# Patient Record
Sex: MALE | Race: WHITE | NOT HISPANIC OR LATINO | Employment: OTHER | URBAN - METROPOLITAN AREA
[De-identification: names, ages, dates, MRNs, and addresses within clinical notes are randomized per-mention and may not be internally consistent; named-entity substitution may affect disease eponyms.]

---

## 2017-01-07 LAB
CHOLEST SERPL-MCNC: 151 MG/DL (ref 100–199)
COMMENT 011824: ABNORMAL
HDLC SERPL-MCNC: 54 MG/DL
LDLC SERPL CALC-MCNC: 63 MG/DL (ref 0–99)
TRIGL SERPL-MCNC: 172 MG/DL (ref 0–149)
VLDLC SERPL CALC-MCNC: 34 MG/DL (ref 5–40)

## 2017-01-12 ENCOUNTER — OFFICE VISIT (OUTPATIENT)
Dept: CARDIOLOGY | Facility: MEDICAL CENTER | Age: 72
End: 2017-01-12
Payer: MEDICARE

## 2017-01-12 VITALS
HEIGHT: 67 IN | SYSTOLIC BLOOD PRESSURE: 112 MMHG | BODY MASS INDEX: 28.72 KG/M2 | WEIGHT: 183 LBS | OXYGEN SATURATION: 96 % | HEART RATE: 88 BPM | DIASTOLIC BLOOD PRESSURE: 68 MMHG

## 2017-01-12 DIAGNOSIS — I10 ESSENTIAL HYPERTENSION: ICD-10-CM

## 2017-01-12 DIAGNOSIS — I25.119 CORONARY ARTERY DISEASE INVOLVING NATIVE CORONARY ARTERY OF NATIVE HEART WITH ANGINA PECTORIS (HCC): ICD-10-CM

## 2017-01-12 DIAGNOSIS — E78.5 HYPERLIPIDEMIA, UNSPECIFIED HYPERLIPIDEMIA TYPE: ICD-10-CM

## 2017-01-12 PROCEDURE — 99214 OFFICE O/P EST MOD 30 MIN: CPT | Performed by: NURSE PRACTITIONER

## 2017-01-12 ASSESSMENT — ENCOUNTER SYMPTOMS
ABDOMINAL PAIN: 0
FEVER: 0
PALPITATIONS: 0
PND: 0
DIZZINESS: 0
SENSORY CHANGE: 1
BACK PAIN: 1
MYALGIAS: 0
ORTHOPNEA: 0
CLAUDICATION: 0
COUGH: 0
SHORTNESS OF BREATH: 0

## 2017-01-12 NOTE — Clinical Note
[facility]   [address] [place] [address]  [place] [address]  Phone: [phone]  Fax: [phone]              Ronny Gallegos  1945    Encounter Date: 1/12/2017    FLAVIA Kirk          PROGRESS NOTE:  Subjective:   Ronny Gallegos is a 71 y.o. male who presents today for 6 month follow up.    He is a patient of Dr. Ellsworth in our office. Hx of HLD, HTN, and CAD (+ CT scoring).    He is overall doing well from a cardiac standpoint. He does have severe back pain and neuropathy, pending another back surgery this year sometime with Dr. Garsia.    He is not able to exercise because of this. He knows he can do better with his diet, his sugars and triglycerides have been a little elevated.    He has had no episodes of chest pain, palpitations, dizziness/lightheadedness, shortness of breath, orthopnea, or peripheral edema.    Past Medical History   Diagnosis Date   • High cholesterol    • GERD (gastroesophageal reflux disease)    • Hypertension    • Alcohol abuse, unspecified    • Arthropathy, unspecified, site unspecified      Surgical repair , cervical arthritis   • Other chest pain    • Other and unspecified hyperlipidemia    • Unspecified essential hypertension    • Hypopotassemia    • Hyposmolality and/or hyponatremia    • Personal history of peptic ulcer disease    • Heart burn    • Indigestion    • Glaucoma    • Unspecified cataract    • Jaundice 4/1/04   • Pneumonia 1979   • Asthma      uses inhalers   • Depressive disorder, not elsewhere classified      Past Surgical History   Procedure Laterality Date   • Other       ANTERIOR NECK FUSION C5-7   • Other       BLEEDING ULCERS   • Lumbar fusion posterior  9/10/2015     Procedure: LUMBAR FUSION POSTERIOR L5-S1 ;  Surgeon: Clint Garsai M.D.;  Location: SURGERY [facility];  Service:    • Lumbar laminectomy diskectomy  9/10/2015     Procedure: LUMBAR LAMINECTOMY ;  Surgeon: Clint FAITH  JULISA Garsia;  Location: SURGERY [facility];  Service:    • Laminotomy  9/10/2015     Procedure: LAMINOTOMY;  Surgeon: Clint Garsia M.D.;  Location: SURGERY [facility];  Service:    • Laparoscopy robotic xi  10/26/2016     Procedure: LAPAROSCOPY FOR LIVER CYST MARSUPIALIZATION AND RESECTION LIVER WALL CYST;  Surgeon: Frank Corona M.D.;  Location: SURGERY [facility];  Service:      Family History   Problem Relation Age of Onset   • Stroke Mother    • Heart Disease Mother    • Stroke Father    • Heart Disease Father      History   Smoking status   • Former Smoker -- 13 years   • Quit date: 12/20/1978   Smokeless tobacco   • Never Used     Allergies   Allergen Reactions   • Nkda [No Known Drug Allergy]      Outpatient Encounter Prescriptions as of 1/12/2017   Medication Sig Dispense Refill   • carvedilol (COREG) 6.25 MG Tab Take 6.25 mg by mouth 2 times a day, with meals.     • escitalopram (LEXAPRO) 20 MG tablet Take 20 mg by mouth every day.     • lisinopril (PRINIVIL) 20 MG Tab Take 20 mg by mouth every day.     • tramadol (ULTRAM) 50 MG Tab Take 1 Tab by mouth every four hours as needed for Mild Pain. 30 Tab 0   • ketorolac (TORADOL) 10 MG Tab Take 1 Tab by mouth every 6 hours as needed for Mild Pain. 20 Tab 1   • ondansetron (ZOFRAN) 4 MG Tab tablet Take 1 Tab by mouth every four hours as needed for Nausea/Vomiting. 20 Tab 1   • atorvastatin (LIPITOR) 20 MG Tab Take 1 Tab by mouth every day. 90 Tab 3   • amlodipine (NORVASC) 10 MG Tab Take 1 Tab by mouth every day. 90 Tab 3   • gabapentin (NEURONTIN) 300 MG Cap Take 300 mg by mouth 3 times a day.  0   • tamsulosin (FLOMAX) 0.4 MG capsule Take 0.4 mg by mouth ONE-HALF HOUR AFTER BREAKFAST.     • dutasteride (AVODART) 0.5 MG capsule Take 0.5 mg by mouth every day.     • Brinzolamide-Brimonidine (SIMBRINZA) 1-0.2 % SUSP 1 Drop by Ophthalmic route 3 times a day.     • bimatoprost (LUMIGAN) 0.03 % SOLN Place 1 Drop in both eyes every  "evening.       No facility-administered encounter medications on file as of 1/12/2017.     Review of Systems   Constitutional: Negative for fever and malaise/fatigue.   Respiratory: Negative for cough and shortness of breath.    Cardiovascular: Negative for chest pain, palpitations, orthopnea, claudication, leg swelling and PND.   Gastrointestinal: Negative for abdominal pain.   Musculoskeletal: Positive for back pain. Negative for myalgias.   Neurological: Positive for sensory change. Negative for dizziness.        Neuropathy     All other systems reviewed and are negative.       Objective:   /68 mmHg  Pulse 88  Ht 1.702 m (5' 7.01\")  Wt 83.008 kg (183 lb)  BMI 28.66 kg/m2  SpO2 96%    Physical Exam   Constitutional: He is oriented to person, place, and time. He appears well-developed and well-nourished. No distress.   HENT:   Head: Normocephalic and atraumatic.   Eyes: EOM are normal.   Neck: Normal range of motion. No JVD present.   Cardiovascular: Normal rate, regular rhythm, normal heart sounds and intact distal pulses.    No murmur heard.  Pulmonary/Chest: Effort normal and breath sounds normal. No respiratory distress. He has no wheezes. He has no rales.   Abdominal: Soft. Bowel sounds are normal.   Musculoskeletal: Normal range of motion. He exhibits no edema.   Neurological: He is alert and oriented to person, place, and time.   Skin: Skin is warm and dry.   Psychiatric: He has a normal mood and affect.   Nursing note and vitals reviewed.    Lab Results   Component Value Date/Time    CHOLESTEROL, 01/06/2017 10:35 AM    LDL 63 01/06/2017 10:35 AM    HDL 54 01/06/2017 10:35 AM    TRIGLYCERIDES 172* 01/06/2017 10:35 AM       Lab Results   Component Value Date/Time    SODIUM 135 10/20/2016 08:28 AM    POTASSIUM 4.0 10/20/2016 08:28 AM    CHLORIDE 101 10/20/2016 08:28 AM    CO2 25 10/20/2016 08:28 AM    GLUCOSE 134* 10/20/2016 08:28 AM    BUN 13 10/20/2016 08:28 AM    CREATININE 0.78 10/20/2016 " 08:28 AM    BUN-CREATININE RATIO 18 06/29/2016 09:59 AM     Lab Results   Component Value Date/Time    ALKALINE PHOSPHATASE 57 10/20/2016 08:28 AM    AST(SGOT) 23 10/20/2016 08:28 AM    ALT(SGPT) 20 10/20/2016 08:28 AM    TOTAL BILIRUBIN 1.1 10/20/2016 08:28 AM      2015 CT SCORING   FINDINGS:  Coronary calcification:  LMA - 0.0  LCX - 35.4  LAD - 167.9  RCA - 616.6  PDA - 0.0  Calcium score:  819.9  The visualized portions of the lungs and mediastinum are unremarkable. There is some scarring in the lingula.  Limited evaluation of the upper abdomen demonstrates interval enlargement of cyst in the left hepatic lobe currently measuring 11 cm and previously measuring 6 cm. Surgical clips identified about the GE junction.           1.  There is extensive atherosclerotic plaque burden.  2.  There is a high likelihood (greater than 90%) of at least one significant coronary stenosis.  3.  The patient's cardiovascular risk is high.  4.  Aggressive risk factor reduction is strongly suggested.  5.  Exercise or pharmacologic nuclear medicine stress testing is strongly suggested to evaluate for indicible ischemia.  6.  Global cardiac assessment is suggested.  7.  Interval enlargement of left hepatic lobe cyst.  Calcium score is above the 75th percentile for the patient's age.    Assessment:     1. Hyperlipidemia, unspecified hyperlipidemia type  PET SCAN MYOCARDIAL PERFUSION   2. Essential hypertension  PET SCAN MYOCARDIAL PERFUSION   3. Coronary artery disease involving native coronary artery of native heart with angina pectoris (HCC)  PET SCAN MYOCARDIAL PERFUSION     Medical Decision Making:  Today's Assessment / Status / Plan:     1. HLD, good LDL control <70 with CAD but triglycerides a little high, he will work on cutting back sugar/carbs. Continue statin. FU with yearly CMP and lipid.    2. HTN, great control on lisinopril, coreg, and amlodipine. Continue same dosing.    3. CAD with + CT scoring, significant disease,  before back surgery check PET scan for ischemia. We will also start him on a baby ASA 81 mg daily, continue his statin and beta blocker. We will call him with his PET scan results- if + elective angiogram will be done, if - can proceed with back surgery at a moderate risk.    FU in clinic in 6 months with ST with yearly labs and PET scan review; call with results as above    Patient verbalizes understanding and agrees with the plan of care.     Collaborating MD: Rui BRADY    Spent 45 minutes in face-to-face patient contact in which greater than 50% of the visit was spent in counseling/coordination of care of medication management, symptom management, re-assurance, discussion of coronary disease, medications in detail, discussion of labs, order PET.          No Recipients

## 2017-01-12 NOTE — PROGRESS NOTES
Subjective:   Ronny Gallegos is a 71 y.o. male who presents today for 6 month follow up.    He is a patient of Dr. Ellsworth in our office. Hx of HLD, HTN, and CAD (+ CT scoring).    He is overall doing well from a cardiac standpoint. He does have severe back pain and neuropathy, pending another back surgery this year sometime with Dr. Garsia.    He is not able to exercise because of this. He knows he can do better with his diet, his sugars and triglycerides have been a little elevated.    He has had no episodes of chest pain, palpitations, dizziness/lightheadedness, shortness of breath, orthopnea, or peripheral edema.    Past Medical History   Diagnosis Date   • High cholesterol    • GERD (gastroesophageal reflux disease)    • Hypertension    • Alcohol abuse, unspecified    • Arthropathy, unspecified, site unspecified      Surgical repair , cervical arthritis   • Other chest pain    • Other and unspecified hyperlipidemia    • Unspecified essential hypertension    • Hypopotassemia    • Hyposmolality and/or hyponatremia    • Personal history of peptic ulcer disease    • Heart burn    • Indigestion    • Glaucoma    • Unspecified cataract    • Jaundice 4/1/04   • Pneumonia 1979   • Asthma      uses inhalers   • Depressive disorder, not elsewhere classified      Past Surgical History   Procedure Laterality Date   • Other       ANTERIOR NECK FUSION C5-7   • Other       BLEEDING ULCERS   • Lumbar fusion posterior  9/10/2015     Procedure: LUMBAR FUSION POSTERIOR L5-S1 ;  Surgeon: Clint Garsia M.D.;  Location: [facility];  Service:    • Lumbar laminectomy diskectomy  9/10/2015     Procedure: LUMBAR LAMINECTOMY ;  Surgeon: Clint Garsia M.D.;  Location: [facility];  Service:    • Laminotomy  9/10/2015     Procedure: LAMINOTOMY;  Surgeon: Clint Garsia M.D.;  Location: [facility];  Service:    • Laparoscopy robotic xi  10/26/2016     Procedure: LAPAROSCOPY FOR LIVER CYST  MARSUPIALIZATION AND RESECTION LIVER WALL CYST;  Surgeon: Frank Corona M.D.;  Location: SURGERY [facility];  Service:      Family History   Problem Relation Age of Onset   • Stroke Mother    • Heart Disease Mother    • Stroke Father    • Heart Disease Father      History   Smoking status   • Former Smoker -- 13 years   • Quit date: 12/20/1978   Smokeless tobacco   • Never Used     Allergies   Allergen Reactions   • Nkda [No Known Drug Allergy]      Outpatient Encounter Prescriptions as of 1/12/2017   Medication Sig Dispense Refill   • carvedilol (COREG) 6.25 MG Tab Take 6.25 mg by mouth 2 times a day, with meals.     • escitalopram (LEXAPRO) 20 MG tablet Take 20 mg by mouth every day.     • lisinopril (PRINIVIL) 20 MG Tab Take 20 mg by mouth every day.     • tramadol (ULTRAM) 50 MG Tab Take 1 Tab by mouth every four hours as needed for Mild Pain. 30 Tab 0   • ketorolac (TORADOL) 10 MG Tab Take 1 Tab by mouth every 6 hours as needed for Mild Pain. 20 Tab 1   • ondansetron (ZOFRAN) 4 MG Tab tablet Take 1 Tab by mouth every four hours as needed for Nausea/Vomiting. 20 Tab 1   • atorvastatin (LIPITOR) 20 MG Tab Take 1 Tab by mouth every day. 90 Tab 3   • amlodipine (NORVASC) 10 MG Tab Take 1 Tab by mouth every day. 90 Tab 3   • gabapentin (NEURONTIN) 300 MG Cap Take 300 mg by mouth 3 times a day.  0   • tamsulosin (FLOMAX) 0.4 MG capsule Take 0.4 mg by mouth ONE-HALF HOUR AFTER BREAKFAST.     • dutasteride (AVODART) 0.5 MG capsule Take 0.5 mg by mouth every day.     • Brinzolamide-Brimonidine (SIMBRINZA) 1-0.2 % SUSP 1 Drop by Ophthalmic route 3 times a day.     • bimatoprost (LUMIGAN) 0.03 % SOLN Place 1 Drop in both eyes every evening.       No facility-administered encounter medications on file as of 1/12/2017.     Review of Systems   Constitutional: Negative for fever and malaise/fatigue.   Respiratory: Negative for cough and shortness of breath.    Cardiovascular: Negative for chest pain,  "palpitations, orthopnea, claudication, leg swelling and PND.   Gastrointestinal: Negative for abdominal pain.   Musculoskeletal: Positive for back pain. Negative for myalgias.   Neurological: Positive for sensory change. Negative for dizziness.        Neuropathy     All other systems reviewed and are negative.       Objective:   /68 mmHg  Pulse 88  Ht 1.702 m (5' 7.01\")  Wt 83.008 kg (183 lb)  BMI 28.66 kg/m2  SpO2 96%    Physical Exam   Constitutional: He is oriented to person, place, and time. He appears well-developed and well-nourished. No distress.   HENT:   Head: Normocephalic and atraumatic.   Eyes: EOM are normal.   Neck: Normal range of motion. No JVD present.   Cardiovascular: Normal rate, regular rhythm, normal heart sounds and intact distal pulses.    No murmur heard.  Pulmonary/Chest: Effort normal and breath sounds normal. No respiratory distress. He has no wheezes. He has no rales.   Abdominal: Soft. Bowel sounds are normal.   Musculoskeletal: Normal range of motion. He exhibits no edema.   Neurological: He is alert and oriented to person, place, and time.   Skin: Skin is warm and dry.   Psychiatric: He has a normal mood and affect.   Nursing note and vitals reviewed.    Lab Results   Component Value Date/Time    CHOLESTEROL, 01/06/2017 10:35 AM    LDL 63 01/06/2017 10:35 AM    HDL 54 01/06/2017 10:35 AM    TRIGLYCERIDES 172* 01/06/2017 10:35 AM       Lab Results   Component Value Date/Time    SODIUM 135 10/20/2016 08:28 AM    POTASSIUM 4.0 10/20/2016 08:28 AM    CHLORIDE 101 10/20/2016 08:28 AM    CO2 25 10/20/2016 08:28 AM    GLUCOSE 134* 10/20/2016 08:28 AM    BUN 13 10/20/2016 08:28 AM    CREATININE 0.78 10/20/2016 08:28 AM    BUN-CREATININE RATIO 18 06/29/2016 09:59 AM     Lab Results   Component Value Date/Time    ALKALINE PHOSPHATASE 57 10/20/2016 08:28 AM    AST(SGOT) 23 10/20/2016 08:28 AM    ALT(SGPT) 20 10/20/2016 08:28 AM    TOTAL BILIRUBIN 1.1 10/20/2016 08:28 AM    "   2015 CT SCORING   FINDINGS:  Coronary calcification:  LMA - 0.0  LCX - 35.4  LAD - 167.9  RCA - 616.6  PDA - 0.0  Calcium score:  819.9  The visualized portions of the lungs and mediastinum are unremarkable. There is some scarring in the lingula.  Limited evaluation of the upper abdomen demonstrates interval enlargement of cyst in the left hepatic lobe currently measuring 11 cm and previously measuring 6 cm. Surgical clips identified about the GE junction.           1.  There is extensive atherosclerotic plaque burden.  2.  There is a high likelihood (greater than 90%) of at least one significant coronary stenosis.  3.  The patient's cardiovascular risk is high.  4.  Aggressive risk factor reduction is strongly suggested.  5.  Exercise or pharmacologic nuclear medicine stress testing is strongly suggested to evaluate for indicible ischemia.  6.  Global cardiac assessment is suggested.  7.  Interval enlargement of left hepatic lobe cyst.  Calcium score is above the 75th percentile for the patient's age.    Assessment:     1. Hyperlipidemia, unspecified hyperlipidemia type  PET SCAN MYOCARDIAL PERFUSION   2. Essential hypertension  PET SCAN MYOCARDIAL PERFUSION   3. Coronary artery disease involving native coronary artery of native heart with angina pectoris (HCC)  PET SCAN MYOCARDIAL PERFUSION     Medical Decision Making:  Today's Assessment / Status / Plan:     1. HLD, good LDL control <70 with CAD but triglycerides a little high, he will work on cutting back sugar/carbs. Continue statin. FU with yearly CMP and lipid.    2. HTN, great control on lisinopril, coreg, and amlodipine. Continue same dosing.    3. CAD with + CT scoring, significant disease, before back surgery check PET scan for ischemia. We will also start him on a baby ASA 81 mg daily, continue his statin and beta blocker. We will call him with his PET scan results- if + elective angiogram will be done, if - can proceed with back surgery at a moderate  risk.    FU in clinic in 6 months with ST with yearly labs and PET scan review; call with results as above    Patient verbalizes understanding and agrees with the plan of care.     Collaborating MD: Rui BRADY    Spent 45 minutes in face-to-face patient contact in which greater than 50% of the visit was spent in counseling/coordination of care of medication management, symptom management, re-assurance, discussion of coronary disease, medications in detail, discussion of labs, order PET.

## 2017-01-12 NOTE — MR AVS SNAPSHOT
"        Ronny Gallegos   2017 1:20 PM   Office Visit   MRN: [id number]    Department:  [facility]   Dept Phone:  [phone]    Description:  Male : 1945   Provider:  FLAVIA Kirk           Reason for Visit     Follow-Up           Allergies as of 2017     Allergen Noted Reactions    Nkda [No Known Drug Allergy] 10/03/2008         You were diagnosed with     Hyperlipidemia, unspecified hyperlipidemia type   [3785929]       Essential hypertension   [4182106]       Coronary artery disease involving native coronary artery of native heart with angina pectoris (HCC)   [1423456]         Vital Signs     Blood Pressure Pulse Height Weight Body Mass Index Oxygen Saturation    112/68 mmHg 88 1.702 m (5' 7.01\") 83.008 kg (183 lb) 28.66 kg/m2 96%    Smoking Status                   Former Smoker           Basic Information     Date Of Birth Sex Race Ethnicity Preferred Language    1945 Male White Non- English      Your appointments     2017 10:00 AM   PET SCAN with PET TECH   [facility] (--)    [address] [place] [address], [address]  [facility] [address]   [phone]           Cardiac PET Prep:  1. Testing Location is [address] [place] [address] 2. No caffeine products including decaf, chocolate and no medications that contain caffeine for 18 hours.  3. NPO for 4 hours, except water.  4. No Viagra, Levitra, or Cialis for 48 hours prior.  5. Arrive 15 minutes before appointment time for check in.  6. Wear comfortable clothing with no metal over the chest area. 7. The procedure will last approximately 45 minutes to an hour.              Problem List              ICD-10-CM Priority Class Noted - Resolved    Alcohol abuse F10.10 Low  Unknown - Present    Arthropathy M12.9 Low  Unknown - Present    Depression F32.9 Low  Unknown - Present    Hyperlipidemia E78.5 Medium  Unknown - Present    Essential hypertension I10 Medium  Unknown - Present    History of peptic ulcer disease " Z87.11 Low  Unknown - Present    Environmental allergies Z91.09 Low  9/17/2013 - Present    Alcohol withdrawal (HCC) F10.239 Low  5/9/2014 - Present    Osteoarthritis of lumbosacral spine M47.817 Low  8/3/2015 - Present    Degeneration of lumbar or lumbosacral intervertebral disc M51.37 Low  9/10/2015 - Present    Other chronic nonalcoholic liver disease K76.89 Low  10/26/2016 - Present    Coronary artery disease involving native coronary artery of native heart with angina pectoris (HCC) I25.119 Medium  1/12/2017 - Present      Health Maintenance        Date Due Completion Dates    IMM DTaP/Tdap/Td Vaccine (1 - Tdap) 7/19/1966 ---    COLONOSCOPY 7/19/1997 ---    IMM ZOSTER VACCINE 7/19/2007 ---    IMM PNEUMOCOCCAL 65+ (ADULT) LOW/MEDIUM RISK SERIES (1 of 2 - PCV13) 7/19/2012 ---    IMM INFLUENZA (1) 9/1/2016 ---            Current Immunizations     Pneumococcal Vaccine (UF)Historical Data 8/1/2015      Below and/or attached are the medications your provider expects you to take. Review all of your home medications and newly ordered medications with your provider and/or pharmacist. Follow medication instructions as directed by your provider and/or pharmacist. Please keep your medication list with you and share with your provider. Update the information when medications are discontinued, doses are changed, or new medications (including over-the-counter products) are added; and carry medication information at all times in the event of emergency situations     Allergies:  NKDA - (reactions not documented)               Medications  Valid as of: January 12, 2017 -  1:56 PM    Generic Name Brand Name Tablet Size Instructions for use    AmLODIPine Besylate (Tab) NORVASC 10 MG Take 1 Tab by mouth every day.        Atorvastatin Calcium (Tab) LIPITOR 20 MG Take 1 Tab by mouth every day.        Bimatoprost (Solution) LUMIGAN 0.03 % Place 1 Drop in both eyes every evening.        Brinzolamide-Brimonidine (Suspension)  Brinzolamide-Brimonidine 1-0.2 % 1 Drop by Ophthalmic route 3 times a day.        Carvedilol (Tab) COREG 6.25 MG Take 6.25 mg by mouth 2 times a day, with meals.        Dutasteride (Cap) AVODART 0.5 MG Take 0.5 mg by mouth every day.        Escitalopram Oxalate (Tab) LEXAPRO 20 MG Take 20 mg by mouth every day.        Gabapentin (Cap) NEURONTIN 300 MG Take 300 mg by mouth 3 times a day.        Ketorolac Tromethamine (Tab) TORADOL 10 MG Take 1 Tab by mouth every 6 hours as needed for Mild Pain.        Lisinopril (Tab) PRINIVIL 20 MG Take 20 mg by mouth every day.        Ondansetron HCl (Tab) ZOFRAN 4 MG Take 1 Tab by mouth every four hours as needed for Nausea/Vomiting.        Tamsulosin HCl (Cap) FLOMAX 0.4 MG Take 0.4 mg by mouth ONE-HALF HOUR AFTER BREAKFAST.        TraMADol HCl (Tab) ULTRAM 50 MG Take 1 Tab by mouth every four hours as needed for Mild Pain.        .                 Medicines prescribed today were sent to:     [facility] - [place], [place] - [address] [facility]    [address] [facility] [address]    Phone: [phone] Fax: [phone]    Open 24 Hours?: No    [facility] - [place], [place] - [address]    [address] [facility] [address]    Phone: [phone] Fax: [phone]    Open 24 Hours?: No    [facility] - [place], [place] - [address] [facility] [facility] [address]    [address] [facility] [place] [address]    Phone: [phone] Fax: [phone]    Open 24 Hours?: No      Medication refill instructions:       If your prescription bottle indicates you have medication refills left, it is not necessary to call your provider’s office. Please contact your pharmacy and they will refill your medication.    If your prescription bottle indicates you do not have any refills left, you may request refills at any time through one of the following ways: The online [employer] system (except Urgent Care), by calling  your provider’s office, or by asking your pharmacy to contact your provider’s office with a refill request. Medication refills are processed only during regular business hours and may not be available until the next business day. Your provider may request additional information or to have a follow-up visit with you prior to refilling your medication.   *Please Note: Medication refills are assigned a new Rx number when refilled electronically. Your pharmacy may indicate that no refills were authorized even though a new prescription for the same medication is available at the pharmacy. Please request the medicine by name with the pharmacy before contacting your provider for a refill.           [employer] Access Code: Activation code not generated  Current [employer] Status: Active

## 2017-01-20 ENCOUNTER — TELEPHONE (OUTPATIENT)
Dept: CARDIOLOGY | Facility: MEDICAL CENTER | Age: 72
End: 2017-01-20

## 2017-01-20 NOTE — TELEPHONE ENCOUNTER
S/w pt, he is requesting order for PET scan to be sent to [facility] in [place], [place] (F: [phone].) Faxed order and included pts cell phone number on the order to call him to schedule as requested.

## 2017-01-20 NOTE — TELEPHONE ENCOUNTER
----- Message from Alyssa Ellis sent at 1/19/2017 11:03 AM PST -----  Regarding: patient wants to do his Pet scan in [place]  SC/Shawn        Patient said Isabela had put in an order for him to have a Pet scan. He is in [place] and wants to know if he can do the scan in [place] so he doesn't have to come back to [place]. He spoke to a lab in [place] and they said they would do it if they have an order. He can be reached at [phone]

## 2017-01-25 NOTE — TELEPHONE ENCOUNTER
Mojgan from [facility] in [place], [place] received request from pt is schedule his PET scan as [facility] in [place] does not perform PET scans.  [facility] is requesting records on the pt to provide better care and facilitate testing- Demographics, H&P, pertinent testing.    Faxed last ov note from SC which includes results of CT-scoring, and facesheet to F: [phone].

## 2017-02-03 ENCOUNTER — FOLLOW UP ESTABLISHED (OUTPATIENT)
Dept: URBAN - METROPOLITAN AREA CLINIC 44 | Facility: CLINIC | Age: 72
End: 2017-02-03
Payer: MEDICARE

## 2017-02-03 PROCEDURE — 92012 INTRM OPH EXAM EST PATIENT: CPT | Performed by: OPHTHALMOLOGY

## 2017-02-03 ASSESSMENT — INTRAOCULAR PRESSURE
OS: 16
OD: 24

## 2017-02-23 DIAGNOSIS — I10 UNSPECIFIED ESSENTIAL HYPERTENSION: ICD-10-CM

## 2017-02-23 DIAGNOSIS — I10 ESSENTIAL HYPERTENSION: ICD-10-CM

## 2017-02-23 DIAGNOSIS — E78.49 OTHER HYPERLIPIDEMIA: ICD-10-CM

## 2017-02-23 RX ORDER — LISINOPRIL 20 MG/1
20 TABLET ORAL DAILY
Qty: 90 TAB | Refills: 3 | Status: SHIPPED | OUTPATIENT
Start: 2017-02-23 | End: 2018-04-25 | Stop reason: SDUPTHER

## 2017-02-23 RX ORDER — ATORVASTATIN CALCIUM 20 MG/1
20 TABLET, FILM COATED ORAL DAILY
Qty: 90 TAB | Refills: 3 | Status: SHIPPED | OUTPATIENT
Start: 2017-02-23 | End: 2018-04-20 | Stop reason: SDUPTHER

## 2017-02-23 RX ORDER — CARVEDILOL 6.25 MG/1
6.25 TABLET ORAL 2 TIMES DAILY WITH MEALS
Qty: 180 TAB | Refills: 3 | Status: SHIPPED | OUTPATIENT
Start: 2017-02-23 | End: 2018-03-19 | Stop reason: SDUPTHER

## 2017-02-23 RX ORDER — AMLODIPINE BESYLATE 10 MG/1
10 TABLET ORAL DAILY
Qty: 90 TAB | Refills: 3 | Status: SHIPPED | OUTPATIENT
Start: 2017-02-23 | End: 2018-03-19 | Stop reason: SDUPTHER

## 2017-04-13 ENCOUNTER — FOLLOW UP ESTABLISHED (OUTPATIENT)
Dept: URBAN - METROPOLITAN AREA CLINIC 10 | Facility: CLINIC | Age: 72
End: 2017-04-13
Payer: MEDICARE

## 2017-04-13 PROCEDURE — 92012 INTRM OPH EXAM EST PATIENT: CPT | Performed by: OPHTHALMOLOGY

## 2017-05-03 ENCOUNTER — HOSPITAL ENCOUNTER (OUTPATIENT)
Dept: RADIOLOGY | Facility: MEDICAL CENTER | Age: 72
End: 2017-05-03
Attending: NURSE PRACTITIONER
Payer: MEDICARE

## 2017-05-03 DIAGNOSIS — E78.5 HYPERLIPIDEMIA, UNSPECIFIED HYPERLIPIDEMIA TYPE: ICD-10-CM

## 2017-05-03 DIAGNOSIS — I10 ESSENTIAL HYPERTENSION: ICD-10-CM

## 2017-05-03 DIAGNOSIS — I25.119 CORONARY ARTERY DISEASE INVOLVING NATIVE CORONARY ARTERY OF NATIVE HEART WITH ANGINA PECTORIS (HCC): ICD-10-CM

## 2017-05-03 PROCEDURE — A9555 RB82 RUBIDIUM: HCPCS

## 2017-05-03 PROCEDURE — 700111 HCHG RX REV CODE 636 W/ 250 OVERRIDE (IP)

## 2017-05-03 NOTE — PROCEDURES
DATE OF PROCEDURE:  5/3/2017    REFERRING PHYSICIAN:  LESA Ny    AGE:  71    GENDER:  Male   HEIGHT:  5 feet 7 inches   WEIGHT:  183 pounds     BMI:      INDICATIONS:  CAD and angina.    MEDICATIONS:      PROCEDURE:  The patient reviewed and signed the acknowledgement for testing   form.  The patient was in a fasting state and was properly prepared for   testing.  An intravenous line was inserted and a flush of normal saline   followed to insure line patency.    A transmission scan was acquired for attenuation correction using the internal   Germanium sources.  The patient was then administered 20.2 mCi of   Rubidium-82.  Approximately 90 seconds after the infusion, resting imagine   were obtained with ECG-gating.  Following the resting series, the patient   administered 47 mg of dipyridamole over four minutes.  The blood pressure,   heart rate and ECG were monitored and recorded.  After the dipyridamole   infusion was completed, another transmission scan for attenuation correction   was obtained.  The patient was then administered 20.1 mCi of Rubidium-82.    Approximately 90 seconds after the infusion, Peak stress images were obtained   with ECG-gating.    CLINICAL RESPONSE:  Resting blood pressure was 117/61 with a heart rate of 58.    Immediately post-dipyridamole infusion the blood pressure was 97/49 with a   heart rate of 71.  After a recovery period the blood pressure was 105/48 with   a heart rate of 69.    The patient experienced headache.    Aminophylline 100 mg was administered following the scan.    ELECTROCARDIOGRAPHIC FINDINGS:  Show normal sinus rhythm with a normal ST   segment and no inducible ST segment changes with peak stress.    SCINTOGRAPHIC FINDINGS:  There is normal homogeneous tracer uptake at rest and   peak stress.    GATED WALL MOTION FINDINGS:  Show a rest EF of 65% with a stress EF of 74%   with normal wall motion and no inducible wall motion  abnormalities.    CONCLUSIONS AND IMPRESSIONS:  Normal stress test.       ____________________________________     MD DREW RILEY / [place]    DD:  05/03/2017 12:32:53  DT:  05/03/2017 13:36:21    D#:  [id number]  Job#:  [id number]

## 2017-05-11 ENCOUNTER — TELEPHONE (OUTPATIENT)
Dept: CARDIOLOGY | Facility: MEDICAL CENTER | Age: 72
End: 2017-05-11

## 2017-05-11 NOTE — TELEPHONE ENCOUNTER
SC/Zeny,     Pt is calling to discuss results of his recent Pet Scan. States he was not able to view them on My Chart. He can be reached at [phone] for a call back.            Pt notified of Pet scan results as reviewed by Dr. Matthews. Pt wanted to get back sx and stated SC wanted to get Pet scan done prior to clearance. Educated pt that he needs to make 6 month Fu appointment and they can also do the clearance then. Pt OK with scheduling appointment. Made for 6/8 with Sheryl ROB.

## 2017-06-05 ENCOUNTER — HOSPITAL ENCOUNTER (OUTPATIENT)
Dept: RADIOLOGY | Facility: MEDICAL CENTER | Age: 72
End: 2017-06-05
Attending: NEUROLOGICAL SURGERY | Admitting: NEUROLOGICAL SURGERY
Payer: MEDICARE

## 2017-06-05 DIAGNOSIS — Z01.812 PRE-OPERATIVE LABORATORY EXAMINATION: ICD-10-CM

## 2017-06-05 DIAGNOSIS — M51.36 DEGENERATION OF LUMBAR INTERVERTEBRAL DISC: ICD-10-CM

## 2017-06-05 DIAGNOSIS — Z01.810 PRE-OPERATIVE CARDIOVASCULAR EXAMINATION: ICD-10-CM

## 2017-06-05 LAB
ANION GAP SERPL CALC-SCNC: 7 MMOL/L (ref 0–11.9)
APPEARANCE UR: CLEAR
APTT PPP: 29.9 SEC (ref 24.7–36)
BASOPHILS # BLD AUTO: 0.9 % (ref 0–1.8)
BASOPHILS # BLD: 0.05 K/UL (ref 0–0.12)
BILIRUB UR QL STRIP.AUTO: NEGATIVE
BUN SERPL-MCNC: 10 MG/DL (ref 8–22)
CALCIUM SERPL-MCNC: 8.8 MG/DL (ref 8.5–10.5)
CHLORIDE SERPL-SCNC: 97 MMOL/L (ref 96–112)
CO2 SERPL-SCNC: 27 MMOL/L (ref 20–33)
COLOR UR: NORMAL
CREAT SERPL-MCNC: 0.63 MG/DL (ref 0.5–1.4)
CULTURE IF INDICATED INDCX: NO UA CULTURE
EKG IMPRESSION: NORMAL
EOSINOPHIL # BLD AUTO: 0.11 K/UL (ref 0–0.51)
EOSINOPHIL NFR BLD: 1.9 % (ref 0–6.9)
ERYTHROCYTE [DISTWIDTH] IN BLOOD BY AUTOMATED COUNT: 47.8 FL (ref 35.9–50)
GFR SERPL CREATININE-BSD FRML MDRD: >60 ML/MIN/1.73 M 2
GLUCOSE SERPL-MCNC: 119 MG/DL (ref 65–99)
GLUCOSE UR STRIP.AUTO-MCNC: NEGATIVE MG/DL
HCT VFR BLD AUTO: 44.2 % (ref 42–52)
HGB BLD-MCNC: 14.5 G/DL (ref 14–18)
IMM GRANULOCYTES # BLD AUTO: 0.09 K/UL (ref 0–0.11)
IMM GRANULOCYTES NFR BLD AUTO: 1.6 % (ref 0–0.9)
INR PPP: 0.93 (ref 0.87–1.13)
KETONES UR STRIP.AUTO-MCNC: NEGATIVE MG/DL
LEUKOCYTE ESTERASE UR QL STRIP.AUTO: NEGATIVE
LYMPHOCYTES # BLD AUTO: 1.66 K/UL (ref 1–4.8)
LYMPHOCYTES NFR BLD: 29.3 % (ref 22–41)
MCH RBC QN AUTO: 29.7 PG (ref 27–33)
MCHC RBC AUTO-ENTMCNC: 32.8 G/DL (ref 33.7–35.3)
MCV RBC AUTO: 90.6 FL (ref 81.4–97.8)
MICRO URNS: NORMAL
MONOCYTES # BLD AUTO: 0.7 K/UL (ref 0–0.85)
MONOCYTES NFR BLD AUTO: 12.4 % (ref 0–13.4)
NEUTROPHILS # BLD AUTO: 3.05 K/UL (ref 1.82–7.42)
NEUTROPHILS NFR BLD: 53.9 % (ref 44–72)
NITRITE UR QL STRIP.AUTO: NEGATIVE
NRBC # BLD AUTO: 0 K/UL
NRBC BLD AUTO-RTO: 0 /100 WBC
PH UR STRIP.AUTO: 6.5 [PH]
PLATELET # BLD AUTO: 357 K/UL (ref 164–446)
PMV BLD AUTO: 9.3 FL (ref 9–12.9)
POTASSIUM SERPL-SCNC: 4.1 MMOL/L (ref 3.6–5.5)
PROT UR QL STRIP: NEGATIVE MG/DL
PROTHROMBIN TIME: 12.7 SEC (ref 12–14.6)
RBC # BLD AUTO: 4.88 M/UL (ref 4.7–6.1)
RBC UR QL AUTO: NEGATIVE
SODIUM SERPL-SCNC: 131 MMOL/L (ref 135–145)
SP GR UR STRIP.AUTO: 1.01
WBC # BLD AUTO: 5.7 K/UL (ref 4.8–10.8)

## 2017-06-05 PROCEDURE — 72110 X-RAY EXAM L-2 SPINE 4/>VWS: CPT

## 2017-06-05 PROCEDURE — 85610 PROTHROMBIN TIME: CPT

## 2017-06-05 PROCEDURE — 93005 ELECTROCARDIOGRAM TRACING: CPT

## 2017-06-05 PROCEDURE — 85730 THROMBOPLASTIN TIME PARTIAL: CPT

## 2017-06-05 PROCEDURE — 36415 COLL VENOUS BLD VENIPUNCTURE: CPT

## 2017-06-05 PROCEDURE — 80048 BASIC METABOLIC PNL TOTAL CA: CPT

## 2017-06-05 PROCEDURE — 93010 ELECTROCARDIOGRAM REPORT: CPT | Performed by: INTERNAL MEDICINE

## 2017-06-05 PROCEDURE — 81003 URINALYSIS AUTO W/O SCOPE: CPT

## 2017-06-05 PROCEDURE — 85025 COMPLETE CBC W/AUTO DIFF WBC: CPT

## 2017-06-05 PROCEDURE — 71020 DX-CHEST-2 VIEWS: CPT

## 2017-06-08 ENCOUNTER — OFFICE VISIT (OUTPATIENT)
Dept: CARDIOLOGY | Facility: MEDICAL CENTER | Age: 72
End: 2017-06-08
Payer: MEDICARE

## 2017-06-08 VITALS
HEART RATE: 78 BPM | SYSTOLIC BLOOD PRESSURE: 132 MMHG | BODY MASS INDEX: 27.94 KG/M2 | HEIGHT: 67 IN | DIASTOLIC BLOOD PRESSURE: 80 MMHG | WEIGHT: 178 LBS | OXYGEN SATURATION: 95 %

## 2017-06-08 DIAGNOSIS — K76.89 OTHER CHRONIC NONALCOHOLIC LIVER DISEASE: ICD-10-CM

## 2017-06-08 DIAGNOSIS — I25.119 CORONARY ARTERY DISEASE INVOLVING NATIVE CORONARY ARTERY OF NATIVE HEART WITH ANGINA PECTORIS (HCC): ICD-10-CM

## 2017-06-08 DIAGNOSIS — E78.2 MIXED HYPERLIPIDEMIA: ICD-10-CM

## 2017-06-08 DIAGNOSIS — I10 ESSENTIAL HYPERTENSION: ICD-10-CM

## 2017-06-08 PROCEDURE — 99214 OFFICE O/P EST MOD 30 MIN: CPT | Performed by: NURSE PRACTITIONER

## 2017-06-08 ASSESSMENT — ENCOUNTER SYMPTOMS
CLAUDICATION: 0
ORTHOPNEA: 0
PND: 0
PALPITATIONS: 0
DIZZINESS: 0
BACK PAIN: 1
ABDOMINAL PAIN: 0
SHORTNESS OF BREATH: 0
COUGH: 0
WEAKNESS: 0
MYALGIAS: 0

## 2017-06-08 NOTE — Clinical Note
"     [facility]   [address] [place] [address]  [place] [address]  Phone: [phone]  Fax: [phone]              Ronny Gallegos  1945    Encounter Date: 2017    KENDELL Hayward          PROGRESS NOTE:  Subjective:   Ronny \"Mikhail\" Royer Gallegos is a 71 y.o. male who presents today to follow-up on hypertension and hyperlipidemia. He is felt to have coronary artery disease as he underwent coronary calcium scoring in  and had a value of 819. He has never had any angina symptoms. He was last seen by Isabela Kay in 2017 at which time she ordered a PET scan as he was anticipating back surgery.    He is scheduled for a lumbar laminectomy on 2017 by Dr. Clint Garsia. Patient tells me that he had an EKG done by Dr. Garsia's office and no other testing was suggested or ordered.    Patient states he feels generally well with the exception of back pain and pain down his right leg. He is hoping that the surgery will relieve these discomforts.    He denies any chest tightness, heaviness or pressure. No shortness of breath, ankle edema or palpitations.    Past Medical History   Diagnosis Date   • High cholesterol    • Hypertension    • Hypopotassemia    • Hyposmolality and/or hyponatremia    • Personal history of peptic ulcer disease    • Glaucoma    • Unspecified cataract      removed bilat   • Jaundice      \"from drinking after wife \"   • Pneumonia    • Asthma    • Cold      2017   • Depression    • Pain 2017     back and bilat legs, 6/10   • Neuropathy (CMS-[facility])      bilat legs/feet   • Arthritis      right ankle/left knee     Past Surgical History   Procedure Laterality Date   • Other       ANTERIOR NECK FUSION C5-7   • Other       BLEEDING ULCERS   • Lumbar fusion posterior  9/10/2015     Procedure: LUMBAR FUSION POSTERIOR L5-S1 ;  Surgeon: Clint Garsia M.D.;  Location: SURGERY [facility];  " Service:    • Lumbar laminectomy diskectomy  9/10/2015     Procedure: LUMBAR LAMINECTOMY ;  Surgeon: Clint Garsia M.D.;  Location: SURGERY [facility];  Service:    • Laminotomy  9/10/2015     Procedure: LAMINOTOMY;  Surgeon: Clint Garsia M.D.;  Location: SURGERY [facility];  Service:    • Laparoscopy robotic xi  10/26/2016     Procedure: LAPAROSCOPY FOR LIVER CYST MARSUPIALIZATION AND RESECTION LIVER WALL CYST;  Surgeon: Frank Corona M.D.;  Location: SURGERY [facility];  Service:      Family History   Problem Relation Age of Onset   • Stroke Mother    • Heart Disease Mother    • Stroke Father    • Heart Disease Father      History   Smoking status   • Former Smoker -- 1.00 packs/day for 13 years   • Types: Cigarettes   • Quit date: 12/25/1978   Smokeless tobacco   • Never Used     Allergies   Allergen Reactions   • Nkda [No Known Drug Allergy]      Outpatient Encounter Prescriptions as of 6/8/2017   Medication Sig Dispense Refill   • amlodipine (NORVASC) 10 MG Tab Take 1 Tab by mouth every day. 90 Tab 3   • atorvastatin (LIPITOR) 20 MG Tab Take 1 Tab by mouth every day. 90 Tab 3   • lisinopril (PRINIVIL) 20 MG Tab Take 1 Tab by mouth every day. 90 Tab 3   • carvedilol (COREG) 6.25 MG Tab Take 1 Tab by mouth 2 times a day, with meals. 180 Tab 3   • escitalopram (LEXAPRO) 20 MG tablet Take 20 mg by mouth every morning. Indications: Major Depressive Disorder     • gabapentin (NEURONTIN) 300 MG Cap Take 300 mg by mouth 2 Times a Day. Indications: Neuropathic Pain  0   • bimatoprost (LUMIGAN) 0.03 % SOLN Place 1 Drop in both eyes every evening.       No facility-administered encounter medications on file as of 6/8/2017.     Review of Systems   Constitutional: Negative for malaise/fatigue.   Respiratory: Negative for cough and shortness of breath.    Cardiovascular: Negative for chest pain, palpitations, orthopnea, claudication, leg swelling and PND.   Gastrointestinal: Negative for  "abdominal pain.   Musculoskeletal: Positive for back pain (chronic with pain down his right leg.). Negative for myalgias.   Neurological: Negative for dizziness and weakness.        Objective:   /80 mmHg  Pulse 78  Ht 1.702 m (5' 7.01\")  Wt 80.74 kg (178 lb)  BMI 27.87 kg/m2  SpO2 95%    Physical Exam   Constitutional: He is oriented to person, place, and time. He appears well-developed and well-nourished.   HENT:   Head: Normocephalic.   Eyes: Conjunctivae are normal.   Neck: No JVD present. No thyromegaly present.   Cardiovascular: Normal rate, regular rhythm and normal heart sounds.    Pulmonary/Chest: Effort normal and breath sounds normal. He has no wheezes. He has no rales.   Abdominal: Soft. Bowel sounds are normal. He exhibits no distension. There is no tenderness.   Musculoskeletal: He exhibits no edema.   Neurological: He is alert and oriented to person, place, and time.   Skin: Skin is warm and dry.   Psychiatric: He has a normal mood and affect.     Results for JAMEL GUADARRAMA (MRN [id number]) as of 6/8/2017 14:42   Ref. Range 6/5/2017 08:38   Sodium Latest Ref Range: 135-145 mmol/L 131 (L)   Potassium Latest Ref Range: 3.6-5.5 mmol/L 4.1   Chloride Latest Ref Range:  mmol/L 97   Co2 Latest Ref Range: 20-33 mmol/L 27   Anion Gap Latest Ref Range: 0.0-11.9  7.0   Glucose Latest Ref Range: 65-99 mg/dL 119 (H)   Bun Latest Ref Range: 8-22 mg/dL 10   Creatinine Latest Ref Range: 0.50-1.40 mg/dL 0.63   GFR If  Latest Ref Range: >60 mL/min/1.73 m 2 >60   GFR If Non  Latest Ref Range: >60 mL/min/1.73 m 2 >60   Calcium Latest Ref Range: 8.5-10.5 mg/dL 8.8   Results for JAMEL GUADARRAMA (MRN [id number]) as of 6/8/2017 15:50   Ref. Range 1/6/2017 10:35   Cholesterol,Tot Latest Ref Range: 100-199 mg/dL 151   Triglycerides Latest Ref Range: 0-149 mg/dL 172 (H)   HDL Latest Ref Range: >39 mg/dL 54   LDL Latest Ref Range: 0-99 mg/dL 63   VLDL Cholesterol Calc " Latest Ref Range: 5-40 mg/dL 34       May 4, 2017: Cardiac PET scan was negative for ischemia.    Assessment:     1. Essential hypertension     2. Mixed hyperlipidemia     3. Other chronic nonalcoholic liver disease     4. Coronary artery disease involving native coronary artery of native heart with angina pectoris (CMS-HCC)         Medical Decision Making:  Today's Assessment / Status / Plan:     Hypertension: His blood pressure is excellent in the office today. He will continue on his current regimen of medications.    Hyperlipidemia: He had stopped the atorvastatin for. Of time but has gone back on it. His last lipids done in January were to goal with exception of slightly elevated triglycerides. He will limit sweets and carbs in his diet.    Coronary artery disease: Based on coronary calcium scoring done in 2015. Patient has no symptoms of angina and his PET scan was negative for ischemia.    Back pain: He has chronic back pain with radiation into his right leg. He is planning on undergoing lambert laminectomy with Dr. Clint Garsia on June 13, 2017. He may undergo this surgery without further testing as he has had a recent PET scan which is negative for ischemia.    He will follow-up in one year with our office. He will need to establish with a new cardiologist as it appears that Dr. Ellsworth will not return for medical leave. [facility]    Collaborating Provider: Dr Ayala.        No Recipients

## 2017-06-08 NOTE — MR AVS SNAPSHOT
"        Ronny Gallegos   2017 2:20 PM   Office Visit   MRN: [id number]    Department:  [facility]   Dept Phone:  [phone]    Description:  Male : 1945   Provider:  KENDELL Hayward           Reason for Visit     New Patient           Allergies as of 2017     Allergen Noted Reactions    Nkda [No Known Drug Allergy] 10/03/2008         You were diagnosed with     Essential hypertension   [6422756]       Mixed hyperlipidemia   [272.2.ICD-9-CM]       Other chronic nonalcoholic liver disease   [571.8.ICD-9-CM]         Vital Signs     Blood Pressure Pulse Height Weight Body Mass Index Oxygen Saturation    132/80 mmHg 78 1.702 m (5' 7.01\") 80.74 kg (178 lb) 27.87 kg/m2 95%    Smoking Status                   Former Smoker           Basic Information     Date Of Birth Sex Race Ethnicity Preferred Language    1945 Male White Non- English      Problem List              ICD-10-CM Priority Class Noted - Resolved    Alcohol abuse F10.10 Low  Unknown - Present    Arthropathy M12.9 Low  Unknown - Present    Depression F32.9 Low  Unknown - Present    Mixed hyperlipidemia E78.2 Medium  Unknown - Present    Essential hypertension I10 Medium  Unknown - Present    History of peptic ulcer disease Z87.11 Low  Unknown - Present    Environmental allergies Z91.09 Low  2013 - Present    Alcohol withdrawal (HCC) F10.239 Low  2014 - Present    Osteoarthritis of lumbosacral spine M47.817 Low  8/3/2015 - Present    Degeneration of lumbar or lumbosacral intervertebral disc M51.37 Low  9/10/2015 - Present    Other chronic nonalcoholic liver disease K76.89 Low  10/26/2016 - Present    Coronary artery disease involving native coronary artery of native heart with angina pectoris (CMS-HCC) I25.119 Medium  2017 - Present      Health Maintenance        Date Due Completion Dates    IMM DTaP/Tdap/Td Vaccine (1 - Tdap) 1966 ---    COLONOSCOPY 1997 ---    IMM ZOSTER VACCINE " 7/19/2007 ---    IMM PNEUMOCOCCAL 65+ (ADULT) LOW/MEDIUM RISK SERIES (1 of 2 - PCV13) 7/19/2012 ---            Current Immunizations     Pneumococcal Vaccine (UF)Historical Data 8/1/2015      Below and/or attached are the medications your provider expects you to take. Review all of your home medications and newly ordered medications with your provider and/or pharmacist. Follow medication instructions as directed by your provider and/or pharmacist. Please keep your medication list with you and share with your provider. Update the information when medications are discontinued, doses are changed, or new medications (including over-the-counter products) are added; and carry medication information at all times in the event of emergency situations     Allergies:  NKDA - (reactions not documented)               Medications  Valid as of: June 08, 2017 -  2:55 PM    Generic Name Brand Name Tablet Size Instructions for use    AmLODIPine Besylate (Tab) NORVASC 10 MG Take 1 Tab by mouth every day.        Atorvastatin Calcium (Tab) LIPITOR 20 MG Take 1 Tab by mouth every day.        Bimatoprost (Solution) LUMIGAN 0.03 % Place 1 Drop in both eyes every evening.        Carvedilol (Tab) COREG 6.25 MG Take 1 Tab by mouth 2 times a day, with meals.        Escitalopram Oxalate (Tab) LEXAPRO 20 MG Take 20 mg by mouth every morning. Indications: Major Depressive Disorder        Gabapentin (Cap) NEURONTIN 300 MG Take 300 mg by mouth 2 Times a Day. Indications: Neuropathic Pain        Lisinopril (Tab) PRINIVIL 20 MG Take 1 Tab by mouth every day.        .                 Medicines prescribed today were sent to:     [id number] - [place], [place] - [address] AT [address] [place] [address] & [place]    [address] [place] [address] [address] [place] [address]    Phone: [phone] Fax: [phone]    Open 24 Hours?: Yes    [id number] - [place] [facility] - [address] AT [facility] [address]    [address] [place] [address]     Phone: [phone] Fax: [phone]    Open 24 Hours?: No      Medication refill instructions:       If your prescription bottle indicates you have medication refills left, it is not necessary to call your provider’s office. Please contact your pharmacy and they will refill your medication.    If your prescription bottle indicates you do not have any refills left, you may request refills at any time through one of the following ways: The online [employer] system (except Urgent Care), by calling your provider’s office, or by asking your pharmacy to contact your provider’s office with a refill request. Medication refills are processed only during regular business hours and may not be available until the next business day. Your provider may request additional information or to have a follow-up visit with you prior to refilling your medication.   *Please Note: Medication refills are assigned a new Rx number when refilled electronically. Your pharmacy may indicate that no refills were authorized even though a new prescription for the same medication is available at the pharmacy. Please request the medicine by name with the pharmacy before contacting your provider for a refill.           [employer] Access Code: Activation code not generated  Current [employer] Status: Active

## 2017-06-08 NOTE — PROGRESS NOTES
"Subjective:   Ronny \"Mikhail\" Royer Gallegos is a 71 y.o. male who presents today to follow-up on hypertension and hyperlipidemia. He is felt to have coronary artery disease as he underwent coronary calcium scoring in  and had a value of 819. He has never had any angina symptoms. He was last seen by Isabela Kay in 2017 at which time she ordered a PET scan as he was anticipating back surgery.    He is scheduled for a lumbar laminectomy on 2017 by Dr. Clint Garsia. Patient tells me that he had an EKG done by Dr. Garsia's office and no other testing was suggested or ordered.    Patient states he feels generally well with the exception of back pain and pain down his right leg. He is hoping that the surgery will relieve these discomforts.    He denies any chest tightness, heaviness or pressure. No shortness of breath, ankle edema or palpitations.    Past Medical History   Diagnosis Date   • High cholesterol    • Hypertension    • Hypopotassemia    • Hyposmolality and/or hyponatremia    • Personal history of peptic ulcer disease    • Glaucoma    • Unspecified cataract      removed bilat   • Jaundice      \"from drinking after wife \"   • Pneumonia    • Asthma    • Cold      2017   • Depression    • Pain 2017     back and bilat legs, 6/10   • Neuropathy (CMS-[facility])      bilat legs/feet   • Arthritis      right ankle/left knee     Past Surgical History   Procedure Laterality Date   • Other       ANTERIOR NECK FUSION C5-7   • Other       BLEEDING ULCERS   • Lumbar fusion posterior  9/10/2015     Procedure: LUMBAR FUSION POSTERIOR L5-S1 ;  Surgeon: Clint Garsia M.D.;  Location: SURGERY [facility];  Service:    • Lumbar laminectomy diskectomy  9/10/2015     Procedure: LUMBAR LAMINECTOMY ;  Surgeon: Clint Garsia M.D.;  Location: [facility];  Service:    • Laminotomy  9/10/2015     Procedure: LAMINOTOMY;  Surgeon: Clint Garsia M.D.;  Location: SURGERY " "[facility];  Service:    • Laparoscopy robotic xi  10/26/2016     Procedure: LAPAROSCOPY FOR LIVER CYST MARSUPIALIZATION AND RESECTION LIVER WALL CYST;  Surgeon: Frank Corona M.D.;  Location: SURGERY [facility];  Service:      Family History   Problem Relation Age of Onset   • Stroke Mother    • Heart Disease Mother    • Stroke Father    • Heart Disease Father      History   Smoking status   • Former Smoker -- 1.00 packs/day for 13 years   • Types: Cigarettes   • Quit date: 12/25/1978   Smokeless tobacco   • Never Used     Allergies   Allergen Reactions   • Nkda [No Known Drug Allergy]      Outpatient Encounter Prescriptions as of 6/8/2017   Medication Sig Dispense Refill   • amlodipine (NORVASC) 10 MG Tab Take 1 Tab by mouth every day. 90 Tab 3   • atorvastatin (LIPITOR) 20 MG Tab Take 1 Tab by mouth every day. 90 Tab 3   • lisinopril (PRINIVIL) 20 MG Tab Take 1 Tab by mouth every day. 90 Tab 3   • carvedilol (COREG) 6.25 MG Tab Take 1 Tab by mouth 2 times a day, with meals. 180 Tab 3   • escitalopram (LEXAPRO) 20 MG tablet Take 20 mg by mouth every morning. Indications: Major Depressive Disorder     • gabapentin (NEURONTIN) 300 MG Cap Take 300 mg by mouth 2 Times a Day. Indications: Neuropathic Pain  0   • bimatoprost (LUMIGAN) 0.03 % SOLN Place 1 Drop in both eyes every evening.       No facility-administered encounter medications on file as of 6/8/2017.     Review of Systems   Constitutional: Negative for malaise/fatigue.   Respiratory: Negative for cough and shortness of breath.    Cardiovascular: Negative for chest pain, palpitations, orthopnea, claudication, leg swelling and PND.   Gastrointestinal: Negative for abdominal pain.   Musculoskeletal: Positive for back pain (chronic with pain down his right leg.). Negative for myalgias.   Neurological: Negative for dizziness and weakness.        Objective:   /80 mmHg  Pulse 78  Ht 1.702 m (5' 7.01\")  Wt 80.74 kg (178 lb)  BMI 27.87 " kg/m2  SpO2 95%    Physical Exam   Constitutional: He is oriented to person, place, and time. He appears well-developed and well-nourished.   HENT:   Head: Normocephalic.   Eyes: Conjunctivae are normal.   Neck: No JVD present. No thyromegaly present.   Cardiovascular: Normal rate, regular rhythm and normal heart sounds.    Pulmonary/Chest: Effort normal and breath sounds normal. He has no wheezes. He has no rales.   Abdominal: Soft. Bowel sounds are normal. He exhibits no distension. There is no tenderness.   Musculoskeletal: He exhibits no edema.   Neurological: He is alert and oriented to person, place, and time.   Skin: Skin is warm and dry.   Psychiatric: He has a normal mood and affect.     Results for JAMEL GUADARRAMA (MRN [id number]) as of 6/8/2017 14:42   Ref. Range 6/5/2017 08:38   Sodium Latest Ref Range: 135-145 mmol/L 131 (L)   Potassium Latest Ref Range: 3.6-5.5 mmol/L 4.1   Chloride Latest Ref Range:  mmol/L 97   Co2 Latest Ref Range: 20-33 mmol/L 27   Anion Gap Latest Ref Range: 0.0-11.9  7.0   Glucose Latest Ref Range: 65-99 mg/dL 119 (H)   Bun Latest Ref Range: 8-22 mg/dL 10   Creatinine Latest Ref Range: 0.50-1.40 mg/dL 0.63   GFR If  Latest Ref Range: >60 mL/min/1.73 m 2 >60   GFR If Non  Latest Ref Range: >60 mL/min/1.73 m 2 >60   Calcium Latest Ref Range: 8.5-10.5 mg/dL 8.8   Results for JAMEL GUADARRAMA (MRN [id number]) as of 6/8/2017 15:50   Ref. Range 1/6/2017 10:35   Cholesterol,Tot Latest Ref Range: 100-199 mg/dL 151   Triglycerides Latest Ref Range: 0-149 mg/dL 172 (H)   HDL Latest Ref Range: >39 mg/dL 54   LDL Latest Ref Range: 0-99 mg/dL 63   VLDL Cholesterol Calc Latest Ref Range: 5-40 mg/dL 34       May 4, 2017: Cardiac PET scan was negative for ischemia.    Assessment:     1. Essential hypertension     2. Mixed hyperlipidemia     3. Other chronic nonalcoholic liver disease     4. Coronary artery disease involving native coronary artery  of native heart with angina pectoris (CMS-HCC)         Medical Decision Making:  Today's Assessment / Status / Plan:     Hypertension: His blood pressure is excellent in the office today. He will continue on his current regimen of medications.    Hyperlipidemia: He had stopped the atorvastatin for. Of time but has gone back on it. His last lipids done in January were to goal with exception of slightly elevated triglycerides. He will limit sweets and carbs in his diet.    Coronary artery disease: Based on coronary calcium scoring done in 2015. Patient has no symptoms of angina and his PET scan was negative for ischemia.    Back pain: He has chronic back pain with radiation into his right leg. He is planning on undergoing lambert laminectomy with Dr. Clint Garsia on June 13, 2017. He may undergo this surgery without further testing as he has had a recent PET scan which is negative for ischemia.    He will follow-up in one year with our office. He will need to establish with a new cardiologist as it appears that Dr. Ellsworth will not return for medical leave. [facility] will follow[facility].    Collaborating Provider: Dr Ayala.

## 2017-06-13 ENCOUNTER — HOSPITAL ENCOUNTER (OUTPATIENT)
Facility: MEDICAL CENTER | Age: 72
End: 2017-06-14
Attending: NEUROLOGICAL SURGERY | Admitting: NEUROLOGICAL SURGERY
Payer: MEDICARE

## 2017-06-13 ENCOUNTER — APPOINTMENT (OUTPATIENT)
Dept: RADIOLOGY | Facility: MEDICAL CENTER | Age: 72
End: 2017-06-13
Attending: NEUROLOGICAL SURGERY
Payer: MEDICARE

## 2017-06-13 PROBLEM — M51.369 DDD (DEGENERATIVE DISC DISEASE), LUMBAR: Status: ACTIVE | Noted: 2017-06-13

## 2017-06-13 PROBLEM — M51.36 DDD (DEGENERATIVE DISC DISEASE), LUMBAR: Status: ACTIVE | Noted: 2017-06-13

## 2017-06-13 PROCEDURE — 700102 HCHG RX REV CODE 250 W/ 637 OVERRIDE(OP)

## 2017-06-13 PROCEDURE — A9270 NON-COVERED ITEM OR SERVICE: HCPCS | Performed by: PHYSICIAN ASSISTANT

## 2017-06-13 PROCEDURE — 500885 HCHG PACK, JACKSON TABLE: Performed by: NEUROLOGICAL SURGERY

## 2017-06-13 PROCEDURE — 160035 HCHG PACU - 1ST 60 MINS PHASE I: Performed by: NEUROLOGICAL SURGERY

## 2017-06-13 PROCEDURE — 160009 HCHG ANES TIME/MIN: Performed by: NEUROLOGICAL SURGERY

## 2017-06-13 PROCEDURE — 501838 HCHG SUTURE GENERAL: Performed by: NEUROLOGICAL SURGERY

## 2017-06-13 PROCEDURE — 96376 TX/PRO/DX INJ SAME DRUG ADON: CPT

## 2017-06-13 PROCEDURE — 700102 HCHG RX REV CODE 250 W/ 637 OVERRIDE(OP): Performed by: NEUROLOGICAL SURGERY

## 2017-06-13 PROCEDURE — 160029 HCHG SURGERY MINUTES - 1ST 30 MINS LEVEL 4: Performed by: NEUROLOGICAL SURGERY

## 2017-06-13 PROCEDURE — 700111 HCHG RX REV CODE 636 W/ 250 OVERRIDE (IP)

## 2017-06-13 PROCEDURE — 160041 HCHG SURGERY MINUTES - EA ADDL 1 MIN LEVEL 4: Performed by: NEUROLOGICAL SURGERY

## 2017-06-13 PROCEDURE — 160036 HCHG PACU - EA ADDL 30 MINS PHASE I: Performed by: NEUROLOGICAL SURGERY

## 2017-06-13 PROCEDURE — 72020 X-RAY EXAM OF SPINE 1 VIEW: CPT

## 2017-06-13 PROCEDURE — 160002 HCHG RECOVERY MINUTES (STAT): Performed by: NEUROLOGICAL SURGERY

## 2017-06-13 PROCEDURE — 96374 THER/PROPH/DIAG INJ IV PUSH: CPT

## 2017-06-13 PROCEDURE — A9270 NON-COVERED ITEM OR SERVICE: HCPCS

## 2017-06-13 PROCEDURE — 700112 HCHG RX REV CODE 229: Performed by: PHYSICIAN ASSISTANT

## 2017-06-13 PROCEDURE — 700111 HCHG RX REV CODE 636 W/ 250 OVERRIDE (IP): Performed by: PHYSICIAN ASSISTANT

## 2017-06-13 PROCEDURE — 502240 HCHG MISC OR SUPPLY RC 0272: Performed by: NEUROLOGICAL SURGERY

## 2017-06-13 PROCEDURE — 700102 HCHG RX REV CODE 250 W/ 637 OVERRIDE(OP): Performed by: PHYSICIAN ASSISTANT

## 2017-06-13 PROCEDURE — 700101 HCHG RX REV CODE 250

## 2017-06-13 PROCEDURE — 502626 HCHG SURGIFLO HEMOSTATIC MATRIX 6ML: Performed by: NEUROLOGICAL SURGERY

## 2017-06-13 PROCEDURE — A9270 NON-COVERED ITEM OR SERVICE: HCPCS | Performed by: NEUROLOGICAL SURGERY

## 2017-06-13 PROCEDURE — 502627 HCHG HEMOSTAT, SURGICEL 4X4: Performed by: NEUROLOGICAL SURGERY

## 2017-06-13 PROCEDURE — 160048 HCHG OR STATISTICAL LEVEL 1-5: Performed by: NEUROLOGICAL SURGERY

## 2017-06-13 PROCEDURE — G0378 HOSPITAL OBSERVATION PER HR: HCPCS

## 2017-06-13 RX ORDER — LIDOCAINE AND PRILOCAINE 25; 25 MG/G; MG/G
1 CREAM TOPICAL
Status: COMPLETED | OUTPATIENT
Start: 2017-06-13 | End: 2017-06-13

## 2017-06-13 RX ORDER — CALCIUM CARBONATE 500 MG/1
500 TABLET, CHEWABLE ORAL 2 TIMES DAILY
Status: DISCONTINUED | OUTPATIENT
Start: 2017-06-13 | End: 2017-06-14 | Stop reason: HOSPADM

## 2017-06-13 RX ORDER — DEXTROSE MONOHYDRATE, SODIUM CHLORIDE, AND POTASSIUM CHLORIDE 50; 1.49; 4.5 G/1000ML; G/1000ML; G/1000ML
INJECTION, SOLUTION INTRAVENOUS CONTINUOUS
Status: DISCONTINUED | OUTPATIENT
Start: 2017-06-13 | End: 2017-06-14 | Stop reason: HOSPADM

## 2017-06-13 RX ORDER — DOCUSATE SODIUM 100 MG/1
100 CAPSULE, LIQUID FILLED ORAL 2 TIMES DAILY
Status: DISCONTINUED | OUTPATIENT
Start: 2017-06-13 | End: 2017-06-14 | Stop reason: HOSPADM

## 2017-06-13 RX ORDER — AMOXICILLIN 250 MG
1 CAPSULE ORAL NIGHTLY
Status: DISCONTINUED | OUTPATIENT
Start: 2017-06-13 | End: 2017-06-14 | Stop reason: HOSPADM

## 2017-06-13 RX ORDER — DIPHENHYDRAMINE HYDROCHLORIDE 50 MG/ML
25 INJECTION INTRAMUSCULAR; INTRAVENOUS EVERY 6 HOURS PRN
Status: DISCONTINUED | OUTPATIENT
Start: 2017-06-13 | End: 2017-06-14 | Stop reason: HOSPADM

## 2017-06-13 RX ORDER — BISACODYL 10 MG
10 SUPPOSITORY, RECTAL RECTAL
Status: DISCONTINUED | OUTPATIENT
Start: 2017-06-13 | End: 2017-06-14 | Stop reason: HOSPADM

## 2017-06-13 RX ORDER — CYCLOBENZAPRINE HCL 10 MG
10 TABLET ORAL EVERY 8 HOURS PRN
Status: DISCONTINUED | OUTPATIENT
Start: 2017-06-13 | End: 2017-06-14 | Stop reason: HOSPADM

## 2017-06-13 RX ORDER — ATORVASTATIN CALCIUM 20 MG/1
20 TABLET, FILM COATED ORAL DAILY
Status: DISCONTINUED | OUTPATIENT
Start: 2017-06-13 | End: 2017-06-14 | Stop reason: HOSPADM

## 2017-06-13 RX ORDER — BIMATOPROST 0.3 MG/ML
1 SOLUTION/ DROPS OPHTHALMIC EVERY EVENING
Status: DISCONTINUED | OUTPATIENT
Start: 2017-06-13 | End: 2017-06-13

## 2017-06-13 RX ORDER — ONDANSETRON 4 MG/1
4 TABLET, ORALLY DISINTEGRATING ORAL EVERY 4 HOURS PRN
Status: DISCONTINUED | OUTPATIENT
Start: 2017-06-13 | End: 2017-06-14 | Stop reason: HOSPADM

## 2017-06-13 RX ORDER — DOXYCYCLINE 100 MG/1
100 TABLET ORAL EVERY 12 HOURS
Status: DISCONTINUED | OUTPATIENT
Start: 2017-06-14 | End: 2017-06-14 | Stop reason: HOSPADM

## 2017-06-13 RX ORDER — OXYCODONE HYDROCHLORIDE 5 MG/1
5 TABLET ORAL EVERY 4 HOURS PRN
Status: DISCONTINUED | OUTPATIENT
Start: 2017-06-13 | End: 2017-06-14 | Stop reason: HOSPADM

## 2017-06-13 RX ORDER — MORPHINE SULFATE 4 MG/ML
2-4 INJECTION, SOLUTION INTRAMUSCULAR; INTRAVENOUS
Status: DISCONTINUED | OUTPATIENT
Start: 2017-06-13 | End: 2017-06-14 | Stop reason: HOSPADM

## 2017-06-13 RX ORDER — SODIUM CHLORIDE, SODIUM LACTATE, POTASSIUM CHLORIDE, CALCIUM CHLORIDE 600; 310; 30; 20 MG/100ML; MG/100ML; MG/100ML; MG/100ML
INJECTION, SOLUTION INTRAVENOUS CONTINUOUS
Status: DISCONTINUED | OUTPATIENT
Start: 2017-06-13 | End: 2017-06-14 | Stop reason: HOSPADM

## 2017-06-13 RX ORDER — LATANOPROST 50 UG/ML
1 SOLUTION/ DROPS OPHTHALMIC EVERY EVENING
Status: DISCONTINUED | OUTPATIENT
Start: 2017-06-13 | End: 2017-06-14 | Stop reason: HOSPADM

## 2017-06-13 RX ORDER — POLYETHYLENE GLYCOL 3350 17 G/17G
1 POWDER, FOR SOLUTION ORAL 2 TIMES DAILY PRN
Status: DISCONTINUED | OUTPATIENT
Start: 2017-06-13 | End: 2017-06-14 | Stop reason: HOSPADM

## 2017-06-13 RX ORDER — SODIUM CHLORIDE, SODIUM LACTATE, POTASSIUM CHLORIDE, AND CALCIUM CHLORIDE .6; .31; .03; .02 G/100ML; G/100ML; G/100ML; G/100ML
IRRIGANT IRRIGATION
Status: DISCONTINUED | OUTPATIENT
Start: 2017-06-13 | End: 2017-06-13 | Stop reason: HOSPADM

## 2017-06-13 RX ORDER — AMOXICILLIN 250 MG
1 CAPSULE ORAL
Status: DISCONTINUED | OUTPATIENT
Start: 2017-06-13 | End: 2017-06-14 | Stop reason: HOSPADM

## 2017-06-13 RX ORDER — OXYCODONE HYDROCHLORIDE 5 MG/1
2.5 TABLET ORAL
Status: DISCONTINUED | OUTPATIENT
Start: 2017-06-13 | End: 2017-06-14 | Stop reason: HOSPADM

## 2017-06-13 RX ORDER — CEFAZOLIN SODIUM 2 G/100ML
2 INJECTION, SOLUTION INTRAVENOUS EVERY 8 HOURS
Status: COMPLETED | OUTPATIENT
Start: 2017-06-13 | End: 2017-06-13

## 2017-06-13 RX ORDER — DIPHENHYDRAMINE HCL 25 MG
25 TABLET ORAL EVERY 6 HOURS PRN
Status: DISCONTINUED | OUTPATIENT
Start: 2017-06-13 | End: 2017-06-14 | Stop reason: HOSPADM

## 2017-06-13 RX ORDER — HYDRALAZINE HYDROCHLORIDE 20 MG/ML
10 INJECTION INTRAMUSCULAR; INTRAVENOUS
Status: DISCONTINUED | OUTPATIENT
Start: 2017-06-13 | End: 2017-06-14 | Stop reason: HOSPADM

## 2017-06-13 RX ORDER — DEXTROSE MONOHYDRATE, SODIUM CHLORIDE, AND POTASSIUM CHLORIDE 50; 1.49; 4.5 G/1000ML; G/1000ML; G/1000ML
INJECTION, SOLUTION INTRAVENOUS
Status: COMPLETED
Start: 2017-06-13 | End: 2017-06-13

## 2017-06-13 RX ORDER — AMLODIPINE BESYLATE 5 MG/1
10 TABLET ORAL DAILY
Status: DISCONTINUED | OUTPATIENT
Start: 2017-06-14 | End: 2017-06-14 | Stop reason: HOSPADM

## 2017-06-13 RX ORDER — OXYCODONE HCL 5 MG/5 ML
SOLUTION, ORAL ORAL
Status: COMPLETED
Start: 2017-06-13 | End: 2017-06-13

## 2017-06-13 RX ORDER — GABAPENTIN 300 MG/1
300 CAPSULE ORAL 2 TIMES DAILY
Status: DISCONTINUED | OUTPATIENT
Start: 2017-06-13 | End: 2017-06-14 | Stop reason: HOSPADM

## 2017-06-13 RX ORDER — ACETAMINOPHEN 500 MG
500 TABLET ORAL EVERY 6 HOURS
Status: DISCONTINUED | OUTPATIENT
Start: 2017-06-13 | End: 2017-06-14 | Stop reason: HOSPADM

## 2017-06-13 RX ORDER — ESCITALOPRAM OXALATE 10 MG/1
20 TABLET ORAL EVERY MORNING
Status: DISCONTINUED | OUTPATIENT
Start: 2017-06-13 | End: 2017-06-14 | Stop reason: HOSPADM

## 2017-06-13 RX ORDER — LIDOCAINE HYDROCHLORIDE 10 MG/ML
0.5 INJECTION, SOLUTION INFILTRATION; PERINEURAL
Status: COMPLETED | OUTPATIENT
Start: 2017-06-13 | End: 2017-06-13

## 2017-06-13 RX ORDER — CLONIDINE HYDROCHLORIDE 0.1 MG/1
0.1 TABLET ORAL EVERY 4 HOURS PRN
Status: DISCONTINUED | OUTPATIENT
Start: 2017-06-13 | End: 2017-06-14 | Stop reason: HOSPADM

## 2017-06-13 RX ORDER — OXYCODONE HYDROCHLORIDE 10 MG/1
10 TABLET ORAL EVERY 4 HOURS PRN
Status: DISCONTINUED | OUTPATIENT
Start: 2017-06-13 | End: 2017-06-14 | Stop reason: HOSPADM

## 2017-06-13 RX ORDER — ONDANSETRON 2 MG/ML
4 INJECTION INTRAMUSCULAR; INTRAVENOUS EVERY 4 HOURS PRN
Status: DISCONTINUED | OUTPATIENT
Start: 2017-06-13 | End: 2017-06-14 | Stop reason: HOSPADM

## 2017-06-13 RX ORDER — ENEMA 19; 7 G/133ML; G/133ML
1 ENEMA RECTAL
Status: DISCONTINUED | OUTPATIENT
Start: 2017-06-13 | End: 2017-06-14 | Stop reason: HOSPADM

## 2017-06-13 RX ORDER — LIDOCAINE HYDROCHLORIDE 10 MG/ML
INJECTION, SOLUTION INFILTRATION; PERINEURAL
Status: COMPLETED
Start: 2017-06-13 | End: 2017-06-13

## 2017-06-13 RX ORDER — BUPIVACAINE HYDROCHLORIDE AND EPINEPHRINE 5; 5 MG/ML; UG/ML
INJECTION, SOLUTION EPIDURAL; INTRACAUDAL; PERINEURAL
Status: DISCONTINUED | OUTPATIENT
Start: 2017-06-13 | End: 2017-06-13 | Stop reason: HOSPADM

## 2017-06-13 RX ORDER — OXYCODONE HYDROCHLORIDE 5 MG/1
5-10 TABLET ORAL EVERY 4 HOURS PRN
Status: DISCONTINUED | OUTPATIENT
Start: 2017-06-13 | End: 2017-06-13 | Stop reason: CLARIF

## 2017-06-13 RX ORDER — LISINOPRIL 20 MG/1
20 TABLET ORAL DAILY
Status: DISCONTINUED | OUTPATIENT
Start: 2017-06-13 | End: 2017-06-14 | Stop reason: HOSPADM

## 2017-06-13 RX ORDER — CARVEDILOL 6.25 MG/1
6.25 TABLET ORAL 2 TIMES DAILY WITH MEALS
Status: DISCONTINUED | OUTPATIENT
Start: 2017-06-13 | End: 2017-06-14 | Stop reason: HOSPADM

## 2017-06-13 RX ADMIN — ACETAMINOPHEN 500 MG: 500 TABLET ORAL at 15:17

## 2017-06-13 RX ADMIN — ANTACID TABLETS 500 MG: 500 TABLET, CHEWABLE ORAL at 15:17

## 2017-06-13 RX ADMIN — CARVEDILOL 6.25 MG: 6.25 TABLET, FILM COATED ORAL at 17:30

## 2017-06-13 RX ADMIN — CEFAZOLIN SODIUM 2 G: 2 INJECTION, SOLUTION INTRAVENOUS at 15:16

## 2017-06-13 RX ADMIN — STANDARDIZED SENNA CONCENTRATE AND DOCUSATE SODIUM 1 TABLET: 8.6; 5 TABLET, FILM COATED ORAL at 20:00

## 2017-06-13 RX ADMIN — LISINOPRIL 20 MG: 20 TABLET ORAL at 15:26

## 2017-06-13 RX ADMIN — GABAPENTIN 300 MG: 300 CAPSULE ORAL at 19:59

## 2017-06-13 RX ADMIN — OXYCODONE HYDROCHLORIDE 5 MG: 5 TABLET ORAL at 19:59

## 2017-06-13 RX ADMIN — ATORVASTATIN CALCIUM 20 MG: 20 TABLET, FILM COATED ORAL at 19:59

## 2017-06-13 RX ADMIN — CYCLOBENZAPRINE 10 MG: 10 TABLET, FILM COATED ORAL at 19:59

## 2017-06-13 RX ADMIN — CEFAZOLIN SODIUM 2 G: 2 INJECTION, SOLUTION INTRAVENOUS at 22:43

## 2017-06-13 RX ADMIN — GABAPENTIN 300 MG: 300 CAPSULE ORAL at 15:16

## 2017-06-13 RX ADMIN — DOCUSATE SODIUM 100 MG: 100 CAPSULE ORAL at 20:00

## 2017-06-13 RX ADMIN — ANTACID TABLETS 500 MG: 500 TABLET, CHEWABLE ORAL at 19:59

## 2017-06-13 RX ADMIN — SODIUM CHLORIDE, SODIUM LACTATE, POTASSIUM CHLORIDE, CALCIUM CHLORIDE: 600; 310; 30; 20 INJECTION, SOLUTION INTRAVENOUS at 07:35

## 2017-06-13 RX ADMIN — ESCITALOPRAM OXALATE 20 MG: 10 TABLET ORAL at 15:16

## 2017-06-13 RX ADMIN — OXYCODONE HYDROCHLORIDE 10 MG: 5 SOLUTION ORAL at 11:45

## 2017-06-13 RX ADMIN — FENTANYL CITRATE 25 MCG: 50 INJECTION, SOLUTION INTRAMUSCULAR; INTRAVENOUS at 11:35

## 2017-06-13 RX ADMIN — ACETAMINOPHEN 500 MG: 500 TABLET ORAL at 22:43

## 2017-06-13 RX ADMIN — POTASSIUM CHLORIDE, DEXTROSE MONOHYDRATE AND SODIUM CHLORIDE 1000 ML: 150; 5; 450 INJECTION, SOLUTION INTRAVENOUS at 11:45

## 2017-06-13 RX ADMIN — LIDOCAINE HYDROCHLORIDE 0.5 ML: 10 INJECTION, SOLUTION INFILTRATION; PERINEURAL at 07:35

## 2017-06-13 RX ADMIN — DEXTROSE MONOHYDRATE, SODIUM CHLORIDE, AND POTASSIUM CHLORIDE 1000 ML: 50; 1.49; 4.5 INJECTION, SOLUTION INTRAVENOUS at 11:45

## 2017-06-13 RX ADMIN — FENTANYL CITRATE 25 MCG: 50 INJECTION, SOLUTION INTRAMUSCULAR; INTRAVENOUS at 11:40

## 2017-06-13 ASSESSMENT — LIFESTYLE VARIABLES
EVER_SMOKED: YES
EVER FELT BAD OR GUILTY ABOUT YOUR DRINKING: NO
DO YOU DRINK ALCOHOL: YES
HOW MANY TIMES IN THE PAST YEAR HAVE YOU HAD 5 OR MORE DRINKS IN A DAY: 10
ON A TYPICAL DAY WHEN YOU DRINK ALCOHOL HOW MANY DRINKS DO YOU HAVE: 4
TOTAL SCORE: 1
HAVE PEOPLE ANNOYED YOU BY CRITICIZING YOUR DRINKING: NO
HAVE YOU EVER FELT YOU SHOULD CUT DOWN ON YOUR DRINKING: YES
EVER HAD A DRINK FIRST THING IN THE MORNING TO STEADY YOUR NERVES TO GET RID OF A HANGOVER: NO
AVERAGE NUMBER OF DAYS PER WEEK YOU HAVE A DRINK CONTAINING ALCOHOL: 4
CONSUMPTION TOTAL: POSITIVE

## 2017-06-13 ASSESSMENT — PAIN SCALES - GENERAL
PAINLEVEL_OUTOF10: 6
PAINLEVEL_OUTOF10: 4
PAINLEVEL_OUTOF10: 3
PAINLEVEL_OUTOF10: 2
PAINLEVEL_OUTOF10: 0
PAINLEVEL_OUTOF10: 2
PAINLEVEL_OUTOF10: 3

## 2017-06-13 NOTE — IP AVS SNAPSHOT
" Home Care Instructions                                                                                                                  Name:Ronny Gallegos  Medical Record Number:[id number]  CSN: [id number]    YOB: 1945   Age: 71 y.o.  Sex: male  HT:1.702 m (5' 7\") WT: 81.1 kg (178 lb 12.7 oz)          Admit Date: 6/13/2017     Discharge Date:   Today's Date: 6/14/2017  Attending Doctor:  Clint Garsia M.D.                  Allergies:  Nkda            Discharge Instructions       May shower in 72 hours   No bending/lifting/twisting   No driving on pain medication   Return to ER with Chest pain, Shortness of Breath , Leg swelling      Discharge Instructions    Discharged to home by car with relative. Discharged via wheelchair, hospital escort: Yes.  Special equipment needed: Not Applicable    Be sure to schedule a follow-up appointment with your primary care doctor or any specialists as instructed.     Discharge Plan:   Diet Plan: Discussed  Activity Level: Discussed  Confirmed Follow up Appointment: Patient to Call and Schedule Appointment  Confirmed Symptoms Management: Discussed  Medication Reconciliation Updated: Yes  Influenza Vaccine Indication: Indicated: Not available from distributor/    I understand that a diet low in cholesterol, fat, and sodium is recommended for good health. Unless I have been given specific instructions below for another diet, I accept this instruction as my diet prescription.   Other diet: Regular as tolerated      Special Instructions: None    · Is patient discharged on Warfarin / Coumadin?   No     · Is patient Post Blood Transfusion?  No    Depression / Suicide Risk    As you are discharged from this [facility] facility, it is important to learn how to keep safe from harming yourself.    Recognize the warning signs:  · Abrupt changes in personality, positive or negative- including increase in energy   · Giving away possessions  · Change in eating " patterns- significant weight changes-  positive or negative  · Change in sleeping patterns- unable to sleep or sleeping all the time   · Unwillingness or inability to communicate  · Depression  · Unusual sadness, discouragement and loneliness  · Talk of wanting to die  · Neglect of personal appearance   · Rebelliousness- reckless behavior  · Withdrawal from people/activities they love  · Confusion- inability to concentrate     If you or a loved one observes any of these behaviors or has concerns about self-harm, here's what you can do:  · Talk about it- your feelings and reasons for harming yourself  · Remove any means that you might use to hurt yourself (examples: pills, rope, extension cords, firearm)  · Get professional help from the community (Mental Health, Substance Abuse, psychological counseling)  · Do not be alone:Call your Safe Contact- someone whom you trust who will be there for you.  · Call your local CRISIS HOTLINE 799-9012 or 648-946-6834  · Call your local Children's Mobile Crisis Response Team [place] (711) 222-6230 or wwwMaxymiser  · Call the toll free National Suicide Prevention Hotlines   · National Suicide Prevention Lifeline 419-532-DNIH (7739)  · National Hope Line Network 800-SUICIDE (820-6848)           Discharge Medication Instructions:    Below are the medications your physician expects you to take upon discharge:    Review all your home medications and newly ordered medications with your doctor and/or pharmacist. Follow medication instructions as directed by your doctor and/or pharmacist.    Please keep your medication list with you and share with your physician.               Medication List      START taking these medications        Instructions    Morning Afternoon Evening Bedtime    cyclobenzaprine 10 MG Tabs   Last time this was given:  10 mg on 6/14/2017  8:04 AM   Commonly known as:  FLEXERIL        Take 1 Tab by mouth every 8 hours as needed for Muscle Spasms.      Dose:  10 mg                        doxycycline monohydrate 100 MG tablet   Last time this was given:  100 mg on 6/14/2017  8:03 AM   Commonly known as:  ADOXA        Take 1 Tab by mouth every 12 hours.   Dose:  100 mg                        oxycodone immediate-release 5 MG Tabs   Last time this was given:  5 mg on 6/14/2017  8:06 AM   Commonly known as:  ROXICODONE        Take 1-2 Tabs by mouth every 6 hours as needed for Severe Pain.   Dose:  5-10 mg                          CONTINUE taking these medications        Instructions    Morning Afternoon Evening Bedtime    amlodipine 10 MG Tabs   Last time this was given:  10 mg on 6/14/2017  8:05 AM   Commonly known as:  NORVASC        Take 1 Tab by mouth every day.   Dose:  10 mg                        atorvastatin 20 MG Tabs   Last time this was given:  20 mg on 6/13/2017  7:59 PM   Commonly known as:  LIPITOR        Take 1 Tab by mouth every day.   Dose:  20 mg                        bimatoprost 0.03 % Soln   Commonly known as:  LUMIGAN        Place 1 Drop in both eyes every evening.   Dose:  1 Drop                        carvedilol 6.25 MG Tabs   Last time this was given:  6.25 mg on 6/14/2017  8:05 AM   Commonly known as:  COREG        Take 1 Tab by mouth 2 times a day, with meals.   Dose:  6.25 mg                        gabapentin 300 MG Caps   Last time this was given:  300 mg on 6/14/2017  8:05 AM   Commonly known as:  NEURONTIN        Take 300 mg by mouth 2 Times a Day. Indications: Neuropathic Pain   Dose:  300 mg                        LEXAPRO 20 MG tablet   Last time this was given:  20 mg on 6/14/2017  8:06 AM   Generic drug:  escitalopram        Take 20 mg by mouth every morning. Indications: Major Depressive Disorder   Dose:  20 mg                        lisinopril 20 MG Tabs   Last time this was given:  20 mg on 6/14/2017  8:06 AM   Commonly known as:  PRINIVIL        Take 1 Tab by mouth every day.   Dose:  20 mg                             Where to  Get Your Medications      Information about where to get these medications is not yet available     ! Ask your nurse or doctor about these medications    - cyclobenzaprine 10 MG Tabs  - doxycycline monohydrate 100 MG tablet  - oxycodone immediate-release 5 MG Tabs            Instructions           Diet / Nutrition:    Follow any diet instructions given to you by your doctor or the dietician, including how much salt (sodium) you are allowed each day.    If you are overweight, talk to your doctor about a weight reduction plan.    Activity:    Remain physically active following your doctor's instructions about exercise and activity.    Rest often.     Any time you become even a little tired or short of breath, SIT DOWN and rest.    Worsening Symptoms:    Report any of the following signs and symptoms to the doctor's office immediately:    *Pain of jaw, arm, or neck  *Chest pain not relieved by medication                               *Dizziness or loss of consciousness  *Difficulty breathing even when at rest   *More tired than usual                                       *Bleeding drainage or swelling of surgical site  *Swelling of feet, ankles, legs or stomach                 *Fever (>100ºF)  *Pink or blood tinged sputum  *Weight gain (3lbs/day or 5lbs /week)           *Shock from internal defibrillator (if applicable)  *Palpitations or irregular heartbeats                *Cool and/or numb extremities    Stroke Awareness    Common Risk Factors for Stroke include:    Age  Atrial Fibrillation  Carotid Artery Stenosis  Diabetes Mellitus  Excessive alcohol consumption  High blood pressure  Overweight   Physical inactivity  Smoking    Warning signs and symptoms of a stroke include:    *Sudden numbness or weakness of the face, arm or leg (especially on one side of the body).  *Sudden confusion, trouble speaking or understanding.  *Sudden trouble seeing in one or both eyes.  *Sudden trouble walking, dizziness, loss of  balance or coordination.Sudden severe headache with no known cause.    It is very important to get treatment quickly when a stroke occurs. If you experience any of the above warning signs, call 911 immediately.                   Disclaimer         Quit Smoking / Tobacco Use:    I understand the use of any tobacco products increases my chance of suffering from future heart disease or stroke and could cause other illnesses which may shorten my life. Quitting the use of tobacco products is the single most important thing I can do to improve my health. For further information on smoking / tobacco cessation call a Toll Free Quit Line at 1-463.501.1955 (*National Cancer [place]) or 1-154.375.3894 (American Lung Association) or you can access the web based program at www.lungTilck.org.    [place] Tobacco Users Help Line:  (932) 562-9990       Toll Free: 1-248.852.2392  Quit Tobacco Program [facility] (749)[phone]    Crisis Hotline:    [place] Crisis Hotline:  2-274-ZVPROZN or 1-553.929.7973    [place] Crisis Hotline:    1-685.638.4313 or 641-201-0291    Discharge Survey:   Thank you for choosing [facility]. We hope we did everything we could to make your hospital stay a pleasant one. You may be receiving a phone survey and we would appreciate your time and participation in answering the questions. Your input is very valuable to us in our efforts to improve our service to our patients and their families.        My signature on this form indicates that:    1. I have reviewed and understand the above information.  2. My questions regarding this information have been answered to my satisfaction.  3. I have formulated a plan with my discharge nurse to obtain my prescribed medications for home.                  Disclaimer         __________________________________                     __________       ________                       Patient Signature                                                 Date                     Time

## 2017-06-13 NOTE — IP AVS SNAPSHOT
" <p align=\"LEFT\"><IMG SRC=\"//[email or web address]$/[email or web address]\" alt=\"Image\" WIDTH=\"50%\" HEIGHT=\"200\" BORDER=\"\"></p>                   Name:Ronny Gallegos  Medical Record Number:[id number]  CSN: [id number]    YOB: 1945   Age: 71 y.o.  Sex: male  HT:1.702 m (5' 7\") WT: 81.1 kg (178 lb 12.7 oz)          Admit Date: 6/13/2017     Discharge Date:   Today's Date: 6/14/2017  Attending Doctor:  Clint Garsia M.D.                  Allergies:  Nkda             Medication List      Take these Medications        Instructions    amlodipine 10 MG Tabs   Commonly known as:  NORVASC    Take 1 Tab by mouth every day.   Dose:  10 mg       atorvastatin 20 MG Tabs   Commonly known as:  LIPITOR    Take 1 Tab by mouth every day.   Dose:  20 mg       bimatoprost 0.03 % Soln   Commonly known as:  LUMIGAN    Place 1 Drop in both eyes every evening.   Dose:  1 Drop       carvedilol 6.25 MG Tabs   Commonly known as:  COREG    Take 1 Tab by mouth 2 times a day, with meals.   Dose:  6.25 mg       cyclobenzaprine 10 MG Tabs   Commonly known as:  FLEXERIL    Take 1 Tab by mouth every 8 hours as needed for Muscle Spasms.   Dose:  10 mg       doxycycline monohydrate 100 MG tablet   Commonly known as:  ADOXA    Take 1 Tab by mouth every 12 hours.   Dose:  100 mg       gabapentin 300 MG Caps   Commonly known as:  NEURONTIN    Take 300 mg by mouth 2 Times a Day. Indications: Neuropathic Pain   Dose:  300 mg       LEXAPRO 20 MG tablet   Generic drug:  escitalopram    Take 20 mg by mouth every morning. Indications: Major Depressive Disorder   Dose:  20 mg       lisinopril 20 MG Tabs   Commonly known as:  PRINIVIL    Take 1 Tab by mouth every day.   Dose:  20 mg       oxycodone immediate-release 5 MG Tabs   Commonly known as:  ROXICODONE    Take 1-2 Tabs by mouth every 6 hours as needed for Severe Pain.   Dose:  5-10 mg         "

## 2017-06-13 NOTE — IP AVS SNAPSHOT
6/14/2017    Ronny Gallegos  [address] [place] [address]    Dear Ronny:    [facility] wants to ensure your discharge home is safe and you or your loved ones have had all of your questions answered regarding your care after you leave the hospital.    Below is a list of resources and contact information should you have any questions regarding your hospital stay, follow-up instructions, or active medical symptoms.    Questions or Concerns Regarding… Contact   Medical Questions Related to Your Discharge  (7 days a week, 8am-5pm) Contact a Nurse Care Coordinator   [phone]   Medical Questions Not Related to Your Discharge  (24 hours a day / 7 days a week)  Contact the Nurse Health Line   [phone]    Medications or Discharge Instructions Refer to your discharge packet   or contact your [facility] Primary Care Provider   [phone]   Follow-up Appointment(s) Schedule your appointment via Mine   or contact Scheduling [phone]   Billing Review your statement via Mine  or contact Billing [phone]   Medical Records Review your records via Mine   or contact Medical Records [phone]     You may receive a telephone call within two days of discharge. This call is to make certain you understand your discharge instructions and have the opportunity to have any questions answered. You can also easily access your medical information, test results and upcoming appointments via the Mine free online health management tool. You can learn more and sign up at [employer]/Mine. For assistance setting up your Mine account, please call [phone].    Once again, we want to ensure your discharge home is safe and that you have a clear understanding of any next steps in your care. If you have any questions or concerns, please do not hesitate to contact us, we are here for you. Thank you for choosing [facility] for your healthcare needs.    Sincerely,    Your [facility] Healthcare Team

## 2017-06-13 NOTE — IP AVS SNAPSHOT
3PointData Access Code: Activation code not generated  Current 3PointData Status: Active    [employer] systemhart  A secure, online tool to manage your health information     [employer]’s 3PointData® is a secure, online tool that connects you to your personalized health information from the privacy of your home -- day or night - making it very easy for you to manage your healthcare. Once the activation process is completed, you can even access your medical information using the 3PointData bruce, which is available for free in the Apple Bruce store or Google Play store.     3PointData provides the following levels of access (as shown below):   My Chart Features   [facility] Primary Care Doctor [facility]  Specialists [facility]  Urgent  Care Non-[facility]  Primary Care  Doctor   Email your healthcare team securely and privately 24/7 X X X X   Manage appointments: schedule your next appointment; view details of past/upcoming appointments X      Request prescription refills. X      View recent personal medical records, including lab and immunizations X X X X   View health record, including health history, allergies, medications X X X X   Read reports about your outpatient visits, procedures, consult and ER notes X X X X   See your discharge summary, which is a recap of your hospital and/or ER visit that includes your diagnosis, lab results, and care plan. X X       How to register for 3PointData:  1. Go to  [email or web address].  2. Click on the Sign Up Now box, which takes you to the New Member Sign Up page. You will need to provide the following information:  a. Enter your 3PointData Access Code exactly as it appears at the top of this page. (You will not need to use this code after you’ve completed the sign-up process. If you do not sign up before the expiration date, you must request a new code.)   b. Enter your date of birth.   c. Enter your home email address.   d. Click Submit, and follow the next screen’s instructions.  3. Create a 3PointData ID. This will  be your Image Metrics login ID and cannot be changed, so think of one that is secure and easy to remember.  4. Create a Image Metrics password. You can change your password at any time.  5. Enter your Password Reset Question and Answer. This can be used at a later time if you forget your password.   6. Enter your e-mail address. This allows you to receive e-mail notifications when new information is available in Image Metrics.  7. Click Sign Up. You can now view your health information.    For assistance activating your Image Metrics account, call [phone]

## 2017-06-14 VITALS
SYSTOLIC BLOOD PRESSURE: 104 MMHG | HEART RATE: 65 BPM | WEIGHT: 178.79 LBS | TEMPERATURE: 98.9 F | OXYGEN SATURATION: 95 % | RESPIRATION RATE: 18 BRPM | DIASTOLIC BLOOD PRESSURE: 58 MMHG | BODY MASS INDEX: 28.06 KG/M2 | HEIGHT: 67 IN

## 2017-06-14 LAB
ANION GAP SERPL CALC-SCNC: 4 MMOL/L (ref 0–11.9)
BUN SERPL-MCNC: 11 MG/DL (ref 8–22)
CALCIUM SERPL-MCNC: 8.9 MG/DL (ref 8.5–10.5)
CHLORIDE SERPL-SCNC: 100 MMOL/L (ref 96–112)
CO2 SERPL-SCNC: 28 MMOL/L (ref 20–33)
CREAT SERPL-MCNC: 0.84 MG/DL (ref 0.5–1.4)
ERYTHROCYTE [DISTWIDTH] IN BLOOD BY AUTOMATED COUNT: 49.1 FL (ref 35.9–50)
GFR SERPL CREATININE-BSD FRML MDRD: >60 ML/MIN/1.73 M 2
GLUCOSE SERPL-MCNC: 176 MG/DL (ref 65–99)
HCT VFR BLD AUTO: 40.6 % (ref 42–52)
HGB BLD-MCNC: 13.7 G/DL (ref 14–18)
MCH RBC QN AUTO: 31 PG (ref 27–33)
MCHC RBC AUTO-ENTMCNC: 33.7 G/DL (ref 33.7–35.3)
MCV RBC AUTO: 91.9 FL (ref 81.4–97.8)
PLATELET # BLD AUTO: 305 K/UL (ref 164–446)
PMV BLD AUTO: 9.8 FL (ref 9–12.9)
POTASSIUM SERPL-SCNC: 4.5 MMOL/L (ref 3.6–5.5)
RBC # BLD AUTO: 4.42 M/UL (ref 4.7–6.1)
SODIUM SERPL-SCNC: 132 MMOL/L (ref 135–145)
WBC # BLD AUTO: 10.2 K/UL (ref 4.8–10.8)

## 2017-06-14 PROCEDURE — 85027 COMPLETE CBC AUTOMATED: CPT

## 2017-06-14 PROCEDURE — 700102 HCHG RX REV CODE 250 W/ 637 OVERRIDE(OP): Performed by: NEUROLOGICAL SURGERY

## 2017-06-14 PROCEDURE — A9270 NON-COVERED ITEM OR SERVICE: HCPCS

## 2017-06-14 PROCEDURE — A9270 NON-COVERED ITEM OR SERVICE: HCPCS | Performed by: PHYSICIAN ASSISTANT

## 2017-06-14 PROCEDURE — 700102 HCHG RX REV CODE 250 W/ 637 OVERRIDE(OP): Performed by: PHYSICIAN ASSISTANT

## 2017-06-14 PROCEDURE — G8988 SELF CARE GOAL STATUS: HCPCS | Mod: CI

## 2017-06-14 PROCEDURE — 80048 BASIC METABOLIC PNL TOTAL CA: CPT

## 2017-06-14 PROCEDURE — G8979 MOBILITY GOAL STATUS: HCPCS | Mod: CI

## 2017-06-14 PROCEDURE — 700112 HCHG RX REV CODE 229: Performed by: PHYSICIAN ASSISTANT

## 2017-06-14 PROCEDURE — A9270 NON-COVERED ITEM OR SERVICE: HCPCS | Performed by: NEUROLOGICAL SURGERY

## 2017-06-14 PROCEDURE — G0378 HOSPITAL OBSERVATION PER HR: HCPCS

## 2017-06-14 PROCEDURE — 97166 OT EVAL MOD COMPLEX 45 MIN: CPT | Mod: XE

## 2017-06-14 PROCEDURE — 97162 PT EVAL MOD COMPLEX 30 MIN: CPT

## 2017-06-14 PROCEDURE — G8987 SELF CARE CURRENT STATUS: HCPCS | Mod: CI

## 2017-06-14 PROCEDURE — 700102 HCHG RX REV CODE 250 W/ 637 OVERRIDE(OP)

## 2017-06-14 PROCEDURE — G8978 MOBILITY CURRENT STATUS: HCPCS | Mod: CI

## 2017-06-14 PROCEDURE — 36415 COLL VENOUS BLD VENIPUNCTURE: CPT

## 2017-06-14 RX ORDER — ESCITALOPRAM OXALATE 10 MG/1
TABLET ORAL
Status: DISCONTINUED
Start: 2017-06-14 | End: 2017-06-14 | Stop reason: HOSPADM

## 2017-06-14 RX ORDER — CYCLOBENZAPRINE HCL 10 MG
10 TABLET ORAL EVERY 8 HOURS PRN
Qty: 30 TAB | Refills: 0 | Status: SHIPPED | OUTPATIENT
Start: 2017-06-14 | End: 2019-08-05

## 2017-06-14 RX ORDER — LISINOPRIL 20 MG/1
TABLET ORAL
Status: DISCONTINUED
Start: 2017-06-14 | End: 2017-06-14 | Stop reason: HOSPADM

## 2017-06-14 RX ORDER — AMLODIPINE BESYLATE 5 MG/1
TABLET ORAL
Status: DISCONTINUED
Start: 2017-06-14 | End: 2017-06-14 | Stop reason: HOSPADM

## 2017-06-14 RX ORDER — OXYCODONE HYDROCHLORIDE 5 MG/1
5-10 TABLET ORAL EVERY 6 HOURS PRN
Qty: 60 TAB | Refills: 0 | Status: SHIPPED | OUTPATIENT
Start: 2017-06-14 | End: 2018-06-05

## 2017-06-14 RX ORDER — ACETAMINOPHEN 500 MG
TABLET ORAL
Status: COMPLETED
Start: 2017-06-14 | End: 2017-06-14

## 2017-06-14 RX ORDER — DOXYCYCLINE 100 MG/1
TABLET ORAL
Status: DISCONTINUED
Start: 2017-06-14 | End: 2017-06-14 | Stop reason: HOSPADM

## 2017-06-14 RX ORDER — CYCLOBENZAPRINE HCL 10 MG
TABLET ORAL
Status: DISCONTINUED
Start: 2017-06-14 | End: 2017-06-14 | Stop reason: HOSPADM

## 2017-06-14 RX ORDER — DOCUSATE SODIUM 100 MG/1
CAPSULE, LIQUID FILLED ORAL
Status: DISCONTINUED
Start: 2017-06-14 | End: 2017-06-14 | Stop reason: HOSPADM

## 2017-06-14 RX ORDER — GABAPENTIN 300 MG/1
CAPSULE ORAL
Status: DISCONTINUED
Start: 2017-06-14 | End: 2017-06-14 | Stop reason: HOSPADM

## 2017-06-14 RX ORDER — OXYCODONE HYDROCHLORIDE 5 MG/1
TABLET ORAL
Status: DISCONTINUED
Start: 2017-06-14 | End: 2017-06-14 | Stop reason: HOSPADM

## 2017-06-14 RX ORDER — CARVEDILOL 6.25 MG/1
TABLET ORAL
Status: DISCONTINUED
Start: 2017-06-14 | End: 2017-06-14 | Stop reason: HOSPADM

## 2017-06-14 RX ORDER — DOXYCYCLINE 100 MG/1
100 TABLET ORAL EVERY 12 HOURS
Qty: 10 TAB | Refills: 0 | Status: SHIPPED | OUTPATIENT
Start: 2017-06-14 | End: 2018-06-05

## 2017-06-14 RX ORDER — OXYCODONE HYDROCHLORIDE 5 MG/1
TABLET ORAL
Status: COMPLETED
Start: 2017-06-14 | End: 2017-06-14

## 2017-06-14 RX ADMIN — DOCUSATE SODIUM 100 MG: 100 CAPSULE ORAL at 08:04

## 2017-06-14 RX ADMIN — DOXYCYCLINE 100 MG: 100 TABLET ORAL at 08:03

## 2017-06-14 RX ADMIN — OXYCODONE HYDROCHLORIDE 5 MG: 5 TABLET ORAL at 06:06

## 2017-06-14 RX ADMIN — ANTACID TABLETS 500 MG: 500 TABLET, CHEWABLE ORAL at 09:00

## 2017-06-14 RX ADMIN — CARVEDILOL 6.25 MG: 6.25 TABLET, FILM COATED ORAL at 08:05

## 2017-06-14 RX ADMIN — ESCITALOPRAM OXALATE 20 MG: 10 TABLET ORAL at 08:06

## 2017-06-14 RX ADMIN — CYCLOBENZAPRINE 10 MG: 10 TABLET, FILM COATED ORAL at 08:04

## 2017-06-14 RX ADMIN — ACETAMINOPHEN 500 MG: 500 TABLET ORAL at 12:14

## 2017-06-14 RX ADMIN — GABAPENTIN 300 MG: 300 CAPSULE ORAL at 08:05

## 2017-06-14 RX ADMIN — LISINOPRIL 20 MG: 20 TABLET ORAL at 08:06

## 2017-06-14 RX ADMIN — OXYCODONE HYDROCHLORIDE 5 MG: 5 TABLET ORAL at 08:06

## 2017-06-14 RX ADMIN — CALCIUM CARBONATE-CHOLECALCIFEROL TAB 250 MG-125 UNIT 1 TABLET: 250-125 TAB at 08:06

## 2017-06-14 RX ADMIN — ACETAMINOPHEN 500 MG: 500 TABLET ORAL at 06:01

## 2017-06-14 RX ADMIN — AMLODIPINE BESYLATE 10 MG: 5 TABLET ORAL at 08:05

## 2017-06-14 ASSESSMENT — COGNITIVE AND FUNCTIONAL STATUS - GENERAL
PERSONAL GROOMING: A LITTLE
WALKING IN HOSPITAL ROOM: A LITTLE
CLIMB 3 TO 5 STEPS WITH RAILING: A LITTLE
SUGGESTED CMS G CODE MODIFIER MOBILITY: CJ
SUGGESTED CMS G CODE MODIFIER DAILY ACTIVITY: CK
TOILETING: A LITTLE
DAILY ACTIVITIY SCORE: 19
MOBILITY SCORE: 21
HELP NEEDED FOR BATHING: A LITTLE
MOVING TO AND FROM BED TO CHAIR: A LITTLE
DRESSING REGULAR UPPER BODY CLOTHING: A LITTLE
DRESSING REGULAR LOWER BODY CLOTHING: A LITTLE

## 2017-06-14 ASSESSMENT — PAIN SCALES - GENERAL: PAINLEVEL_OUTOF10: 4

## 2017-06-14 ASSESSMENT — ACTIVITIES OF DAILY LIVING (ADL): TOILETING: INDEPENDENT

## 2017-06-14 ASSESSMENT — PATIENT HEALTH QUESTIONNAIRE - PHQ9
1. LITTLE INTEREST OR PLEASURE IN DOING THINGS: NOT AT ALL
SUM OF ALL RESPONSES TO PHQ9 QUESTIONS 1 AND 2: 0
SUM OF ALL RESPONSES TO PHQ QUESTIONS 1-9: 0
2. FEELING DOWN, DEPRESSED, IRRITABLE, OR HOPELESS: NOT AT ALL

## 2017-06-14 ASSESSMENT — ENCOUNTER SYMPTOMS: BACK PAIN: 1

## 2017-06-14 ASSESSMENT — GAIT ASSESSMENTS
DISTANCE (FEET): 250
DEVIATION: STEP TO;DECREASED BASE OF SUPPORT
ASSISTIVE DEVICE: FRONT WHEEL WALKER
GAIT LEVEL OF ASSIST: STAND BY ASSIST

## 2017-06-14 NOTE — THERAPY
"Occupational Therapy Evaluation completed.   Functional Status:  CG with standing ADLs and CG with mobility with FWW  Plan of Care: Will benefit from Occupational Therapy 2 times per week  Discharge Recommendations:  Equipment: FWW, Shower Chair, Grab Bars and Will Continue to Assess for Equipment Needs. Post-acute therapy Discharge to home with outpatient or home health for additional skilled therapy services.    Patient presents s/p lumbar sx with no brace ordered at this time. Patient near functional baseline of I ADLs and mobility necessitating CG with standing ADLs and CG with mobility. Patient incurred one significant LOB during initial ambulation with HHA necessitating therapist assist to recover. No further LOB with FWW however patient requested therapist physical contact for steady assist. Patient report RN dtr in law will assist upon DC home for about one week. Patient would benefit from skilled OT in this setting prior to DC home with services. 1-2 more sessions.     Reports owns FWW from  wife that is fairly new and sturdy.     See \"Rehab Therapy-Acute\" Patient Summary Report for complete documentation.    "

## 2017-06-14 NOTE — PROGRESS NOTES
Progress Note               Author: Glenna Dela Cruz Date & Time created: 2017  8:01 AM     Interval History:  POD1 s/p redo decompression, L4-S1  Leg pain improved  Has not mobilized  Drain     Review of Systems:  Review of Systems   Musculoskeletal: Positive for back pain.       Physical Exam:  Physical Exam   Musculoskeletal:   Motor 5/5 b/l LEs  No calf tenderness  Drain intact       Labs:        Invalid input(s): [id number]      Recent Labs      17   SODIUM  132*   POTASSIUM  4.5   CHLORIDE  100   CO2  28   BUN  11   CREATININE  0.84   CALCIUM  8.9     Recent Labs      17   GLUCOSE  176*     Recent Labs      17   RBC  4.42*   HEMOGLOBIN  13.7*   HEMATOCRIT  40.6*   PLATELETCT  305     Recent Labs      17   WBC  10.2     Hemodynamics:  Temp (24hrs), Av.6 °C (97.9 °F), Min:36.1 °C (97 °F), Max:37.3 °C (99.2 °F)  Temperature: 36.1 °C (97 °F)  Pulse  Av.8  Min: 57  Max: 86Heart Rate (Monitored): 62  Blood Pressure : 117/72 mmHg, NIBP: 121/72 mmHg     Respiratory:    Respiration: 18, Pulse Oximetry: 94 %           Fluids:    Intake/Output Summary (Last 24 hours) at 17 0801  Last data filed at 17 0600   Gross per 24 hour   Intake   2120 ml   Output   2470 ml   Net   -350 ml        GI/Nutrition:  Orders Placed This Encounter   Procedures   • DIET ORDER     Standing Status: Standing      Number of Occurrences: 1      Standing Expiration Date:      Order Specific Question:  Diet:     Answer:  Regular [1]     Medical Decision Making, by Problem:  Active Hospital Problems    Diagnosis   • DDD (degenerative disc disease), lumbar [M51.36]       Plan:  Mobilize PT/OT  D/c home if tolerates PT  Rx on chart  D/c drain prior to d/c    Core Measures

## 2017-06-14 NOTE — CARE PLAN
Problem: Pain Management  Goal: Pain level will decrease to patient’s comfort goal  Outcome: PROGRESSING AS EXPECTED  Patient's pain relieved with current PO medication regime. Patient able to rest comfortably and preform appropriate ADL's.

## 2017-06-14 NOTE — CARE PLAN
Problem: Safety  Goal: Will remain free from injury  Outcome: PROGRESSING AS EXPECTED  Goal: Will remain free from falls  Outcome: PROGRESSING AS EXPECTED    Problem: Infection  Goal: Will remain free from infection  Outcome: PROGRESSING AS EXPECTED    Problem: Venous Thromboembolism (VTW)/Deep Vein Thrombosis (DVT) Prevention:  Goal: Patient will participate in Venous Thrombosis (VTE)/Deep Vein Thrombosis (DVT)Prevention Measures  Outcome: PROGRESSING AS EXPECTED

## 2017-06-14 NOTE — CARE PLAN
Problem: Safety  Goal: Will remain free from injury  Outcome: PROGRESSING AS EXPECTED  Patient using call bell appropriately. Bed is locked and in lowest position. Bed alarm on and functional. Room cleared of mobility hazards.

## 2017-06-14 NOTE — RESPIRATORY CARE
COPD EDUCATION by COPD CLINICAL EDUCATOR  6/14/2017 at 6:02 AM by Toma Roy     Patient reviewed by COPD education team. Patient does not qualify for COPD program at this time

## 2017-06-14 NOTE — PROGRESS NOTES
Pt tolerated ambulation with staff and believes that he will be ok to go home. Pt was instructed that he needs to use a walker and he states that he has one at home. Dc instructions, prescriptions, and follow up appointments provided. Pt verbalized understanding. All lines and monitors removed. Pt to be escorted by wheel chair to private vehicle.

## 2017-06-14 NOTE — PROGRESS NOTES
Two rn skin check done. Skin intact with no signs of breakdown other than surgical incision. See flow sheet for incision.

## 2017-06-14 NOTE — DISCHARGE SUMMARY
DATE OF ADMISSION:  06/13/2017    DATE OF DISCHARGE:  06/14/2017    ADMISSION DIAGNOSES:  1.  Right lumbar radiculopathy.  2.  Low back pain.  3.  Remote history of L5-S1 laminectomy and fusion in 2015 with Dr. Garsia.  4.  Recurrent stenosis, L5-S1.  5.  Lateral recess stenosis, L4-L5.    HOSPITAL COURSE:  The patient is a very pleasant 71-year-old male with a   remote history of L5-S1 laminectomy and fusion with Dr. Garsia in 2015.    Patient did well immediately postoperatively.  Unfortunately, he developed   significant right lower extremity pain and some fraying in his left foot,   which prompted workup and evaluation.  Ultimately, he failed nonoperative   measures.  Imaging demonstrated recurrent stenosis as well as foraminal   stenosis.  He underwent revision decompression at L5-S1 with laminectomy and   decompression at L4-L5.  For details of the surgery, please see Dr. Garsia's   operative report.  Postoperatively, he will be mobilized with physical therapy   and occupational therapy.  His drain output is 35 mL over 8-hour period.  He   reports he did have improved right lower extremity symptoms and is quite   happy.  His vital signs are stable and he is afebrile.    DISCHARGE INSTRUCTIONS:  Follow up with [facility] as previously arranged.    No bending, lifting, twisting.  Take pain medication as prescribed.  Return to   ER with chest pain, shortness of breath, fever, chills, leg or arm swelling.    Take stool softeners as needed.  Ice the incision frequently and mobilize as   tolerated.    The above represents a brief summary of this patient's hospital stay.  For   details, please see the medical chart.       ____________________________________     KRISTAN Holley / [place]    DD:  06/14/2017 08:11:27  DT:  06/14/2017 10:32:36    D#:  [id number]  Job#:  [id number]    cc: Aletha Guzman MD

## 2017-06-14 NOTE — DISCHARGE INSTRUCTIONS
May shower in 72 hours   No bending/lifting/twisting   No driving on pain medication   Return to ER with Chest pain, Shortness of Breath , Leg swelling      Discharge Instructions    Discharged to home by car with relative. Discharged via wheelchair, hospital escort: Yes.  Special equipment needed: Not Applicable    Be sure to schedule a follow-up appointment with your primary care doctor or any specialists as instructed.     Discharge Plan:   Diet Plan: Discussed  Activity Level: Discussed  Confirmed Follow up Appointment: Patient to Call and Schedule Appointment  Confirmed Symptoms Management: Discussed  Medication Reconciliation Updated: Yes  Influenza Vaccine Indication: Indicated: Not available from distributor/    I understand that a diet low in cholesterol, fat, and sodium is recommended for good health. Unless I have been given specific instructions below for another diet, I accept this instruction as my diet prescription.   Other diet: Regular as tolerated      Special Instructions: None    · Is patient discharged on Warfarin / Coumadin?   No     · Is patient Post Blood Transfusion?  No    Depression / Suicide Risk    As you are discharged from this [facility] facility, it is important to learn how to keep safe from harming yourself.    Recognize the warning signs:  · Abrupt changes in personality, positive or negative- including increase in energy   · Giving away possessions  · Change in eating patterns- significant weight changes-  positive or negative  · Change in sleeping patterns- unable to sleep or sleeping all the time   · Unwillingness or inability to communicate  · Depression  · Unusual sadness, discouragement and loneliness  · Talk of wanting to die  · Neglect of personal appearance   · Rebelliousness- reckless behavior  · Withdrawal from people/activities they love  · Confusion- inability to concentrate     If you or a loved one observes any of these behaviors or has concerns about  self-harm, here's what you can do:  · Talk about it- your feelings and reasons for harming yourself  · Remove any means that you might use to hurt yourself (examples: pills, rope, extension cords, firearm)  · Get professional help from the community (Mental Health, Substance Abuse, psychological counseling)  · Do not be alone:Call your Safe Contact- someone whom you trust who will be there for you.  · Call your local CRISIS HOTLINE 120-9453 or 122-331-3739  · Call your local Children's Mobile Crisis Response Team [place] (974) 386-2744 or www.Yamisee  · Call the toll free National Suicide Prevention Hotlines   · National Suicide Prevention Lifeline 713-366-PQYA (3149)  · National Hope Line Network 800-SUICIDE (087-8657)

## 2017-06-14 NOTE — CARE PLAN
Problem: Safety  Goal: Will remain free from injury  Outcome: PROGRESSING AS EXPECTED  Uses call ligth appropriately. Steady with ambulation    Problem: Infection  Goal: Will remain free from infection  Outcome: PROGRESSING AS EXPECTED  Monitor for s/s of infection

## 2017-06-15 NOTE — THERAPY
"Physical Therapy Evaluation completed.   Bed Mobility:  Supine to Sit:  (NT, in chair pre/post; demo'd and discussed)  Transfers: Sit to Stand: Stand by Assist (with FWW)  Gait: Level Of Assist: Stand by Assist with Front-Wheel Walker       Plan of Care: Will benefit from Physical Therapy 4 times per week  Discharge Recommendations: Equipment: No Equipment Needed has a FWW at home; unless identified by OT   Pt presents with impaired activity tolerance, motor contorl, right LE ROM and gait mechanics s/p L4-S1 'redo'. Pt is most limited by tremulous gait, denies excess EtOH intake or this kind of anesthetic reaction (though gin does report jaudice from drinking in 2014). Either way, improves with continued ambulation, sensation intact but tingling in a sock distribution on right foot. Reporting negative fall history and daughter in law's assist for the next week, anticipate can return home but would ambulate at least one more time with RN staff to ensure progression/steadiness. Will benefit from outpt PT when appropriate in stage of recovery. Will continue to follow if remains in house.   See \"Rehab Therapy-Acute\" Patient Summary Report for complete documentation.     "

## 2017-06-18 ENCOUNTER — PATIENT OUTREACH (OUTPATIENT)
Dept: HEALTH INFORMATION MANAGEMENT | Facility: OTHER | Age: 72
End: 2017-06-18

## 2017-06-18 NOTE — PROGRESS NOTES
· 6/18/17 at 9:13 AM--Received incoming VM from pt 6/17/17 at 4:04 PM.  Placed phone call to patient s/p hospital discharge 6/14/17.  Pt inquires when he can remove dressing to shower.  Reviewed discharge instructions with patient and informed pt that his d/c instructions state he can remove outer dressing and shower 72 hours after discharge.  Pt verbalizes understanding and states that he has no further needs at this time.

## 2017-06-19 NOTE — OP REPORT
DATE OF SERVICE:  06/13/2017    PREOPERATIVE DIAGNOSES.  1.  Bilateral L5-S1 radiculopathies.  2.  Bilateral L4-L5 and L5-S1 lateral recess and foraminal stenosis.  3.  Status post prior fusion with hardware.    POSTOPERATIVE DIAGNOSES:  1.  Bilateral L5-S1 radiculopathies.  2.  Bilateral L4-L5 and L5-S1 lateral recess and foraminal stenosis.  3.  Status post prior fusion with hardware.    PROCEDURES:  1.  Redo bilateral L4 laminotomies and foraminotomies, decompression bilateral   L4 nerve roots.  2.  Redo bilateral L5 laminotomies and foraminotomies, decompression bilateral   L5 nerve roots.  3.  Redo bilateral S1 laminotomies and foraminotomies, decompression bilateral   S1 nerve roots.  4.  Right L5 transpedicular approach far lateral decompression of right L5   nerve root.  5.  Right S1 transpedicular approach far lateral decompression right S1 nerve   root.  6.  Removal of prior hardware with replacement.  7.  L5-S1 posterior lateral arthrodesis.  8.  Exploration of prior fusion.  9.  Microscope for microdissection of spinal canal.  10.  SSEP, EMG monitoring performed by neuro monitoring associates, which   remained stable throughout.    SURGEON:  Clint Garsia MD, neurosurgery-spine surgery.    ASSISTANT:  Glenna Dela Cruz PA-C.    ANESTHESIA:  General endotracheal anesthesia.    ANESTHESIOLOGIST:  Rojelio Iniguez MD    COMPLICATIONS:  None.    ESTIMATED BLOOD LOSS:  Less than 100 mL    PREOPERATIVE NOTE:  This is an extremely pleasant 71-year-old male who   presents with right greater than left radicular leg pain, weakness, and   paresthesia.  When we first worked up, the patient was complaining of mainly   right leg pain.  Now he is presenting with bilateral leg symptoms with pain in   the right L5-S1 distribution.  Given his lumbar stenosis, particularly at   L4-L5 above his prior fusion and his L5-S1, lateral recess stenosis, I offered   the patient redo bilateral L4-S1 laminotomies and foraminotomies,  exploration   of prior fusion.  I discussed surgical procedure, alternatives, goals, risks   and benefits, complications in detail with the patient, used a bone model, MRI   and educational handouts to assist the patient with decision making, answered   all questions to the best of my ability.  The patient understood and   consented to surgery.  Details are well contained in the office visit notes.    NARRATIVE DICTATION:  The patient was brought to the operating room and placed   under general endotracheal anesthesia.  He was placed prone on the operating   OSI table with care taken about the bony prominences, peripheral nerves.    Lumbar region was prepped and draped in usual sterile fashion.  Following   localization with cross table fluoroscopy, a midline incision was made from   L4-S1.  The dorsal elements of L4-S1 were exposed in a subperiosteal fashion   bilaterally.  Self-retaining retraction applied.  Intraoperative x-ray   confirmed appropriate levels.  I dissected over the prior hardware.  I removed   the prior hardware on the right side and replaced it at the completion of the   case to get better exposure, overall.  I explored the prior fusion, which   certainly could have better fusion material laid down.    Using [place] AM-8 drillbit, Kerrison rongeurs and curettes, bilateral redo   L4 laminotomies and foraminotomies were completed, bilateral redo L5   laminotomies and foraminotomies completed, bilateral redo S1 laminotomies and   foraminotomies were completed.  Marked facet and ligamentous hypertrophy was   undercut and removed.  I then drilled down the right L5 pedicle for   transpedicular decompression of right L5 nerve root.  I drilled down the right   S1 pedicle for transpedicular decompression right S1 nerve root.  I then   explored the fusion.  I decorticated the fusion and then I mixed up bone with   DBM and laid down more posterior lateral arthrodesis at L5-S1 level to further   augment  the fusion.  Replaced the hardware that I removed.  Microscopes used   for microdissection of spinal canal to assist throughout.  An EMG monitoring   and SSEP monitoring were stable throughout.  Wound was irrigated with   bacitracin irrigation.  I placed an epidural catheter with Marcaine and   fentanyl for postoperative analgesia.  I infiltrated the muscle with Marcaine   analgesia and closed the wound over a drain brought through a separate   incision with 0 Vicryl deep fascia, 2-0 Vicryl deep dermal layer, Steri-Strips   to skin.  Small sterile dressing was applied.  Swabs, needles, instruments   correct x2 count.  No complications were encountered.  The patient tolerated   procedure, was stable and transferred to recovery room.  The patient will be   observed at [facility] overnight until he meets discharge   criteria.    INTRAOPERATIVE FINDINGS:  Severe stenosis bilaterally L4-L5 and L5-S1 levels   which was freed up as described.  I added the bilateral exposure after   discussing with the patient that he had new left-sided symptoms and this was   after reviewing the MRI, this was the most appropriate thing to do and had   medical necessity.    The patient will follow up at [facility]   in 2 weeks and 6 weeks' time as arranged.       ____________________________________     MD RUFINA MARTINEZ / [place]    DD:  06/18/2017 19:32:34  DT:  06/18/2017 19:54:24    D#:  [id number]  Job#:  [id number]    cc: Aletha Guzman MD, Ronny Gallegos

## 2017-06-28 NOTE — ADDENDUM NOTE
Encounter addended by: Laura Ulloa R.N. on: 6/28/2017  4:50 PM<BR>     Documentation filed: Inpatient Document Flowsheet

## 2017-07-18 ENCOUNTER — HOSPITAL ENCOUNTER (OUTPATIENT)
Dept: HOSPITAL 8 - CFH | Age: 72
Discharge: HOME | End: 2017-07-18
Attending: FAMILY MEDICINE
Payer: MEDICARE

## 2017-07-18 DIAGNOSIS — J98.11: Primary | ICD-10-CM

## 2017-07-18 DIAGNOSIS — K76.89: ICD-10-CM

## 2017-07-18 DIAGNOSIS — J84.10: ICD-10-CM

## 2017-07-18 PROCEDURE — 82565 ASSAY OF CREATININE: CPT

## 2017-07-18 PROCEDURE — 71260 CT THORAX DX C+: CPT

## 2017-08-10 ENCOUNTER — FOLLOW UP ESTABLISHED (OUTPATIENT)
Dept: URBAN - METROPOLITAN AREA CLINIC 10 | Facility: CLINIC | Age: 72
End: 2017-08-10
Payer: MEDICARE

## 2017-08-10 PROCEDURE — 92012 INTRM OPH EXAM EST PATIENT: CPT | Performed by: OPHTHALMOLOGY

## 2017-08-10 PROCEDURE — 92020 GONIOSCOPY: CPT | Performed by: OPHTHALMOLOGY

## 2017-08-10 PROCEDURE — 92083 EXTENDED VISUAL FIELD XM: CPT | Performed by: OPHTHALMOLOGY

## 2017-08-10 ASSESSMENT — INTRAOCULAR PRESSURE
OS: 15
OD: 14

## 2018-02-28 ENCOUNTER — HOSPITAL ENCOUNTER (OUTPATIENT)
Dept: RADIOLOGY | Facility: MEDICAL CENTER | Age: 73
End: 2018-02-28
Attending: NURSE PRACTITIONER
Payer: MEDICARE

## 2018-02-28 DIAGNOSIS — M12.871 ARTHROPATHY, TRANSIENT, ANKLE OR FOOT, RIGHT: ICD-10-CM

## 2018-02-28 DIAGNOSIS — M25.571 RIGHT ANKLE PAIN, UNSPECIFIED CHRONICITY: ICD-10-CM

## 2018-02-28 PROCEDURE — 73700 CT LOWER EXTREMITY W/O DYE: CPT | Mod: RT

## 2018-03-06 ENCOUNTER — FOLLOW UP ESTABLISHED (OUTPATIENT)
Dept: URBAN - METROPOLITAN AREA CLINIC 24 | Facility: CLINIC | Age: 73
End: 2018-03-06
Payer: MEDICARE

## 2018-03-06 PROCEDURE — 92012 INTRM OPH EXAM EST PATIENT: CPT | Performed by: OPHTHALMOLOGY

## 2018-03-06 ASSESSMENT — INTRAOCULAR PRESSURE
OD: 18
OS: 18

## 2018-03-19 DIAGNOSIS — I10 ESSENTIAL HYPERTENSION: ICD-10-CM

## 2018-03-19 RX ORDER — AMLODIPINE BESYLATE 10 MG/1
10 TABLET ORAL DAILY
Qty: 90 TAB | Refills: 3 | Status: SHIPPED | OUTPATIENT
Start: 2018-03-19 | End: 2018-04-20 | Stop reason: SDUPTHER

## 2018-03-19 RX ORDER — CARVEDILOL 6.25 MG/1
6.25 TABLET ORAL 2 TIMES DAILY WITH MEALS
Qty: 180 TAB | Refills: 3 | Status: SHIPPED | OUTPATIENT
Start: 2018-03-19 | End: 2018-04-20 | Stop reason: SDUPTHER

## 2018-03-28 ENCOUNTER — HOSPITAL ENCOUNTER (OUTPATIENT)
Dept: RADIOLOGY | Facility: MEDICAL CENTER | Age: 73
End: 2018-03-28
Attending: ORTHOPAEDIC SURGERY
Payer: MEDICARE

## 2018-03-28 DIAGNOSIS — M12.871 ARTHROPATHY, TRANSIENT, ANKLE OR FOOT, RIGHT: ICD-10-CM

## 2018-03-28 DIAGNOSIS — M25.571 RIGHT ANKLE PAIN, UNSPECIFIED CHRONICITY: ICD-10-CM

## 2018-03-28 PROCEDURE — 73721 MRI JNT OF LWR EXTRE W/O DYE: CPT | Mod: RT

## 2018-04-20 DIAGNOSIS — E78.49 OTHER HYPERLIPIDEMIA: ICD-10-CM

## 2018-04-20 DIAGNOSIS — I10 ESSENTIAL HYPERTENSION: ICD-10-CM

## 2018-04-20 RX ORDER — CARVEDILOL 6.25 MG/1
6.25 TABLET ORAL 2 TIMES DAILY WITH MEALS
Qty: 180 TAB | Refills: 3 | Status: SHIPPED | OUTPATIENT
Start: 2018-04-20 | End: 2019-05-23 | Stop reason: SDUPTHER

## 2018-04-20 RX ORDER — AMLODIPINE BESYLATE 10 MG/1
10 TABLET ORAL DAILY
Qty: 90 TAB | Refills: 1 | Status: SHIPPED | OUTPATIENT
Start: 2018-04-20 | End: 2018-08-03 | Stop reason: SDUPTHER

## 2018-04-20 RX ORDER — CARVEDILOL 6.25 MG/1
6.25 TABLET ORAL 2 TIMES DAILY WITH MEALS
Qty: 180 TAB | Refills: 1 | Status: CANCELLED | OUTPATIENT
Start: 2018-04-20

## 2018-04-20 RX ORDER — ATORVASTATIN CALCIUM 20 MG/1
20 TABLET, FILM COATED ORAL DAILY
Qty: 90 TAB | Refills: 3 | Status: SHIPPED | OUTPATIENT
Start: 2018-04-20 | End: 2019-05-22 | Stop reason: SDUPTHER

## 2018-04-20 RX ORDER — ATORVASTATIN CALCIUM 20 MG/1
20 TABLET, FILM COATED ORAL DAILY
Qty: 90 TAB | Refills: 1 | Status: CANCELLED | OUTPATIENT
Start: 2018-04-20

## 2018-04-25 DIAGNOSIS — I10 ESSENTIAL HYPERTENSION: ICD-10-CM

## 2018-04-25 RX ORDER — LISINOPRIL 20 MG/1
20 TABLET ORAL DAILY
Qty: 90 TAB | Refills: 0 | Status: SHIPPED | OUTPATIENT
Start: 2018-04-25 | End: 2018-08-03 | Stop reason: SDUPTHER

## 2018-05-29 ENCOUNTER — TELEPHONE (OUTPATIENT)
Dept: CARDIOLOGY | Facility: MEDICAL CENTER | Age: 73
End: 2018-05-29

## 2018-05-29 NOTE — TELEPHONE ENCOUNTER
Spoke with patient to confirm appointment with [facility], [facility] and has all the results will bring them with him.

## 2018-06-05 ENCOUNTER — OFFICE VISIT (OUTPATIENT)
Dept: CARDIOLOGY | Facility: MEDICAL CENTER | Age: 73
End: 2018-06-05
Payer: MEDICARE

## 2018-06-05 VITALS
WEIGHT: 175 LBS | HEART RATE: 78 BPM | BODY MASS INDEX: 27.47 KG/M2 | DIASTOLIC BLOOD PRESSURE: 72 MMHG | SYSTOLIC BLOOD PRESSURE: 120 MMHG | OXYGEN SATURATION: 96 % | HEIGHT: 67 IN

## 2018-06-05 DIAGNOSIS — Z01.812 PRE-OPERATIVE LABORATORY EXAMINATION: ICD-10-CM

## 2018-06-05 DIAGNOSIS — Z01.810 PRE-OPERATIVE CARDIOVASCULAR EXAMINATION: ICD-10-CM

## 2018-06-05 DIAGNOSIS — I25.119 CORONARY ARTERY DISEASE INVOLVING NATIVE CORONARY ARTERY OF NATIVE HEART WITH ANGINA PECTORIS (HCC): ICD-10-CM

## 2018-06-05 DIAGNOSIS — E78.2 MIXED HYPERLIPIDEMIA: ICD-10-CM

## 2018-06-05 DIAGNOSIS — I10 ESSENTIAL HYPERTENSION: ICD-10-CM

## 2018-06-05 PROBLEM — M51.369 DDD (DEGENERATIVE DISC DISEASE), LUMBAR: Status: RESOLVED | Noted: 2017-06-13 | Resolved: 2018-06-05

## 2018-06-05 PROBLEM — M51.36 DDD (DEGENERATIVE DISC DISEASE), LUMBAR: Status: RESOLVED | Noted: 2017-06-13 | Resolved: 2018-06-05

## 2018-06-05 LAB
ANION GAP SERPL CALC-SCNC: 9 MMOL/L (ref 0–11.9)
BASOPHILS # BLD AUTO: 0.9 % (ref 0–1.8)
BASOPHILS # BLD: 0.06 K/UL (ref 0–0.12)
BUN SERPL-MCNC: 13 MG/DL (ref 8–22)
CALCIUM SERPL-MCNC: 9.2 MG/DL (ref 8.5–10.5)
CHLORIDE SERPL-SCNC: 98 MMOL/L (ref 96–112)
CO2 SERPL-SCNC: 26 MMOL/L (ref 20–33)
COMMENT 1642: NORMAL
CREAT SERPL-MCNC: 0.85 MG/DL (ref 0.5–1.4)
EKG IMPRESSION: NORMAL
EOSINOPHIL # BLD AUTO: 0.13 K/UL (ref 0–0.51)
EOSINOPHIL NFR BLD: 2 % (ref 0–6.9)
ERYTHROCYTE [DISTWIDTH] IN BLOOD BY AUTOMATED COUNT: 46 FL (ref 35.9–50)
GLUCOSE SERPL-MCNC: 168 MG/DL (ref 65–99)
HCT VFR BLD AUTO: 44.9 % (ref 42–52)
HGB BLD-MCNC: 14.8 G/DL (ref 14–18)
IMM GRANULOCYTES # BLD AUTO: 0.03 K/UL (ref 0–0.11)
IMM GRANULOCYTES NFR BLD AUTO: 0.5 % (ref 0–0.9)
LYMPHOCYTES # BLD AUTO: 1.88 K/UL (ref 1–4.8)
LYMPHOCYTES NFR BLD: 28.4 % (ref 22–41)
MCH RBC QN AUTO: 30 PG (ref 27–33)
MCHC RBC AUTO-ENTMCNC: 33 G/DL (ref 33.7–35.3)
MCV RBC AUTO: 91.1 FL (ref 81.4–97.8)
MONOCYTES # BLD AUTO: 0.52 K/UL (ref 0–0.85)
MONOCYTES NFR BLD AUTO: 7.9 % (ref 0–13.4)
MORPHOLOGY BLD-IMP: NORMAL
NEUTROPHILS # BLD AUTO: 4 K/UL (ref 1.82–7.42)
NEUTROPHILS NFR BLD: 60.3 % (ref 44–72)
NRBC # BLD AUTO: 0 K/UL
NRBC BLD-RTO: 0 /100 WBC
PLATELET # BLD AUTO: 242 K/UL (ref 164–446)
PMV BLD AUTO: 12.1 FL (ref 9–12.9)
POTASSIUM SERPL-SCNC: 4.1 MMOL/L (ref 3.6–5.5)
RBC # BLD AUTO: 4.93 M/UL (ref 4.7–6.1)
SODIUM SERPL-SCNC: 133 MMOL/L (ref 135–145)
WBC # BLD AUTO: 6.6 K/UL (ref 4.8–10.8)

## 2018-06-05 PROCEDURE — 93010 ELECTROCARDIOGRAM REPORT: CPT | Performed by: INTERNAL MEDICINE

## 2018-06-05 PROCEDURE — 36415 COLL VENOUS BLD VENIPUNCTURE: CPT

## 2018-06-05 PROCEDURE — 85025 COMPLETE CBC W/AUTO DIFF WBC: CPT

## 2018-06-05 PROCEDURE — 99214 OFFICE O/P EST MOD 30 MIN: CPT | Performed by: NURSE PRACTITIONER

## 2018-06-05 PROCEDURE — 93010 ELECTROCARDIOGRAM REPORT: CPT | Mod: 77 | Performed by: INTERNAL MEDICINE

## 2018-06-05 PROCEDURE — 80048 BASIC METABOLIC PNL TOTAL CA: CPT

## 2018-06-05 PROCEDURE — 93005 ELECTROCARDIOGRAM TRACING: CPT

## 2018-06-05 RX ORDER — GABAPENTIN 600 MG/1
TABLET ORAL
Refills: 1 | COMMUNITY
Start: 2018-04-04 | End: 2018-06-05

## 2018-06-05 RX ORDER — BIMATOPROST 0.1 MG/ML
1 SOLUTION/ DROPS OPHTHALMIC
Refills: 3 | COMMUNITY
Start: 2018-04-05

## 2018-06-05 RX ORDER — FLUTICASONE PROPIONATE 50 MCG
1 SPRAY, SUSPENSION (ML) NASAL
COMMUNITY
End: 2019-10-30

## 2018-06-05 RX ORDER — NAPROXEN 500 MG/1
TABLET ORAL
Refills: 2 | COMMUNITY
Start: 2018-05-30 | End: 2019-08-05

## 2018-06-05 RX ORDER — ASPIRIN 81 MG/1
162 TABLET, CHEWABLE ORAL DAILY
Qty: 100 TAB | Refills: 3 | Status: ON HOLD | COMMUNITY
End: 2019-11-07

## 2018-06-05 RX ORDER — DUTASTERIDE 0.5 MG/1
CAPSULE, LIQUID FILLED ORAL
COMMUNITY
End: 2018-06-05

## 2018-06-05 ASSESSMENT — ENCOUNTER SYMPTOMS
PND: 0
SENSORY CHANGE: 1
CLAUDICATION: 0
FEVER: 0
MYALGIAS: 0
BACK PAIN: 0
ABDOMINAL PAIN: 0
COUGH: 0
SHORTNESS OF BREATH: 0
ORTHOPNEA: 0
DIZZINESS: 0
PALPITATIONS: 0

## 2018-06-05 NOTE — LETTER
"     [facility]   [address]  [place] [address]  Phone: [phone]  Fax: [phone]              Ronny Gallegos  1945    Encounter Date: 2018    FLAVIA Kirk          PROGRESS NOTE:  Subjective:   Ronny Gallegos is a 71 y.o. male who presents today for 6 month follow up for CAD, HTN, and HLD.    He is a prior patient of Dr. Ellsworth in our office we will set him up with another cardiologist.     Hx of HLD, HTN, and CAD (+ CT scoring in '15).    He is overall doing well from a cardiac standpoint.    He has no complaints except for right ankle pain and swelling, for which he is getting surgery for in a few weeks.    He is able to exercise and walk with no chest pain or dyspnea. He has lost weight with dietary changes. He is now on medication for his diabetes.    Past Medical History:   Diagnosis Date   • Arthritis     right ankle/left knee   • Asthma     inhaler PRN    • CAD (coronary artery disease)     + CT scoring, negative PET cardiac in    • Depression    • Diabetes (HCC)     oral medication    • Glaucoma    • High cholesterol    • Hypertension    • Hypopotassemia    • Hyposmolality and/or hyponatremia    • Jaundice     \"from drinking after wife \"   • Neuropathy (HCC)     bilat legs/feet   • Pain     right ankle    • Personal history of peptic ulcer disease    • Pneumonia    • Unspecified cataract     removed bilat     Past Surgical History:   Procedure Laterality Date   • LUMBAR LAMINECTOMY DISKECTOMY Right 2017    Procedure: LUMBAR LAMINECTOMY DISKECTOMY - RE-DO OPEN L4-S1 LAMINOTOMIES;  Surgeon: Clint Garsia M.D.;  Location: SURGERY [facility];  Service:    • FORAMINOTOMY  2017    Procedure: FORAMINOTOMY;  Surgeon: Clint Garsia M.D.;  Location: SURGERY [facility];  Service:    • HARDWARE REMOVAL NEURO  2017    Procedure: HARDWARE REMOVAL NEURO ;  Surgeon: Clint FAITH" JULISA Garsia;  Location: SURGERY [facility];  Service:    • LAPAROSCOPY ROBOTIC XI  10/26/2016    Procedure: LAPAROSCOPY FOR LIVER CYST MARSUPIALIZATION AND RESECTION LIVER WALL CYST;  Surgeon: Frank Corona M.D.;  Location: SURGERY [facility];  Service:    • LUMBAR FUSION POSTERIOR  9/10/2015    Procedure: LUMBAR FUSION POSTERIOR L5-S1 ;  Surgeon: Clint Garsia M.D.;  Location: SURGERY [facility];  Service:    • LUMBAR LAMINECTOMY DISKECTOMY  9/10/2015    Procedure: LUMBAR LAMINECTOMY ;  Surgeon: Clint Garsia M.D.;  Location: SURGERY [facility];  Service:    • LAMINOTOMY  9/10/2015    Procedure: LAMINOTOMY;  Surgeon: Clint Garsia M.D.;  Location: SURGERY [facility];  Service:    • OTHER  2005    ANTERIOR NECK FUSION C5-7   • OTHER  1978    BLEEDING ULCERS   • CATARACT EXTRACTION WITH IOL       Family History   Problem Relation Age of Onset   • Stroke Mother    • Heart Disease Mother    • Stroke Father    • Heart Disease Father      History   Smoking Status   • Former Smoker   • Packs/day: 1.50   • Years: 13.00   • Types: Cigarettes   • Quit date: 12/25/1978   Smokeless Tobacco   • Never Used     Allergies   Allergen Reactions   • Nkda [No Known Drug Allergy]      Outpatient Encounter Prescriptions as of 6/5/2018   Medication Sig Dispense Refill   • naproxen (NAPROSYN) 500 MG Tab TK 1 T PO BID WF PRN  2   • LUMIGAN 0.01 % Solution INT ONE GTT INTO OU QHS  3   • metFORMIN (GLUCOPHAGE) 500 MG Tab Take 500 mg by mouth 2 times a day, with meals.     • aspirin (ASA) 81 MG Chew Tab chewable tablet Take 1 Tab by mouth every day. 100 Tab 3   • lisinopril (PRINIVIL) 20 MG Tab Take 1 Tab by mouth every day. 90 Tab 0   • amLODIPine (NORVASC) 10 MG Tab Take 1 Tab by mouth every day. 90 Tab 1   • carvedilol (COREG) 6.25 MG Tab Take 1 Tab by mouth 2 times a day, with meals. 180 Tab 3   • atorvastatin (LIPITOR) 20 MG Tab Take 1 Tab by mouth every day. 90 Tab 3   • escitalopram (LEXAPRO) 20  "MG tablet Take 20 mg by mouth every morning. Indications: Major Depressive Disorder     • gabapentin (NEURONTIN) 300 MG Cap Take 300 mg by mouth every day.  0   • PROAIR  (90 Base) MCG/ACT Aero Soln inhalation aerosol      • [DISCONTINUED] dutasteride (AVODART) 0.5 MG capsule Avodart 0.5 mg capsule   Take 1 capsule every day by oral route.     • [DISCONTINUED] gabapentin (NEURONTIN) 600 MG tablet TK 1 T PO QHS  1   • cyclobenzaprine (FLEXERIL) 10 MG Tab Take 1 Tab by mouth every 8 hours as needed for Muscle Spasms. 30 Tab 0   • [DISCONTINUED] oxycodone immediate-release (ROXICODONE) 5 MG Tab Take 1-2 Tabs by mouth every 6 hours as needed for Severe Pain. 60 Tab 0   • [DISCONTINUED] doxycycline monohydrate (ADOXA) 100 MG tablet Take 1 Tab by mouth every 12 hours. 10 Tab 0   • [DISCONTINUED] bimatoprost (LUMIGAN) 0.03 % SOLN Place 1 Drop in both eyes every evening.       No facility-administered encounter medications on file as of 6/5/2018.      Review of Systems   Constitutional: Negative for fever and malaise/fatigue.   Respiratory: Negative for cough and shortness of breath.    Cardiovascular: Negative for chest pain, palpitations, orthopnea, claudication, leg swelling and PND.   Gastrointestinal: Negative for abdominal pain.   Musculoskeletal: Positive for joint pain. Negative for back pain and myalgias.        R ankle pain (pending surgery soon)   Neurological: Positive for sensory change. Negative for dizziness.        Neuropathy     All other systems reviewed and are negative.       Objective:   /72   Pulse 78   Ht 1.702 m (5' 7\")   Wt 79.4 kg (175 lb)   SpO2 96%   BMI 27.41 kg/m²      Physical Exam   Constitutional: He is oriented to person, place, and time. He appears well-developed and well-nourished. No distress.   HENT:   Head: Normocephalic and atraumatic.   Eyes: EOM are normal.   Neck: Normal range of motion. No JVD present.   Cardiovascular: Normal rate, regular rhythm, normal heart " sounds and intact distal pulses.    No murmur heard.  Pulmonary/Chest: Effort normal and breath sounds normal. No respiratory distress. He has no wheezes. He has no rales.   Abdominal: Soft. Bowel sounds are normal.   Musculoskeletal: Normal range of motion. He exhibits no edema.   Neurological: He is alert and oriented to person, place, and time.   Skin: Skin is warm and dry.   Psychiatric: He has a normal mood and affect.   Nursing note and vitals reviewed.    Lab Results   Component Value Date/Time    CHOLSTRLTOT 151 01/06/2017 10:35 AM    CHOLSTRLTOT 105 09/12/2015 02:28 AM    LDL 63 01/06/2017 10:35 AM    LDL 44 09/12/2015 02:28 AM    HDL 54 01/06/2017 10:35 AM    HDL 45 09/12/2015 02:28 AM    TRIGLYCERIDE 172 (H) 01/06/2017 10:35 AM    TRIGLYCERIDE 80 09/12/2015 02:28 AM       Lab Results   Component Value Date/Time    SODIUM 132 (L) 06/14/2017 02:12 AM    POTASSIUM 4.5 06/14/2017 02:12 AM    CHLORIDE 100 06/14/2017 02:12 AM    CO2 28 06/14/2017 02:12 AM    GLUCOSE 176 (H) 06/14/2017 02:12 AM    BUN 11 06/14/2017 02:12 AM    CREATININE 0.84 06/14/2017 02:12 AM    CREATININE 0.93 06/18/2012 09:13 AM    BUNCREATRAT 18 06/29/2016 09:59 AM    BUNCREATRAT 14 06/18/2012 09:13 AM     Lab Results   Component Value Date/Time    ALKPHOSPHAT 57 10/20/2016 08:28 AM    ASTSGOT 23 10/20/2016 08:28 AM    ALTSGPT 20 10/20/2016 08:28 AM    TBILIRUBIN 1.1 10/20/2016 08:28 AM      2015 CT SCORING   FINDINGS:  Coronary calcification:  LMA - 0.0  LCX - 35.4  LAD - 167.9  RCA - 616.6  PDA - 0.0  Calcium score:  819.9  The visualized portions of the lungs and mediastinum are unremarkable. There is some scarring in the lingula.  Limited evaluation of the upper abdomen demonstrates interval enlargement of cyst in the left hepatic lobe currently measuring 11 cm and previously measuring 6 cm. Surgical clips identified about the GE junction.           1.  There is extensive atherosclerotic plaque burden.  2.  There is a high likelihood  (greater than 90%) of at least one significant coronary stenosis.  3.  The patient's cardiovascular risk is high.  4.  Aggressive risk factor reduction is strongly suggested.  5.  Exercise or pharmacologic nuclear medicine stress testing is strongly suggested to evaluate for indicible ischemia.  6.  Global cardiac assessment is suggested.  7.  Interval enlargement of left hepatic lobe cyst.  Calcium score is above the 75th percentile for the patient's age.    Assessment:     1. Coronary artery disease involving native coronary artery of native heart with angina pectoris (HCC)     2. Essential hypertension     3. Mixed hyperlipidemia       Medical Decision Making:  Today's Assessment / Status / Plan:     1. HLD  -good LDL control <70 with CAD  -statin, cont   -follow yearly CMP and lipid    2. HTN  -great control on lisinopril, coreg, and amlodipine    3. CAD with + CT scoring in '15  -stress imaging in '17 with no ischemia  -follow clinically, currently no angina or DAWSON  -start baby ASA 81 mg and cont statin (he wasn't taking ASA previously as prescribed?)    FU in clinic in 6 months with LS; no labs or testing; call for cardiac concerns/symptoms    Patient verbalizes understanding and agrees with the plan of care.     Collaborating MD: Og BRADY    Physical Exam   Constitutional: He is oriented to person, place, and time. He appears well-developed and well-nourished. No distress.   HENT:   Head: Normocephalic and atraumatic.   Eyes: EOM are normal.   Neck: Normal range of motion. No JVD present.   Cardiovascular: Normal rate, regular rhythm, normal heart sounds and intact distal pulses.    No murmur heard.  Pulmonary/Chest: Effort normal and breath sounds normal. No respiratory distress. He has no wheezes. He has no rales.   Abdominal: Soft. Bowel sounds are normal.   Musculoskeletal: Normal range of motion. He exhibits no edema.   Neurological: He is alert and oriented to person, place, and time.   Skin: Skin is  warm and dry.   Psychiatric: He has a normal mood and affect.   Nursing note and vitals reviewed.          No Recipients

## 2018-06-05 NOTE — PROGRESS NOTES
"Subjective:   Ronny Gallegos is a 71 y.o. male who presents today for 6 month follow up for CAD, HTN, and HLD.    He is a prior patient of Dr. Ellsworth in our office we will set him up with another cardiologist.     Hx of HLD, HTN, and CAD (+ CT scoring in '15).    He is overall doing well from a cardiac standpoint.    He has no complaints except for right ankle pain and swelling, for which he is getting surgery for in a few weeks.    He is able to exercise and walk with no chest pain or dyspnea. He has lost weight with dietary changes. He is now on medication for his diabetes.    Past Medical History:   Diagnosis Date   • Arthritis     right ankle/left knee   • Asthma     inhaler PRN    • CAD (coronary artery disease)     + CT scoring, negative PET cardiac in    • Depression    • Diabetes (HCC)     oral medication    • Glaucoma    • High cholesterol    • Hypertension    • Hypopotassemia    • Hyposmolality and/or hyponatremia    • Jaundice     \"from drinking after wife \"   • Neuropathy (HCC)     bilat legs/feet   • Pain     right ankle    • Personal history of peptic ulcer disease    • Pneumonia    • Unspecified cataract     removed bilat     Past Surgical History:   Procedure Laterality Date   • LUMBAR LAMINECTOMY DISKECTOMY Right 2017    Procedure: LUMBAR LAMINECTOMY DISKECTOMY - RE-DO OPEN L4-S1 LAMINOTOMIES;  Surgeon: Clint Garsia M.D.;  Location: [facility];  Service:    • FORAMINOTOMY  2017    Procedure: FORAMINOTOMY;  Surgeon: Clint Garsia M.D.;  Location: [facility];  Service:    • HARDWARE REMOVAL NEURO  2017    Procedure: HARDWARE REMOVAL NEURO ;  Surgeon: Clint Garsia M.D.;  Location: [facility];  Service:    • LAPAROSCOPY ROBOTIC XI  10/26/2016    Procedure: LAPAROSCOPY FOR LIVER CYST MARSUPIALIZATION AND RESECTION LIVER WALL CYST;  Surgeon: Frank Corona M.D.;  Location: [facility];  Service:    • " LUMBAR FUSION POSTERIOR  9/10/2015    Procedure: LUMBAR FUSION POSTERIOR L5-S1 ;  Surgeon: Clint Garsia M.D.;  Location: SUR[facility]ERY [facility];  Service:    • LUMBAR LAMINECTOMY DISKECTOMY  9/10/2015    Procedure: LUMBAR LAMINECTOMY ;  Surgeon: Clint Garsia M.D.;  Location: SURGERY [facility];  Service:    • LAMINOTOMY  9/10/2015    Procedure: LAMINOTOMY;  Surgeon: Clint Garsia M.D.;  Location: SURGERY [facility];  Service:    • OTHER  2005    ANTERIOR NECK FUSION C5-7   • OTHER  1978    BLEEDING ULCERS   • CATARACT EXTRACTION WITH IOL       Family History   Problem Relation Age of Onset   • Stroke Mother    • Heart Disease Mother    • Stroke Father    • Heart Disease Father      History   Smoking Status   • Former Smoker   • Packs/day: 1.50   • Years: 13.00   • Types: Cigarettes   • Quit date: 12/25/1978   Smokeless Tobacco   • Never Used     Allergies   Allergen Reactions   • Nkda [No Known Drug Allergy]      Outpatient Encounter Prescriptions as of 6/5/2018   Medication Sig Dispense Refill   • naproxen (NAPROSYN) 500 MG Tab TK 1 T PO BID WF PRN  2   • LUMIGAN 0.01 % Solution INT ONE GTT INTO OU QHS  3   • metFORMIN (GLUCOPHAGE) 500 MG Tab Take 500 mg by mouth 2 times a day, with meals.     • aspirin (ASA) 81 MG Chew Tab chewable tablet Take 1 Tab by mouth every day. 100 Tab 3   • lisinopril (PRINIVIL) 20 MG Tab Take 1 Tab by mouth every day. 90 Tab 0   • amLODIPine (NORVASC) 10 MG Tab Take 1 Tab by mouth every day. 90 Tab 1   • carvedilol (COREG) 6.25 MG Tab Take 1 Tab by mouth 2 times a day, with meals. 180 Tab 3   • atorvastatin (LIPITOR) 20 MG Tab Take 1 Tab by mouth every day. 90 Tab 3   • escitalopram (LEXAPRO) 20 MG tablet Take 20 mg by mouth every morning. Indications: Major Depressive Disorder     • gabapentin (NEURONTIN) 300 MG Cap Take 300 mg by mouth every day.  0   • PROAIR  (90 Base) MCG/ACT Aero Soln inhalation aerosol      • [DISCONTINUED] dutasteride (AVODART) 0.5 MG  "capsule Avodart 0.5 mg capsule   Take 1 capsule every day by oral route.     • [DISCONTINUED] gabapentin (NEURONTIN) 600 MG tablet TK 1 T PO QHS  1   • cyclobenzaprine (FLEXERIL) 10 MG Tab Take 1 Tab by mouth every 8 hours as needed for Muscle Spasms. 30 Tab 0   • [DISCONTINUED] oxycodone immediate-release (ROXICODONE) 5 MG Tab Take 1-2 Tabs by mouth every 6 hours as needed for Severe Pain. 60 Tab 0   • [DISCONTINUED] doxycycline monohydrate (ADOXA) 100 MG tablet Take 1 Tab by mouth every 12 hours. 10 Tab 0   • [DISCONTINUED] bimatoprost (LUMIGAN) 0.03 % SOLN Place 1 Drop in both eyes every evening.       No facility-administered encounter medications on file as of 6/5/2018.      Review of Systems   Constitutional: Negative for fever and malaise/fatigue.   Respiratory: Negative for cough and shortness of breath.    Cardiovascular: Negative for chest pain, palpitations, orthopnea, claudication, leg swelling and PND.   Gastrointestinal: Negative for abdominal pain.   Musculoskeletal: Positive for joint pain. Negative for back pain and myalgias.        R ankle pain (pending surgery soon)   Neurological: Positive for sensory change. Negative for dizziness.        Neuropathy     All other systems reviewed and are negative.       Objective:   /72   Pulse 78   Ht 1.702 m (5' 7\")   Wt 79.4 kg (175 lb)   SpO2 96%   BMI 27.41 kg/m²     Physical Exam   Constitutional: He is oriented to person, place, and time. He appears well-developed and well-nourished. No distress.   HENT:   Head: Normocephalic and atraumatic.   Eyes: EOM are normal.   Neck: Normal range of motion. No JVD present.   Cardiovascular: Normal rate, regular rhythm, normal heart sounds and intact distal pulses.    No murmur heard.  Pulmonary/Chest: Effort normal and breath sounds normal. No respiratory distress. He has no wheezes. He has no rales.   Abdominal: Soft. Bowel sounds are normal.   Musculoskeletal: Normal range of motion. He exhibits no " edema.   Neurological: He is alert and oriented to person, place, and time.   Skin: Skin is warm and dry.   Psychiatric: He has a normal mood and affect.   Nursing note and vitals reviewed.    Lab Results   Component Value Date/Time    CHOLSTRLTOT 151 01/06/2017 10:35 AM    CHOLSTRLTOT 105 09/12/2015 02:28 AM    LDL 63 01/06/2017 10:35 AM    LDL 44 09/12/2015 02:28 AM    HDL 54 01/06/2017 10:35 AM    HDL 45 09/12/2015 02:28 AM    TRIGLYCERIDE 172 (H) 01/06/2017 10:35 AM    TRIGLYCERIDE 80 09/12/2015 02:28 AM       Lab Results   Component Value Date/Time    SODIUM 132 (L) 06/14/2017 02:12 AM    POTASSIUM 4.5 06/14/2017 02:12 AM    CHLORIDE 100 06/14/2017 02:12 AM    CO2 28 06/14/2017 02:12 AM    GLUCOSE 176 (H) 06/14/2017 02:12 AM    BUN 11 06/14/2017 02:12 AM    CREATININE 0.84 06/14/2017 02:12 AM    CREATININE 0.93 06/18/2012 09:13 AM    BUNCREATRAT 18 06/29/2016 09:59 AM    BUNCREATRAT 14 06/18/2012 09:13 AM     Lab Results   Component Value Date/Time    ALKPHOSPHAT 57 10/20/2016 08:28 AM    ASTSGOT 23 10/20/2016 08:28 AM    ALTSGPT 20 10/20/2016 08:28 AM    TBILIRUBIN 1.1 10/20/2016 08:28 AM      2015 CT SCORING   FINDINGS:  Coronary calcification:  LMA - 0.0  LCX - 35.4  LAD - 167.9  RCA - 616.6  PDA - 0.0  Calcium score:  819.9  The visualized portions of the lungs and mediastinum are unremarkable. There is some scarring in the lingula.  Limited evaluation of the upper abdomen demonstrates interval enlargement of cyst in the left hepatic lobe currently measuring 11 cm and previously measuring 6 cm. Surgical clips identified about the GE junction.           1.  There is extensive atherosclerotic plaque burden.  2.  There is a high likelihood (greater than 90%) of at least one significant coronary stenosis.  3.  The patient's cardiovascular risk is high.  4.  Aggressive risk factor reduction is strongly suggested.  5.  Exercise or pharmacologic nuclear medicine stress testing is strongly suggested to evaluate for  indicible ischemia.  6.  Global cardiac assessment is suggested.  7.  Interval enlargement of left hepatic lobe cyst.  Calcium score is above the 75th percentile for the patient's age.    Assessment:     1. Coronary artery disease involving native coronary artery of native heart with angina pectoris (HCC)     2. Essential hypertension     3. Mixed hyperlipidemia       Medical Decision Making:  Today's Assessment / Status / Plan:     1. HLD  -good LDL control <70 with CAD  -statin, cont   -follow yearly CMP and lipid    2. HTN  -great control on lisinopril, coreg, and amlodipine    3. CAD with + CT scoring in '15  -stress imaging in '17 with no ischemia  -follow clinically, currently no angina or DAWSON  -start baby ASA 81 mg and cont statin (he wasn't taking ASA previously as prescribed?)    FU in clinic in 6 months with LS; no labs or testing; call for cardiac concerns/symptoms    Patient verbalizes understanding and agrees with the plan of care.     Collaborating MD: Og BRADY    Physical Exam   Constitutional: He is oriented to person, place, and time. He appears well-developed and well-nourished. No distress.   HENT:   Head: Normocephalic and atraumatic.   Eyes: EOM are normal.   Neck: Normal range of motion. No JVD present.   Cardiovascular: Normal rate, regular rhythm, normal heart sounds and intact distal pulses.    No murmur heard.  Pulmonary/Chest: Effort normal and breath sounds normal. No respiratory distress. He has no wheezes. He has no rales.   Abdominal: Soft. Bowel sounds are normal.   Musculoskeletal: Normal range of motion. He exhibits no edema.   Neurological: He is alert and oriented to person, place, and time.   Skin: Skin is warm and dry.   Psychiatric: He has a normal mood and affect.   Nursing note and vitals reviewed.

## 2018-06-05 NOTE — DISCHARGE PLANNING
DISCHARGE PLANNING NOTE - TOTAL JOINT     Procedure: Procedure(s):  ANKLE TOTAL ARTHROPLASTY  LIGAMENT REPAIR- LATERAL, THOMAS  Procedure Date: 6/18/2018  Insurance:  Payor: MEDICARE / Plan: MEDICARE PART A & B   Equipment currently available at home? cane and kneeling scooter  Steps into the home? 2  Steps within the home? 0  Toilet height? Standard  Type of shower? tub-shower  Who will be with you during your recovery? Friend,son  Is Outpatient Physical Therapy set up after surgery? No , not yet  Did you take the Total Joint Class and where? N/A     Plan: Reviewed Equipment Resource list and provided a copy to the patient. Recommended a tub transfer bench, hand held shower head and raised toilet seat. He may have crutches at home for the stairs. He will let the staff know if he needs crutches. Provided Home Safety Checklist. Anticipate discharge home.

## 2018-06-18 ENCOUNTER — APPOINTMENT (OUTPATIENT)
Dept: RADIOLOGY | Facility: MEDICAL CENTER | Age: 73
DRG: 469 | End: 2018-06-18
Attending: ORTHOPAEDIC SURGERY
Payer: MEDICARE

## 2018-06-18 ENCOUNTER — HOSPITAL ENCOUNTER (INPATIENT)
Facility: MEDICAL CENTER | Age: 73
LOS: 1 days | DRG: 469 | End: 2018-06-18
Attending: ORTHOPAEDIC SURGERY | Admitting: ORTHOPAEDIC SURGERY
Payer: MEDICARE

## 2018-06-18 VITALS
BODY MASS INDEX: 26.89 KG/M2 | DIASTOLIC BLOOD PRESSURE: 74 MMHG | WEIGHT: 171.3 LBS | OXYGEN SATURATION: 95 % | HEIGHT: 67 IN | SYSTOLIC BLOOD PRESSURE: 126 MMHG | RESPIRATION RATE: 14 BRPM | HEART RATE: 69 BPM | TEMPERATURE: 97.8 F

## 2018-06-18 LAB — GLUCOSE BLD-MCNC: 98 MG/DL (ref 65–99)

## 2018-06-18 PROCEDURE — 501838 HCHG SUTURE GENERAL: Performed by: ORTHOPAEDIC SURGERY

## 2018-06-18 PROCEDURE — 160002 HCHG RECOVERY MINUTES (STAT): Performed by: ORTHOPAEDIC SURGERY

## 2018-06-18 PROCEDURE — 160046 HCHG PACU - 1ST 60 MINS PHASE II: Performed by: ORTHOPAEDIC SURGERY

## 2018-06-18 PROCEDURE — 160022 HCHG BLOCK: Performed by: ORTHOPAEDIC SURGERY

## 2018-06-18 PROCEDURE — 700111 HCHG RX REV CODE 636 W/ 250 OVERRIDE (IP)

## 2018-06-18 PROCEDURE — A4306 DRUG DELIVERY SYSTEM <=50 ML: HCPCS | Performed by: ORTHOPAEDIC SURGERY

## 2018-06-18 PROCEDURE — 700101 HCHG RX REV CODE 250: Performed by: ORTHOPAEDIC SURGERY

## 2018-06-18 PROCEDURE — C1713 ANCHOR/SCREW BN/BN,TIS/BN: HCPCS | Performed by: ORTHOPAEDIC SURGERY

## 2018-06-18 PROCEDURE — 73600 X-RAY EXAM OF ANKLE: CPT | Mod: RT

## 2018-06-18 PROCEDURE — 160028 HCHG SURGERY MINUTES - 1ST 30 MINS LEVEL 3: Performed by: ORTHOPAEDIC SURGERY

## 2018-06-18 PROCEDURE — 500881 HCHG PACK, EXTREMITY: Performed by: ORTHOPAEDIC SURGERY

## 2018-06-18 PROCEDURE — 306637 HCHG MISC ORTHO ITEM RC 0274

## 2018-06-18 PROCEDURE — A6454 SELF-ADHER BAND W>=3" <5"/YD: HCPCS | Performed by: ORTHOPAEDIC SURGERY

## 2018-06-18 PROCEDURE — 160036 HCHG PACU - EA ADDL 30 MINS PHASE I: Performed by: ORTHOPAEDIC SURGERY

## 2018-06-18 PROCEDURE — 82962 GLUCOSE BLOOD TEST: CPT

## 2018-06-18 PROCEDURE — 160035 HCHG PACU - 1ST 60 MINS PHASE I: Performed by: ORTHOPAEDIC SURGERY

## 2018-06-18 PROCEDURE — 160039 HCHG SURGERY MINUTES - EA ADDL 1 MIN LEVEL 3: Performed by: ORTHOPAEDIC SURGERY

## 2018-06-18 PROCEDURE — 502240 HCHG MISC OR SUPPLY RC 0272: Performed by: ORTHOPAEDIC SURGERY

## 2018-06-18 PROCEDURE — 502000 HCHG MISC OR IMPLANTS RC 0278: Performed by: ORTHOPAEDIC SURGERY

## 2018-06-18 PROCEDURE — 160025 RECOVERY II MINUTES (STATS): Performed by: ORTHOPAEDIC SURGERY

## 2018-06-18 PROCEDURE — A6223 GAUZE >16<=48 NO W/SAL W/O B: HCPCS | Performed by: ORTHOPAEDIC SURGERY

## 2018-06-18 PROCEDURE — 500367 HCHG DRAIN KIT, HEMOVAC: Performed by: ORTHOPAEDIC SURGERY

## 2018-06-18 PROCEDURE — 700111 HCHG RX REV CODE 636 W/ 250 OVERRIDE (IP): Performed by: ORTHOPAEDIC SURGERY

## 2018-06-18 PROCEDURE — 0SRF0JZ REPLACEMENT OF RIGHT ANKLE JOINT WITH SYNTHETIC SUBSTITUTE, OPEN APPROACH: ICD-10-PCS | Performed by: ORTHOPAEDIC SURGERY

## 2018-06-18 PROCEDURE — E0114 CRUTCH UNDERARM PAIR NO WOOD: HCPCS | Performed by: ORTHOPAEDIC SURGERY

## 2018-06-18 PROCEDURE — 160048 HCHG OR STATISTICAL LEVEL 1-5: Performed by: ORTHOPAEDIC SURGERY

## 2018-06-18 PROCEDURE — 700101 HCHG RX REV CODE 250

## 2018-06-18 PROCEDURE — 501480 HCHG STOCKINETTE: Performed by: ORTHOPAEDIC SURGERY

## 2018-06-18 PROCEDURE — 160009 HCHG ANES TIME/MIN: Performed by: ORTHOPAEDIC SURGERY

## 2018-06-18 PROCEDURE — 503339 HCHG DRESSSING PICO: Performed by: ORTHOPAEDIC SURGERY

## 2018-06-18 DEVICE — SUTURE ANCHOR TWINFIX 3.5 WITH NEEDLES SMALL JOINT: Type: IMPLANTABLE DEVICE | Site: ANKLE | Status: FUNCTIONAL

## 2018-06-18 DEVICE — IMPLANTABLE DEVICE: Type: IMPLANTABLE DEVICE | Site: ANKLE | Status: FUNCTIONAL

## 2018-06-18 RX ORDER — GABAPENTIN 600 MG/1
600 TABLET ORAL DAILY
COMMUNITY
End: 2019-08-05

## 2018-06-18 RX ORDER — MAGNESIUM HYDROXIDE 1200 MG/15ML
LIQUID ORAL
Status: COMPLETED | OUTPATIENT
Start: 2018-06-18 | End: 2018-06-18

## 2018-06-18 RX ORDER — LIDOCAINE HYDROCHLORIDE 10 MG/ML
0.5 INJECTION, SOLUTION INFILTRATION; PERINEURAL
Status: DISCONTINUED | OUTPATIENT
Start: 2018-06-18 | End: 2018-06-18 | Stop reason: HOSPADM

## 2018-06-18 RX ORDER — SODIUM CHLORIDE 9 MG/ML
1000 INJECTION, SOLUTION INTRAVENOUS
Status: COMPLETED | OUTPATIENT
Start: 2018-06-18 | End: 2018-06-18

## 2018-06-18 RX ADMIN — SODIUM CHLORIDE 1000 ML: 9 INJECTION, SOLUTION INTRAVENOUS at 12:54

## 2018-06-18 ASSESSMENT — PAIN SCALES - GENERAL
PAINLEVEL_OUTOF10: 0

## 2018-06-18 NOTE — OP REPORT
DATE OF SERVICE:  6/18/2018     PREOPERATIVE DIAGNOSIS:  Right ankle arthritis.     POSTOPERATIVE DIAGNOSIS:  Right ankle arthritis.     PROCEDURE PERFORMED:  1.  Right total ankle arthroplasty.  2.  Right placement of CECE dressing.    3. Right repair lateral ligaments     SURGEON:  Jose De Jesus Medrano MD     FIRST ASSISTANT:  MD Paras     SECOND ASSISTANT:  Dr. Brenda Moreau.     ANESTHESIA:  General endotracheal with popliteal block catheter per my request   for pain management.     ESTIMATED BLOOD LOSS:  None.     COMPLICATIONS:  None.     DRAINS:  Hemovac x1.     IMPLANTS:  [place] STAR total ankle arthroplasty, tibia LG, polyethylene 11,   talus SM.     POSTOPERATIVE PLAN:  1.  Perioperative antibiotics.  2.  Follow up in 2 weeks.  3.  Preoperative antibiotics.     INDICATIONS:  The patient is a pleasant 72 year old male.  The patient had problems    with their right ankle for some time.  The above diagnoses made and options were   discussed including operative and nonoperative.  The patient elected to undergo    operative intervention.  The procedure discussed and all questions were    answered.  Risks of surgery explained to include but not limited to wound    problems, infection, nerve injury, vascular injury, need for further surgery.     The patient understands the potential for persistent risk for pain, malunion, nonunion,    need for further surgery.  The patient understands and accepts these risks and agrees    to proceed.  The site was marked by myself prior to receiving psychotropic    medicines.     PROCEDURE IN DETAIL:  The patient was given a popliteal block and catheter per   my request for pain management.  They were then brought to the operating room and    underwent general endotracheal anesthesia without complications. The right  lower extremity was prepped and draped in standard fashion in supine position    with all appropriate padding.  Positive site verification confirmed the  patient's right   lower extremity as well as procedure and confirmation that the patient received    preoperative antibiotics.  Esmarch was used to exsanguinate the foot and ankle   and leg tourniquet was inflated to 250 mmHg.  An anterior incision was made    centered over the anterior ankle.  It was dissected down.  The superficial    peroneal nerve was identified and protected distally.  The interval between    the tibialis anterior and extensor hallus longus was developed.  Neurovascular   bundle was identified and was retracted laterally.  This exposed the ankle    joint.  A guide pin was placed perpendicular to the long axis of the tibia.  Tibial   alignment jig was then placed over the top of this, was pinned in place    parallel to the long axis of the tibia.  This was confirmed on C-arm imaging    on AP view.  On lateral view, the janiya wing was placed and this was    identified to be perpendicular to the long axis of the tibia.  Tibial cut    height was adjusted approximately 5 mm at the subchondral bone margin and    medial and lateral cut was based off the shoulder of the tibia at the medial    malleolar aspect.  This was then pinned in place and the distal tibial    alignment jig was pinned in place.  Malleolar guide pins were placed for later   protection.  Distal tibia cut was made with a combination of oscillating saw    and reciprocating saw.  The tibial cutting jig was removed and all bone was    removed from the distal tibia.  Next, the superior talar cutting guide was    placed onto the tibial alignment jig.  This was then held in neutral position    with slight valgus.  It was pinned in place with talus directly against the    paddle.  The superior cut was made with the oscillating saw.  This was    removed.  The talus was measured to be a small.  The guide pin for the     talus was placed through the datum alignment jig was confirmed on    C-arm imaging on AP view to be centered on the talus on  lateral view with the    nipple over the lateral process of the talus.  This was then removed and the    anterior, posterior cutting guide was placed over the datum alignment jig pin    and was pinned in place.  The posterior cut was made with the oscillating saw    and the anterior cut was made with the milling drill bit.  An anterior,    posterior cutting guide was then removed and/or guide pin the medial and    lateral cutting guide was then pinned in place into the tibia went to the    talus.  The medial and lateral cuts were made with the reciprocating saw and    bone of the medial and lateral gutters were removed as well as posterior    aspect of the tibia.  Window trial was then placed after the cutting guide was   removed.  It sat very nicely on the bone.  The keel was then drilled.  Window   trial was removed and was punched.  The window trial was also confirmed on    C-arm imaging to be directly against bone as well as direct visualization.     The tibia was then measured and turned to be a large.  The gutters were   cleared of all soft tissue.  The talar component was then impacted in place    with the long side lateral.  The barrel guide was then set of the tibia on AP    and lateral views with the blue bar protecting the underlying talar component    was determined to be directly against bone on the lateral.  It was pinned in    place and both the medial and lateral barrels were drilled.  This was removed.    All bone was removed.  A thorough irrigation was performed.  Tibial    component was impacted in place with the blue bar protecting the underlying    talar component.  C-arm imaging confirmed tibia component to remain directly    against the distal tibial bone. A deltoid release was perfomed with a sweeney elevator.  There was still lateral instabiliyt.   A 3.5mm anchor was placed in the lateral malleolus.  One suture was brought throught the CFL and the other through the ATFL.  Both were tightened  with good stability lateral  .A series of polyethylene trials were placed    and a 11 mm demonstrated good range of motion with very minimal lift off medial   and lateral. The barrels were then bone    grafted.  Final C-arm imaging demonstrates satisfactory position for internal    fixation alignment.  The patient had excellent range of motion.  No gross instability.    Wounds were irrigated with copious irrigation.  Hemovac drain was placed.     The anterior wound was closed with multiple layers closing the capsular layer    followed by an extensor retinacular layer followed by subcutaneous layer, all    with 3-0 Vicryl and 3-0 nylon for the skin.  Sterile dressings were applied    including a negative pressure incisional dressing, which demonstrated a    positive seal.  The patient was placed into a bulky Charles splint and woke from    general anesthesia without complications and was transferred to the recovery    room in good condition.  Next, utilization of Dr. Crain was necessary for   patient positioning, holding, retracting, provisional and definitive    fixation, wound closure, dressing and splint placement.  He was present    throughout the entire procedure.

## 2018-06-19 NOTE — PROGRESS NOTES
Patient AAOx4, UMAÑA equally, bilaterally, taking PO without N/V, denies pain, meets pacu discharge criteria

## 2018-06-19 NOTE — DISCHARGE INSTRUCTIONS
ACTIVITY: Rest and take it easy for the first 24 hours.  A responsible adult is recommended to remain with you during that time.  It is normal to feel sleepy.  We encourage you to not do anything that requires balance, judgment or coordination.    MILD FLU-LIKE SYMPTOMS ARE NORMAL. YOU MAY EXPERIENCE GENERALIZED MUSCLE ACHES, THROAT IRRITATION, HEADACHE AND/OR SOME NAUSEA.    FOR 24 HOURS DO NOT:  Drive, operate machinery or run household appliances.  Drink beer or alcoholic beverages.   Make important decisions or sign legal documents.    SPECIAL INSTRUCTIONS:   You must remain non-weight bearing to the operative extremity.  You may be touch down for transfers only  Discharge drain on 6/19/18.  Turn off CECE wound vac on post operative day 7.  If the CECE would vac begins to alarm, you may call clinic and come in early for a dressing change.  Keep splint/dressing on at all times, do not remove.    DIET: To avoid nausea, slowly advance diet as tolerated, avoiding spicy or greasy foods for the first day.  Add more substantial food to your diet according to your physician's instructions.  Babies can be fed formula or breast milk as soon as they are hungry.  INCREASE FLUIDS AND FIBER TO AVOID CONSTIPATION.    SURGICAL DRESSING/BATHING: Keep splint/dressing on at all times, do not remove.    FOLLOW-UP APPOINTMENT:  A follow-up appointment should be arranged with your doctor in 1-2 weeks; call to schedule.    You should CALL YOUR PHYSICIAN if you develop:  Fever greater than 101 degrees F.  Pain not relieved by medication, or persistent nausea or vomiting.  Excessive bleeding (blood soaking through dressing) or unexpected drainage from the wound.  Extreme redness or swelling around the incision site, drainage of pus or foul smelling drainage.  Inability to urinate or empty your bladder within 8 hours.  Problems with breathing or chest pain.    You should call 911 if you develop problems with breathing or chest  pain.  If you are unable to contact your doctor or surgical center, you should go to the nearest emergency room or urgent care center.  Physician's telephone #: [phone]    If any questions arise, call your doctor.  If your doctor is not available, please feel free to call the Surgical Center at [phone].  The Center is open Monday through Friday from 7AM to 7PM.  You can also call the HEALTH HOTLINE open 24 hours/day, 7 days/week and speak to a nurse at [phone], or toll free at [phone].    A registered nurse may call you a few days after your surgery to see how you are doing after your procedure.    MEDICATIONS: Resume taking daily medication.  Take prescribed pain medication with food.  If no medication is prescribed, you may take non-aspirin pain medication if needed.  PAIN MEDICATION CAN BE VERY CONSTIPATING.  Take a stool softener or laxative such as senokot, pericolace, or milk of magnesia if needed.    Prescription at home. Pain medication may be taken any time.    If your physician has prescribed pain medication that includes Acetaminophen (Tylenol), do not take additional Acetaminophen (Tylenol) while taking the prescribed medication.    Depression / Suicide Risk    As you are discharged from this [facility] facility, it is important to learn how to keep safe from harming yourself.    Recognize the warning signs:  · Abrupt changes in personality, positive or negative- including increase in energy   · Giving away possessions  · Change in eating patterns- significant weight changes-  positive or negative  · Change in sleeping patterns- unable to sleep or sleeping all the time   · Unwillingness or inability to communicate  · Depression  · Unusual sadness, discouragement and loneliness  · Talk of wanting to die  · Neglect of personal appearance   · Rebelliousness- reckless behavior  · Withdrawal from people/activities they love  · Confusion- inability to concentrate     If you or a  loved one observes any of these behaviors or has concerns about self-harm, here's what you can do:  · Talk about it- your feelings and reasons for harming yourself  · Remove any means that you might use to hurt yourself (examples: pills, rope, extension cords, firearm)  · Get professional help from the community (Mental Health, Substance Abuse, psychological counseling)  · Do not be alone:Call your Safe Contact- someone whom you trust who will be there for you.  · Call your local CRISIS HOTLINE 931-6005 or 159-442-0102  · Call your local Children's Mobile Crisis Response Team [place] (476) 022-9457 or www.Played  · Call the toll free National Suicide Prevention Hotlines   · National Suicide Prevention Lifeline 260-404-BWFZ (5389)  · National Hope Line Network 800-SUICIDE (960-6749)

## 2018-06-19 NOTE — OR NURSING
Assumed care of patient at 1725.  Patient alert and oriented x4. See flowsheets for VS.  Pain is rated 0/10.     Call light and personal belongings within reach. Gurney in lowest position. Monitor alarms set appropriately.    Dressing is CDI . See flowsheets for detailed wound documentation.

## 2018-06-19 NOTE — OR NURSING
Discharge information reviewed with patient and responsible adult. Educated pt and family on NORMA, CECE and On Q. Pt and family verbalized understanding. Provided documentation sheet for NORMA output. No questions or concerns at this time.     IV discontinued.     See vital sign flowsheets  for discharge details.

## 2018-09-04 ENCOUNTER — FOLLOW UP ESTABLISHED (OUTPATIENT)
Dept: URBAN - METROPOLITAN AREA CLINIC 24 | Facility: CLINIC | Age: 73
End: 2018-09-04
Payer: MEDICARE

## 2018-09-04 PROCEDURE — 92012 INTRM OPH EXAM EST PATIENT: CPT | Performed by: OPHTHALMOLOGY

## 2018-09-04 PROCEDURE — 92083 EXTENDED VISUAL FIELD XM: CPT | Performed by: OPHTHALMOLOGY

## 2018-09-04 PROCEDURE — 92250 FUNDUS PHOTOGRAPHY W/I&R: CPT | Performed by: OPHTHALMOLOGY

## 2018-09-04 ASSESSMENT — INTRAOCULAR PRESSURE
OS: 15
OD: 18

## 2018-11-19 ENCOUNTER — TELEPHONE (OUTPATIENT)
Dept: CARDIOLOGY | Facility: MEDICAL CENTER | Age: 73
End: 2018-11-19

## 2018-11-19 DIAGNOSIS — E78.2 MIXED HYPERLIPIDEMIA: ICD-10-CM

## 2018-11-19 DIAGNOSIS — I25.119 CORONARY ARTERY DISEASE INVOLVING NATIVE CORONARY ARTERY OF NATIVE HEART WITH ANGINA PECTORIS (HCC): ICD-10-CM

## 2018-11-19 DIAGNOSIS — I10 ESSENTIAL HYPERTENSION: ICD-10-CM

## 2018-11-19 NOTE — TELEPHONE ENCOUNTER
Yara Jeffries R.N.             STACIE/Emy     Patient has an appt on 12/6 with Dr Foley and wants to find out if he needs to have lab work done for the appt. He can be reached at [phone].      Labs ordered; pt notified. Lab slip sent to pt.

## 2018-12-01 ENCOUNTER — HOSPITAL ENCOUNTER (OUTPATIENT)
Dept: LAB | Facility: MEDICAL CENTER | Age: 73
End: 2018-12-01
Attending: INTERNAL MEDICINE
Payer: MEDICARE

## 2018-12-01 DIAGNOSIS — I10 ESSENTIAL HYPERTENSION: ICD-10-CM

## 2018-12-01 DIAGNOSIS — I25.119 CORONARY ARTERY DISEASE INVOLVING NATIVE CORONARY ARTERY OF NATIVE HEART WITH ANGINA PECTORIS (HCC): ICD-10-CM

## 2018-12-01 DIAGNOSIS — E78.2 MIXED HYPERLIPIDEMIA: ICD-10-CM

## 2018-12-01 LAB
ALBUMIN SERPL BCP-MCNC: 4.3 G/DL (ref 3.2–4.9)
ALBUMIN/GLOB SERPL: 1.6 G/DL
ALP SERPL-CCNC: 58 U/L (ref 30–99)
ALT SERPL-CCNC: 12 U/L (ref 2–50)
ANION GAP SERPL CALC-SCNC: 9 MMOL/L (ref 0–11.9)
AST SERPL-CCNC: 18 U/L (ref 12–45)
BILIRUB SERPL-MCNC: 1.6 MG/DL (ref 0.1–1.5)
BUN SERPL-MCNC: 10 MG/DL (ref 8–22)
CALCIUM SERPL-MCNC: 9.4 MG/DL (ref 8.5–10.5)
CHLORIDE SERPL-SCNC: 96 MMOL/L (ref 96–112)
CHOLEST SERPL-MCNC: 126 MG/DL (ref 100–199)
CO2 SERPL-SCNC: 27 MMOL/L (ref 20–33)
CREAT SERPL-MCNC: 0.79 MG/DL (ref 0.5–1.4)
GLOBULIN SER CALC-MCNC: 2.7 G/DL (ref 1.9–3.5)
GLUCOSE SERPL-MCNC: 103 MG/DL (ref 65–99)
HDLC SERPL-MCNC: 53 MG/DL
LDLC SERPL CALC-MCNC: 49 MG/DL
POTASSIUM SERPL-SCNC: 4.4 MMOL/L (ref 3.6–5.5)
PROT SERPL-MCNC: 7 G/DL (ref 6–8.2)
SODIUM SERPL-SCNC: 132 MMOL/L (ref 135–145)
TRIGL SERPL-MCNC: 120 MG/DL (ref 0–149)

## 2018-12-01 PROCEDURE — 80061 LIPID PANEL: CPT

## 2018-12-01 PROCEDURE — 36415 COLL VENOUS BLD VENIPUNCTURE: CPT

## 2018-12-01 PROCEDURE — 80053 COMPREHEN METABOLIC PANEL: CPT

## 2018-12-06 ENCOUNTER — OFFICE VISIT (OUTPATIENT)
Dept: CARDIOLOGY | Facility: MEDICAL CENTER | Age: 73
End: 2018-12-06
Payer: MEDICARE

## 2018-12-06 VITALS
BODY MASS INDEX: 25.61 KG/M2 | WEIGHT: 163.14 LBS | OXYGEN SATURATION: 96 % | DIASTOLIC BLOOD PRESSURE: 70 MMHG | SYSTOLIC BLOOD PRESSURE: 100 MMHG | HEIGHT: 67 IN | HEART RATE: 76 BPM

## 2018-12-06 DIAGNOSIS — E78.49 OTHER HYPERLIPIDEMIA: ICD-10-CM

## 2018-12-06 DIAGNOSIS — Z72.0 TOBACCO USE: ICD-10-CM

## 2018-12-06 DIAGNOSIS — I25.119 CORONARY ARTERY DISEASE INVOLVING NATIVE CORONARY ARTERY OF NATIVE HEART WITH ANGINA PECTORIS (HCC): ICD-10-CM

## 2018-12-06 DIAGNOSIS — I10 ESSENTIAL HYPERTENSION: ICD-10-CM

## 2018-12-06 DIAGNOSIS — E78.2 MIXED HYPERLIPIDEMIA: ICD-10-CM

## 2018-12-06 PROCEDURE — 99214 OFFICE O/P EST MOD 30 MIN: CPT | Performed by: INTERNAL MEDICINE

## 2018-12-06 RX ORDER — AMLODIPINE BESYLATE 5 MG/1
5 TABLET ORAL DAILY
Qty: 90 TAB | Refills: 3 | Status: SHIPPED | OUTPATIENT
Start: 2018-12-06 | End: 2019-10-30

## 2018-12-06 NOTE — PROGRESS NOTES
Subjective:   Chief Complaint:   Chief Complaint   Patient presents with   • Coronary Artery Disease       Ronny Gallegos is a 73 y.o. male who returns for follow-up of coronary artery disease, hypertension hyperlipidemia.  He has a previous patient of Dr. Ellsworth for 35 years.  He first came to cardiology after giving blood and found out he had HTN. This had been the focus.  Was also on statin therapy despite low cholesterol.  This led to a calcium. He had a positive calcium score in  with disease predominantly in the RCA and LAD with total score of 820.  He has been on primary prevention therapy.  He had a nuclear stress test in 2017 which is normal.  His EKG is normal. LDL cholesterol is 49.    His blood pressures been well controlled on multiple medications.  He is on aspirin and statin therapy in addition to beta-blocker.  He has never had an echocardiogram that I could find even in the archived records. No heart cath.    He is not limited by chest pain, pressure or tightness. No significant dyspnea on exertion, orthopnea or lower extremity swelling. No significant palpitations, dizziness, or presyncope/syncope. No symptoms of leg claudication.    Has remotely been checked in the hospital for Cps, no issues.  Remote tobacco use, no abd US.    Wife  about 5 years ago, prior heavy etoh.    DATA REVIEWED by me:  ECG 2018  Sinus, rate 70, first-degree AV delay, left axis deviation    Nuclear PET stress test 5/3/2017  EF 65%, normal perfusion    Calcium score 2015  Coronary calcification: Total score 819.9  LMA - 0.0  LCX - 35.4  LAD - 167.9  RCA - 616.6  PDA - 0.0    Treadmill 3-21-12  10:16 sec, good BP, no arrhtymias      Most recent labs:     2018 sodium 132, potassium 4.4, creatinine 0.79, LFTs normal the exception of total bilirubin of 1.6, total cholesterol 126, triglycerides 120, HDL 53, LDL 49    Past Medical History:   Diagnosis Date   • Arthritis     right ankle/left knee   •  "Asthma     inhaler PRN    • CAD (coronary artery disease)     + CT scoring, negative PET cardiac in    • Depression    • Diabetes (HCC)     oral medication    • Glaucoma    • High cholesterol    • Hypertension    • Hypopotassemia    • Hyposmolality and/or hyponatremia    • Jaundice     \"from drinking after wife \"   • Neuropathy (HCC)     bilat legs/feet   • Pain     right ankle    • Personal history of peptic ulcer disease    • Pneumonia    • Unspecified cataract     removed bilat     Past Surgical History:   Procedure Laterality Date   • ANKLE TOTAL ARTHROPLASTY Right 2018    Procedure: ANKLE TOTAL ARTHROPLASTY;  Surgeon: Jose De Jesus Medrano M.D.;  Location: SURGERY [facility];  Service: Orthopedics   • LIGAMENT REPAIR Right 2018    Procedure: LIGAMENT REPAIR- LATERAL;  Surgeon: Jose De Jesus Medrano M.D.;  Location: SURGERY [facility];  Service: Orthopedics   • LUMBAR LAMINECTOMY DISKECTOMY Right 2017    Procedure: LUMBAR LAMINECTOMY DISKECTOMY - RE-DO OPEN L4-S1 LAMINOTOMIES;  Surgeon: Clint Garsia M.D.;  Location: SURGERY [facility];  Service:    • FORAMINOTOMY  2017    Procedure: FORAMINOTOMY;  Surgeon: Clint Garsia M.D.;  Location: SURGERY [facility];  Service:    • HARDWARE REMOVAL NEURO  2017    Procedure: HARDWARE REMOVAL NEURO ;  Surgeon: Clint Garsia M.D.;  Location: SURGERY [facility];  Service:    • LAPAROSCOPY ROBOTIC XI  10/26/2016    Procedure: LAPAROSCOPY FOR LIVER CYST MARSUPIALIZATION AND RESECTION LIVER WALL CYST;  Surgeon: Frank Corona M.D.;  Location: SURGERY [facility];  Service:    • LUMBAR FUSION POSTERIOR  9/10/2015    Procedure: LUMBAR FUSION POSTERIOR L5-S1 ;  Surgeon: Clint Garsia M.D.;  Location: SURGERY [facility];  Service:    • LUMBAR LAMINECTOMY DISKECTOMY  9/10/2015    Procedure: LUMBAR LAMINECTOMY ;  Surgeon: Clint Garsia M.D.;  Location: SURGERY [facility];  Service:    • LAMINOTOMY  " 9/10/2015    Procedure: LAMINOTOMY;  Surgeon: Clint Garsia M.D.;  Location: SURGERY [facility];  Service:    • OTHER  2005    ANTERIOR NECK FUSION C5-7   • OTHER  1978    BLEEDING ULCERS   • CATARACT EXTRACTION WITH IOL       Family History   Problem Relation Age of Onset   • Stroke Mother    • Heart Disease Mother    • Stroke Father    • Heart Disease Father      Social History     Social History   • Marital status:      Spouse name: N/A   • Number of children: N/A   • Years of education: N/A     Occupational History   • Not on file.     Social History Main Topics   • Smoking status: Former Smoker     Packs/day: 1.50     Years: 13.00     Types: Cigarettes     Quit date: 12/25/1978   • Smokeless tobacco: Never Used   • Alcohol use No   • Drug use: No   • Sexual activity: Not on file     Other Topics Concern   • Not on file     Social History Narrative   • No narrative on file     Allergies   Allergen Reactions   • Nkda [No Known Drug Allergy]        Current Outpatient Prescriptions   Medication Sig Dispense Refill   • amLODIPine (NORVASC) 5 MG Tab Take 1 Tab by mouth every day. 90 Tab 3   • lisinopril (PRINIVIL) 20 MG Tab TAKE 1 TABLET BY MOUTH  EVERY DAY 90 Tab 3   • gabapentin (NEURONTIN) 600 MG tablet Take 600 mg by mouth every day.     • naproxen (NAPROSYN) 500 MG Tab TK 1 T PO BID WF PRN  2   • LUMIGAN 0.01 % Solution INT ONE GTT INTO OU QHS  3   • PROAIR  (90 Base) MCG/ACT Aero Soln inhalation aerosol Inhale 2 Puffs by mouth 1 time daily as needed.     • metFORMIN (GLUCOPHAGE) 500 MG Tab Take 500 mg by mouth 2 times a day, with meals.     • aspirin (ASA) 81 MG Chew Tab chewable tablet Take 1 Tab by mouth every day. 100 Tab 3   • fluticasone (FLONASE) 50 MCG/ACT nasal spray Spray 1 Spray in nose every day.     • carvedilol (COREG) 6.25 MG Tab Take 1 Tab by mouth 2 times a day, with meals. 180 Tab 3   • atorvastatin (LIPITOR) 20 MG Tab Take 1 Tab by mouth every day. 90 Tab 3   •  "cyclobenzaprine (FLEXERIL) 10 MG Tab Take 1 Tab by mouth every 8 hours as needed for Muscle Spasms. 30 Tab 0   • escitalopram (LEXAPRO) 20 MG tablet Take 20 mg by mouth every morning. Indications: Major Depressive Disorder       No current facility-administered medications for this visit.        ROS  All others systems reviewed and negative.     Objective:     Blood pressure 100/70, pulse 76, height 1.702 m (5' 7\"), weight 74 kg (163 lb 2.3 oz), SpO2 96 %. Body mass index is 25.55 kg/m².    Physical Exam   General: No acute distress. Well nourished.  HEENT: EOM grossly intact, no scleral icterus, no pharyngeal erythema.   Neck:  No JVD, no bruits, trachea midline  CVS: RRR. Normal S1, S2. No M/R/G. Trace right LE edema (after prosthesis).  2+ radial pulses, 2+ DP pulses  Resp: CTAB. No wheezing or crackles/rhonchi. Normal respiratory effort.  Abdomen: Soft, NT, no tayler hepatomegaly.  MSK/Ext: No clubbing or cyanosis.  Skin: Warm and dry, no rashes.  Neurological: CN III-XII grossly intact. No focal deficits.   Psych: A&O x 3, appropriate affect, good judgement      Assessment:     1. Coronary artery disease involving native coronary artery of native heart with angina pectoris (HCC)  US-AORTA/ILIACS DUPLEX COMPLETE   2. Mixed hyperlipidemia     3. Essential hypertension     4. Other hyperlipidemia     5. Tobacco use  US-AORTA/ILIACS DUPLEX COMPLETE    History of, quit 1978.       Medical Decision Making:  Today's Assessment / Status / Plan:     -Primary prevention ASA and statin, LDL at goal.  -BP at goal, a little low, reduce amlodipine to 5 mg  -AAA us screen, prior smoker  -[facility]    Return in about 1 year (around 12/6/2019).    It is my pleasure to participate in the care of Mr. Gallegos.  Please do not hesitate to contact me with questions or concerns.    Alberta Foley MD, [facility]  Cardiologist [facility]    Please note that this dictation was " created using voice recognition software. I have made every reasonable attempt to correct obvious errors, but it is possible there are errors of grammar and possibly content that I did not discover before finalizing the note.

## 2018-12-06 NOTE — LETTER
[facility]   [address]  [place] [address]  Phone: [phone]  Fax: [phone]              Ronny Gallegos  1945    Encounter Date: 2018    Alberta Foley M.D.          PROGRESS NOTE:  Subjective:   Chief Complaint:   Chief Complaint   Patient presents with   • Coronary Artery Disease       Ronny Gallegos is a 73 y.o. male who returns for follow-up of coronary artery disease, hypertension hyperlipidemia.  He has a previous patient of Dr. Ellsworth for 35 years.  He first came to cardiology after giving blood and found out he had HTN. This had been the focus.  Was also on statin therapy despite low cholesterol.  This led to a calcium. He had a positive calcium score in  with disease predominantly in the RCA and LAD with total score of 820.  He has been on primary prevention therapy.  He had a nuclear stress test in  which is normal.  His EKG is normal. LDL cholesterol is 49.    His blood pressures been well controlled on multiple medications.  He is on aspirin and statin therapy in addition to beta-blocker.  He has never had an echocardiogram that I could find even in the archived records. No heart cath.    He is not limited by chest pain, pressure or tightness. No significant dyspnea on exertion, orthopnea or lower extremity swelling. No significant palpitations, dizziness, or presyncope/syncope. No symptoms of leg claudication.    Has remotely been checked in the hospital for Cps, no issues.  Remote tobacco use, no abd US.    Wife  about 5 years ago, prior heavy etoh.    DATA REVIEWED by me:  ECG 2018  Sinus, rate 70, first-degree AV delay, left axis deviation    Nuclear PET stress test 5/3/2017  EF 65%, normal perfusion    Calcium score 2015  Coronary calcification: Total score 819.9  LMA - 0.0  LCX - 35.4  LAD - 167.9  RCA - 616.6  PDA - 0.0    Treadmill 3-21-12  10:16 sec, good BP, no  "arrhtymias      Most recent labs:     2018 sodium 132, potassium 4.4, creatinine 0.79, LFTs normal the exception of total bilirubin of 1.6, total cholesterol 126, triglycerides 120, HDL 53, LDL 49    Past Medical History:   Diagnosis Date   • Arthritis     right ankle/left knee   • Asthma     inhaler PRN    • CAD (coronary artery disease)     + CT scoring, negative PET cardiac in    • Depression    • Diabetes (HCC)     oral medication    • Glaucoma    • High cholesterol    • Hypertension    • Hypopotassemia    • Hyposmolality and/or hyponatremia    • Jaundice     \"from drinking after wife \"   • Neuropathy (HCC)     bilat legs/feet   • Pain     right ankle    • Personal history of peptic ulcer disease    • Pneumonia    • Unspecified cataract     removed bilat     Past Surgical History:   Procedure Laterality Date   • ANKLE TOTAL ARTHROPLASTY Right 2018    Procedure: ANKLE TOTAL ARTHROPLASTY;  Surgeon: Jose De Jesus Medrano M.D.;  Location: [facility];  Service: Orthopedics   • LIGAMENT REPAIR Right 2018    Procedure: LIGAMENT REPAIR- LATERAL;  Surgeon: Jose De Jesus Medrano M.D.;  Location: [facility];  Service: Orthopedics   • LUMBAR LAMINECTOMY DISKECTOMY Right 2017    Procedure: LUMBAR LAMINECTOMY DISKECTOMY - RE-DO OPEN L4-S1 LAMINOTOMIES;  Surgeon: Clint Garsia M.D.;  Location: [facility];  Service:    • FORAMINOTOMY  2017    Procedure: FORAMINOTOMY;  Surgeon: Clint Garsia M.D.;  Location: [facility];  Service:    • HARDWARE REMOVAL NEURO  2017    Procedure: HARDWARE REMOVAL NEURO ;  Surgeon: Clint Garsia M.D.;  Location: [facility];  Service:    • LAPAROSCOPY ROBOTIC XI  10/26/2016    Procedure: LAPAROSCOPY FOR LIVER CYST MARSUPIALIZATION AND RESECTION LIVER WALL CYST;  Surgeon: Frank Corona M.D.;  Location: [facility];  Service:    • LUMBAR FUSION POSTERIOR  9/10/2015    Procedure: " LUMBAR FUSION POSTERIOR L5-S1 ;  Surgeon: Clint Garsia M.D.;  Location: SURGERY [facility];  Service:    • LUMBAR LAMINECTOMY DISKECTOMY  9/10/2015    Procedure: LUMBAR LAMINECTOMY ;  Surgeon: Clint Garsia M.D.;  Location: SURGERY [facility];  Service:    • LAMINOTOMY  9/10/2015    Procedure: LAMINOTOMY;  Surgeon: Clint Garsia M.D.;  Location: SURGERY [facility];  Service:    • OTHER  2005    ANTERIOR NECK FUSION C5-7   • OTHER  1978    BLEEDING ULCERS   • CATARACT EXTRACTION WITH IOL       Family History   Problem Relation Age of Onset   • Stroke Mother    • Heart Disease Mother    • Stroke Father    • Heart Disease Father      Social History     Social History   • Marital status:      Spouse name: N/A   • Number of children: N/A   • Years of education: N/A     Occupational History   • Not on file.     Social History Main Topics   • Smoking status: Former Smoker     Packs/day: 1.50     Years: 13.00     Types: Cigarettes     Quit date: 12/25/1978   • Smokeless tobacco: Never Used   • Alcohol use No   • Drug use: No   • Sexual activity: Not on file     Other Topics Concern   • Not on file     Social History Narrative   • No narrative on file     Allergies   Allergen Reactions   • Nkda [No Known Drug Allergy]        Current Outpatient Prescriptions   Medication Sig Dispense Refill   • amLODIPine (NORVASC) 5 MG Tab Take 1 Tab by mouth every day. 90 Tab 3   • lisinopril (PRINIVIL) 20 MG Tab TAKE 1 TABLET BY MOUTH  EVERY DAY 90 Tab 3   • gabapentin (NEURONTIN) 600 MG tablet Take 600 mg by mouth every day.     • naproxen (NAPROSYN) 500 MG Tab TK 1 T PO BID WF PRN  2   • LUMIGAN 0.01 % Solution INT ONE GTT INTO OU QHS  3   • PROAIR  (90 Base) MCG/ACT Aero Soln inhalation aerosol Inhale 2 Puffs by mouth 1 time daily as needed.     • metFORMIN (GLUCOPHAGE) 500 MG Tab Take 500 mg by mouth 2 times a day, with meals.     • aspirin (ASA) 81 MG Chew Tab chewable tablet Take 1 Tab by mouth every  "day. 100 Tab 3   • fluticasone (FLONASE) 50 MCG/ACT nasal spray Spray 1 Spray in nose every day.     • carvedilol (COREG) 6.25 MG Tab Take 1 Tab by mouth 2 times a day, with meals. 180 Tab 3   • atorvastatin (LIPITOR) 20 MG Tab Take 1 Tab by mouth every day. 90 Tab 3   • cyclobenzaprine (FLEXERIL) 10 MG Tab Take 1 Tab by mouth every 8 hours as needed for Muscle Spasms. 30 Tab 0   • escitalopram (LEXAPRO) 20 MG tablet Take 20 mg by mouth every morning. Indications: Major Depressive Disorder       No current facility-administered medications for this visit.        ROS  All others systems reviewed and negative.     Objective:     Blood pressure 100/70, pulse 76, height 1.702 m (5' 7\"), weight 74 kg (163 lb 2.3 oz), SpO2 96 %. Body mass index is 25.55 kg/m².    Physical Exam   General: No acute distress. Well nourished.  HEENT: EOM grossly intact, no scleral icterus, no pharyngeal erythema.   Neck:  No JVD, no bruits, trachea midline  CVS: RRR. Normal S1, S2. No M/R/G. Trace right LE edema (after prosthesis).  2+ radial pulses, 2+ DP pulses  Resp: CTAB. No wheezing or crackles/rhonchi. Normal respiratory effort.  Abdomen: Soft, NT, no tayler hepatomegaly.  MSK/Ext: No clubbing or cyanosis.  Skin: Warm and dry, no rashes.  Neurological: CN III-XII grossly intact. No focal deficits.   Psych: A&O x 3, appropriate affect, good judgement      Assessment:     1. Coronary artery disease involving native coronary artery of native heart with angina pectoris (HCC)  US-AORTA/ILIACS DUPLEX COMPLETE   2. Mixed hyperlipidemia     3. Essential hypertension     4. Other hyperlipidemia     5. Tobacco use  US-AORTA/ILIACS DUPLEX COMPLETE    History of, quit 1978.       Medical Decision Making:  Today's Assessment / Status / Plan:     -Primary prevention ASA and statin, LDL at goal.  -BP at goal, a little low, reduce amlodipine to 5 mg  -AAA us screen, prior smoker  -annual CMP, Lipids[facility]    Return in about 1 year (around " 12/6/2019).    It is my pleasure to participate in the care of Mr. Gallegos.  Please do not hesitate to contact me with questions or concerns.    Alberta Foley MD, [facility]  Cardiologist [facility]    Please note that this dictation was created using voice recognition software. I have made every reasonable attempt to correct obvious errors, but it is possible there are errors of grammar and possibly content that I did not discover before finalizing the note.      China Abad, N.P.  [address]  [address] [address]  VIA Facsimile: [phone]

## 2018-12-13 ENCOUNTER — HOSPITAL ENCOUNTER (OUTPATIENT)
Dept: RADIOLOGY | Facility: MEDICAL CENTER | Age: 73
End: 2018-12-13
Attending: INTERNAL MEDICINE
Payer: MEDICARE

## 2018-12-13 PROCEDURE — 93978 VASCULAR STUDY: CPT

## 2018-12-13 PROCEDURE — 93979 VASCULAR STUDY: CPT

## 2018-12-14 PROCEDURE — 93979 VASCULAR STUDY: CPT | Mod: 26 | Performed by: INTERNAL MEDICINE

## 2018-12-21 ENCOUNTER — TELEPHONE (OUTPATIENT)
Dept: CARDIOLOGY | Facility: MEDICAL CENTER | Age: 73
End: 2018-12-21

## 2018-12-21 NOTE — TELEPHONE ENCOUNTER
Sharon Barnett, Med Ass't  Emy Jeffires R.N.   Phone Number: [phone]             Patient said he had a missed call from  nurse. He believed the call was in regards to US resutls. He asked for a call back [phone]      Spoke to pt. Provided aortic u/s results as per Dr. Foley. Pt verbalized understanding.

## 2019-04-15 ENCOUNTER — FOLLOW UP ESTABLISHED (OUTPATIENT)
Dept: URBAN - METROPOLITAN AREA CLINIC 10 | Facility: CLINIC | Age: 74
End: 2019-04-15
Payer: MEDICARE

## 2019-04-15 PROCEDURE — 92012 INTRM OPH EXAM EST PATIENT: CPT | Performed by: OPHTHALMOLOGY

## 2019-04-15 ASSESSMENT — INTRAOCULAR PRESSURE
OD: 27
OS: 14

## 2019-05-22 DIAGNOSIS — E78.49 OTHER HYPERLIPIDEMIA: ICD-10-CM

## 2019-05-22 DIAGNOSIS — I10 ESSENTIAL HYPERTENSION: ICD-10-CM

## 2019-05-22 RX ORDER — ATORVASTATIN CALCIUM 20 MG/1
20 TABLET, FILM COATED ORAL DAILY
Qty: 90 TAB | Refills: 2 | Status: SHIPPED | OUTPATIENT
Start: 2019-05-22 | End: 2019-10-30

## 2019-05-23 DIAGNOSIS — I10 ESSENTIAL HYPERTENSION: ICD-10-CM

## 2019-05-23 RX ORDER — CARVEDILOL 6.25 MG/1
6.25 TABLET ORAL 2 TIMES DAILY WITH MEALS
Qty: 180 TAB | Refills: 2 | Status: SHIPPED | OUTPATIENT
Start: 2019-05-23 | End: 2019-10-30

## 2019-06-03 ENCOUNTER — Encounter (OUTPATIENT)
Dept: URBAN - METROPOLITAN AREA CLINIC 44 | Facility: CLINIC | Age: 74
End: 2019-06-03
Payer: MEDICARE

## 2019-06-03 PROCEDURE — 65855 TRABECULOPLASTY LASER SURG: CPT | Performed by: OPHTHALMOLOGY

## 2019-06-03 RX ORDER — PREDNISOLONE ACETATE 10 MG/ML
1 % SUSPENSION/ DROPS OPHTHALMIC
Qty: 1 | Refills: 1 | Status: INACTIVE
Start: 2019-06-03 | End: 2019-06-03

## 2019-06-15 ENCOUNTER — HOSPITAL ENCOUNTER (EMERGENCY)
Dept: HOSPITAL 8 - ED | Age: 74
Discharge: HOME | End: 2019-06-15
Payer: MEDICARE

## 2019-06-15 VITALS — WEIGHT: 169.76 LBS | HEIGHT: 67 IN | BODY MASS INDEX: 26.64 KG/M2

## 2019-06-15 VITALS — SYSTOLIC BLOOD PRESSURE: 132 MMHG | DIASTOLIC BLOOD PRESSURE: 74 MMHG

## 2019-06-15 DIAGNOSIS — E11.9: ICD-10-CM

## 2019-06-15 DIAGNOSIS — I10: ICD-10-CM

## 2019-06-15 DIAGNOSIS — S80.12XA: Primary | ICD-10-CM
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
## 2019-06-15 DIAGNOSIS — M71.22: ICD-10-CM
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
## 2019-06-15 PROCEDURE — 99284 EMERGENCY DEPT VISIT MOD MDM: CPT

## 2019-06-15 NOTE — NUR
PT ASSISTED TO UNDRESS AND INTO PT GOWN. PT STATES PAIN TO L CALF MINIMAL AT 
THIS TIME.  MILD EDEMA TO BILATERAL ANKLES-PT STATES NORMAL D/T ARTHRITIS AND 
ANKLE SURGERY. COLOR WNL, PEDAL PULSES PALPABLE AND STRONG.  PT STATES SLIGHT 
NUMBNESS TO BILATERAL FEET.  CALL LIGHT WITHIN REACH, ERP IN TO SEE.

## 2019-07-22 DIAGNOSIS — I10 ESSENTIAL HYPERTENSION: ICD-10-CM

## 2019-07-22 RX ORDER — LISINOPRIL 20 MG/1
20 TABLET ORAL
Qty: 90 TAB | Refills: 2 | Status: SHIPPED | OUTPATIENT
Start: 2019-07-22 | End: 2019-10-30

## 2019-08-05 ENCOUNTER — HOSPITAL ENCOUNTER (INPATIENT)
Facility: MEDICAL CENTER | Age: 74
LOS: 2 days | DRG: 641 | End: 2019-08-07
Attending: EMERGENCY MEDICINE | Admitting: HOSPITALIST
Payer: MEDICARE

## 2019-08-05 ENCOUNTER — APPOINTMENT (OUTPATIENT)
Dept: RADIOLOGY | Facility: MEDICAL CENTER | Age: 74
DRG: 641 | End: 2019-08-05
Attending: EMERGENCY MEDICINE
Payer: MEDICARE

## 2019-08-05 ENCOUNTER — APPOINTMENT (OUTPATIENT)
Dept: RADIOLOGY | Facility: MEDICAL CENTER | Age: 74
DRG: 641 | End: 2019-08-05
Attending: STUDENT IN AN ORGANIZED HEALTH CARE EDUCATION/TRAINING PROGRAM
Payer: MEDICARE

## 2019-08-05 DIAGNOSIS — E87.1 HYPONATREMIA: ICD-10-CM

## 2019-08-05 DIAGNOSIS — R20.0 NUMBNESS: ICD-10-CM

## 2019-08-05 DIAGNOSIS — M54.9 PAIN, UPPER BACK: ICD-10-CM

## 2019-08-05 PROBLEM — R10.13 EPIGASTRIC PAIN: Status: ACTIVE | Noted: 2019-08-05

## 2019-08-05 PROBLEM — E80.6 HYPERBILIRUBINEMIA: Status: ACTIVE | Noted: 2019-08-05

## 2019-08-05 LAB
ALBUMIN SERPL BCP-MCNC: 4.1 G/DL (ref 3.2–4.9)
ALBUMIN/GLOB SERPL: 1.6 G/DL
ALP SERPL-CCNC: 61 U/L (ref 30–99)
ALT SERPL-CCNC: 22 U/L (ref 2–50)
ANION GAP SERPL CALC-SCNC: 11 MMOL/L (ref 0–11.9)
ANION GAP SERPL CALC-SCNC: 12 MMOL/L (ref 0–11.9)
ANION GAP SERPL CALC-SCNC: 8 MMOL/L (ref 0–11.9)
APPEARANCE UR: CLEAR
AST SERPL-CCNC: 52 U/L (ref 12–45)
BASOPHILS # BLD AUTO: 0.3 % (ref 0–1.8)
BASOPHILS # BLD: 0.03 K/UL (ref 0–0.12)
BILIRUB SERPL-MCNC: 3.2 MG/DL (ref 0.1–1.5)
BILIRUB UR QL STRIP.AUTO: NEGATIVE
BUN SERPL-MCNC: 8 MG/DL (ref 8–22)
BUN SERPL-MCNC: 8 MG/DL (ref 8–22)
BUN SERPL-MCNC: 9 MG/DL (ref 8–22)
CALCIUM SERPL-MCNC: 8.1 MG/DL (ref 8.5–10.5)
CALCIUM SERPL-MCNC: 8.2 MG/DL (ref 8.5–10.5)
CALCIUM SERPL-MCNC: 8.5 MG/DL (ref 8.5–10.5)
CHLORIDE SERPL-SCNC: 80 MMOL/L (ref 96–112)
CHLORIDE SERPL-SCNC: 84 MMOL/L (ref 96–112)
CHLORIDE SERPL-SCNC: 90 MMOL/L (ref 96–112)
CHLORIDE UR-SCNC: 50 MMOL/L
CHOLEST SERPL-MCNC: 137 MG/DL (ref 100–199)
CO2 SERPL-SCNC: 20 MMOL/L (ref 20–33)
CO2 SERPL-SCNC: 23 MMOL/L (ref 20–33)
CO2 SERPL-SCNC: 24 MMOL/L (ref 20–33)
COLOR UR: YELLOW
CREAT SERPL-MCNC: 0.57 MG/DL (ref 0.5–1.4)
CREAT SERPL-MCNC: 0.68 MG/DL (ref 0.5–1.4)
CREAT SERPL-MCNC: 0.72 MG/DL (ref 0.5–1.4)
CREAT UR-MCNC: 27.2 MG/DL
EKG IMPRESSION: NORMAL
EOSINOPHIL # BLD AUTO: 0.02 K/UL (ref 0–0.51)
EOSINOPHIL NFR BLD: 0.2 % (ref 0–6.9)
ERYTHROCYTE [DISTWIDTH] IN BLOOD BY AUTOMATED COUNT: 39.6 FL (ref 35.9–50)
GLOBULIN SER CALC-MCNC: 2.6 G/DL (ref 1.9–3.5)
GLUCOSE SERPL-MCNC: 109 MG/DL (ref 65–99)
GLUCOSE SERPL-MCNC: 166 MG/DL (ref 65–99)
GLUCOSE SERPL-MCNC: 175 MG/DL (ref 65–99)
GLUCOSE UR STRIP.AUTO-MCNC: NEGATIVE MG/DL
HCT VFR BLD AUTO: 41 % (ref 42–52)
HDLC SERPL-MCNC: 78 MG/DL
HGB BLD-MCNC: 14.4 G/DL (ref 14–18)
IMM GRANULOCYTES # BLD AUTO: 0.08 K/UL (ref 0–0.11)
IMM GRANULOCYTES NFR BLD AUTO: 0.7 % (ref 0–0.9)
KETONES UR STRIP.AUTO-MCNC: NEGATIVE MG/DL
LDLC SERPL CALC-MCNC: 37 MG/DL
LEUKOCYTE ESTERASE UR QL STRIP.AUTO: NEGATIVE
LIPASE SERPL-CCNC: 21 U/L (ref 11–82)
LYMPHOCYTES # BLD AUTO: 1.23 K/UL (ref 1–4.8)
LYMPHOCYTES NFR BLD: 10.5 % (ref 22–41)
MAGNESIUM SERPL-MCNC: 1.9 MG/DL (ref 1.5–2.5)
MCH RBC QN AUTO: 29.8 PG (ref 27–33)
MCHC RBC AUTO-ENTMCNC: 35.1 G/DL (ref 33.7–35.3)
MCV RBC AUTO: 84.7 FL (ref 81.4–97.8)
MICRO URNS: NORMAL
MONOCYTES # BLD AUTO: 0.82 K/UL (ref 0–0.85)
MONOCYTES NFR BLD AUTO: 7 % (ref 0–13.4)
NEUTROPHILS # BLD AUTO: 9.55 K/UL (ref 1.82–7.42)
NEUTROPHILS NFR BLD: 81.3 % (ref 44–72)
NITRITE UR QL STRIP.AUTO: NEGATIVE
NRBC # BLD AUTO: 0 K/UL
NRBC BLD-RTO: 0 /100 WBC
PH UR STRIP.AUTO: 8 [PH] (ref 5–8)
PLATELET # BLD AUTO: 318 K/UL (ref 164–446)
PMV BLD AUTO: 10.3 FL (ref 9–12.9)
POTASSIUM SERPL-SCNC: 3.6 MMOL/L (ref 3.6–5.5)
POTASSIUM SERPL-SCNC: 3.7 MMOL/L (ref 3.6–5.5)
POTASSIUM SERPL-SCNC: 4.8 MMOL/L (ref 3.6–5.5)
POTASSIUM UR-SCNC: 29.1 MMOL/L
PROT SERPL-MCNC: 6.7 G/DL (ref 6–8.2)
PROT UR QL STRIP: NEGATIVE MG/DL
PROT UR-MCNC: 6.1 MG/DL (ref 0–15)
RBC # BLD AUTO: 4.84 M/UL (ref 4.7–6.1)
RBC UR QL AUTO: NEGATIVE
SODIUM SERPL-SCNC: 111 MMOL/L (ref 135–145)
SODIUM SERPL-SCNC: 120 MMOL/L (ref 135–145)
SODIUM SERPL-SCNC: 121 MMOL/L (ref 135–145)
SODIUM UR-SCNC: 53 MMOL/L
SP GR UR STRIP.AUTO: 1.01
TRIGL SERPL-MCNC: 110 MG/DL (ref 0–149)
TROPONIN T SERPL-MCNC: 16 NG/L (ref 6–19)
UROBILINOGEN UR STRIP.AUTO-MCNC: 1 MG/DL
WBC # BLD AUTO: 11.7 K/UL (ref 4.8–10.8)

## 2019-08-05 PROCEDURE — 81003 URINALYSIS AUTO W/O SCOPE: CPT

## 2019-08-05 PROCEDURE — 36415 COLL VENOUS BLD VENIPUNCTURE: CPT

## 2019-08-05 PROCEDURE — 93005 ELECTROCARDIOGRAM TRACING: CPT | Performed by: STUDENT IN AN ORGANIZED HEALTH CARE EDUCATION/TRAINING PROGRAM

## 2019-08-05 PROCEDURE — 80053 COMPREHEN METABOLIC PANEL: CPT

## 2019-08-05 PROCEDURE — 82436 ASSAY OF URINE CHLORIDE: CPT

## 2019-08-05 PROCEDURE — 84156 ASSAY OF PROTEIN URINE: CPT

## 2019-08-05 PROCEDURE — 99223 1ST HOSP IP/OBS HIGH 75: CPT | Performed by: HOSPITALIST

## 2019-08-05 PROCEDURE — 770020 HCHG ROOM/CARE - TELE (206)

## 2019-08-05 PROCEDURE — A9270 NON-COVERED ITEM OR SERVICE: HCPCS | Performed by: STUDENT IN AN ORGANIZED HEALTH CARE EDUCATION/TRAINING PROGRAM

## 2019-08-05 PROCEDURE — 700105 HCHG RX REV CODE 258: Performed by: EMERGENCY MEDICINE

## 2019-08-05 PROCEDURE — 85025 COMPLETE CBC W/AUTO DIFF WBC: CPT

## 2019-08-05 PROCEDURE — 83690 ASSAY OF LIPASE: CPT

## 2019-08-05 PROCEDURE — 71045 X-RAY EXAM CHEST 1 VIEW: CPT

## 2019-08-05 PROCEDURE — 80048 BASIC METABOLIC PNL TOTAL CA: CPT | Mod: 91

## 2019-08-05 PROCEDURE — 700105 HCHG RX REV CODE 258: Performed by: STUDENT IN AN ORGANIZED HEALTH CARE EDUCATION/TRAINING PROGRAM

## 2019-08-05 PROCEDURE — 84300 ASSAY OF URINE SODIUM: CPT

## 2019-08-05 PROCEDURE — 700102 HCHG RX REV CODE 250 W/ 637 OVERRIDE(OP): Performed by: STUDENT IN AN ORGANIZED HEALTH CARE EDUCATION/TRAINING PROGRAM

## 2019-08-05 PROCEDURE — 84484 ASSAY OF TROPONIN QUANT: CPT

## 2019-08-05 PROCEDURE — 80061 LIPID PANEL: CPT

## 2019-08-05 PROCEDURE — 82570 ASSAY OF URINE CREATININE: CPT

## 2019-08-05 PROCEDURE — 83735 ASSAY OF MAGNESIUM: CPT

## 2019-08-05 PROCEDURE — 99285 EMERGENCY DEPT VISIT HI MDM: CPT

## 2019-08-05 PROCEDURE — 84133 ASSAY OF URINE POTASSIUM: CPT

## 2019-08-05 RX ORDER — M-VIT,TX,IRON,MINS/CALC/FOLIC 27MG-0.4MG
1 TABLET ORAL EVERY MORNING
Status: DISCONTINUED | OUTPATIENT
Start: 2019-08-05 | End: 2019-08-07 | Stop reason: HOSPADM

## 2019-08-05 RX ORDER — AMLODIPINE BESYLATE 5 MG/1
5 TABLET ORAL DAILY
Status: DISCONTINUED | OUTPATIENT
Start: 2019-08-05 | End: 2019-08-07 | Stop reason: HOSPADM

## 2019-08-05 RX ORDER — CARVEDILOL 6.25 MG/1
6.25 TABLET ORAL 2 TIMES DAILY WITH MEALS
Status: DISCONTINUED | OUTPATIENT
Start: 2019-08-05 | End: 2019-08-07 | Stop reason: HOSPADM

## 2019-08-05 RX ORDER — AMOXICILLIN 250 MG
2 CAPSULE ORAL 2 TIMES DAILY
Status: DISCONTINUED | OUTPATIENT
Start: 2019-08-05 | End: 2019-08-07 | Stop reason: HOSPADM

## 2019-08-05 RX ORDER — POLYETHYLENE GLYCOL 3350 17 G/17G
1 POWDER, FOR SOLUTION ORAL
Status: DISCONTINUED | OUTPATIENT
Start: 2019-08-05 | End: 2019-08-07 | Stop reason: HOSPADM

## 2019-08-05 RX ORDER — ALBUTEROL SULFATE 90 UG/1
2 AEROSOL, METERED RESPIRATORY (INHALATION) EVERY 4 HOURS PRN
Status: DISCONTINUED | OUTPATIENT
Start: 2019-08-05 | End: 2019-08-07 | Stop reason: HOSPADM

## 2019-08-05 RX ORDER — SODIUM CHLORIDE 9 MG/ML
INJECTION, SOLUTION INTRAVENOUS CONTINUOUS
Status: DISCONTINUED | OUTPATIENT
Start: 2019-08-05 | End: 2019-08-06

## 2019-08-05 RX ORDER — FLUTICASONE PROPIONATE 50 MCG
1 SPRAY, SUSPENSION (ML) NASAL
Status: DISCONTINUED | OUTPATIENT
Start: 2019-08-05 | End: 2019-08-07 | Stop reason: HOSPADM

## 2019-08-05 RX ORDER — ATORVASTATIN CALCIUM 20 MG/1
20 TABLET, FILM COATED ORAL DAILY
Status: DISCONTINUED | OUTPATIENT
Start: 2019-08-05 | End: 2019-08-07 | Stop reason: HOSPADM

## 2019-08-05 RX ORDER — GABAPENTIN 800 MG/1
800 TABLET ORAL 2 TIMES DAILY
COMMUNITY
Start: 2019-07-10 | End: 2019-10-30

## 2019-08-05 RX ORDER — FAMOTIDINE 20 MG/1
20 TABLET, FILM COATED ORAL 2 TIMES DAILY
Status: DISCONTINUED | OUTPATIENT
Start: 2019-08-05 | End: 2019-08-07 | Stop reason: HOSPADM

## 2019-08-05 RX ORDER — ACETAMINOPHEN 325 MG/1
650 TABLET ORAL EVERY 6 HOURS PRN
Status: DISCONTINUED | OUTPATIENT
Start: 2019-08-05 | End: 2019-08-07 | Stop reason: HOSPADM

## 2019-08-05 RX ORDER — BISACODYL 10 MG
10 SUPPOSITORY, RECTAL RECTAL
Status: DISCONTINUED | OUTPATIENT
Start: 2019-08-05 | End: 2019-08-07 | Stop reason: HOSPADM

## 2019-08-05 RX ORDER — ASPIRIN 81 MG/1
81 TABLET, CHEWABLE ORAL DAILY
Status: DISCONTINUED | OUTPATIENT
Start: 2019-08-06 | End: 2019-08-07 | Stop reason: HOSPADM

## 2019-08-05 RX ORDER — ESCITALOPRAM OXALATE 10 MG/1
10 TABLET ORAL EVERY MORNING
Status: DISCONTINUED | OUTPATIENT
Start: 2019-08-05 | End: 2019-08-07 | Stop reason: HOSPADM

## 2019-08-05 RX ORDER — LATANOPROST 50 UG/ML
1 SOLUTION/ DROPS OPHTHALMIC EVERY EVENING
Status: DISCONTINUED | OUTPATIENT
Start: 2019-08-05 | End: 2019-08-07 | Stop reason: HOSPADM

## 2019-08-05 RX ORDER — SODIUM CHLORIDE 9 MG/ML
1000 INJECTION, SOLUTION INTRAVENOUS ONCE
Status: COMPLETED | OUTPATIENT
Start: 2019-08-05 | End: 2019-08-06

## 2019-08-05 RX ORDER — GABAPENTIN 400 MG/1
800 CAPSULE ORAL 2 TIMES DAILY
Status: DISCONTINUED | OUTPATIENT
Start: 2019-08-05 | End: 2019-08-07 | Stop reason: HOSPADM

## 2019-08-05 RX ADMIN — CARVEDILOL 6.25 MG: 6.25 TABLET, FILM COATED ORAL at 16:47

## 2019-08-05 RX ADMIN — METFORMIN HYDROCHLORIDE 500 MG: 500 TABLET, FILM COATED ORAL at 16:45

## 2019-08-05 RX ADMIN — ATORVASTATIN CALCIUM 20 MG: 20 TABLET, FILM COATED ORAL at 16:46

## 2019-08-05 RX ADMIN — SODIUM CHLORIDE 1000 ML: 9 INJECTION, SOLUTION INTRAVENOUS at 11:09

## 2019-08-05 RX ADMIN — SODIUM CHLORIDE: 9 INJECTION, SOLUTION INTRAVENOUS at 16:48

## 2019-08-05 RX ADMIN — FAMOTIDINE 20 MG: 20 TABLET ORAL at 16:45

## 2019-08-05 RX ADMIN — GABAPENTIN 800 MG: 400 CAPSULE ORAL at 16:47

## 2019-08-05 RX ADMIN — AMLODIPINE BESYLATE 5 MG: 5 TABLET ORAL at 16:47

## 2019-08-05 RX ADMIN — SENNOSIDES, DOCUSATE SODIUM 2 TABLET: 50; 8.6 TABLET, FILM COATED ORAL at 16:46

## 2019-08-05 RX ADMIN — MULTIPLE VITAMINS W/ MINERALS TAB 1 TABLET: TAB at 16:44

## 2019-08-05 ASSESSMENT — ENCOUNTER SYMPTOMS
HEADACHES: 0
SPUTUM PRODUCTION: 0
CONSTIPATION: 0
BRUISES/BLEEDS EASILY: 1
WHEEZING: 1
CHILLS: 0
DIZZINESS: 1
VOMITING: 0
EYE PAIN: 0
BRUISES/BLEEDS EASILY: 0
BACK PAIN: 1
SORE THROAT: 0
FOCAL WEAKNESS: 0
DEPRESSION: 0
NECK PAIN: 0
POLYDIPSIA: 0
PALPITATIONS: 0
PHOTOPHOBIA: 0
DIAPHORESIS: 0
DIARRHEA: 1
ABDOMINAL PAIN: 0
FEVER: 0
LOSS OF CONSCIOUSNESS: 0
COUGH: 0
ORTHOPNEA: 0
HEMOPTYSIS: 0
DOUBLE VISION: 0
BLURRED VISION: 0
DEPRESSION: 1
WEIGHT LOSS: 0
SHORTNESS OF BREATH: 0
WEAKNESS: 0
SPEECH CHANGE: 0
BLOOD IN STOOL: 0
NAUSEA: 0
TREMORS: 0

## 2019-08-05 ASSESSMENT — LIFESTYLE VARIABLES
DOES PATIENT WANT TO STOP DRINKING: YES
ON A TYPICAL DAY WHEN YOU DRINK ALCOHOL HOW MANY DRINKS DO YOU HAVE: 5
AVERAGE NUMBER OF DAYS PER WEEK YOU HAVE A DRINK CONTAINING ALCOHOL: 7
EVER FELT BAD OR GUILTY ABOUT YOUR DRINKING: NO
EVER FELT BAD OR GUILTY ABOUT YOUR DRINKING: YES
EVER FELT BAD OR GUILTY ABOUT YOUR DRINKING: NO
EVER HAD A DRINK FIRST THING IN THE MORNING TO STEADY YOUR NERVES TO GET RID OF A HANGOVER: NO
CONSUMPTION TOTAL: INCOMPLETE
HAVE YOU EVER FELT YOU SHOULD CUT DOWN ON YOUR DRINKING: NO
DO YOU DRINK ALCOHOL: YES
TOTAL SCORE: 0
DOES PATIENT WANT TO TALK TO SOMEONE ABOUT QUITTING: NO
TOTAL SCORE: 0
EVER HAD A DRINK FIRST THING IN THE MORNING TO STEADY YOUR NERVES TO GET RID OF A HANGOVER: NO
TOTAL SCORE: 0
HAVE PEOPLE ANNOYED YOU BY CRITICIZING YOUR DRINKING: NO
TOTAL SCORE: 1
ALCOHOL_USE: YES
EVER_SMOKED: YES
EVER HAD A DRINK FIRST THING IN THE MORNING TO STEADY YOUR NERVES TO GET RID OF A HANGOVER: NO
ALCOHOL_USE: YES
CONSUMPTION TOTAL: POSITIVE
TOTAL SCORE: 3
TOTAL SCORE: 1
TOTAL SCORE: 1
HOW MANY TIMES IN THE PAST YEAR HAVE YOU HAD 5 OR MORE DRINKS IN A DAY: 1
HAVE YOU EVER FELT YOU SHOULD CUT DOWN ON YOUR DRINKING: YES
HAVE PEOPLE ANNOYED YOU BY CRITICIZING YOUR DRINKING: YES
HAVE YOU EVER FELT YOU SHOULD CUT DOWN ON YOUR DRINKING: YES
CONSUMPTION TOTAL: INCOMPLETE
HAVE PEOPLE ANNOYED YOU BY CRITICIZING YOUR DRINKING: NO

## 2019-08-05 ASSESSMENT — COGNITIVE AND FUNCTIONAL STATUS - GENERAL
SUGGESTED CMS G CODE MODIFIER MOBILITY: CH
SUGGESTED CMS G CODE MODIFIER DAILY ACTIVITY: CH
MOBILITY SCORE: 24
DAILY ACTIVITIY SCORE: 24

## 2019-08-05 ASSESSMENT — PATIENT HEALTH QUESTIONNAIRE - PHQ9
1. LITTLE INTEREST OR PLEASURE IN DOING THINGS: NOT AT ALL
SUM OF ALL RESPONSES TO PHQ9 QUESTIONS 1 AND 2: 0
2. FEELING DOWN, DEPRESSED, IRRITABLE, OR HOPELESS: NOT AT ALL
SUM OF ALL RESPONSES TO PHQ9 QUESTIONS 1 AND 2: 0
2. FEELING DOWN, DEPRESSED, IRRITABLE, OR HOPELESS: NOT AT ALL
1. LITTLE INTEREST OR PLEASURE IN DOING THINGS: NOT AT ALL

## 2019-08-05 NOTE — SENIOR ADMIT NOTE
Senior admit note    HPI  Mr. Gallegos, a pleasant 74-year-old male  with history of hypertension, type 2 diabetes mellitus, dyslipidemia, depression, peripheral neuropathy and alcohol use disorder presented to the ER with complaint of back pain described as spasm which occurs intermittently for the past 3 days, constant, nonradiating without any aggravating or relieving factors.  Patient denies any associated symptoms as well as no history of fall or trauma.  This morning, he developed numbness on the left maxillary area.  This is an isolated symptoms no other focal neurological deficits.  Patient denies any headache, dizziness, visual changes, focal weakness.  Patient drinks alcohol heavily.  He drank 3 sixpacks of beer in 3 days.  Patient states that he has been on and off drinking alcohol and can go without drinking alcohol 4 months before relapsing.  He feels guilty of drinking a lot.  [facility] which he has rescheduled due to admission to the hospital.  He understands that he needs to quit drinking and will follow recommendations.  At this point in time, patient states that his numbness on his left cheek has significantly improved and his back spasm have reduced.  Patient denies any history of alcohol withdrawal in the past.    In the ER, vitals were within normal limits, labs showed leukocytosis of 11.7 with ANC 9.55, , K4.8, CL 80, HCO3 20, ABG 11, glucose 166, total bilirubin elevated 3.2, AST elevated 52,.  Urinalysis was normal.  EKG showed sinus rhythm without any arrhythmia.  Chest x-ray did not show any evidence of acute cardiopulmonary disease    Review of system: As per [facility] [place]    Physical examination  Physical Exam   Constitutional: He is oriented to person, place, and time and well-developed, well-nourished, and in no distress.   HENT:   Head: Normocephalic and atraumatic.   Eyes: Pupils are equal, round, and  reactive to light. EOM are normal.   Neck: Normal range of motion. Neck supple.   Cardiovascular: Normal rate, regular rhythm and normal heart sounds.   Pulmonary/Chest: Effort normal and breath sounds normal. No respiratory distress. He has no wheezes.   Abdominal: Soft. Bowel sounds are normal. He exhibits no distension. There is tenderness (Mild epigastric tenderness).   Neurological: He is alert and oriented to person, place, and time. He has normal sensation, normal strength, normal reflexes and intact cranial nerves. No cranial nerve deficit.   Skin: Skin is warm and dry.   Psychiatric: Mood and affect normal.     Assessment and plan  #Electrolyte imbalance -hyponatremia, hypochloremia most likely secondary to beer potomania  #Alcohol use disorder  -Patient presented with spasmic back pain and numbness on the left maxillary area which has improved over time  -NIHSS score of 0, unlikely to be stroke  -Labs showed sodium of 111, chloride of 80, corrected serum sodium for hyperglycemia is 112  -Patient has been drinking excessive alcohol for the past 3 days about 12 packs of beer per day    Plan  -Admit to telemetry  -Start IV normal saline 83 mL/hr  -Restrict fluid to 1.5-to 2 L/day  -Avoid overcorrection of sodium (goal is 4-8meq/l/day)  -Check urine sodium and urine osmolality  -Hold lisinopril  -Recheck BMP  -Alcohol cessation counseling  -Monitor for alcohol withdrawal    #Gastritis  -Start famotidine    #Transaminitis -elevated AST, normal ALT  #Hyperbilirubinemia  -Most likely secondary to alcohol use disorder  -No evidence of alcoholic hepatitis at this point in time  -Continue to monitor  -Alcohol cessation counseling provided    #Hypertension  #Type 2 diabetes mellitus  #Dyslipidemia  #Depression  #Peripheral neuropathy  -Continue home medication    Core measures  CODE STATUS: Full code  DVT prophylaxis: Lovenox  GI prophylaxis: Famotidine  Antibiotics: Not required    For complete H&P, please refer to  intern note by Dr. Arroyo

## 2019-08-05 NOTE — NON-PROVIDER
"      Internal Medicine Medical Student Admitting History and Physical  Note Author: Dominga Beltran, Student    Name Ronny Gallegos     1945   Age/Sex 74 y.o. male   MRN [id number]   Code Status Full Code       Chief Complaint:  Back Pain, Face numbness     HPI:  Ronny Gallegos is a 74 year old male who presented to the ED with new onset back pain and left sided facial numbness.     The patient's back pain suddenly onset 2-3 days ago. Since then, the pain has been intermittent in nature. The patient rates the pain a 5/10 and localizes it to his left thoracic region around the level of T4. He describes the pain as a spasm. He denies injury and recent trauma. Per patient, he has not tried OTC pain medicines or heat/ice for the pain. Patient states that he has had a cervical spine operation and two lumbar back surgeries. Patient states that these procedures were uncomplicated but notes that he developed a peripheral neuropathy in his right foot after his first lumbar surgery in . Patient denies current difficulties ambulating.     The patient's left sided facial numbness onset today at 2:00 am. This was very concerning to the patient and prompted him to come to the ED. Patient notes that he felt like his left eyelid was \"drooping\" when the numbness onset. He denies weakness, speech difficulties, headache and vision changes. Patient states that the numbness has fully resolved at this time.     Patient has a history of alcoholism. Per patient, he has been struggling with this for \"many years.\" He has periodically been able to maintain sobriety but has recently been drinking. This last weekend, the patient states that he drank 12 beers daily. Patient denies blackouts, loss of consciousness, and vomiting.     During his ED workup the patient labs showed leukocytosis 11.7 with absolute neutrophils 9.55, , CL 80, calcium 8.1, glucose 166, total bilirubin elevated 3.2, AST elevated 52.  " Urinalysis and EKG were normal. Chest x-ray did not show any evidence of acute cardiopulmonary process.       Review of Systems   Constitutional: Positive for malaise/fatigue. Negative for fever and weight loss.   HENT: Positive for hearing loss (Chronic). Negative for congestion and sore throat.    Eyes: Negative for blurred vision and double vision.   Respiratory: Positive for wheezing (Chronic 2/2 asthma). Negative for cough and hemoptysis.    Cardiovascular: Negative for chest pain, palpitations, orthopnea and leg swelling.   Gastrointestinal: Positive for diarrhea (One episode two days ago). Negative for abdominal pain, blood in stool, constipation, nausea and vomiting.   Genitourinary: Negative for dysuria.   Musculoskeletal: Positive for back pain.   Skin: Negative for rash.   Neurological: Positive for dizziness. Negative for focal weakness, loss of consciousness, weakness and headaches.   Endo/Heme/Allergies: Bruises/bleeds easily.   Psychiatric/Behavioral: Positive for depression.             Past Medical History (chronic problems, known complications, current management)     Hyperlipidemia   Hypertension  Diabetes- per patient well controlled  Glaucoma   Depression   Peripheral Neuropathy   Asthma   GERD  Seasonal Allergies    Past Surgical History:  Liver Cyst removal- [facility] 2017  Right Ankle total arthroplasty - [facility] 2018  Lumbar Fusion-L5-S1 Ady 2015    -Revised 2017 L4-S1 by Ady     Medication Allergy/Sensitivities:  Patient states none    Family History:  Mother- Alcoholism, COPD  Father- MI at 58  Grandma (paternal)- Stroke   Uncle (paternal)- Stroke     Social History:  Smokin pack years  Alcohol: chronic alcoholic- recently 12 beers a day  Illictis: None  Living situation: Lives with son and daughter-in-law         Physical Exam     Vitals:    19 1201 19 1230 19 1300 19 1400   BP: 138/78 151/85 160/88 156/85   Pulse: 81 82 79 83   Resp:       Temp:      "  TempSrc:       SpO2: 97% 99% 98% 98%   Weight:       Height:         Body mass index is 27.17 kg/m².  /85   Pulse 83   Temp 36.2 °C (97.2 °F) (Temporal)   Resp (!) 34   Ht 1.702 m (5' 7\")   Wt 78.7 kg (173 lb 8 oz)   SpO2 98%   BMI 27.17 kg/m²   O2 therapy: Pulse Oximetry: 98 %, O2 Delivery: None (Room Air)    Physical Exam   Constitutional: He is oriented to person, place, and time and well-developed, well-nourished, and in no distress.   HENT:   Head: Normocephalic and atraumatic.   Mouth/Throat: Oropharynx is clear and moist.   Eyes: Pupils are equal, round, and reactive to light. EOM are normal.   Cardiovascular: Normal rate and regular rhythm.   No murmur heard.  Pulmonary/Chest: Effort normal and breath sounds normal.   Abdominal: Soft. He exhibits no distension. There is tenderness (epigastric). There is no rebound and no guarding.   Musculoskeletal: He exhibits no tenderness (No back tenderness ).   Neurological: He is alert and oriented to person, place, and time. No cranial nerve deficit.   Skin: Skin is warm and dry.   Psychiatric: Affect normal.           Assessment/Plan     1) electrolyte imbalance- hyponatremia and hypochloremia   NS 83 mL/hr   Fluid restriction 1.5-2L per day   Slowly correct sodium  Check urine sodium and osmolality   Hold lisinopril  Recheck BMP    Differential Diagnosis:  hyponatremia due to lisinopril use vs beer potomania vs cirrhosis     2) Hyperbilirubinemia:  2008 US showed evidence of fatty infiltration or fibrosis of liver suggests chronic cirrhosis     2) Epigastric Pain and tenderness   Given recent alcohol binge, may be consistent with gastritis.  Plan famotidine     2) Chronic alcohol us disorder:  High risk for withdrawal. Consider monitoring with CIWA and PRN benzodiazepines   "

## 2019-08-05 NOTE — ED PROVIDER NOTES
ED Provider Note    Scribed for Seth Mayer M.D. by Guido Giles. 8/5/2019  8:25 AM    Primary care provider: China Abad N.P.  Means of arrival: Walk in  History obtained from: Patient, family  History limited by: None    CHIEF COMPLAINT  Chief Complaint   Patient presents with   • Back Pain     began yesterday. Hx of back pain.   • Numbness     Left side of face became numb at 0300.       HPI  Ronny Gallegos is a 74 y.o. male with history of back pain and L4-S1 laminectomy who presents to the Emergency Department complaining of sudden onset upper back pain onset yesterday. States the pain feels like muscle spasms. He rates the pain as a 5/10 in severity currently. He was unable to sleep yesterday due to the back pain. Denies any known recent injuries. He started eperiencing left maxillary numbness onset 2 AM this morning. Family states patient is concerned of having a heart attack or stroke due to having the new numbness with the back pain. He notes consuming 5-6 beers yesterday. States he drank a significant amount of alcohol in the last week. He has history of alcoholism for which he has been intermittently relapsing. Denies any nausea, vomiting, fevers, weakness, facial droop, slurred speech, double vision, or headaches. His right foot is affected by chronic neuropathy from pinched nerves.  He has history of hypertension, hyperlipidemia, and diabetes for which he takes medications. He had a stress test a few years ago which was normal. He reports strong family history of CAD. No history of MI, stroke, or TIA. He is followed by Dr. Foley (cardiology). No history of DVT or PE. He notes having cyst removal on his liver done by Dr. Gutierrez. He smoked tobacco 40 years ago but quit.      REVIEW OF SYSTEMS  Pertinent positives include: upper back pain, left maxillary numbness.  Pertinent negatives include: nausea, vomiting, fevers, weakness, facial droop, slurred speech, double vision, or  "headaches.  10+ systems reviewed and negative.      PAST MEDICAL HISTORY  Past Medical History:   Diagnosis Date   • Arthritis     right ankle/left knee   • Asthma     inhaler PRN    • CAD (coronary artery disease)     + CT scoring, negative PET cardiac in    • Depression    • Diabetes (HCC)     oral medication    • Glaucoma    • High cholesterol    • Hypertension    • Hypopotassemia    • Hyposmolality and/or hyponatremia    • Jaundice     \"from drinking after wife \"   • Neuropathy (HCC)     bilat legs/feet   • Pain     right ankle    • Personal history of peptic ulcer disease    • Pneumonia    • Unspecified cataract     removed bilat       FAMILY HISTORY  Family History   Problem Relation Age of Onset   • Stroke Mother    • Heart Disease Mother    • Stroke Father    • Heart Disease Father        SOCIAL HISTORY  Social History     Tobacco Use   • Smoking status: Former Smoker     Packs/day: 1.50     Years: 13.00     Pack years: 19.50     Types: Cigarettes     Last attempt to quit: 1978     Years since quittin.6   • Smokeless tobacco: Never Used   Substance Use Topics   • Alcohol use: Not Currently     Alcohol/week: 0.0 oz   • Drug use: No     Social History     Substance and Sexual Activity   Drug Use No       SURGICAL HISTORY  Past Surgical History:   Procedure Laterality Date   • ANKLE TOTAL ARTHROPLASTY Right 2018    Procedure: ANKLE TOTAL ARTHROPLASTY;  Surgeon: Jose De Jesus Medrano M.D.;  Location: SURGERY [facility];  Service: Orthopedics   • LIGAMENT REPAIR Right 2018    Procedure: LIGAMENT REPAIR- LATERAL;  Surgeon: Jose De Jesus Medrano M.D.;  Location: [facility];  Service: Orthopedics   • LUMBAR LAMINECTOMY DISKECTOMY Right 2017    Procedure: LUMBAR LAMINECTOMY DISKECTOMY - RE-DO OPEN L4-S1 LAMINOTOMIES;  Surgeon: Clint Garsia M.D.;  Location: [facility];  Service:    • FORAMINOTOMY  2017    Procedure: FORAMINOTOMY;  Surgeon: Clint FAITH" JULISA Garsia;  Location: SURGERY [facility];  Service:    • HARDWARE REMOVAL NEURO  6/13/2017    Procedure: HARDWARE REMOVAL NEURO ;  Surgeon: Clint Garsia M.D.;  Location: SURGERY [facility];  Service:    • LAPAROSCOPY ROBOTIC XI  10/26/2016    Procedure: LAPAROSCOPY FOR LIVER CYST MARSUPIALIZATION AND RESECTION LIVER WALL CYST;  Surgeon: Frank Corona M.D.;  Location: SURGERY [facility];  Service:    • LUMBAR FUSION POSTERIOR  9/10/2015    Procedure: LUMBAR FUSION POSTERIOR L5-S1 ;  Surgeon: Clint Garsia M.D.;  Location: SURGERY [facility];  Service:    • LUMBAR LAMINECTOMY DISKECTOMY  9/10/2015    Procedure: LUMBAR LAMINECTOMY ;  Surgeon: Clint Garsia M.D.;  Location: SURGERY [facility];  Service:    • LAMINOTOMY  9/10/2015    Procedure: LAMINOTOMY;  Surgeon: Clint Garsia M.D.;  Location: SURGERY [facility];  Service:    • OTHER  2005    ANTERIOR NECK FUSION C5-7   • OTHER  1978    BLEEDING ULCERS   • CATARACT EXTRACTION WITH IOL         CURRENT MEDICATIONS  No current facility-administered medications on file prior to encounter.      Current Outpatient Medications on File Prior to Encounter   Medication Sig Dispense Refill   • gabapentin (NEURONTIN) 800 MG tablet Take 800 mg by mouth 3 times a day.     • lisinopril (PRINIVIL) 20 MG Tab Take 1 Tab by mouth every day. 90 Tab 2   • carvedilol (COREG) 6.25 MG Tab Take 1 Tab by mouth 2 times a day, with meals. 180 Tab 2   • atorvastatin (LIPITOR) 20 MG Tab Take 1 Tab by mouth every day. 90 Tab 2   • amLODIPine (NORVASC) 5 MG Tab Take 1 Tab by mouth every day. 90 Tab 3   • LUMIGAN 0.01 % Solution INT ONE GTT INTO OU QHS  3   • PROAIR  (90 Base) MCG/ACT Aero Soln inhalation aerosol Inhale 2 Puffs by mouth 1 time daily as needed.     • metFORMIN (GLUCOPHAGE) 500 MG Tab Take 500 mg by mouth 2 times a day, with meals.     • aspirin (ASA) 81 MG Chew Tab chewable tablet Take 1 Tab by mouth every day. 100 Tab 3   •  "fluticasone (FLONASE) 50 MCG/ACT nasal spray Spray 1 Spray in nose every day.     • escitalopram (LEXAPRO) 20 MG tablet Take 20 mg by mouth every morning. Indications: Major Depressive Disorder          ALLERGIES  Allergies   Allergen Reactions   • Nkda [No Known Drug Allergy]        PHYSICAL EXAM  VITAL SIGNS: BP (!) 169/86   Pulse 70   Temp 36.2 °C (97.2 °F) (Temporal)   Resp (!) 21   Ht 1.702 m (5' 7\")   Wt 78.7 kg (173 lb 8 oz)   SpO2 99%   BMI 27.17 kg/m²   Reviewed and hypertensive, tachypneic, no hypoxia room air  Constitutional: Well developed, Well nourished.  HENT: Normocephalic, atraumatic, bilateral external ears normal, oropharynx moist, No exudates or erythema.   Eyes: PERRLA, conjunctiva pink, no scleral icterus.   Cardiovascular: Regular rate and rhythm. No murmurs, rubs or gallops.   Respiratory: Lungs clear to auscultation bilaterally. No wheezes, rales, or rhonchi.  Abdominal:  Abdomen soft, non-tender, non distended. No rebound, or guarding.   Skin: No erythema, no rash.   Genitourinary: No costovertebral angle tenderness.   Musculoskeletal: No edema.   Neurologic: Alert & oriented x 3.  LOC: Normal.      Motor left leg: No drift.  LOC questions; answers correctly.     Motor right leg: No drift.  Commands: Follows.     Limb ataxia: None.  Best gaze: Normal.      Sensation: Intact to light touch 4 limbs.  Visual fields: Intact by quadrants.     Language: Fluent.  Facial palsy: None.     Dysarthria: Normal.  Motor left arm: No drift.     Extinction: Normal.  Motor right arm: No drift.     Score: 0  Psychiatric: Affect normal, Judgment normal, Mood normal.       DIFFERENTIAL DIAGNOSIS:  Aortic dissection, musculoskeletal back pain, MI, stroke is less likely but still possible.    EKG Interpretation:  Interpreted by me  Rhythm:  Normal sinus rhythm   Rate: 77  Axis: normal  Ectopy: none  Conduction: normal  ST Segments: no acute change  T Waves: no acute change  Q Waves: inferior Q waves which " are old  Clinical Impression: Sinus rhythm, old inferior ischemia.    RADIOLOGY/PROCEDURES  DX-CHEST-LIMITED (1 VIEW)   Final Result      No evidence of acute cardiopulmonary process.        Radiologist interpretation have been reviewed by me.     LABORATORY:  Results for orders placed or performed during the hospital encounter of 08/05/19   CBC WITH DIFFERENTIAL   Result Value Ref Range    WBC 11.7 (H) 4.8 - 10.8 K/uL    RBC 4.84 4.70 - 6.10 M/uL    Hemoglobin 14.4 14.0 - 18.0 g/dL    Hematocrit 41.0 (L) 42.0 - 52.0 %    MCV 84.7 81.4 - 97.8 fL    MCH 29.8 27.0 - 33.0 pg    MCHC 35.1 33.7 - 35.3 g/dL    RDW 39.6 35.9 - 50.0 fL    Platelet Count 318 164 - 446 K/uL    MPV 10.3 9.0 - 12.9 fL    Neutrophils-Polys 81.30 (H) 44.00 - 72.00 %    Lymphocytes 10.50 (L) 22.00 - 41.00 %    Monocytes 7.00 0.00 - 13.40 %    Eosinophils 0.20 0.00 - 6.90 %    Basophils 0.30 0.00 - 1.80 %    Immature Granulocytes 0.70 0.00 - 0.90 %    Nucleated RBC 0.00 /100 WBC    Neutrophils (Absolute) 9.55 (H) 1.82 - 7.42 K/uL    Lymphs (Absolute) 1.23 1.00 - 4.80 K/uL    Monos (Absolute) 0.82 0.00 - 0.85 K/uL    Eos (Absolute) 0.02 0.00 - 0.51 K/uL    Baso (Absolute) 0.03 0.00 - 0.12 K/uL    Immature Granulocytes (abs) 0.08 0.00 - 0.11 K/uL    NRBC (Absolute) 0.00 K/uL   CMP   Result Value Ref Range    Sodium 111 (LL) 135 - 145 mmol/L    Potassium 4.8 3.6 - 5.5 mmol/L    Chloride 80 (L) 96 - 112 mmol/L    Co2 20 20 - 33 mmol/L    Anion Gap 11.0 0.0 - 11.9    Glucose 166 (H) 65 - 99 mg/dL    Bun 9 8 - 22 mg/dL    Creatinine 0.57 0.50 - 1.40 mg/dL    Calcium 8.1 (L) 8.5 - 10.5 mg/dL    AST(SGOT) 52 (H) 12 - 45 U/L    ALT(SGPT) 22 2 - 50 U/L    Alkaline Phosphatase 61 30 - 99 U/L    Total Bilirubin 3.2 (H) 0.1 - 1.5 mg/dL    Albumin 4.1 3.2 - 4.9 g/dL    Total Protein 6.7 6.0 - 8.2 g/dL    Globulin 2.6 1.9 - 3.5 g/dL    A-G Ratio 1.6 g/dL   LIPASE   Result Value Ref Range    Lipase 21 11 - 82 U/L   TROPONIN   Result Value Ref Range    Troponin T  16 6 - 19 ng/L      Lab results reviewed by me.     INTERVENTIONS: Indication IV fluids severe hyponatremia  Medications   NS infusion 1,000 mL (has no administration in time range)     Response: Approved hydration on repeat assessment.    ED COURSE:  Nursing notes, VS, PMSFHx reviewed in chart.     8:25 AM - Patient seen and examined at bedside by resident. Ordered DX elbow right, DX chest, estimated GFR, troponin, CBC with differential, CMP, lipase, EKG to evaluate.     10:52 AM Patient reevaluated at bedside. Discussed lab results as seen above which shows hyponatremia. Discussed the medical risks of hyponatremia and advised admission for further evaluation and care. Patient understands and agrees to plan.     10:52 AM Paged [facility]    I independently evaluated the patient and repeated the important components of the history and physical.  I discussed the management with the resident.  I have reviewed and agree with the pertinent clinical information as above including history, exam, study findings and recommendations.         MEDICAL DECISION MAKING:  This patient presents with upper back pain of unclear etiology.  The pain is mild to moderate and he has had this pain before.  I was worried he may be experiencing a posterior chest pain but mediastinum appears normal on chest x-ray and he has symmetric bilateral blood pressors suggesting that aortic dissection is unlikely.  There is no evidence of pneumonia or pneumothorax.  There is no history of trauma.  He also has a very small patch of numbness on his cheek and severe hyponatremia.  This is possibly related to his serum sodium and I doubt he is having a stroke.    PLAN:  Admission for correction of serum sodium    CONDITION: Patient will be admitted in fair condition.     FINAL IMPRESSION  1. Hyponatremia    2. Pain, upper back    3. Numbness          I, Guido Giles (Scribe), am scribing for, and in the presence of, Seth Mayer M.D..    Electronically  signed by: Guido Giles (Scribe), 8/5/2019    I, Seth Mayer M.D. personally performed the services described in this documentation, as scribed by Guido Giles in my presence, and it is both accurate and complete. C    I independently evaluated the patient and repeated the important components of the history and physical. I discussed the management with the resident. I have reviewed and agree with the pertinent clinical information above including history, exam, study findings, and recommendations.  I saw this patient with resident Dr. Lopez.    Electronically signed by: Seth Mayer, 8/5/2019 3:52 PM       The note accurately reflects work and decisions made by me.  Seth Mayer  8/5/2019  3:52 PM

## 2019-08-05 NOTE — ED TRIAGE NOTES
"Chief Complaint   Patient presents with   • Back Pain     began yesterday. Hx of back pain.   • Numbness     Left side of face became numb at 0300.     /70   Pulse 88   Temp 36.2 °C (97.2 °F) (Temporal)   Resp 16   Ht 1.702 m (5' 7\")   Wt 78.7 kg (173 lb 8 oz)   SpO2 98%   BMI 27.17 kg/m²     PT comes to ER with above complaint.    Charge notified of pt.     A&Ox4.  equal. No motor drift. Possible L upper facial droop. Pt to blue 18 for ERP evaluation.  "

## 2019-08-06 PROBLEM — E11.9 DIABETES (HCC): Status: ACTIVE | Noted: 2019-08-06

## 2019-08-06 LAB
ALBUMIN SERPL BCP-MCNC: 4 G/DL (ref 3.2–4.9)
ALBUMIN/GLOB SERPL: 1.6 G/DL
ALP SERPL-CCNC: 59 U/L (ref 30–99)
ALT SERPL-CCNC: 21 U/L (ref 2–50)
ANION GAP SERPL CALC-SCNC: 10 MMOL/L (ref 0–11.9)
ANION GAP SERPL CALC-SCNC: 9 MMOL/L (ref 0–11.9)
AST SERPL-CCNC: 39 U/L (ref 12–45)
BILIRUB SERPL-MCNC: 2.9 MG/DL (ref 0.1–1.5)
BUN SERPL-MCNC: 13 MG/DL (ref 8–22)
BUN SERPL-MCNC: 8 MG/DL (ref 8–22)
CALCIUM SERPL-MCNC: 8.5 MG/DL (ref 8.5–10.5)
CALCIUM SERPL-MCNC: 8.8 MG/DL (ref 8.5–10.5)
CHLORIDE SERPL-SCNC: 92 MMOL/L (ref 96–112)
CHLORIDE SERPL-SCNC: 94 MMOL/L (ref 96–112)
CO2 SERPL-SCNC: 21 MMOL/L (ref 20–33)
CO2 SERPL-SCNC: 26 MMOL/L (ref 20–33)
CREAT SERPL-MCNC: 0.72 MG/DL (ref 0.5–1.4)
CREAT SERPL-MCNC: 1 MG/DL (ref 0.5–1.4)
ERYTHROCYTE [DISTWIDTH] IN BLOOD BY AUTOMATED COUNT: 41.1 FL (ref 35.9–50)
GLOBULIN SER CALC-MCNC: 2.5 G/DL (ref 1.9–3.5)
GLUCOSE SERPL-MCNC: 112 MG/DL (ref 65–99)
GLUCOSE SERPL-MCNC: 92 MG/DL (ref 65–99)
HCT VFR BLD AUTO: 41.6 % (ref 42–52)
HGB BLD-MCNC: 14.3 G/DL (ref 14–18)
MCH RBC QN AUTO: 29.9 PG (ref 27–33)
MCHC RBC AUTO-ENTMCNC: 34.4 G/DL (ref 33.7–35.3)
MCV RBC AUTO: 87 FL (ref 81.4–97.8)
PLATELET # BLD AUTO: 267 K/UL (ref 164–446)
PMV BLD AUTO: 9.5 FL (ref 9–12.9)
POTASSIUM SERPL-SCNC: 3.9 MMOL/L (ref 3.6–5.5)
POTASSIUM SERPL-SCNC: 4.3 MMOL/L (ref 3.6–5.5)
PROT SERPL-MCNC: 6.5 G/DL (ref 6–8.2)
RBC # BLD AUTO: 4.78 M/UL (ref 4.7–6.1)
SODIUM SERPL-SCNC: 125 MMOL/L (ref 135–145)
SODIUM SERPL-SCNC: 127 MMOL/L (ref 135–145)
WBC # BLD AUTO: 9.1 K/UL (ref 4.8–10.8)

## 2019-08-06 PROCEDURE — 80048 BASIC METABOLIC PNL TOTAL CA: CPT

## 2019-08-06 PROCEDURE — 85027 COMPLETE CBC AUTOMATED: CPT

## 2019-08-06 PROCEDURE — A9270 NON-COVERED ITEM OR SERVICE: HCPCS | Performed by: STUDENT IN AN ORGANIZED HEALTH CARE EDUCATION/TRAINING PROGRAM

## 2019-08-06 PROCEDURE — 700105 HCHG RX REV CODE 258: Performed by: STUDENT IN AN ORGANIZED HEALTH CARE EDUCATION/TRAINING PROGRAM

## 2019-08-06 PROCEDURE — 99232 SBSQ HOSP IP/OBS MODERATE 35: CPT | Performed by: HOSPITALIST

## 2019-08-06 PROCEDURE — 700111 HCHG RX REV CODE 636 W/ 250 OVERRIDE (IP): Performed by: STUDENT IN AN ORGANIZED HEALTH CARE EDUCATION/TRAINING PROGRAM

## 2019-08-06 PROCEDURE — 770020 HCHG ROOM/CARE - TELE (206)

## 2019-08-06 PROCEDURE — 80053 COMPREHEN METABOLIC PANEL: CPT

## 2019-08-06 PROCEDURE — 700102 HCHG RX REV CODE 250 W/ 637 OVERRIDE(OP): Performed by: STUDENT IN AN ORGANIZED HEALTH CARE EDUCATION/TRAINING PROGRAM

## 2019-08-06 PROCEDURE — 99356 PR PROLONGED SVC I/P OR OBS SETTING 1ST HOUR: CPT | Performed by: HOSPITALIST

## 2019-08-06 PROCEDURE — 36415 COLL VENOUS BLD VENIPUNCTURE: CPT

## 2019-08-06 RX ORDER — THIAMINE MONONITRATE (VIT B1) 100 MG
100 TABLET ORAL DAILY
Status: DISCONTINUED | OUTPATIENT
Start: 2019-08-06 | End: 2019-08-07 | Stop reason: HOSPADM

## 2019-08-06 RX ORDER — LISINOPRIL 20 MG/1
20 TABLET ORAL DAILY
Status: DISCONTINUED | OUTPATIENT
Start: 2019-08-06 | End: 2019-08-07 | Stop reason: HOSPADM

## 2019-08-06 RX ORDER — FOLIC ACID 1 MG/1
1 TABLET ORAL DAILY
Status: DISCONTINUED | OUTPATIENT
Start: 2019-08-06 | End: 2019-08-07 | Stop reason: HOSPADM

## 2019-08-06 RX ADMIN — FAMOTIDINE 20 MG: 20 TABLET ORAL at 17:38

## 2019-08-06 RX ADMIN — ENOXAPARIN SODIUM 40 MG: 100 INJECTION SUBCUTANEOUS at 05:15

## 2019-08-06 RX ADMIN — GABAPENTIN 800 MG: 400 CAPSULE ORAL at 17:37

## 2019-08-06 RX ADMIN — FOLIC ACID 1 MG: 1 TABLET ORAL at 09:11

## 2019-08-06 RX ADMIN — FAMOTIDINE 20 MG: 20 TABLET ORAL at 05:14

## 2019-08-06 RX ADMIN — SENNOSIDES, DOCUSATE SODIUM 2 TABLET: 50; 8.6 TABLET, FILM COATED ORAL at 18:22

## 2019-08-06 RX ADMIN — AMLODIPINE BESYLATE 5 MG: 5 TABLET ORAL at 05:14

## 2019-08-06 RX ADMIN — METFORMIN HYDROCHLORIDE 500 MG: 500 TABLET, FILM COATED ORAL at 09:03

## 2019-08-06 RX ADMIN — Medication 100 MG: at 09:11

## 2019-08-06 RX ADMIN — ATORVASTATIN CALCIUM 20 MG: 20 TABLET, FILM COATED ORAL at 17:37

## 2019-08-06 RX ADMIN — GABAPENTIN 800 MG: 400 CAPSULE ORAL at 05:14

## 2019-08-06 RX ADMIN — SENNOSIDES, DOCUSATE SODIUM 2 TABLET: 50; 8.6 TABLET, FILM COATED ORAL at 05:14

## 2019-08-06 RX ADMIN — LISINOPRIL 20 MG: 20 TABLET ORAL at 10:18

## 2019-08-06 RX ADMIN — ESCITALOPRAM OXALATE 10 MG: 10 TABLET ORAL at 05:15

## 2019-08-06 RX ADMIN — METFORMIN HYDROCHLORIDE 500 MG: 500 TABLET, FILM COATED ORAL at 18:22

## 2019-08-06 RX ADMIN — CARVEDILOL 6.25 MG: 6.25 TABLET, FILM COATED ORAL at 17:37

## 2019-08-06 RX ADMIN — SODIUM CHLORIDE: 9 INJECTION, SOLUTION INTRAVENOUS at 00:00

## 2019-08-06 RX ADMIN — LATANOPROST 1 DROP: 50 SOLUTION OPHTHALMIC at 21:17

## 2019-08-06 RX ADMIN — CARVEDILOL 6.25 MG: 6.25 TABLET, FILM COATED ORAL at 09:03

## 2019-08-06 RX ADMIN — ASPIRIN 81 MG 81 MG: 81 TABLET ORAL at 05:14

## 2019-08-06 RX ADMIN — MULTIPLE VITAMINS W/ MINERALS TAB 1 TABLET: TAB at 05:14

## 2019-08-06 ASSESSMENT — ENCOUNTER SYMPTOMS
PALPITATIONS: 0
FEVER: 0
SHORTNESS OF BREATH: 0
PHOTOPHOBIA: 0
TINGLING: 0
NAUSEA: 0
CHILLS: 0
HEADACHES: 0
VOMITING: 0
HEARTBURN: 0
COUGH: 0
DEPRESSION: 0
DOUBLE VISION: 0
BLURRED VISION: 0
WEIGHT LOSS: 0

## 2019-08-06 NOTE — ASSESSMENT & PLAN NOTE
-Patient presented with spasmic back pain and numbness on the left maxillary area which has improved over time  -NIHSS score of 0, unlikely to be stroke  -Labs showed sodium of 111, chloride of 80, corrected serum sodium for hyperglycemia is 112  -Patient has been drinking excessive alcohol for the past 3 days about 12 packs of beer per day.  -Alcohol cessation counseling provided to the patient and [facility]

## 2019-08-06 NOTE — ASSESSMENT & PLAN NOTE
-2008 US abdomen  showed evidence of fatty infiltration or fibrosis of liver  -Counseled patient on consultation and will follow up with PCP

## 2019-08-06 NOTE — H&P
Internal Medicine Admitting History and Physical    Note Author: Fransisco Arroyo M.D.       Name Ronny Gallegos     1945   Age/Sex 74 y.o. male   MRN [id number]   Code Status Full     After 5PM or if no immediate response to page, please call for cross-coverage  Attending/Team: /Jose Ramon See Patient List for primary contact information  Call [phone] to page    1st Call - Day Intern (R1):    2nd Call - Day Sr. Resident (R2/R3):          Chief Complaint:   Back pain and Facial numbness.    HPI:   is a pleasant 74 year-old  Male  who presented to the ED with compaints of Back pain for the past 2-3 days , the pain started suddenly and its off and on, he described this pain as spasm that appeared suddenly in the upper back region , non radiating, 5/10 in intensity with no aggravating or reliving factors.patient denied any trauma to his back.  Patient also started to feel numbness in his left maxillary area around 2 am this morning.he denied any weakness,Headach nausea, vomiting, change in smell or hearing sensation and no change in vision.  Patient stated that he has been drinking heavily for the past few days, he consumed 3 sixpacks  Of beer in 3 days.  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      Review of Systems   Constitutional: Positive for malaise/fatigue. Negative for chills, diaphoresis, fever and weight loss.   Eyes: Negative for double vision, photophobia and pain.   Respiratory: Negative for cough, sputum production and shortness of breath.    Cardiovascular: Negative for chest pain, palpitations and leg swelling.   Gastrointestinal: Negative for abdominal pain and vomiting.   Genitourinary: Negative for dysuria, frequency and urgency.   Musculoskeletal:  Positive for back pain. Negative for neck pain.   Skin: Negative for rash.   Neurological: Negative for tremors, speech change, focal weakness, loss of consciousness and headaches.   Endo/Heme/Allergies: Negative for polydipsia. Does not bruise/bleed easily.   Psychiatric/Behavioral: Negative for depression and suicidal ideas.             Past Medical History (Chronic medical problem, known complications and current treatment)    *Hyperlipidemia.  *Hypertension    *Type II DM  *Glaucoma        *Depression     *Peripheral Neuropathy   *Asthma    *GERD    *Seasonal Allergies    Past Surgical History:  Past Surgical History:   Procedure Laterality Date   • ANKLE TOTAL ARTHROPLASTY Right 6/18/2018    Procedure: ANKLE TOTAL ARTHROPLASTY;  Surgeon: Jose De Jesus Medrano M.D.;  Location: [facility];  Service: Orthopedics   • LIGAMENT REPAIR Right 6/18/2018    Procedure: LIGAMENT REPAIR- LATERAL;  Surgeon: Jose De Jesus Medrano M.D.;  Location: [facility];  Service: Orthopedics   • LUMBAR LAMINECTOMY DISKECTOMY Right 6/13/2017    Procedure: LUMBAR LAMINECTOMY DISKECTOMY - RE-DO OPEN L4-S1 LAMINOTOMIES;  Surgeon: Clint Garsia M.D.;  Location: [facility];  Service:    • FORAMINOTOMY  6/13/2017    Procedure: FORAMINOTOMY;  Surgeon: Clint Garsia M.D.;  Location: [facility];  Service:    • HARDWARE REMOVAL NEURO  6/13/2017    Procedure: HARDWARE REMOVAL NEURO ;  Surgeon: Clint Garsia M.D.;  Location: [facility];  Service:    • LAPAROSCOPY ROBOTIC XI  10/26/2016    Procedure: LAPAROSCOPY FOR LIVER CYST MARSUPIALIZATION AND RESECTION LIVER WALL CYST;  Surgeon: Frank Corona M.D.;  Location: [facility];  Service:    • LUMBAR FUSION POSTERIOR  9/10/2015    Procedure: LUMBAR FUSION POSTERIOR L5-S1 ;  Surgeon: Clint Garsia M.D.;  Location: [facility];  Service:    • LUMBAR LAMINECTOMY DISKECTOMY  9/10/2015    Procedure: LUMBAR  LAMINECTOMY ;  Surgeon: Clint Garsia M.D.;  Location: SURGERY [facility];  Service:    • LAMINOTOMY  9/10/2015    Procedure: LAMINOTOMY;  Surgeon: Clint Garsia M.D.;  Location: SURGERY [facility];  Service:    • OTHER  2005    ANTERIOR NECK FUSION C5-7   • OTHER  1978    BLEEDING ULCERS   • CATARACT EXTRACTION WITH IOL         Current Outpatient Medications:  Home Medications     Reviewed by Saul Beach (Pharmacy Tech) on 08/05/19 at 1117  Med List Status: Complete   Medication Last Dose Status   amLODIPine (NORVASC) 5 MG Tab 8/4/2019 Active   aspirin (ASA) 81 MG Chew Tab chewable tablet 8/5/2019 Active   atorvastatin (LIPITOR) 20 MG Tab 8/4/2019 Active   carvedilol (COREG) 6.25 MG Tab 8/5/2019 Active   escitalopram (LEXAPRO) 20 MG tablet 8/5/2019 Active   fluticasone (FLONASE) 50 MCG/ACT nasal spray > 3 DAYS Active   gabapentin (NEURONTIN) 800 MG tablet 8/5/2019 Active   lisinopril (PRINIVIL) 20 MG Tab 8/5/2019 Active   LUMIGAN 0.01 % Solution 8/4/2019 Active   metFORMIN (GLUCOPHAGE) 500 MG Tab 8/5/2019 Active   Multiple Vitamins-Minerals (CENTRUM SILVER 50+MEN) Tab 8/4/2019 Active   PROAIR  (90 Base) MCG/ACT Aero Soln inhalation aerosol > 3 DAYS Active                Medication Allergy/Sensitivities:  Allergies   Allergen Reactions   • Nkda [No Known Drug Allergy]          Family History (mandatory)   Family History   Problem Relation Age of Onset   • Stroke Mother    • Heart Disease Mother    • Stroke Father    • Heart Disease Father        Social History (mandatory)   Social History     Socioeconomic History   • Marital status:      Spouse name: Not on file   • Number of children: Not on file   • Years of education: Not on file   • Highest education level: Not on file   Occupational History   • Not on file   Social Needs   • Financial resource strain: Not on file   • Food insecurity:     Worry: Not on file     Inability: Not on file   • Transportation needs:     Medical: Not  on file     Non-medical: Not on file   Tobacco Use   • Smoking status: Former Smoker     Packs/day: 1.50     Years: 13.00     Pack years: 19.50     Types: Cigarettes     Last attempt to quit: 1978     Years since quittin.6   • Smokeless tobacco: Never Used   Substance and Sexual Activity   • Alcohol use: Not Currently     Alcohol/week: 0.0 oz   • Drug use: No   • Sexual activity: Not on file   Lifestyle   • Physical activity:     Days per week: Not on file     Minutes per session: Not on file   • Stress: Not on file   Relationships   • Social connections:     Talks on phone: Not on file     Gets together: Not on file     Attends Anabaptism service: Not on file     Active member of club or organization: Not on file     Attends meetings of clubs or organizations: Not on file     Relationship status: Not on file   • Intimate partner violence:     Fear of current or ex partner: Not on file     Emotionally abused: Not on file     Physically abused: Not on file     Forced sexual activity: Not on file   Other Topics Concern   • Not on file   Social History Narrative   • Not on file     Living situation: home  PCP : [place] Abad N.P.    Physical Exam     Vitals:    19 1400 19 1500 19 1712 19   BP: 156/85 145/88  138/82   Pulse: 83 77  82   Resp:  16  18   Temp:    36.8 °C (98.2 °F)   TempSrc:    Temporal   SpO2: 98% 96%  95%   Weight:   78.7 kg (173 lb 8 oz) 76.7 kg (169 lb 1.5 oz)   Height:         Body mass index is 26.48 kg/m².  O2 therapy: Pulse Oximetry: 95 %, O2 (LPM): 0, O2 Delivery: None (Room Air)    Physical Exam   Constitutional: He is oriented to person, place, and time and well-developed, well-nourished, and in no distress.   HENT:   Head: Normocephalic and atraumatic.   Eyes: Pupils are equal, round, and reactive to light. Conjunctivae are normal.   Neck: Normal range of motion. No JVD present.   Cardiovascular: Normal rate, regular rhythm and normal heart sounds. Exam  reveals no gallop and no friction rub.   No murmur heard.  Pulmonary/Chest: Effort normal and breath sounds normal. No respiratory distress. He has no wheezes. He has no rales.   Abdominal: Soft. Bowel sounds are normal. He exhibits no distension. There is no tenderness. There is no rebound.   Musculoskeletal: He exhibits no edema.   Neurological: He is alert and oriented to person, place, and time. No cranial nerve deficit.   Skin: Skin is warm. No rash noted. No erythema.   Psychiatric: Affect normal.   Nursing note and vitals reviewed.        Data Review       Old Records Request:   Deferred  Current Records review/summary: Completed    Lab Data Review:  Recent Results (from the past 24 hour(s))   URINE SODIUM RANDOM    Collection Time: 08/05/19  8:57 AM   Result Value Ref Range    Sodium, Urine -per volume 53 mmol/L   URINE POTASSIUM RANDOM    Collection Time: 08/05/19  8:57 AM   Result Value Ref Range    Potassium 29.1 mmol/L   URINE CHLORIDE RANDOM    Collection Time: 08/05/19  8:57 AM   Result Value Ref Range    Chloride, Urine-per volume 50 mmol/L   URINE CREATININE RANDOM    Collection Time: 08/05/19  8:57 AM   Result Value Ref Range    Creatinine, Random Urine 27.20 mg/dL   URINE PROTEIN RANDOM    Collection Time: 08/05/19  8:57 AM   Result Value Ref Range    Total Protein, Urine 6.1 0.0 - 15.0 mg/dL   URINALYSIS,CULTURE IF INDICATED    Collection Time: 08/05/19  8:57 AM   Result Value Ref Range    Color Yellow     Character Clear     Specific Gravity 1.006 <1.035    Ph 8.0 5.0 - 8.0    Glucose Negative Negative mg/dL    Ketones Negative Negative mg/dL    Protein Negative Negative mg/dL    Bilirubin Negative Negative    Urobilinogen, Urine 1.0 Negative    Nitrite Negative Negative    Leukocyte Esterase Negative Negative    Occult Blood Negative Negative    Micro Urine Req see below    CBC WITH DIFFERENTIAL    Collection Time: 08/05/19  9:24 AM   Result Value Ref Range    WBC 11.7 (H) 4.8 - 10.8 K/uL    RBC  4.84 4.70 - 6.10 M/uL    Hemoglobin 14.4 14.0 - 18.0 g/dL    Hematocrit 41.0 (L) 42.0 - 52.0 %    MCV 84.7 81.4 - 97.8 fL    MCH 29.8 27.0 - 33.0 pg    MCHC 35.1 33.7 - 35.3 g/dL    RDW 39.6 35.9 - 50.0 fL    Platelet Count 318 164 - 446 K/uL    MPV 10.3 9.0 - 12.9 fL    Neutrophils-Polys 81.30 (H) 44.00 - 72.00 %    Lymphocytes 10.50 (L) 22.00 - 41.00 %    Monocytes 7.00 0.00 - 13.40 %    Eosinophils 0.20 0.00 - 6.90 %    Basophils 0.30 0.00 - 1.80 %    Immature Granulocytes 0.70 0.00 - 0.90 %    Nucleated RBC 0.00 /100 WBC    Neutrophils (Absolute) 9.55 (H) 1.82 - 7.42 K/uL    Lymphs (Absolute) 1.23 1.00 - 4.80 K/uL    Monos (Absolute) 0.82 0.00 - 0.85 K/uL    Eos (Absolute) 0.02 0.00 - 0.51 K/uL    Baso (Absolute) 0.03 0.00 - 0.12 K/uL    Immature Granulocytes (abs) 0.08 0.00 - 0.11 K/uL    NRBC (Absolute) 0.00 K/uL   CMP    Collection Time: 08/05/19  9:24 AM   Result Value Ref Range    Sodium 111 (LL) 135 - 145 mmol/L    Potassium 4.8 3.6 - 5.5 mmol/L    Chloride 80 (L) 96 - 112 mmol/L    Co2 20 20 - 33 mmol/L    Anion Gap 11.0 0.0 - 11.9    Glucose 166 (H) 65 - 99 mg/dL    Bun 9 8 - 22 mg/dL    Creatinine 0.57 0.50 - 1.40 mg/dL    Calcium 8.1 (L) 8.5 - 10.5 mg/dL    AST(SGOT) 52 (H) 12 - 45 U/L    ALT(SGPT) 22 2 - 50 U/L    Alkaline Phosphatase 61 30 - 99 U/L    Total Bilirubin 3.2 (H) 0.1 - 1.5 mg/dL    Albumin 4.1 3.2 - 4.9 g/dL    Total Protein 6.7 6.0 - 8.2 g/dL    Globulin 2.6 1.9 - 3.5 g/dL    A-G Ratio 1.6 g/dL   LIPASE    Collection Time: 08/05/19  9:24 AM   Result Value Ref Range    Lipase 21 11 - 82 U/L   TROPONIN    Collection Time: 08/05/19  9:24 AM   Result Value Ref Range    Troponin T 16 6 - 19 ng/L   ESTIMATED GFR    Collection Time: 08/05/19  9:24 AM   Result Value Ref Range    GFR If African American >60 >60 mL/min/1.73 m 2    GFR If Non African American >60 >60 mL/min/1.73 m 2   EKG    Collection Time: 08/05/19  9:31 AM   Result Value Ref Range    Report       [facility]  Emergency Dept.    Test Date:  2019  Pt Name:    JAMEL GUADARRAMA             Department: ER  MRN:        [id number]                      Room:        18  Gender:     Male                         Technician: [id number]  :        1945                   Requested By:SYD LUNDBERG  Order #:    [id number]                    Reading MD:    Measurements  Intervals                                Axis  Rate:       77                           P:          60  WV:         200                          QRS:        -32  QRSD:       100                          T:          12  QT:         420  QTc:        476    Interpretive Statements  SINUS RHYTHM  PROBABLE LEFT ATRIAL ABNORMALITY  PROBABLE INFERIOR INFARCT, AGE INDETERMINATE  BORDERLINE PROLONGED QT INTERVAL  Compared to ECG 2018 12:08:28  First degree AV block no longer present  Myocardial infarct finding still present     Lipid Profile    Collection Time: 19  4:18 PM   Result Value Ref Range    Cholesterol,Tot 137 100 - 199 mg/dL    Triglycerides 110 0 - 149 mg/dL    HDL 78 >=40 mg/dL    LDL 37 <100 mg/dL   Basic Metabolic Panel    Collection Time: 19  4:18 PM   Result Value Ref Range    Sodium 120 (L) 135 - 145 mmol/L    Potassium 3.6 3.6 - 5.5 mmol/L    Chloride 84 (L) 96 - 112 mmol/L    Co2 24 20 - 33 mmol/L    Glucose 175 (H) 65 - 99 mg/dL    Bun 8 8 - 22 mg/dL    Creatinine 0.72 0.50 - 1.40 mg/dL    Calcium 8.5 8.5 - 10.5 mg/dL    Anion Gap 12.0 (H) 0.0 - 11.9   ESTIMATED GFR    Collection Time: 19  4:18 PM   Result Value Ref Range    GFR If African American >60 >60 mL/min/1.73 m 2    GFR If Non African American >60 >60 mL/min/1.73 m 2   Basic Metabolic Panel    Collection Time: 19 10:05 PM   Result Value Ref Range    Sodium 121 (L) 135 - 145 mmol/L    Potassium 3.7 3.6 - 5.5 mmol/L    Chloride 90 (L) 96 - 112 mmol/L    Co2 23 20 - 33 mmol/L    Glucose 109 (H) 65 - 99 mg/dL    Bun 8 8 - 22 mg/dL    Creatinine 0.68 0.50 -  1.40 mg/dL    Calcium 8.2 (L) 8.5 - 10.5 mg/dL    Anion Gap 8.0 0.0 - 11.9   MAGNESIUM    Collection Time: 08/05/19 10:05 PM   Result Value Ref Range    Magnesium 1.9 1.5 - 2.5 mg/dL   ESTIMATED GFR    Collection Time: 08/05/19 10:05 PM   Result Value Ref Range    GFR If African American >60 >60 mL/min/1.73 m 2    GFR If Non African American >60 >60 mL/min/1.73 m 2       Imaging/Procedures Review:    Independant Imaging Review: Completed  DX-CHEST-LIMITED (1 VIEW)   Final Result      No evidence of acute cardiopulmonary process.             Records reviewed and summarized in current documentation :  Yes  [employer] teaching service handout given to patient:  Yes         Assessment/Plan     * Hyponatremia  Assessment & Plan  Patient serum sodium at 111 upon admission.  Most likely due to Beer Potomania.  Patient is asymptomatic  @Plan:-  -start replacing with IV NS at a rate of 83 ml/hr aiming for correction rate of  8-10 mEq/day.  -Stop  Lisinopril.  -CTM         Epigastric pain  Assessment & Plan  Given recent alcohol binge, may be consistent with gastritis.  @Plan:-  Start famotidine          Alcohol use disorder, mild, abuse  Assessment & Plan  -Patient presented with spasmic back pain and numbness on the left maxillary area which has improved over time  -NIHSS score of 0, unlikely to be stroke  -Labs showed sodium of 111, chloride of 80, corrected serum sodium for hyperglycemia is 112  -Patient has been drinking excessive alcohol for the past 3 days about 12 packs of beer per day.  -Patient scheduled to see alcohol cessation counselor [facility] on 8/6  -Alcohol cessation counseling  -Monitor for alcohol withdrawal    Hyperbilirubinemia  Assessment & Plan  2008 US abdomen  showed evidence of fatty infiltration or fibrosis of liver suggests chronic cirrhosis       Anticipated Hospital stay:  >2 midnights        Quality Measures  Quality-Core Measures   Reviewed items::  Labs reviewed, EKG reviewed, Medications  reviewed and Radiology images reviewed  Chung catheter::  No Chung  DVT prophylaxis pharmacological::  Enoxaparin (Lovenox)    PCP: [place] Abad N.P.

## 2019-08-06 NOTE — RESPIRATORY CARE
COPD EDUCATION by COPD CLINICAL EDUCATOR  8/6/2019 at 6:52 AM by Kanika Garcia     Patient reviewed by COPD education team. Patient does not have an Respiratory Care Protocol, therefore the COPD education team can not treat.

## 2019-08-06 NOTE — CARE PLAN
Problem: Communication  Goal: The ability to communicate needs accurately and effectively will improve  Outcome: PROGRESSING AS EXPECTED     Problem: Safety  Goal: Will remain free from injury  Outcome: PROGRESSING AS EXPECTED  Goal: Will remain free from falls  Outcome: PROGRESSING AS EXPECTED

## 2019-08-06 NOTE — NON-PROVIDER
"       Internal Medicine Medical Student Note  Note Author: Dominga Beltran, Student    Name Ronny Gallegos     1945   Age/Sex 74 y.o. male   MRN [id number]   Code Status Full Code        Reason for interval visit  (Principal Problem)   Hyponatremia    Interval Problem Daily Status Update  (problem status, last 24 hours, new history, new data )   Patients sodium has increased from 111 to 124 in approximately 24 hours. He has had 320 mL of fluids (240 PO and 80 IV) in the last 24 hours.   Patient reports resolved back pain and left sided facial numbness. He notes that last night he had a \"burning\" in his stomach that was relieved with the famotidine. Patient has been tolerating food well. He denies nausea, vomiting, and diarrhea. Patient also notes that he felt \"off balance\" last night going to the bathroom but states that this has improved substantially. Patient has no additional complaints or questions at this time.     Labs notable for   Na-127  Cl- 92  Bilirubin- 2.9    Normal AST/ALT, Normal lipid panel, normal magnesium.     Physical Exam       Vitals:    19 0405 19 0828 19 0903 19 1241   BP: 152/87 (!) 165/93 (!) 165/93 138/82   Pulse: 77 83 83 76   Resp: 18 18  18   Temp: 36.2 °C (97.1 °F) 36.2 °C (97.2 °F)  36.6 °C (97.8 °F)   TempSrc: Temporal Temporal  Temporal   SpO2: 96% 96%  97%   Weight:       Height:         Body mass index is 26.48 kg/m². Weight: 76.7 kg (169 lb 1.5 oz)  Oxygen Therapy:  Pulse Oximetry: 97 %, O2 (LPM): 0, O2 Delivery: None (Room Air)    Physical Exam   Constitutional: He is oriented to person, place, and time and well-developed, well-nourished, and in no distress.   HENT:   Head: Normocephalic and atraumatic.   Mouth/Throat: Oropharynx is clear and moist.   Eyes: Pupils are equal, round, and reactive to light.   End gaze Nystagmus noted.   Left eye strabismus and left ptosis (pt states chronic)    Cardiovascular: Normal rate and regular rhythm. "   Pulmonary/Chest: No respiratory distress. He has no wheezes.   Abdominal: There is tenderness (Mid epigatric).   Musculoskeletal: He exhibits no tenderness (No back tenderness).   Neurological: He is alert and oriented to person, place, and time. No cranial nerve deficit.   Skin: Skin is warm and dry.   Psychiatric: Affect normal.         Assessment/Plan     1) Hyponatremia   Patients sodium increased from 111 to 120 over seven hours with fluid restriction, 0.9% Saline, and discontinuation of lisinopril. This was above the goal of 4- 8 mEq daily. At this point, Saline was held. This morning, sodium was 127. Lisinopril was resumed to slow the correction of sodium. Patients sodium increased somewhat suddenly despite conservative treatment, we will need to monitor for mental status changes or additional symptoms concerning for osmotic demyelination syndrome.     2) Hyperbilirubinemia   Bilirubin still elevated. 2008 US imaging shows fatty infiltration and fibrosis suggesting cirrhosis.   Continue to monitor     3) Mid epigastric tenderness  Tenderness and recent alcohol binge suggests gastritis. Continue famotidine.     4) Type 2 Diabetes  Continue metformin 500 mg BID PO    5)Hypertension  Blood pressure elevated at 152/87 continue Carvedilol 6.25 mg BID PO and add Lisinopril 20 mg daily PO    6) Dyslipidemia   Lipid panel at this time is normal   Continue Lipitor 20 mg PO     7) Peripheral Neuropathy  Continue gabapentin 800 mg BID PO     8)Alcohol use disorder   Continue to monitor for signs of withdrawal. PRN benzodiazepines if needed     9) Depression  Continue Lexapro 10mg PO

## 2019-08-06 NOTE — PROGRESS NOTES
Bedside report received, assumed care of pt. Pt is sleeping. Call light and personal belongings within reach. Bed in lowest position and locked. Tele box is on. Will continue to monitor.

## 2019-08-06 NOTE — CARE PLAN
Problem: Communication  Goal: The ability to communicate needs accurately and effectively will improve  Outcome: PROGRESSING AS EXPECTED  Note:   Pt educated on call bell usage and bed controls.  Pt able to demonstrate usage and agrees to calls for assistance.     Problem: Safety  Goal: Will remain free from injury  Outcome: PROGRESSING AS EXPECTED  Note:   Fall precautions in place and frequent rounding done to ensure safety.  Goal: Will remain free from falls  Outcome: PROGRESSING AS EXPECTED  Note:   Fall precautions in place and frequent rounding done to ensure safety.     Problem: Bowel/Gastric:  Goal: Normal bowel function is maintained or improved  Outcome: PROGRESSING AS EXPECTED  Note:   Normal Bms per pt, refuses softners

## 2019-08-06 NOTE — ASSESSMENT & PLAN NOTE
-Most likely due to be beer potomania  -Improved with IV fluids  -Follow-up with PCP.  Will require repeat labs on his next visit to the PCP  -Counseled on alcohol cessation

## 2019-08-06 NOTE — PROGRESS NOTES
Sodium correction noted to have increased from 111-120. Paged for physician update as goal is increase of 4-8mEq daily. Orders to hold NS infusion until 0000 tonight then resume NS at a rate of 40 ml/hr.

## 2019-08-07 ENCOUNTER — PATIENT OUTREACH (OUTPATIENT)
Dept: HEALTH INFORMATION MANAGEMENT | Facility: OTHER | Age: 74
End: 2019-08-07

## 2019-08-07 VITALS
SYSTOLIC BLOOD PRESSURE: 125 MMHG | HEIGHT: 67 IN | BODY MASS INDEX: 26.06 KG/M2 | TEMPERATURE: 99.6 F | OXYGEN SATURATION: 96 % | HEART RATE: 74 BPM | WEIGHT: 166.01 LBS | DIASTOLIC BLOOD PRESSURE: 69 MMHG | RESPIRATION RATE: 16 BRPM

## 2019-08-07 LAB
ANION GAP SERPL CALC-SCNC: 11 MMOL/L (ref 0–11.9)
ANION GAP SERPL CALC-SCNC: 9 MMOL/L (ref 0–11.9)
BASOPHILS # BLD AUTO: 0.8 % (ref 0–1.8)
BASOPHILS # BLD: 0.08 K/UL (ref 0–0.12)
BUN SERPL-MCNC: 16 MG/DL (ref 8–22)
BUN SERPL-MCNC: 17 MG/DL (ref 8–22)
CALCIUM SERPL-MCNC: 8.3 MG/DL (ref 8.5–10.5)
CALCIUM SERPL-MCNC: 8.6 MG/DL (ref 8.5–10.5)
CHLORIDE SERPL-SCNC: 96 MMOL/L (ref 96–112)
CHLORIDE SERPL-SCNC: 98 MMOL/L (ref 96–112)
CO2 SERPL-SCNC: 19 MMOL/L (ref 20–33)
CO2 SERPL-SCNC: 24 MMOL/L (ref 20–33)
CREAT SERPL-MCNC: 0.77 MG/DL (ref 0.5–1.4)
CREAT SERPL-MCNC: 0.84 MG/DL (ref 0.5–1.4)
EOSINOPHIL # BLD AUTO: 0.18 K/UL (ref 0–0.51)
EOSINOPHIL NFR BLD: 1.9 % (ref 0–6.9)
ERYTHROCYTE [DISTWIDTH] IN BLOOD BY AUTOMATED COUNT: 43.8 FL (ref 35.9–50)
GLUCOSE SERPL-MCNC: 92 MG/DL (ref 65–99)
GLUCOSE SERPL-MCNC: 97 MG/DL (ref 65–99)
HCT VFR BLD AUTO: 40.2 % (ref 42–52)
HGB BLD-MCNC: 13.5 G/DL (ref 14–18)
IMM GRANULOCYTES # BLD AUTO: 0.07 K/UL (ref 0–0.11)
IMM GRANULOCYTES NFR BLD AUTO: 0.7 % (ref 0–0.9)
LYMPHOCYTES # BLD AUTO: 2.76 K/UL (ref 1–4.8)
LYMPHOCYTES NFR BLD: 28.8 % (ref 22–41)
MCH RBC QN AUTO: 29.9 PG (ref 27–33)
MCHC RBC AUTO-ENTMCNC: 33.6 G/DL (ref 33.7–35.3)
MCV RBC AUTO: 89.1 FL (ref 81.4–97.8)
MONOCYTES # BLD AUTO: 1.12 K/UL (ref 0–0.85)
MONOCYTES NFR BLD AUTO: 11.7 % (ref 0–13.4)
NEUTROPHILS # BLD AUTO: 5.38 K/UL (ref 1.82–7.42)
NEUTROPHILS NFR BLD: 56.1 % (ref 44–72)
NRBC # BLD AUTO: 0 K/UL
NRBC BLD-RTO: 0 /100 WBC
PLATELET # BLD AUTO: 233 K/UL (ref 164–446)
PMV BLD AUTO: 9.7 FL (ref 9–12.9)
POTASSIUM SERPL-SCNC: 4 MMOL/L (ref 3.6–5.5)
POTASSIUM SERPL-SCNC: 4.1 MMOL/L (ref 3.6–5.5)
RBC # BLD AUTO: 4.51 M/UL (ref 4.7–6.1)
SODIUM SERPL-SCNC: 128 MMOL/L (ref 135–145)
SODIUM SERPL-SCNC: 129 MMOL/L (ref 135–145)
WBC # BLD AUTO: 9.6 K/UL (ref 4.8–10.8)

## 2019-08-07 PROCEDURE — A9270 NON-COVERED ITEM OR SERVICE: HCPCS | Performed by: STUDENT IN AN ORGANIZED HEALTH CARE EDUCATION/TRAINING PROGRAM

## 2019-08-07 PROCEDURE — 700111 HCHG RX REV CODE 636 W/ 250 OVERRIDE (IP): Performed by: STUDENT IN AN ORGANIZED HEALTH CARE EDUCATION/TRAINING PROGRAM

## 2019-08-07 PROCEDURE — 85025 COMPLETE CBC W/AUTO DIFF WBC: CPT

## 2019-08-07 PROCEDURE — 700105 HCHG RX REV CODE 258: Performed by: STUDENT IN AN ORGANIZED HEALTH CARE EDUCATION/TRAINING PROGRAM

## 2019-08-07 PROCEDURE — 80048 BASIC METABOLIC PNL TOTAL CA: CPT

## 2019-08-07 PROCEDURE — 99238 HOSP IP/OBS DSCHRG MGMT 30/<: CPT | Performed by: HOSPITALIST

## 2019-08-07 PROCEDURE — 700102 HCHG RX REV CODE 250 W/ 637 OVERRIDE(OP): Performed by: STUDENT IN AN ORGANIZED HEALTH CARE EDUCATION/TRAINING PROGRAM

## 2019-08-07 PROCEDURE — 36415 COLL VENOUS BLD VENIPUNCTURE: CPT

## 2019-08-07 RX ORDER — SODIUM CHLORIDE 9 MG/ML
INJECTION, SOLUTION INTRAVENOUS CONTINUOUS
Status: DISCONTINUED | OUTPATIENT
Start: 2019-08-07 | End: 2019-08-07

## 2019-08-07 RX ADMIN — CARVEDILOL 6.25 MG: 6.25 TABLET, FILM COATED ORAL at 08:21

## 2019-08-07 RX ADMIN — GABAPENTIN 800 MG: 400 CAPSULE ORAL at 05:24

## 2019-08-07 RX ADMIN — FAMOTIDINE 20 MG: 20 TABLET ORAL at 05:24

## 2019-08-07 RX ADMIN — METFORMIN HYDROCHLORIDE 500 MG: 500 TABLET, FILM COATED ORAL at 08:21

## 2019-08-07 RX ADMIN — ENOXAPARIN SODIUM 40 MG: 100 INJECTION SUBCUTANEOUS at 05:27

## 2019-08-07 RX ADMIN — SODIUM CHLORIDE 700 ML: 9 INJECTION, SOLUTION INTRAVENOUS at 08:22

## 2019-08-07 RX ADMIN — SENNOSIDES, DOCUSATE SODIUM 2 TABLET: 50; 8.6 TABLET, FILM COATED ORAL at 05:25

## 2019-08-07 RX ADMIN — LISINOPRIL 20 MG: 20 TABLET ORAL at 05:24

## 2019-08-07 RX ADMIN — ASPIRIN 81 MG 81 MG: 81 TABLET ORAL at 05:25

## 2019-08-07 RX ADMIN — FOLIC ACID 1 MG: 1 TABLET ORAL at 05:25

## 2019-08-07 RX ADMIN — AMLODIPINE BESYLATE 5 MG: 5 TABLET ORAL at 05:25

## 2019-08-07 RX ADMIN — MULTIPLE VITAMINS W/ MINERALS TAB 1 TABLET: TAB at 05:23

## 2019-08-07 RX ADMIN — Medication 100 MG: at 05:24

## 2019-08-07 RX ADMIN — ESCITALOPRAM OXALATE 10 MG: 10 TABLET ORAL at 05:25

## 2019-08-07 NOTE — DISCHARGE INSTRUCTIONS
Discharge Instructions    Discharged to home by car with relative. Discharged via wheelchair, hospital escort: Yes.  Special equipment needed: Not Applicable    Be sure to schedule a follow-up appointment with your primary care doctor or any specialists as instructed.     Discharge Plan:   Diet Plan: Discussed  Activity Level: Discussed  Confirmed Follow up Appointment: Appointment Scheduled  Confirmed Symptoms Management: Discussed  Medication Reconciliation Updated: Yes  Influenza Vaccine Indication: Indicated: Not available from distributor/    I understand that a diet low in cholesterol, fat, and sodium is recommended for good health. Unless I have been given specific instructions below for another diet, I accept this instruction as my diet prescription.   Other diet: Regular diet    Special Instructions: None    · Is patient discharged on Warfarin / Coumadin?   No         Hyponatremia  Introduction  Hyponatremia is when the amount of salt (sodium) in your blood is too low. When salt levels are low, your cells absorb extra water and they swell. The swelling happens throughout the body, but it mostly affects the brain.  Follow these instructions at home:  · Take medicines only as told by your doctor. Many medicines can make this condition worse. Talk with your doctor about any medicines that you are currently taking.  · Carefully follow a recommended diet as told by your doctor.  · Carefully follow instructions from your doctor about fluid restrictions.  · Keep all follow-up visits as told by your doctor. This is important.  · Do not drink alcohol.  Contact a doctor if:  · You feel sicker to your stomach (nauseous).  · You feel more confused.  · You feel more tired (fatigued).  · Your headache gets worse.  · You feel weaker.  · Your symptoms go away and then they come back.  · You have trouble following the diet instructions.  Get help right away if:  · You start to twitch and shake (have a  seizure).  · You pass out (faint).  · You keep having watery poop (diarrhea).  · You keep throwing up (vomiting).  This information is not intended to replace advice given to you by your health care provider. Make sure you discuss any questions you have with your health care provider.  Document Released: 08/29/2012 Document Revised: 05/25/2017 Document Reviewed: 12/14/2015  © 2017 Elsevier      Depression / Suicide Risk    As you are discharged from this [facility] facility, it is important to learn how to keep safe from harming yourself.    Recognize the warning signs:  · Abrupt changes in personality, positive or negative- including increase in energy   · Giving away possessions  · Change in eating patterns- significant weight changes-  positive or negative  · Change in sleeping patterns- unable to sleep or sleeping all the time   · Unwillingness or inability to communicate  · Depression  · Unusual sadness, discouragement and loneliness  · Talk of wanting to die  · Neglect of personal appearance   · Rebelliousness- reckless behavior  · Withdrawal from people/activities they love  · Confusion- inability to concentrate     If you or a loved one observes any of these behaviors or has concerns about self-harm, here's what you can do:  · Talk about it- your feelings and reasons for harming yourself  · Remove any means that you might use to hurt yourself (examples: pills, rope, extension cords, firearm)  · Get professional help from the community (Mental Health, Substance Abuse, psychological counseling)  · Do not be alone:Call your Safe Contact- someone whom you trust who will be there for you.  · Call your local CRISIS HOTLINE 219-9292 or 121-337-8887  · Call your local Children's Mobile Crisis Response Team [place] (609) 099-3308 or www.[employer]  · Call the toll free National Suicide Prevention Hotlines   · National Suicide Prevention Lifeline 456-555-FMJF (1935)  · National Deligic Line Network  800-SUICIDE (190-4032)

## 2019-08-07 NOTE — CARE PLAN
Problem: Safety  Goal: Will remain free from injury  Outcome: PROGRESSING AS EXPECTED  Note:   Patient's risk for injury and falls assessed. Appropriate safety precautions in place. Patient educated to utilize call light for needs. Patient verbalizes understanding.      Problem: Knowledge Deficit  Goal: Knowledge of disease process/condition, treatment plan, diagnostic tests, and medications will improve  Outcome: PROGRESSING AS EXPECTED  Note:   Patient is educated of disease process and condition. Treatment team has included patient in plan of care. All medications indications and side effects are explained. Patient is encouraged to ask questions. Patient indicates understanding.      Problem: Knowledge Deficit  Goal: Knowledge of disease process/condition, treatment plan, diagnostic tests, and medications will improve  Outcome: PROGRESSING AS EXPECTED  Note:   Patient is educated of disease process and condition. Treatment team has included patient in plan of care. All medications indications and side effects are explained. Patient is encouraged to ask questions. Patient indicates understanding.

## 2019-08-07 NOTE — PROGRESS NOTES
Handoff report received. Assumed patient care. PT is sitting up in bed, AAOx4, no complaints of pain or discomfort. POC discussed with PT and all questions addressed. Safety precautions in place. Call light and personal belongings within reach. Educated to call for assistance if needed.

## 2019-08-07 NOTE — PROGRESS NOTES
Pt given discharge instructions.  Discussed diet, activity, follow up appointments, symptoms and management, and prescriptions provided.  Packet sent with patient.  IV d/c'd, tele box off, and all questions answered.  Patient A&Ox4, discharged home with his niece.

## 2019-08-07 NOTE — PROGRESS NOTES
Internal Medicine Interval Note  Note Author: Fransisco Arroyo M.D.     Name Ronny Gallegos     1945   Age/Sex 74 y.o. male   MRN [id number]   Code Status Full     After 5PM or if no immediate response to page, please call for cross-coverage  Attending/Team: /Jose Ramon team. See Patient List for primary contact information  Call [phone] to page    1st Call - Day Intern (R1):    2nd Call - Day Sr. Resident (R2/R3):            Reason for interval visit  (Principal Problem)   Back pain and Left Maxillary sinus numbness + Severe hyponatremia.      Interval Problem Daily Status Update  (24 hours, problem oriented, brief subjective history, new lab/imaging data pertinent to that problem)   No acute events overnight, VSS. Back pain and left maxillary numbness now resolved.  Patient started on NS infusion with a rate of correction of 4-6 mEq/day.  Last serum Na is 127 this Am.  - IVF stopped => reached our daily target of correction.      Review of Systems   Constitutional: Negative for chills, fever and weight loss.   HENT: Negative for hearing loss and tinnitus.    Eyes: Negative for blurred vision, double vision and photophobia.   Respiratory: Negative for cough and shortness of breath.    Cardiovascular: Negative for chest pain and palpitations.   Gastrointestinal: Negative for heartburn, nausea and vomiting.   Genitourinary: Negative for dysuria, frequency and urgency.   Neurological: Negative for tingling and headaches.   Psychiatric/Behavioral: Negative for depression.       Disposition/Barriers to discharge:   inpatient for correction of severe hyponatremia.    Consultants/Specialty  Hospital medicine.  PCP: China Abad N.P.      Quality Measures  Quality-Core Measures   Reviewed items::  Labs reviewed, EKG reviewed, Medications reviewed and Radiology images reviewed  DVT prophylaxis pharmacological::  Enoxaparin (Lovenox)          Physical Exam       Vitals:     08/06/19 0903 08/06/19 1241 08/06/19 1611 08/06/19 2034   BP: (!) 165/93 138/82 142/80 114/73   Pulse: 83 76 77 82   Resp:  18 20 18   Temp:  36.6 °C (97.8 °F) 35.9 °C (96.6 °F) 36.6 °C (97.8 °F)   TempSrc:  Temporal Temporal Temporal   SpO2:  97% 96% 95%   Weight:    75.3 kg (166 lb 0.1 oz)   Height:         Body mass index is 26 kg/m². Weight: 75.3 kg (166 lb 0.1 oz)  Oxygen Therapy:  Pulse Oximetry: 95 %, O2 (LPM): 0, O2 Delivery: None (Room Air)    Physical Exam   Constitutional: He is oriented to person, place, and time. No distress.   HENT:   Head: Normocephalic and atraumatic.   Eyes: Pupils are equal, round, and reactive to light. Right eye exhibits no discharge. Left eye exhibits no discharge. No scleral icterus. Left eye exhibits nystagmus.   Neck: Neck supple.   Cardiovascular: Normal rate and regular rhythm. Exam reveals no friction rub.   No murmur heard.  Pulmonary/Chest: Effort normal. No respiratory distress. He has no wheezes.   Abdominal: Soft. Bowel sounds are normal. He exhibits no distension.   Neurological: He is alert and oriented to person, place, and time. He displays facial symmetry. No cranial nerve deficit. He has an abnormal Cerebellar Exam (patient exhibit dysmetria and poor heel-to-shin test.). GCS score is 15.   Skin: Skin is warm. He is not diaphoretic. No erythema.   Psychiatric: Affect normal.             Assessment/Plan     * Hyponatremia  Assessment & Plan  Patient serum sodium at 111 upon admission.  Most likely due to Beer Potomania.  Patient is asymptomatic  @Plan:-  -start replacing with IV NS at a rate of 83 ml/hr aiming for correction rate of  8-10 mEq/day.  -Stop  Lisinopril.  -CTM         Epigastric pain  Assessment & Plan  Given recent alcohol binge, may be consistent with gastritis.  @Plan:-  Start famotidine          Alcohol use disorder, mild, abuse  Assessment & Plan  -Patient presented with spasmic back pain and numbness on the left maxillary area which has improved  over time  -NIHSS score of 0, unlikely to be stroke  -Labs showed sodium of 111, chloride of 80, corrected serum sodium for hyperglycemia is 112  -Patient has been drinking excessive alcohol for the past 3 days about 12 packs of beer per day.  -Patient scheduled to see alcohol cessation counselor at the [facility] on 8/6  -Alcohol cessation counseling  -Monitor for alcohol withdrawal    Diabetes (HCC)  Assessment & Plan  Glucose on admission was 176.  Patient on Metformin 500 mg BID.      Hyperbilirubinemia  Assessment & Plan  2008 US abdomen  showed evidence of fatty infiltration or fibrosis of liver suggests chronic cirrhosis

## 2019-08-07 NOTE — NON-PROVIDER
Internal Medicine Medical Student Note  Note Author: Dominga Beltran, Student    Name Ronny Gallegos     1945   Age/Sex 74 y.o. male   MRN [id number]   Code Status Full Code         Reason for interval visit  (Principal Problem)   Hyponatremia    Interval Problem Daily Status Update  (problem status, last 24 hours, new history, new data )   Patient was evaluated at bedside. He has no complaints at this time. His epigastric pain, back pain, and facial numbness have fully resolved. He denies nausea, vomiting, and diarrhea. He also denies dizziness, numbness, and tingling. Patient states he feels well enough to go home.     Labs notable for Na 129 (previous 127)  Chloride 90      Physical Exam       Vitals:    19 1611 19 2034 19 0000 19 0400   BP: 142/80 114/73 122/67 120/79   Pulse: 77 82 72 68   Resp: 20 18 18 18   Temp: 35.9 °C (96.6 °F) 36.6 °C (97.8 °F) 36.2 °C (97.1 °F) 36.7 °C (98.1 °F)   TempSrc: Temporal Temporal Temporal Temporal   SpO2: 96% 95% 98% 96%   Weight:  75.3 kg (166 lb 0.1 oz)     Height:         Body mass index is 26 kg/m². Weight: 75.3 kg (166 lb 0.1 oz)  Oxygen Therapy:  Pulse Oximetry: 96 %, O2 (LPM): 0, O2 Delivery: None (Room Air)    Physical Exam   Constitutional: He is oriented to person, place, and time and well-developed, well-nourished, and in no distress.   HENT:   Head: Normocephalic and atraumatic.   Mouth/Throat: Oropharynx is clear and moist.   Eyes: Pupils are equal, round, and reactive to light. No scleral icterus.   Strabismus in left eye noted. Left ptosis. End gaze nystagmus    Neck: Normal range of motion.   Cardiovascular: Normal rate, regular rhythm and normal heart sounds.   Pulmonary/Chest: Breath sounds normal. He has no wheezes.   Abdominal: Soft. He exhibits no distension. There is no tenderness.   Musculoskeletal: He exhibits no edema.   Neurological: He is alert and oriented to person, place, and time. No cranial nerve  deficit.   Skin: Skin is warm and dry.   Psychiatric: Affect normal.             Assessment/Plan   Patient evaluated at bedside. Sodium has increased to 129. He will be placed on NS 30 mL an hour and discharged home pending repeat sodium measurement.     Discharge Summary:    Patient with history of chronic alcohol abuse was admitted to the hospital on 7/5/19 after presenting to the ED with left sided thoracic back pain and left sided facial numbness. He had no additional neurological signs. ED Chest X-ray, troponin, and EKG unremarkable. Patient was found to have severe hyponatremia (111) and was admitted to the internal medicine team.     On admission, goal was to correct sodium at a rate no more than 4-8 mEq a day to avoid complications, specifically osmotic demyelination syndrome.  Patient was plased on NS 0.9% 83 mL/hour. Patients sodium increased from 111 to 120 over seven hours with fluid restriction, 0.9% Saline, and discontinuation of lisinopril. This was above the goal of 4- 8 mEq daily. At this point, saline was held. The next morning sodium was 127. Lisinopril was resumed to slow the correction of sodium and patient was monitored for changes in mental status. Morning of discharge the patients sodium was measured to be 129. NS was started at rate of 30 mL per hour. Na was rechecked at approximately 12 pm and measured 128.     Patient was counseled on alcohol cessation. He states that [facility] Per patient, he has had good success with these in the past.     Patient will be discharged home in stable condition     1) Hyponatremia   Sodium level increased in hospital from 111 to 128 at time of discharge. Follow up with PCP.      2) Hyperbilirubinemia   Bilirubin still elevated. 2008 US imaging shows fatty infiltration and fibrosis suggesting cirrhosis.     3) Mid epigastric tenderness  Has resolved at this time.      4) Type 2  Diabetes  Continue metformin 500 mg BID PO     5)Hypertension  Carvedilol 6.25 mg BID PO and Lisinopril 20 mg daily PO     6) Dyslipidemia   Lipid panel at this time is normal   Continue Lipitor 20 mg PO      7) Peripheral Neuropathy  Continue gabapentin 800 mg BID PO      8)Alcohol use disorder   Patient was counseled on alcohol cessation. Instructed to seek medical attention for signs and symptoms of severe withdrawal.      9) Depression  Patient denies Continue Lexapro 10mg PO     Patient states that he is in consent with the treatment plan. He was given an opportunity for questions.      Patient instructed to follow up with PCP and come to the ED immediately for any concerning symptoms.

## 2019-08-08 NOTE — DISCHARGE SUMMARY
Internal Medicine Discharge Summary  Note Author: Brianda Cuevas M.D.       Name Ronny Gallegos     1945   Age/Sex 74 y.o. male   MRN [id number]     Admit Date:  2019       Discharge Date:  2019    Service:   [facility] Internal Medicine Blue Team  Attending Physician(s):   Dr. Barker  Senior Resident(s):   Dr. Cuevas  Derek Resident(s):  Dr. Arroyo  PCP: KENDELL Fernández    Primary Diagnosis:   Hyponatremia secondary to excessive alcohol use    Secondary Diagnoses:                Principal Problem:    Hyponatremia POA: Unknown  Active Problems:    Alcohol use disorder, mild, abuse POA: Unknown      Overview: ICD-10 transition    Hyperbilirubinemia POA: Unknown    Epigastric pain POA: Unknown    Diabetes (HCC) POA: Unknown  Resolved Problems:    * No resolved hospital problems. *      Hospital Summary (Brief Narrative):       Mr. Gallegos, 74-year-old male with a history of hypertension, type 2 diabetes mellitus, dyslipidemia, depression and peripheral neuropathy with alcohol use disorder presented to the ER on 2019 due to spasmic back pain and numbness on his left maxillary area.  In the ER he was found to have sodium of 111.  Patient stated that he was drinking alcohol excessively for the past 3 days.  He drank about 3 x 6 packs of beer and 3 days.    While inpatient, patient was started on IV normal saline which slowly corrected sodium level to the level of 128.  Patient is completely asymptomatic.  All his symptoms have resolved.  Patient is medically stable to be discharged with outpatient follow-up with PCP.  Counseled patient on alcohol cessation and patient is very motivated and wants to go home.  Patient will [facility] up with the [facility] for alcohol cessation.    Patient /Hospital Summary (Details -- Problem Oriented) :          * Hyponatremia  Assessment & Plan  -Most likely due to be beer potomania  -Improved with IV fluids  -Follow-up with PCP.  Will require repeat  labs on his next visit to the PCP  -Counseled on alcohol cessation      Alcohol use disorder, mild, abuse  Assessment & Plan  -Patient presented with spasmic back pain and numbness on the left maxillary area which has improved over time  -NIHSS score of 0, unlikely to be stroke  -Labs showed sodium of 111, chloride of 80, corrected serum sodium for hyperglycemia is 112  -Patient has been drinking excessive alcohol for the past 3 days about 12 packs of beer per day.  -Alcohol cessation counseling provided to the patient and [facility]    Diabetes (HCC)  Assessment & Plan  -Continue metformin    Epigastric pain  Assessment & Plan  -Resolved      Hyperbilirubinemia  Assessment & Plan  -2008 US abdomen  showed evidence of fatty infiltration or fibrosis of liver  -Counseled patient on consultation and will follow up with PCP    Consultants:     None    Procedures:        None    Imaging/ Testing:      DX-CHEST-LIMITED (1 VIEW)   Final Result      No evidence of acute cardiopulmonary process.        Discharge Medications:         Medication Reconciliation: Completed       Medication List      CONTINUE taking these medications      Instructions   amLODIPine 5 MG Tabs  Commonly known as:  NORVASC   Take 1 Tab by mouth every day.  Dose:  5 mg     aspirin 81 MG Chew chewable tablet  Commonly known as:  ASA   Take 1 Tab by mouth every day.  Dose:  81 mg     atorvastatin 20 MG Tabs  Commonly known as:  LIPITOR   Take 1 Tab by mouth every day.  Dose:  20 mg     carvedilol 6.25 MG Tabs  Commonly known as:  COREG   Take 1 Tab by mouth 2 times a day, with meals.  Dose:  6.25 mg     CENTRUM SILVER 50+MEN Tabs   Take 1 Tab by mouth every morning.  Dose:  1 Tab     fluticasone 50 MCG/ACT nasal spray  Commonly known as:  FLONASE   Spray 1 Spray in nose 1 time daily as needed. Indications: Signs and Symptoms of Nose Diseases  Dose:  1 Spray     gabapentin 800 MG tablet  Commonly known as:  NEURONTIN   Take 800 mg by mouth 2  times a day.  Dose:  800 mg     LEXAPRO 20 MG tablet  Generic drug:  escitalopram   Take 20 mg by mouth every morning. Indications: Major Depressive Disorder  Dose:  20 mg     lisinopril 20 MG Tabs  Commonly known as:  PRINIVIL   Take 1 Tab by mouth every day.  Dose:  20 mg     LUMIGAN 0.01 % Soln  Generic drug:  bimatoprost   Place 1 Drop in both eyes every bedtime.  Dose:  1 Drop     metFORMIN 500 MG Tabs  Commonly known as:  GLUCOPHAGE   Take 500 mg by mouth 2 times a day, with meals.  Dose:  500 mg     PROAIR  (90 Base) MCG/ACT Aers inhalation aerosol  Generic drug:  albuterol   Inhale 2 Puffs by mouth every four hours as needed.  Dose:  2 Puff          Disposition:   Home    Diet:   Diabetic    Activity:   As tolerated    Instructions:      The patient was instructed to return to the ER in the event of worsening symptoms. I have counseled the patient on the importance of compliance and the patient has agreed to proceed with all medical recommendations and follow up plan indicated above.   The patient understands that all medications come with benefits and risks. Risks may include permanent injury or death and these risks can be minimized with close reassessment and monitoring.        Primary Care Provider:      Discharge summary faxed to primary care provider:  Deferred  Copy of discharge summary given to the patient: Deferred    Follow up appointment details :      Future Appointments   Date Time Provider Department Center   12/9/2019  1:20 PM Alberta Foley M.D. [facility] None     KENDELL Fernández  [address]  [address]  [facility] [id number]  [phone]    Go on 8/14/2019  PLEASE ARRIVE AT 3:45PM FOR YOUR 4:00PM APPOINTMENT. THANK YOU    Pending Studies:        None    Time spent on discharge day patient visit, preparing discharge paperwork and arranging for patient follow up.    Summary of follow up issues:   Follow-up with primary care provider for rechecking sodium level  [facility]    Discharge Time (Minutes) :    45min  Hospital Course Type:  Inpatient Stay >2 midnights    Condition on Discharge  : Stable  ______________________________________________________________________    Interval history/exam for day of discharge:    No acute events overnight.  Patient denied any epigastric pain or facial numbness.  All his symptoms have resolved patient wants to go home.  Sodium has been stable at 128.  Patient is medically stable to be discharged with outpatient follow-up with PCP    Physical Exam   Constitutional: He is oriented to person, place, and time and well-developed, well-nourished, and in no distress.   HENT:   Head: Normocephalic and atraumatic.   Eyes: Pupils are equal, round, and reactive to light. EOM are normal.   Mild left eye ptosis   Neck: Normal range of motion. Neck supple.   Cardiovascular: Normal rate.   Pulmonary/Chest: Effort normal and breath sounds normal. No respiratory distress.   Musculoskeletal: Normal range of motion. He exhibits no edema or deformity.   Neurological: He is alert and oriented to person, place, and time.   Skin: Skin is warm and dry.   Psychiatric: Mood and affect normal.     Most Recent Labs:    Lab Results   Component Value Date/Time    WBC 9.6 08/07/2019 02:57 AM    RBC 4.51 (L) 08/07/2019 02:57 AM    HEMOGLOBIN 13.5 (L) 08/07/2019 02:57 AM    HEMATOCRIT 40.2 (L) 08/07/2019 02:57 AM    MCV 89.1 08/07/2019 02:57 AM    MCH 29.9 08/07/2019 02:57 AM    MCHC 33.6 (L) 08/07/2019 02:57 AM    MPV 9.7 08/07/2019 02:57 AM    NEUTSPOLYS 56.10 08/07/2019 02:57 AM    LYMPHOCYTES 28.80 08/07/2019 02:57 AM    MONOCYTES 11.70 08/07/2019 02:57 AM    EOSINOPHILS 1.90 08/07/2019 02:57 AM    BASOPHILS 0.80 08/07/2019 02:57 AM      Lab Results   Component Value Date/Time    SODIUM 128 (L) 08/07/2019 12:12 PM    POTASSIUM 4.1 08/07/2019 12:12 PM    CHLORIDE 98 08/07/2019 12:12 PM    CO2 19 (L) 08/07/2019 12:12 PM    GLUCOSE 92 08/07/2019  12:12 PM    BUN 17 08/07/2019 12:12 PM    CREATININE 0.77 08/07/2019 12:12 PM    CREATININE 0.93 06/18/2012 09:13 AM    BUNCREATRAT 18 06/29/2016 09:59 AM    BUNCREATRAT 14 06/18/2012 09:13 AM      Lab Results   Component Value Date/Time    ALTSGPT 21 08/06/2019 02:58 AM    ASTSGOT 39 08/06/2019 02:58 AM    ALKPHOSPHAT 59 08/06/2019 02:58 AM    TBILIRUBIN 2.9 (H) 08/06/2019 02:58 AM    LIPASE 21 08/05/2019 09:24 AM    ALBUMIN 4.0 08/06/2019 02:58 AM    GLOBULIN 2.5 08/06/2019 02:58 AM    INR 0.93 06/05/2017 08:38 AM     Lab Results   Component Value Date/Time    PROTHROMBTM 12.7 06/05/2017 08:38 AM    INR 0.93 06/05/2017 08:38 AM

## 2019-08-10 NOTE — DOCUMENTATION QUERY
[facility]                                                                       Query Response Note      PATIENT:               JAMEL GUADARRAMA  ACCT #:                  [id number]  MRN:                     [id number]  :                      1945  ADMIT DATE:       2019 6:57 AM  DISCH DATE:        2019 4:42 PM  RESPONDING  PROVIDER #:        [id number]           QUERY TEXT:    Depression is documented in the Medical Record.  Please specify the type.    NOTE:  If an appropriate response is not listed below, please respond with a new note.              The patient's Clinical Indicators include:  Affect normal  risk factors: history of depression, alcohol abuse  Treatment: lexapro  Options provided:   -- MDD, recurrent, in full remission   -- MDD, single episode, in full remission   -- MDD, recurrent, in partial remission   -- MDD, single episode, in partial remission   -- MDD, mild, single episode   -- MDD, mild, recurrent, current episode   -- MDD, moderate, single episode   -- MDD, moderate, recurrent, current episode   -- MDD, severe, single episode, without psychotic features   -- MDD, severe, single episode, with psychotic features   -- MDD, severe, recurrent, current episode, without psychotic features   -- MDD, severe, recurrent, current episode, with psychotic features   -- Situational/Grief Reaction depression   -- Unable to determine      Query created by: Sundeep Artis on 2019 2:14 PM    RESPONSE TEXT:    MDD, mild, recurrent, current episode       QUERY TEXT:    Alcohol use disorder, mild, abuse, and history of alcoholism are documented in the Medical Record.  Can the pattern of patients substance usage be clarified?    NOTE:  If an appropriate response is not listed below, please respond with a new note.      The patient's Clinical Indicators include:  Alcohol use disorder, mild, abuse.  Electrolyte imbalance  likely secondary to beer potomania  patient states he has been drinking heavily for the past few days  Per ED provider note: history of alcoholism and intermittently relapsing   Risk Factors: history of alcoholism, depression  Treatment: alcohol cessation counseling, monitor for alcohol withdrawal, folic acid, multivitamin, thiamine  Options provided:   -- Alcohol abuse   -- Alcohol dependence without withdrawal   -- Alcohol dependence with withdrawal   -- Alcohol remission   -- Alcohol use only   -- Unable to determine      Query created by: Sundeep Artis on 8/7/2019 2:22 PM    RESPONSE TEXT:    Alcohol use only          Electronically signed by:  CHUCK RIOS 8/10/2019 11:07 AM

## 2019-09-09 ENCOUNTER — FOLLOW UP ESTABLISHED (OUTPATIENT)
Dept: URBAN - METROPOLITAN AREA CLINIC 44 | Facility: CLINIC | Age: 74
End: 2019-09-09
Payer: MEDICARE

## 2019-09-09 PROCEDURE — 92012 INTRM OPH EXAM EST PATIENT: CPT | Performed by: OPHTHALMOLOGY

## 2019-09-09 ASSESSMENT — INTRAOCULAR PRESSURE
OD: 16
OS: 16

## 2019-10-07 ENCOUNTER — TELEPHONE (OUTPATIENT)
Dept: CARDIOLOGY | Facility: MEDICAL CENTER | Age: 74
End: 2019-10-07

## 2019-10-08 NOTE — TELEPHONE ENCOUNTER
Clearance request received from [facility] for patient to have a Left TKA with Dr. Charles.     Patient recently in ER for back pain/facial numbness.  Dx with severe hyponatremia.    Last OV 12/6/18  Cardiac Dx: CAD, HTN, Hyperlipidemia  On ASA  Cardiac PET 5/3/2017 EF 74%      To LS to advise on clearance

## 2019-10-08 NOTE — TELEPHONE ENCOUNTER
Please generate letter stating moderate risk for moderate risk surgery, proceed to surgery with general anesthesia.  Okay to use a standard template.  Okay to hold aspirin prior.  Please let me know if you have any questions about generating letter.  Thank you!  LS

## 2019-10-30 ENCOUNTER — HOSPITAL ENCOUNTER (EMERGENCY)
Facility: MEDICAL CENTER | Age: 74
DRG: 486 | End: 2019-10-30
Attending: EMERGENCY MEDICINE
Payer: MEDICARE

## 2019-10-30 VITALS
WEIGHT: 169.53 LBS | OXYGEN SATURATION: 98 % | DIASTOLIC BLOOD PRESSURE: 72 MMHG | HEART RATE: 71 BPM | HEIGHT: 67 IN | SYSTOLIC BLOOD PRESSURE: 141 MMHG | TEMPERATURE: 97.3 F | BODY MASS INDEX: 26.61 KG/M2 | RESPIRATION RATE: 14 BRPM

## 2019-10-30 DIAGNOSIS — T81.30XA WOUND DEHISCENCE: ICD-10-CM

## 2019-10-30 LAB
ANION GAP SERPL CALC-SCNC: 10 MMOL/L (ref 0–11.9)
BLOOD CULTURE HOLD CXBCH: NORMAL
BUN SERPL-MCNC: 8 MG/DL (ref 8–22)
CALCIUM SERPL-MCNC: 8.8 MG/DL (ref 8.5–10.5)
CHLORIDE SERPL-SCNC: 96 MMOL/L (ref 96–112)
CO2 SERPL-SCNC: 25 MMOL/L (ref 20–33)
CREAT SERPL-MCNC: 0.66 MG/DL (ref 0.5–1.4)
GLUCOSE BLD-MCNC: 91 MG/DL (ref 65–99)
GLUCOSE SERPL-MCNC: 94 MG/DL (ref 65–99)
POTASSIUM SERPL-SCNC: 3.8 MMOL/L (ref 3.6–5.5)
SODIUM SERPL-SCNC: 131 MMOL/L (ref 135–145)

## 2019-10-30 PROCEDURE — 303747 HCHG EXTRA SUTURE

## 2019-10-30 PROCEDURE — 80048 BASIC METABOLIC PNL TOTAL CA: CPT

## 2019-10-30 PROCEDURE — 700101 HCHG RX REV CODE 250: Performed by: EMERGENCY MEDICINE

## 2019-10-30 PROCEDURE — 99284 EMERGENCY DEPT VISIT MOD MDM: CPT

## 2019-10-30 PROCEDURE — 304999 HCHG REPAIR-SIMPLE/INTERMED LEVEL 1

## 2019-10-30 PROCEDURE — 82962 GLUCOSE BLOOD TEST: CPT

## 2019-10-30 RX ORDER — NAPROXEN 500 MG/1
500 TABLET ORAL 2 TIMES DAILY WITH MEALS
Status: SHIPPED | COMMUNITY
End: 2022-02-18

## 2019-10-30 RX ORDER — ATORVASTATIN CALCIUM 20 MG/1
20 TABLET, FILM COATED ORAL DAILY
COMMUNITY
End: 2020-01-03 | Stop reason: SDUPTHER

## 2019-10-30 RX ORDER — GABAPENTIN 600 MG/1
600 TABLET ORAL 2 TIMES DAILY
Status: SHIPPED | COMMUNITY
End: 2021-12-15

## 2019-10-30 RX ORDER — AMLODIPINE BESYLATE 5 MG/1
5 TABLET ORAL DAILY
COMMUNITY
End: 2019-11-12 | Stop reason: SDUPTHER

## 2019-10-30 RX ORDER — LIDOCAINE HYDROCHLORIDE AND EPINEPHRINE 10; 10 MG/ML; UG/ML
5 INJECTION, SOLUTION INFILTRATION; PERINEURAL ONCE
Status: COMPLETED | OUTPATIENT
Start: 2019-10-30 | End: 2019-10-30

## 2019-10-30 RX ORDER — CARVEDILOL 6.25 MG/1
6.25 TABLET ORAL 2 TIMES DAILY
COMMUNITY
End: 2020-01-03 | Stop reason: SDUPTHER

## 2019-10-30 RX ORDER — LISINOPRIL 20 MG/1
20 TABLET ORAL DAILY
COMMUNITY
End: 2020-04-21

## 2019-10-30 RX ADMIN — LIDOCAINE HYDROCHLORIDE,EPINEPHRINE BITARTRATE 5 ML: 10; .01 INJECTION, SOLUTION INFILTRATION; PERINEURAL at 19:30

## 2019-10-30 ASSESSMENT — ENCOUNTER SYMPTOMS
FEVER: 0
CHILLS: 0
WEAKNESS: 0
TINGLING: 0

## 2019-10-31 NOTE — ED NOTES
"Pt with full range of motion to LLE, reports pain \"very minimal\". Dressing placed prior to arrival, saturated with serosanguinous drainage, dressing replaced with clean 4x4.  "

## 2019-10-31 NOTE — ED NOTES
D/C home with written and verbal instructions re: Rx, activity, f/u.  Verbalizes understanding.  Ambulated out with steady gait with use of cane with family. A+Ox4. VSS

## 2019-10-31 NOTE — ED PROVIDER NOTES
"ED Provider Note    Scribed for CHIVO Michaud II* by Jesús Martinez. 10/30/2019  6:51 PM    Means of Arrival: Walk In  History obtained by: Patient  Limitations: None    CHIEF COMPLAINT  Chief Complaint   Patient presents with   • Post-Op Complications   • Knee Pain       HPI  Ronny Gallegos is a 74 y.o. male who presents to the Emergency Department for evaluation of post operative pain to his left knee after having a knee replacement performed by Dr. Charles three weeks ago on 10/08. He states that he was having his normal pain and he has been recovering well since surgery.  This afternoon he started to perform his routine physical therapy exercises, and while doing so, his knee \"popped\" and he started to have drainage from the surgical site. He called his orthopedist and was recommended to come here for possible surgical intervention. At this time his able to move and bend the knee and denies having any pain to his knee. He denies any tingling or weakness to the knee and further denies any fevers or chills.    REVIEW OF SYSTEMS  Review of Systems   Constitutional: Negative for chills and fever.   Musculoskeletal: Negative for joint pain (left knee).        Positive: Left knee swelling and drainage   Neurological: Negative for tingling and weakness.   All other systems reviewed and are negative.  See HPI for further details.    PAST MEDICAL HISTORY   has a past medical history of Arthritis, Asthma, CAD (coronary artery disease), Depression, Diabetes (), Glaucoma, High cholesterol, Hypertension, Hypopotassemia, Hyposmolality and/or hyponatremia, Jaundice (), Neuropathy (), Pain, Personal history of peptic ulcer disease, Pneumonia (), and Unspecified cataract.    SOCIAL HISTORY  Social History     Tobacco Use   • Smoking status: Former Smoker     Packs/day: 1.50     Years: 13.00     Pack years: 19.50     Types: Cigarettes     Last attempt to quit: 1978     Years since quittin.8 " "  • Smokeless tobacco: Never Used   Substance and Sexual Activity   • Alcohol use: Not Currently     Alcohol/week: 0.0 oz   • Drug use: No   • Sexual activity: Not on file       SURGICAL HISTORY   has a past surgical history that includes other (2005); other (1978); lumbar fusion posterior (9/10/2015); lumbar laminectomy diskectomy (9/10/2015); laminotomy (9/10/2015); laparoscopy robotic xi (10/26/2016); lumbar laminectomy diskectomy (Right, 6/13/2017); foraminotomy (6/13/2017); hardware removal neuro (6/13/2017); cataract extraction with iol; ankle total arthroplasty (Right, 6/18/2018); and ligament repair (Right, 6/18/2018).    CURRENT MEDICATIONS  Home Medications     Reviewed by Saul Bynum (Pharmacy Tech) on 10/30/19 at 2011  Med List Status: Complete   Medication Last Dose Status   amLODIPine (NORVASC) 5 MG Tab 10/30/2019 Active   aspirin (ASA) 81 MG Chew Tab chewable tablet 10/30/2019 Active   atorvastatin (LIPITOR) 20 MG Tab 10/30/2019 Active   carvedilol (COREG) 6.25 MG Tab 10/30/2019 Active   escitalopram (LEXAPRO) 20 MG tablet 10/30/2019 Active   gabapentin (NEURONTIN) 600 MG tablet 10/30/2019 Active   lisinopril (PRINIVIL) 20 MG Tab 10/30/2019 Active   LUMIGAN 0.01 % Solution 10/29/2019 Active   metFORMIN (GLUCOPHAGE) 500 MG Tab 10/30/2019 Active   Multiple Vitamins-Minerals (CENTRUM SILVER 50+MEN) Tab 10/30/2019 Active   naproxen (NAPROSYN) 500 MG Tab 10/30/2019 Active   PROAIR  (90 Base) MCG/ACT Aero Soln inhalation aerosol > 1 weeks Active                ALLERGIES  Allergies   Allergen Reactions   • Nkda [No Known Drug Allergy]        PHYSICAL EXAM  VITAL SIGNS: BP (!) 175/95   Pulse 81   Temp 36.3 °C (97.3 °F) (Temporal)   Resp 16   Ht 1.702 m (5' 7\")   Wt 76.9 kg (169 lb 8.5 oz)   SpO2 92%   BMI 26.55 kg/m²     Pulse ox interpretation: I interpret this pulse ox as abnormal.  Constitutional: Alert in no apparent distress.  Skin: Warm, Dry, No erythema, No rash.   Musculoskeletal: " Left knee with mild edema but no erythema or warmth, wound dehiscence at the inferior portion of the knee with fat exposed, no active drainage, serosanguinous saturation on the bandages, full range of motion to the knee.  Neurologic: Alert, Normal motor function, Normal sensory function, No focal deficits noted.   Psychiatric: Affect normal, Judgment normal, Mood normal.     DIAGNOSTIC STUDIES / PROCEDURES    Surgical Incision Repair Procedure Note    Indication: Open surgical incision    Procedure: Wound was thoroughly cleaned with chlorhexidine prior to repair.  He patient was placed in the appropriate position and anesthesia around the surgical incision obtained by infiltration using 1% Lidocaine with epinephrine. The laceration was closed with 3-0 Ethilon using interrupted sutures. There were no additional open incisions requiring repair. The wound area was then dressed with a sterile dressing with antibiotic ointment.      Total repaired wound length: 2 cm.     Other Items: Suture count: 4    The patient tolerated the procedure well.    Complications: None    COURSE & MEDICAL DECISION MAKING  Pertinent Labs & Imaging studies reviewed. (See chart for details)    6:51 PM This is a 74 y.o. male who presents with left knee swelling and drainage.  This does not appear to be an infection.  It looks most consistent with a small wound dehiscence.  There is visible fat but I do not visualize any hardware or deeper structures.  He has full range of motion of the knee with only minimal edema which he says has continued to improve since surgery.  Discussed with the patient that I will consult with the [facility] to determine their desired plan of care regarding admission and surgical intervention. He will be informed of the plan of care when determined. He understands and agrees.    7:02 PM - Paged Ortho.    7:08 PM - I spoke with Sowmya Watson.  I discussed with Dr. Virginia pepe and my impression of the patient's wound.  It  is only the inferior portion 2 to 3 cm at most.  No exposed hardware.  No signs of infection.  He believes it would be safe and reasonable to close the wound with simple sutures.      7:12 PM - Updated patient on the plan of care to suture the wound and discharge him home. He understands and agrees.    7:23 PM - I performed a wound repair procedure at this time as outlined above.  There was significant on tension at wound edges which is why used 4 sutures instead of only 2.  I advised him to apply antibiotic ointment to 3 times a day for the next several days until the edges appear closed.  He agrees to contact [facility] clinic tomorrow to discuss with him follow-up, any precautions with physical therapy.    The patient will return for worsening symptoms and is stable at the time of discharge. The patient verbalizes understanding and will comply. Guidance provided on appropriate use of medications.    HTN/IDDM FOLLOW UP:  The patient has known hypertension and is being followed by their primary care doctor    DISPOSITION:  Patient will be discharged home in stable condition.    FOLLOW UP:  Parmjit Charles M.D.  [address] [facility] [address] [phone]    Call   tomorrow to discuss limitations with physical therapy and when to follow up next for suture removal.    [facility], Emergency Dept  [address] [facility] [address] [phone]    if any signs of infection, redness, foul smelling thick discharge or other serious concerns    FINAL IMPRESSION  1. Wound dehiscence          Jesús ZAVALETA (Scribe), am scribing for, and in the presence of, TEENA Michaud II.    Electronically signed by: Jesús Martinez (Darius), 10/30/2019    Gagan ZAVALETA II, M* personally performed the services described in this documentation, as scribed by Jesús Martinez in my presence, and it is both accurate and complete. C.    The note accurately reflects work and decisions made by me.   Gagan WALDRON Sciascia II  10/31/2019  1:21 AM

## 2019-10-31 NOTE — ED NOTES
Med rec updated and complete. Allergies reviewed. Pt denies   Antibiotic use in last 14 days.  All morning medications taken.  Home pharmacy [facility] [place]

## 2019-10-31 NOTE — ED NOTES
"Pt reports he noticed left knee with \"bump\" this morning, was doing home PT when he noticed to \"the bump popped\" with blood oozing from left knee incision site, hx left knee replacement 10/8 at [facility]. Bleeding controlled at this time, incision site not well approximated, what appears like fat tissue at site. Denies SOB, CP, fevers/chills. ERP at bedside.  "

## 2019-11-02 ENCOUNTER — ANESTHESIA EVENT (OUTPATIENT)
Dept: SURGERY | Facility: MEDICAL CENTER | Age: 74
DRG: 486 | End: 2019-11-02
Payer: MEDICARE

## 2019-11-02 ENCOUNTER — HOSPITAL ENCOUNTER (INPATIENT)
Facility: MEDICAL CENTER | Age: 74
LOS: 6 days | DRG: 486 | End: 2019-11-08
Attending: EMERGENCY MEDICINE | Admitting: ORTHOPAEDIC SURGERY
Payer: MEDICARE

## 2019-11-02 ENCOUNTER — ANESTHESIA (OUTPATIENT)
Dept: SURGERY | Facility: MEDICAL CENTER | Age: 74
DRG: 486 | End: 2019-11-02
Payer: MEDICARE

## 2019-11-02 DIAGNOSIS — T81.30XA WOUND DEHISCENCE: ICD-10-CM

## 2019-11-02 DIAGNOSIS — M12.9 ARTHROPATHY: ICD-10-CM

## 2019-11-02 DIAGNOSIS — T84.59XD INFECTION OF PROSTHETIC KNEE JOINT, SUBSEQUENT ENCOUNTER: ICD-10-CM

## 2019-11-02 DIAGNOSIS — Z96.659 INFECTION OF PROSTHETIC KNEE JOINT, SUBSEQUENT ENCOUNTER: ICD-10-CM

## 2019-11-02 LAB
ALBUMIN SERPL BCP-MCNC: 3.9 G/DL (ref 3.2–4.9)
ALBUMIN/GLOB SERPL: 1.4 G/DL
ALP SERPL-CCNC: 73 U/L (ref 30–99)
ALT SERPL-CCNC: 10 U/L (ref 2–50)
ANION GAP SERPL CALC-SCNC: 8 MMOL/L (ref 0–11.9)
APPEARANCE FLD: NORMAL
AST SERPL-CCNC: 16 U/L (ref 12–45)
BASOPHILS # BLD AUTO: 0.8 % (ref 0–1.8)
BASOPHILS # BLD: 0.06 K/UL (ref 0–0.12)
BILIRUB SERPL-MCNC: 0.9 MG/DL (ref 0.1–1.5)
BODY FLD TYPE: NORMAL
BUN SERPL-MCNC: 7 MG/DL (ref 8–22)
CALCIUM SERPL-MCNC: 9 MG/DL (ref 8.5–10.5)
CHLORIDE SERPL-SCNC: 97 MMOL/L (ref 96–112)
CO2 SERPL-SCNC: 26 MMOL/L (ref 20–33)
COLOR FLD: YELLOW
CREAT SERPL-MCNC: 0.65 MG/DL (ref 0.5–1.4)
CRP SERPL HS-MCNC: 3.13 MG/DL (ref 0–0.75)
EOSINOPHIL # BLD AUTO: 0.14 K/UL (ref 0–0.51)
EOSINOPHIL NFR BLD: 1.8 % (ref 0–6.9)
ERYTHROCYTE [DISTWIDTH] IN BLOOD BY AUTOMATED COUNT: 47.9 FL (ref 35.9–50)
ERYTHROCYTE [SEDIMENTATION RATE] IN BLOOD BY WESTERGREN METHOD: 61 MM/HOUR (ref 0–20)
GLOBULIN SER CALC-MCNC: 2.7 G/DL (ref 1.9–3.5)
GLUCOSE BLD-MCNC: 279 MG/DL (ref 65–99)
GLUCOSE SERPL-MCNC: 105 MG/DL (ref 65–99)
GRAM STN SPEC: NORMAL
HCT VFR BLD AUTO: 35.9 % (ref 42–52)
HGB BLD-MCNC: 11.4 G/DL (ref 14–18)
IMM GRANULOCYTES # BLD AUTO: 0.02 K/UL (ref 0–0.11)
IMM GRANULOCYTES NFR BLD AUTO: 0.3 % (ref 0–0.9)
LYMPHOCYTES # BLD AUTO: 1.65 K/UL (ref 1–4.8)
LYMPHOCYTES NFR BLD: 20.8 % (ref 22–41)
LYMPHOCYTES NFR FLD: 3 %
MCH RBC QN AUTO: 27.5 PG (ref 27–33)
MCHC RBC AUTO-ENTMCNC: 31.8 G/DL (ref 33.7–35.3)
MCV RBC AUTO: 86.5 FL (ref 81.4–97.8)
MONOCYTES # BLD AUTO: 0.85 K/UL (ref 0–0.85)
MONOCYTES NFR BLD AUTO: 10.7 % (ref 0–13.4)
MONONUC CELLS NFR FLD: 1 %
NEUTROPHILS # BLD AUTO: 5.22 K/UL (ref 1.82–7.42)
NEUTROPHILS NFR BLD: 65.6 % (ref 44–72)
NEUTROPHILS NFR FLD: 96 %
NRBC # BLD AUTO: 0 K/UL
NRBC BLD-RTO: 0 /100 WBC
PLATELET # BLD AUTO: 646 K/UL (ref 164–446)
PMV BLD AUTO: 8.8 FL (ref 9–12.9)
POTASSIUM SERPL-SCNC: 4.1 MMOL/L (ref 3.6–5.5)
PROT SERPL-MCNC: 6.6 G/DL (ref 6–8.2)
RBC # BLD AUTO: 4.15 M/UL (ref 4.7–6.1)
RBC # FLD: NORMAL CELLS/UL
SIGNIFICANT IND 70042: NORMAL
SITE SITE: NORMAL
SODIUM SERPL-SCNC: 131 MMOL/L (ref 135–145)
SOURCE SOURCE: NORMAL
VANCOMYCIN SERPL-MCNC: 0.9 UG/ML
WBC # BLD AUTO: 7.9 K/UL (ref 4.8–10.8)
WBC # FLD: 5842 CELLS/UL

## 2019-11-02 PROCEDURE — 160002 HCHG RECOVERY MINUTES (STAT): Performed by: ORTHOPAEDIC SURGERY

## 2019-11-02 PROCEDURE — 80053 COMPREHEN METABOLIC PANEL: CPT

## 2019-11-02 PROCEDURE — 86140 C-REACTIVE PROTEIN: CPT

## 2019-11-02 PROCEDURE — 501838 HCHG SUTURE GENERAL: Performed by: ORTHOPAEDIC SURGERY

## 2019-11-02 PROCEDURE — 80202 ASSAY OF VANCOMYCIN: CPT

## 2019-11-02 PROCEDURE — 700105 HCHG RX REV CODE 258: Performed by: ANESTHESIOLOGY

## 2019-11-02 PROCEDURE — 700102 HCHG RX REV CODE 250 W/ 637 OVERRIDE(OP): Performed by: ORTHOPAEDIC SURGERY

## 2019-11-02 PROCEDURE — 87040 BLOOD CULTURE FOR BACTERIA: CPT | Mod: 91

## 2019-11-02 PROCEDURE — 3E0T3BZ INTRODUCTION OF ANESTHETIC AGENT INTO PERIPHERAL NERVES AND PLEXI, PERCUTANEOUS APPROACH: ICD-10-PCS | Performed by: ANESTHESIOLOGY

## 2019-11-02 PROCEDURE — 700111 HCHG RX REV CODE 636 W/ 250 OVERRIDE (IP): Performed by: ANESTHESIOLOGY

## 2019-11-02 PROCEDURE — A9270 NON-COVERED ITEM OR SERVICE: HCPCS | Performed by: ORTHOPAEDIC SURGERY

## 2019-11-02 PROCEDURE — 87015 SPECIMEN INFECT AGNT CONCNTJ: CPT

## 2019-11-02 PROCEDURE — 87205 SMEAR GRAM STAIN: CPT | Mod: 91

## 2019-11-02 PROCEDURE — 160038 HCHG SURGERY MINUTES - EA ADDL 1 MIN LEVEL 2: Performed by: ORTHOPAEDIC SURGERY

## 2019-11-02 PROCEDURE — 0SBD0ZZ EXCISION OF LEFT KNEE JOINT, OPEN APPROACH: ICD-10-PCS | Performed by: ORTHOPAEDIC SURGERY

## 2019-11-02 PROCEDURE — 89051 BODY FLUID CELL COUNT: CPT

## 2019-11-02 PROCEDURE — 82962 GLUCOSE BLOOD TEST: CPT

## 2019-11-02 PROCEDURE — 36415 COLL VENOUS BLD VENIPUNCTURE: CPT

## 2019-11-02 PROCEDURE — 160035 HCHG PACU - 1ST 60 MINS PHASE I: Performed by: ORTHOPAEDIC SURGERY

## 2019-11-02 PROCEDURE — A9270 NON-COVERED ITEM OR SERVICE: HCPCS | Performed by: ANESTHESIOLOGY

## 2019-11-02 PROCEDURE — 87206 SMEAR FLUORESCENT/ACID STAI: CPT | Mod: 91

## 2019-11-02 PROCEDURE — 160036 HCHG PACU - EA ADDL 30 MINS PHASE I: Performed by: ORTHOPAEDIC SURGERY

## 2019-11-02 PROCEDURE — 700102 HCHG RX REV CODE 250 W/ 637 OVERRIDE(OP): Performed by: ANESTHESIOLOGY

## 2019-11-02 PROCEDURE — 99291 CRITICAL CARE FIRST HOUR: CPT

## 2019-11-02 PROCEDURE — 85025 COMPLETE CBC W/AUTO DIFF WBC: CPT

## 2019-11-02 PROCEDURE — 160048 HCHG OR STATISTICAL LEVEL 1-5: Performed by: ORTHOPAEDIC SURGERY

## 2019-11-02 PROCEDURE — 85652 RBC SED RATE AUTOMATED: CPT

## 2019-11-02 PROCEDURE — 160027 HCHG SURGERY MINUTES - 1ST 30 MINS LEVEL 2: Performed by: ORTHOPAEDIC SURGERY

## 2019-11-02 PROCEDURE — 87070 CULTURE OTHR SPECIMN AEROBIC: CPT | Mod: 91

## 2019-11-02 PROCEDURE — 160009 HCHG ANES TIME/MIN: Performed by: ORTHOPAEDIC SURGERY

## 2019-11-02 PROCEDURE — 500881 HCHG PACK, EXTREMITY: Performed by: ORTHOPAEDIC SURGERY

## 2019-11-02 PROCEDURE — 87116 MYCOBACTERIA CULTURE: CPT

## 2019-11-02 PROCEDURE — 770001 HCHG ROOM/CARE - MED/SURG/GYN PRIV*

## 2019-11-02 PROCEDURE — 87075 CULTR BACTERIA EXCEPT BLOOD: CPT | Mod: 91

## 2019-11-02 RX ORDER — GABAPENTIN 300 MG/1
600 CAPSULE ORAL EVERY EVENING
Status: DISCONTINUED | OUTPATIENT
Start: 2019-11-02 | End: 2019-11-08 | Stop reason: HOSPADM

## 2019-11-02 RX ORDER — OXYCODONE HCL 5 MG/5 ML
10 SOLUTION, ORAL ORAL
Status: COMPLETED | OUTPATIENT
Start: 2019-11-02 | End: 2019-11-02

## 2019-11-02 RX ORDER — ONDANSETRON 2 MG/ML
INJECTION INTRAMUSCULAR; INTRAVENOUS PRN
Status: DISCONTINUED | OUTPATIENT
Start: 2019-11-02 | End: 2019-11-02 | Stop reason: SURG

## 2019-11-02 RX ORDER — ATORVASTATIN CALCIUM 20 MG/1
20 TABLET, FILM COATED ORAL DAILY
Status: DISCONTINUED | OUTPATIENT
Start: 2019-11-02 | End: 2019-11-08 | Stop reason: HOSPADM

## 2019-11-02 RX ORDER — CARVEDILOL 6.25 MG/1
6.25 TABLET ORAL 2 TIMES DAILY
Status: DISCONTINUED | OUTPATIENT
Start: 2019-11-02 | End: 2019-11-08 | Stop reason: HOSPADM

## 2019-11-02 RX ORDER — LATANOPROST 50 UG/ML
1 SOLUTION/ DROPS OPHTHALMIC EVERY EVENING
Status: DISCONTINUED | OUTPATIENT
Start: 2019-11-02 | End: 2019-11-08 | Stop reason: HOSPADM

## 2019-11-02 RX ORDER — LABETALOL HYDROCHLORIDE 5 MG/ML
5 INJECTION, SOLUTION INTRAVENOUS
Status: DISCONTINUED | OUTPATIENT
Start: 2019-11-02 | End: 2019-11-02 | Stop reason: HOSPADM

## 2019-11-02 RX ORDER — SODIUM CHLORIDE, SODIUM LACTATE, POTASSIUM CHLORIDE, CALCIUM CHLORIDE 600; 310; 30; 20 MG/100ML; MG/100ML; MG/100ML; MG/100ML
INJECTION, SOLUTION INTRAVENOUS CONTINUOUS
Status: DISCONTINUED | OUTPATIENT
Start: 2019-11-02 | End: 2019-11-02 | Stop reason: HOSPADM

## 2019-11-02 RX ORDER — HALOPERIDOL 5 MG/ML
1 INJECTION INTRAMUSCULAR
Status: DISCONTINUED | OUTPATIENT
Start: 2019-11-02 | End: 2019-11-02 | Stop reason: HOSPADM

## 2019-11-02 RX ORDER — ONDANSETRON 2 MG/ML
4 INJECTION INTRAMUSCULAR; INTRAVENOUS
Status: DISCONTINUED | OUTPATIENT
Start: 2019-11-02 | End: 2019-11-02 | Stop reason: HOSPADM

## 2019-11-02 RX ORDER — MORPHINE SULFATE 4 MG/ML
4 INJECTION, SOLUTION INTRAMUSCULAR; INTRAVENOUS
Status: DISCONTINUED | OUTPATIENT
Start: 2019-11-02 | End: 2019-11-05

## 2019-11-02 RX ORDER — DIPHENHYDRAMINE HYDROCHLORIDE 50 MG/ML
12.5 INJECTION INTRAMUSCULAR; INTRAVENOUS
Status: DISCONTINUED | OUTPATIENT
Start: 2019-11-02 | End: 2019-11-02 | Stop reason: HOSPADM

## 2019-11-02 RX ORDER — ONDANSETRON 2 MG/ML
4 INJECTION INTRAMUSCULAR; INTRAVENOUS EVERY 4 HOURS PRN
Status: DISCONTINUED | OUTPATIENT
Start: 2019-11-02 | End: 2019-11-05

## 2019-11-02 RX ORDER — OXYCODONE HYDROCHLORIDE 5 MG/1
5 TABLET ORAL
Status: DISCONTINUED | OUTPATIENT
Start: 2019-11-02 | End: 2019-11-05

## 2019-11-02 RX ORDER — AMLODIPINE BESYLATE 5 MG/1
5 TABLET ORAL DAILY
Status: DISCONTINUED | OUTPATIENT
Start: 2019-11-02 | End: 2019-11-08 | Stop reason: HOSPADM

## 2019-11-02 RX ORDER — LISINOPRIL 20 MG/1
20 TABLET ORAL DAILY
Status: DISCONTINUED | OUTPATIENT
Start: 2019-11-02 | End: 2019-11-08 | Stop reason: HOSPADM

## 2019-11-02 RX ORDER — SODIUM CHLORIDE, SODIUM LACTATE, POTASSIUM CHLORIDE, CALCIUM CHLORIDE 600; 310; 30; 20 MG/100ML; MG/100ML; MG/100ML; MG/100ML
INJECTION, SOLUTION INTRAVENOUS
Status: DISCONTINUED | OUTPATIENT
Start: 2019-11-02 | End: 2019-11-02 | Stop reason: SURG

## 2019-11-02 RX ORDER — CEFAZOLIN SODIUM 1 G/3ML
INJECTION, POWDER, FOR SOLUTION INTRAMUSCULAR; INTRAVENOUS PRN
Status: DISCONTINUED | OUTPATIENT
Start: 2019-11-02 | End: 2019-11-02 | Stop reason: SURG

## 2019-11-02 RX ORDER — ESCITALOPRAM OXALATE 10 MG/1
20 TABLET ORAL EVERY MORNING
Status: DISCONTINUED | OUTPATIENT
Start: 2019-11-02 | End: 2019-11-08 | Stop reason: HOSPADM

## 2019-11-02 RX ORDER — OXYCODONE HYDROCHLORIDE 10 MG/1
10 TABLET ORAL
Status: DISCONTINUED | OUTPATIENT
Start: 2019-11-02 | End: 2019-11-05

## 2019-11-02 RX ORDER — DEXAMETHASONE SODIUM PHOSPHATE 4 MG/ML
INJECTION, SOLUTION INTRA-ARTICULAR; INTRALESIONAL; INTRAMUSCULAR; INTRAVENOUS; SOFT TISSUE PRN
Status: DISCONTINUED | OUTPATIENT
Start: 2019-11-02 | End: 2019-11-02 | Stop reason: SURG

## 2019-11-02 RX ORDER — KETOROLAC TROMETHAMINE 30 MG/ML
INJECTION, SOLUTION INTRAMUSCULAR; INTRAVENOUS PRN
Status: DISCONTINUED | OUTPATIENT
Start: 2019-11-02 | End: 2019-11-02 | Stop reason: SURG

## 2019-11-02 RX ORDER — ACETAMINOPHEN 500 MG
1000 TABLET ORAL EVERY 6 HOURS
Status: DISPENSED | OUTPATIENT
Start: 2019-11-02 | End: 2019-11-07

## 2019-11-02 RX ORDER — OXYCODONE HCL 5 MG/5 ML
5 SOLUTION, ORAL ORAL
Status: COMPLETED | OUTPATIENT
Start: 2019-11-02 | End: 2019-11-02

## 2019-11-02 RX ORDER — ALBUTEROL SULFATE 90 UG/1
2 AEROSOL, METERED RESPIRATORY (INHALATION)
Status: DISCONTINUED | OUTPATIENT
Start: 2019-11-02 | End: 2019-11-08 | Stop reason: HOSPADM

## 2019-11-02 RX ADMIN — CARVEDILOL 6.25 MG: 6.25 TABLET, FILM COATED ORAL at 18:30

## 2019-11-02 RX ADMIN — ACETAMINOPHEN 1000 MG: 500 TABLET ORAL at 23:29

## 2019-11-02 RX ADMIN — ATORVASTATIN CALCIUM 20 MG: 20 TABLET, FILM COATED ORAL at 18:31

## 2019-11-02 RX ADMIN — AMLODIPINE BESYLATE 5 MG: 5 TABLET ORAL at 18:31

## 2019-11-02 RX ADMIN — FENTANYL CITRATE 50 MCG: 50 INJECTION, SOLUTION INTRAMUSCULAR; INTRAVENOUS at 15:55

## 2019-11-02 RX ADMIN — SODIUM CHLORIDE, POTASSIUM CHLORIDE, SODIUM LACTATE AND CALCIUM CHLORIDE: 600; 310; 30; 20 INJECTION, SOLUTION INTRAVENOUS at 12:56

## 2019-11-02 RX ADMIN — OXYCODONE HYDROCHLORIDE 10 MG: 5 SOLUTION ORAL at 14:11

## 2019-11-02 RX ADMIN — FENTANYL CITRATE 50 MCG: 50 INJECTION, SOLUTION INTRAMUSCULAR; INTRAVENOUS at 15:05

## 2019-11-02 RX ADMIN — GABAPENTIN 600 MG: 300 CAPSULE ORAL at 18:30

## 2019-11-02 RX ADMIN — FENTANYL CITRATE 50 MCG: 50 INJECTION, SOLUTION INTRAMUSCULAR; INTRAVENOUS at 15:42

## 2019-11-02 RX ADMIN — FENTANYL CITRATE 50 MCG: 50 INJECTION, SOLUTION INTRAMUSCULAR; INTRAVENOUS at 12:56

## 2019-11-02 RX ADMIN — FENTANYL CITRATE 50 MCG: 50 INJECTION, SOLUTION INTRAMUSCULAR; INTRAVENOUS at 15:15

## 2019-11-02 RX ADMIN — LISINOPRIL 20 MG: 20 TABLET ORAL at 18:30

## 2019-11-02 RX ADMIN — OXYCODONE HYDROCHLORIDE 5 MG: 5 TABLET ORAL at 21:44

## 2019-11-02 RX ADMIN — CEFAZOLIN 2 G: 330 INJECTION, POWDER, FOR SOLUTION INTRAMUSCULAR; INTRAVENOUS at 12:56

## 2019-11-02 RX ADMIN — LATANOPROST 1 DROP: 50 SOLUTION OPHTHALMIC at 23:34

## 2019-11-02 RX ADMIN — ALBUTEROL SULFATE 2 PUFF: 90 AEROSOL, METERED RESPIRATORY (INHALATION) at 23:31

## 2019-11-02 RX ADMIN — ACETAMINOPHEN 1000 MG: 500 TABLET ORAL at 18:30

## 2019-11-02 RX ADMIN — FENTANYL CITRATE 50 MCG: 50 INJECTION, SOLUTION INTRAMUSCULAR; INTRAVENOUS at 13:38

## 2019-11-02 RX ADMIN — LIDOCAINE HYDROCHLORIDE 100 MG: 20 INJECTION, SOLUTION INTRAVENOUS at 13:11

## 2019-11-02 RX ADMIN — KETOROLAC TROMETHAMINE 30 MG: 30 INJECTION, SOLUTION INTRAMUSCULAR at 13:11

## 2019-11-02 RX ADMIN — DEXAMETHASONE SODIUM PHOSPHATE 8 MG: 4 INJECTION, SOLUTION INTRA-ARTICULAR; INTRALESIONAL; INTRAMUSCULAR; INTRAVENOUS; SOFT TISSUE at 13:11

## 2019-11-02 RX ADMIN — METFORMIN HYDROCHLORIDE 500 MG: 500 TABLET ORAL at 18:30

## 2019-11-02 RX ADMIN — PROPOFOL 150 MG: 10 INJECTION, EMULSION INTRAVENOUS at 13:11

## 2019-11-02 RX ADMIN — ONDANSETRON 8 MG: 2 INJECTION INTRAMUSCULAR; INTRAVENOUS at 13:11

## 2019-11-02 ASSESSMENT — LIFESTYLE VARIABLES
TOTAL SCORE: 0
EVER HAD A DRINK FIRST THING IN THE MORNING TO STEADY YOUR NERVES TO GET RID OF A HANGOVER: NO
HAVE PEOPLE ANNOYED YOU BY CRITICIZING YOUR DRINKING: NO
HOW MANY TIMES IN THE PAST YEAR HAVE YOU HAD 5 OR MORE DRINKS IN A DAY: 0
ALCOHOL_USE: YES
HAVE YOU EVER FELT YOU SHOULD CUT DOWN ON YOUR DRINKING: NO
ON A TYPICAL DAY WHEN YOU DRINK ALCOHOL HOW MANY DRINKS DO YOU HAVE: 1
TOTAL SCORE: 0
AVERAGE NUMBER OF DAYS PER WEEK YOU HAVE A DRINK CONTAINING ALCOHOL: 2
EVER FELT BAD OR GUILTY ABOUT YOUR DRINKING: NO
TOTAL SCORE: 0
CONSUMPTION TOTAL: NEGATIVE

## 2019-11-02 ASSESSMENT — COGNITIVE AND FUNCTIONAL STATUS - GENERAL
SUGGESTED CMS G CODE MODIFIER MOBILITY: CJ
MOVING FROM LYING ON BACK TO SITTING ON SIDE OF FLAT BED: A LITTLE
STANDING UP FROM CHAIR USING ARMS: A LITTLE
SUGGESTED CMS G CODE MODIFIER DAILY ACTIVITY: CH
HELP NEEDED FOR BATHING: A LITTLE
DAILY ACTIVITIY SCORE: 24
DAILY ACTIVITIY SCORE: 22
SUGGESTED CMS G CODE MODIFIER DAILY ACTIVITY: CJ
MOBILITY SCORE: 24
DRESSING REGULAR LOWER BODY CLOTHING: A LITTLE
MOBILITY SCORE: 20
CLIMB 3 TO 5 STEPS WITH RAILING: A LITTLE
WALKING IN HOSPITAL ROOM: A LITTLE
SUGGESTED CMS G CODE MODIFIER MOBILITY: CH

## 2019-11-02 ASSESSMENT — PAIN SCALES - GENERAL: PAIN_LEVEL: 1

## 2019-11-02 ASSESSMENT — PATIENT HEALTH QUESTIONNAIRE - PHQ9
SUM OF ALL RESPONSES TO PHQ9 QUESTIONS 1 AND 2: 0
1. LITTLE INTEREST OR PLEASURE IN DOING THINGS: NOT AT ALL
2. FEELING DOWN, DEPRESSED, IRRITABLE, OR HOPELESS: NOT AT ALL

## 2019-11-02 ASSESSMENT — PAIN SCALES - WONG BAKER: WONGBAKER_NUMERICALRESPONSE: HURTS JUST A LITTLE BIT

## 2019-11-02 NOTE — ED NOTES
Transport arrived for patient, all belongings placed in bag and labeled.  Left ED in no acute distress, pt taken off the floor to OR.

## 2019-11-02 NOTE — OR NURSING
Pt's daughter-in-law Alexandra at bedside in PACU.  Alexandra taking home pt's clothes, cane, wallet and cell phone.  Pt keeping his glasses and ring on right hand.

## 2019-11-02 NOTE — ED NOTES
Report and pt care taken from Alberta PENA.  Pt had questions about OR, placed for MD reeval for pt.  Resp even ul, skin warm and dry, No acute distress. AAOx4. BC x2 drawn via lab. Denies further needs, will continue to monitor pt.

## 2019-11-02 NOTE — ED PROVIDER NOTES
"ED Provider Note    CHIEF COMPLAINT  Chief Complaint   Patient presents with   • Wound Check     hx of knee replacement, wound has opened twice        HPI  Ronny Gallegos is a 74 y.o. male who presents for evaluation of pain redness and drainage from the inferior margin of his left total knee incision.  Patient underwent a total knee arthroplasty with Dr. Charles approximately 3 weeks ago.  Patient had a report of superficial dehiscence several days ago was seen here, orthopedics was consulted they considered taking him for washout but ultimately recommended that the do primary closure and have him follow-up.  The patient followed up at the [facility] urgent care and there was some drainage that appeared to be purulent and they referred him here.  Patient has been nothing by mouth since last night    REVIEW OF SYSTEMS  See HPI for further details. All other systems are negative.     PAST MEDICAL HISTORY  Past Medical History:   Diagnosis Date   • Jaundice     \"from drinking after wife \"   • Pneumonia    • Arthritis     right ankle/left knee   • Asthma     inhaler PRN    • CAD (coronary artery disease)     + CT scoring, negative PET cardiac in    • Depression    • Diabetes (HCC)     oral medication    • Glaucoma    • High cholesterol    • Hypertension    • Hypopotassemia    • Hyposmolality and/or hyponatremia    • Neuropathy (HCC)     bilat legs/feet   • Pain     right ankle    • Personal history of peptic ulcer disease    • Unspecified cataract     removed bilat       FAMILY HISTORY  Noncontributory    SOCIAL HISTORY  Social History     Socioeconomic History   • Marital status:      Spouse name: Not on file   • Number of children: Not on file   • Years of education: Not on file   • Highest education level: Not on file   Occupational History   • Not on file   Social Needs   • Financial resource strain: Not on file   • Food insecurity:     Worry: Not on file     Inability: Not on file   • " Transportation needs:     Medical: Not on file     Non-medical: Not on file   Tobacco Use   • Smoking status: Former Smoker     Packs/day: 1.50     Years: 13.00     Pack years: 19.50     Types: Cigarettes     Last attempt to quit: 1978     Years since quittin.8   • Smokeless tobacco: Never Used   Substance and Sexual Activity   • Alcohol use: Not Currently     Alcohol/week: 0.0 oz   • Drug use: No   • Sexual activity: Not on file   Lifestyle   • Physical activity:     Days per week: Not on file     Minutes per session: Not on file   • Stress: Not on file   Relationships   • Social connections:     Talks on phone: Not on file     Gets together: Not on file     Attends Orthodox service: Not on file     Active member of club or organization: Not on file     Attends meetings of clubs or organizations: Not on file     Relationship status: Not on file   • Intimate partner violence:     Fear of current or ex partner: Not on file     Emotionally abused: Not on file     Physically abused: Not on file     Forced sexual activity: Not on file   Other Topics Concern   • Not on file   Social History Narrative   • Not on file       SURGICAL HISTORY  Past Surgical History:   Procedure Laterality Date   • ANKLE TOTAL ARTHROPLASTY Right 2018    Procedure: ANKLE TOTAL ARTHROPLASTY;  Surgeon: Jose De Jesus Medrano M.D.;  Location: [facility];  Service: Orthopedics   • LIGAMENT REPAIR Right 2018    Procedure: LIGAMENT REPAIR- LATERAL;  Surgeon: Jose De Jesus Medrano M.D.;  Location: [facility];  Service: Orthopedics   • LUMBAR LAMINECTOMY DISKECTOMY Right 2017    Procedure: LUMBAR LAMINECTOMY DISKECTOMY - RE-DO OPEN L4-S1 LAMINOTOMIES;  Surgeon: Clint Garsia M.D.;  Location: SURGERY [facility];  Service:    • FORAMINOTOMY  2017    Procedure: FORAMINOTOMY;  Surgeon: Clint Garsia M.D.;  Location: SURGERY [facility];  Service:    • HARDWARE REMOVAL NEURO  2017    Procedure:  HARDWARE REMOVAL NEURO ;  Surgeon: Clint Garsia M.D.;  Location: SURGERY [facility];  Service:    • LAPAROSCOPY ROBOTIC XI  10/26/2016    Procedure: LAPAROSCOPY FOR LIVER CYST MARSUPIALIZATION AND RESECTION LIVER WALL CYST;  Surgeon: Frank Corona M.D.;  Location: SURGERY [facility];  Service:    • LUMBAR FUSION POSTERIOR  9/10/2015    Procedure: LUMBAR FUSION POSTERIOR L5-S1 ;  Surgeon: Clint Garsia M.D.;  Location: SURGERY [facility];  Service:    • LUMBAR LAMINECTOMY DISKECTOMY  9/10/2015    Procedure: LUMBAR LAMINECTOMY ;  Surgeon: Clint Garsia M.D.;  Location: SURGERY [facility];  Service:    • LAMINOTOMY  9/10/2015    Procedure: LAMINOTOMY;  Surgeon: Clint Garsia M.D.;  Location: SURGERY [facility];  Service:    • OTHER  2005    ANTERIOR NECK FUSION C5-7   • OTHER  1978    BLEEDING ULCERS   • CATARACT EXTRACTION WITH IOL         CURRENT MEDICATIONS  No current facility-administered medications for this encounter.     Current Outpatient Medications:   •  amLODIPine (NORVASC) 5 MG Tab, Take 5 mg by mouth every day., Disp: , Rfl:   •  atorvastatin (LIPITOR) 20 MG Tab, Take 20 mg by mouth every day., Disp: , Rfl:   •  carvedilol (COREG) 6.25 MG Tab, Take 6.25 mg by mouth 2 times a day., Disp: , Rfl:   •  gabapentin (NEURONTIN) 600 MG tablet, Take 600 mg by mouth 2 times a day., Disp: , Rfl:   •  lisinopril (PRINIVIL) 20 MG Tab, Take 20 mg by mouth every day., Disp: , Rfl:   •  naproxen (NAPROSYN) 500 MG Tab, Take 500 mg by mouth 2 times a day, with meals., Disp: , Rfl:   •  Multiple Vitamins-Minerals (CENTRUM SILVER 50+MEN) Tab, Take 1 Tab by mouth every morning., Disp: , Rfl:   •  LUMIGAN 0.01 % Solution, Place 1 Drop in both eyes every bedtime., Disp: , Rfl: 3  •  PROAIR  (90 Base) MCG/ACT Aero Soln inhalation aerosol, Inhale 2 Puffs by mouth every four hours as needed for Shortness of Breath., Disp: , Rfl:   •  metFORMIN (GLUCOPHAGE) 500 MG Tab, Take 500 mg by  "mouth every day., Disp: , Rfl:   •  aspirin (ASA) 81 MG Chew Tab chewable tablet, Take 162 mg by mouth every day., Disp: 100 Tab, Rfl: 3  •  escitalopram (LEXAPRO) 20 MG tablet, Take 20 mg by mouth every morning. Indications: Major Depressive Disorder, Disp: , Rfl:       ALLERGIES  Allergies   Allergen Reactions   • Nkda [No Known Drug Allergy]        PHYSICAL EXAM  VITAL SIGNS: /78   Pulse 66   Temp 37.3 °C (99.1 °F) (Temporal)   Resp 16   Ht 1.702 m (5' 7\")   Wt 74.2 kg (163 lb 9.3 oz)   SpO2 97%   BMI 25.62 kg/m²       Constitutional: Well developed, Well nourished, No acute distress, Non-toxic appearance.   HENT: Normocephalic, Atraumatic, Bilateral external ears normal, Oropharynx moist, No oral exudates, Nose normal.   Eyes: PERRLA, EOMI, Conjunctiva normal, No discharge.   Neck: Normal range of motion, No tenderness, Supple, No stridor.   Lymphatic: No lymphadenopathy noted.   Cardiovascular: Normal heart rate, Normal rhythm, No murmurs, No rubs, No gallops.   Thorax & Lungs: Normal breath sounds, No respiratory distress, No wheezing, No chest tenderness.   Abdomen: Bowel sounds normal, Soft, No tenderness, No masses, No pulsatile masses.   Skin: Warm, Dry, No erythema, No rash.   Back: No tenderness, No CVA tenderness.   Extremities: Left lower extremity exam is notable for erythema circumferentially around the left knee.  The midline anterior surgical incision consistent with total knee arthroplasty has a 2 cm area of dehiscence with erythema and pus.  Wound culture was obtained  Neurologic: Alert & oriented x 3, Normal motor function, Normal sensory function, No focal deficits noted.   Psychiatric: Anxious    Results for orders placed or performed during the hospital encounter of 11/02/19   WESTERGREN SED RATE   Result Value Ref Range    Sed Rate Westergren 61 (H) 0 - 20 mm/hour   CRP QUANTITIVE (NON-CARDIAC)   Result Value Ref Range    Stat C-Reactive Protein 3.13 (H) 0.00 - 0.75 mg/dL   CBC " WITH DIFFERENTIAL   Result Value Ref Range    WBC 7.9 4.8 - 10.8 K/uL    RBC 4.15 (L) 4.70 - 6.10 M/uL    Hemoglobin 11.4 (L) 14.0 - 18.0 g/dL    Hematocrit 35.9 (L) 42.0 - 52.0 %    MCV 86.5 81.4 - 97.8 fL    MCH 27.5 27.0 - 33.0 pg    MCHC 31.8 (L) 33.7 - 35.3 g/dL    RDW 47.9 35.9 - 50.0 fL    Platelet Count 646 (H) 164 - 446 K/uL    MPV 8.8 (L) 9.0 - 12.9 fL    Neutrophils-Polys 65.60 44.00 - 72.00 %    Lymphocytes 20.80 (L) 22.00 - 41.00 %    Monocytes 10.70 0.00 - 13.40 %    Eosinophils 1.80 0.00 - 6.90 %    Basophils 0.80 0.00 - 1.80 %    Immature Granulocytes 0.30 0.00 - 0.90 %    Nucleated RBC 0.00 /100 WBC    Neutrophils (Absolute) 5.22 1.82 - 7.42 K/uL    Lymphs (Absolute) 1.65 1.00 - 4.80 K/uL    Monos (Absolute) 0.85 0.00 - 0.85 K/uL    Eos (Absolute) 0.14 0.00 - 0.51 K/uL    Baso (Absolute) 0.06 0.00 - 0.12 K/uL    Immature Granulocytes (abs) 0.02 0.00 - 0.11 K/uL    NRBC (Absolute) 0.00 K/uL   Comp Metabolic Panel   Result Value Ref Range    Sodium 131 (L) 135 - 145 mmol/L    Potassium 4.1 3.6 - 5.5 mmol/L    Chloride 97 96 - 112 mmol/L    Co2 26 20 - 33 mmol/L    Anion Gap 8.0 0.0 - 11.9    Glucose 105 (H) 65 - 99 mg/dL    Bun 7 (L) 8 - 22 mg/dL    Creatinine 0.65 0.50 - 1.40 mg/dL    Calcium 9.0 8.5 - 10.5 mg/dL    AST(SGOT) 16 12 - 45 U/L    ALT(SGPT) 10 2 - 50 U/L    Alkaline Phosphatase 73 30 - 99 U/L    Total Bilirubin 0.9 0.1 - 1.5 mg/dL    Albumin 3.9 3.2 - 4.9 g/dL    Total Protein 6.6 6.0 - 8.2 g/dL    Globulin 2.7 1.9 - 3.5 g/dL    A-G Ratio 1.4 g/dL   ESTIMATED GFR   Result Value Ref Range    GFR If African American >60 >60 mL/min/1.73 m 2    GFR If Non African American >60 >60 mL/min/1.73 m 2        COURSE & MEDICAL DECISION MAKING  Pertinent Labs & Imaging studies reviewed. (See chart for details)  Consultation with orthopedics was obtained.  Wound culture was obtained.  They will take the patient to the operating room for definitive washout as this is high risk as he has intentionally  infected hardware.    FINAL IMPRESSION  1.  Left knee wound dehiscence with infection status post total knee replacement    Admission         Electronically signed by: Ej Powers, 11/2/2019 11:19 AM

## 2019-11-02 NOTE — ANESTHESIA POSTPROCEDURE EVALUATION
Patient: Ronny Gallegos    Procedure Summary     Date:  11/02/19 Room / Location:  [place] [address] / SURGERY [facility]    Anesthesia Start:  1256 Anesthesia Stop:  1402    Procedure:  IRRIGATION AND DEBRIDEMENT, WOUND (Left Knee) Diagnosis:  (Left knee infection)    Surgeon:  Jesús Wesley M.D. Responsible Provider:  Roni Nye M.D.    Anesthesia Type:  general ASA Status:  2 - Emergent          Final Anesthesia Type: general  Last vitals  BP   Blood Pressure : (!) 167/84    Temp   37 °C (98.6 °F)    Pulse   Pulse: 70   Resp   16    SpO2   100 %      Anesthesia Post Evaluation    Patient location during evaluation: PACU  Patient participation: complete - patient participated  Level of consciousness: awake and alert  Pain score: 1    Airway patency: patent  Anesthetic complications: no  Cardiovascular status: hemodynamically stable  Respiratory status: acceptable  Hydration status: euvolemic    PONV: none           Nurse Pain Score: 1 (NPRS)

## 2019-11-02 NOTE — PROGRESS NOTES
Left knee wound dehiscence after TKA with Dr. Charles 4 weeks ago.    To OR for I&D and wound closure.

## 2019-11-02 NOTE — OP REPORT
DATE OF SERVICE:  11/02/2019    PREOPERATIVE DIAGNOSIS:  Wound dehiscence after left total knee arthroplasty.    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    PROCEDURES:  1.  Left knee irrigation and debridement with procurement of deep cultures.  2.  Placement of deep drains and wound VAC sponge.    SURGEON:  Jesús Wesley MD    ASSISTANT:  None.    ANESTHESIA:  General.    ANESTHESIOLOGIST:  Roni Nye MD    IMPLANTS:  One medium Hemovac drain and one medium wound VAC sponge.    COMPLICATIONS:  None.    DISPOSITION:  Stable to postanesthesia care unit.    INDICATIONS:  The patient had a total knee arthroplasty about 4 weeks ago with   Dr. Charles.  He had been doing well, but apparently had a wound dehiscence and   then came into the emergency department where they stitched the area of wound   dehiscence back together, it continued to drain ____ and eventually came in   to [facility] and was sent in for irrigation and debridement.  The risks,   benefits, alternatives, and limitations of surgical intervention were   discussed in detail.  He expressed understanding and desired to proceed.    DESCRIPTION OF PROCEDURE:  The  patient and the correct operative extremity   were identified in the preoperative area.  The knee was marked.  He was   brought to the operating room and the correct operative extremity again   confirmed.  He was placed supine on the OR table where he underwent general   anesthesia without complication.  Examination under anesthesia showed an area   of wound dehiscence with underlying fluctuance.  There were 4 nylon stitches   there.  Those were removed.  They were just floating in the middle of the   wound, not keeping anything together.  I then prepped the knee with alcohol   and aspirated the knee.  I got about 1 mL of cloudy synovial fluid that was   sent for cell count, differential, and cultures.  I then prepped the knee with    Betadine gel, allowed that to dry, and then reopened the part of the incision   around the area of dehiscence and a large amount of coagulated   granulation-type tissue came out and was removed.  We took a culture from   that.  We took some swabs.  I was clear that then with palpation and   irrigating the superficial layer that the infection tracked deep as there was   a hole in the inferior aspect of the fascial closure, which tracked up into   the joint and just putting my finger in there I could palpate the knee joint   and the prosthesis.  So, we opened the knee arthrotomy, took deep cultures   from the tissue there and there was abundant inflamed synovial tissue   consistent with infection, debrided all.  There is a visible infection.  Given   that this was a contaminated wound from outside and the presumption was that   it was likely a polymicrobial infection and may necessitate a resection   arthroplasty or at the very least another formal debridement with polyethylene   liner exchange.  The question is being able to get the fascia closed   inferiorly to seal the joint because of that sizable defect.  I then irrigated   the wound copiously with 3 liters of normal saline, removing any visible   necrotic area, placed a medium Hemovac drain into the suprapatellar pouch,   closed the extensor mechanism with a running #1 PDS suture, then placed a   medium Hemovac sponge between the skin and the fascial layer, got a good seal   with the wound VAC and overwrapped the knee with an Ace wrap.  I placed the   patient into a knee immobilizer, allowed him to awakened from anesthesia, and   took him to the postanesthesia care unit where he was allowed to awake from   anesthesia.  He tolerated the procedure well.  There were no immediate   complications.       ____________________________________     MARYANN HER MD RD / [place]    DD:  11/02/2019 14:14:56  DT:  11/02/2019 15:43:57    D#:  [id number]  Job#:  [id number]

## 2019-11-02 NOTE — H&P
CHIEF COMPLAINT:  Left leg drainage.    HISTORY OF PRESENT ILLNESS:  The patient is a very pleasant 74-year-old   gentleman who about 4 weeks ago underwent total knee arthroplasty by Dr. Charles.  He had a superficial dehiscence of his knee, came into the emergency   department apparently and that was sewn up.  It continued to drain.  He did   well with therapy and was just covering it with dry dressings and then   eventually came into [facility] today, seen by Dr. Abdalla who contacted me as   I was on-call to [facility] and sent him over for formal   irrigation and debridement.    PAST MEDICAL HISTORY:  Significant for jaundice, pneumonia, arthritis, asthma,   coronary artery disease, depression, diabetes, glaucoma,   hypercholesterolemia, hypertension, hypopotassemia, neuropathy, peptic ulcer   disease.    PAST SURGICAL HISTORY:  Please see the note from Dr. Ej Powers on 11/02.    CURRENT MEDICATIONS:  Please see the note from Dr. Ej Powers.    ALLERGIES:  No known drug allergies.    PHYSICAL EXAMINATION:  VITAL SIGNS:  Admission blood pressure 143/78, pulse 66, temperature 37.3,   respirations 16, and SpO2 of 97%.  GENERAL:  He is a healthy-appearing gentleman who appears his stated age.  He   is alert and oriented x4.  His mood is appropriate to the situation.  HEENT:  Pupils are equal, round and react to light and accommodate.    Extraocular muscles are intact.  NECK:  Full pain free range of motion of the cervical spine.  HEART:  Regular heart rate.  LUNGS:  Regular respirations.  ABDOMEN:  Benign.  MUSCULOSKELETAL:  Examination of his left knee shows good range of motion, no   significant erythema around the knee, just an area of tayler dehiscence at the   inferior aspect of the incision with 4 nylon stitches through it.  There is   fairly significant drainage.     RADIOGRAPHIC DATA:  X-rays of the knee show well-fixed total knee   arthroplasty.    LABORATORY DATA:  Admission labs,  sed rate 61, C-reactive protein 3.13, white   blood cell count 7.9.    ASSESSMENT:  Left knee wound dehiscence and possible deep infection.    PLAN:  The operative and nonoperative treatment options were discussed in   detail with the patient.  The risks, benefits, alternatives, and limitations   of irrigation and debridement, possible wound closure versus polyethylene   liner exchange and revision knee arthroplasty were discussed in detail.  He   has expressed understanding and desired to proceed with surgical intervention.       ____________________________________     MARYANN HER MD RD / [place]    DD:  11/02/2019 14:09:31  DT:  11/02/2019 15:09:43    D#:  [id number]  Job#:  [id number]

## 2019-11-02 NOTE — ED TRIAGE NOTES
"PT ambulated to triage c/o a wound on his lt knee that has opened twice. PT had knee replacement surgery in October, pt had a area that opened, he came to the ER 3 days ago and had sutures placed. PT reports that it has re opened.  PT was told by the [facility] to come to the ER for a washout  Chief Complaint   Patient presents with   • Wound Check     hx of knee replacement, wound has opened twice      /78   Pulse 66   Temp 37.3 °C (99.1 °F) (Temporal)   Resp 16   Ht 1.702 m (5' 7\")   Wt 74.2 kg (163 lb 9.3 oz)   SpO2 97%   .  "

## 2019-11-02 NOTE — ED NOTES
Report called to Donna CORREA MD reeval at bedside to explain plan of care. Pt to be placed in gown and consents to be completed in OR.  Awaiting transport.

## 2019-11-02 NOTE — ANESTHESIA PROCEDURE NOTES
Airway  Date/Time: 11/2/2019 1:11 PM  Performed by: Roni Nye M.D.  Authorized by: Roni Nye M.D.     Location:  OR  Urgency:  Elective  Indications for Airway Management:  Anesthesia  Spontaneous Ventilation: absent    Sedation Level:  Deep  Preoxygenated: Yes    Final Airway Type:  Supraglottic airway  Final Supraglottic Airway:  Standard LMA  SGA Size:  4  Number of Attempts at Approach:  1

## 2019-11-02 NOTE — ANESTHESIA PREPROCEDURE EVALUATION
Relevant Problems   NEURO   (+) History of peptic ulcer disease      CARDIAC   (+) Coronary artery disease involving native coronary artery of native heart with angina pectoris (HCC)   (+) Essential hypertension       Physical Exam    Airway   Mallampati: II  TM distance: >3 FB  Neck ROM: full       Cardiovascular - normal exam  Rhythm: regular  Rate: normal  (-) murmur     Dental - normal exam         Pulmonary - normal exam  Breath sounds clear to auscultation     Abdominal    Neurological - normal exam                 Anesthesia Plan    ASA 2- EMERGENT   ASA physical status emergent criteria: acutely contaminated wound or identified infection source    Plan - general       Airway plan will be LMA        Induction: intravenous    Postoperative Plan: Postoperative administration of opioids is intended.    Pertinent diagnostic labs and testing reviewed    Informed Consent:    Anesthetic plan and risks discussed with patient.    Use of blood products discussed with: patient whom consented to blood products.

## 2019-11-03 LAB
ERYTHROCYTE [DISTWIDTH] IN BLOOD BY AUTOMATED COUNT: 46.5 FL (ref 35.9–50)
HCT VFR BLD AUTO: 30.9 % (ref 42–52)
HGB BLD-MCNC: 10 G/DL (ref 14–18)
MCH RBC QN AUTO: 27.9 PG (ref 27–33)
MCHC RBC AUTO-ENTMCNC: 32.4 G/DL (ref 33.7–35.3)
MCV RBC AUTO: 86.1 FL (ref 81.4–97.8)
PLATELET # BLD AUTO: 552 K/UL (ref 164–446)
PMV BLD AUTO: 8.7 FL (ref 9–12.9)
RBC # BLD AUTO: 3.59 M/UL (ref 4.7–6.1)
RHODAMINE-AURAMINE STN SPEC: NORMAL
RHODAMINE-AURAMINE STN SPEC: NORMAL
SIGNIFICANT IND 70042: NORMAL
SIGNIFICANT IND 70042: NORMAL
SITE SITE: NORMAL
SITE SITE: NORMAL
SOURCE SOURCE: NORMAL
SOURCE SOURCE: NORMAL
VANCOMYCIN SERPL-MCNC: 0.9 UG/ML
WBC # BLD AUTO: 7.3 K/UL (ref 4.8–10.8)

## 2019-11-03 PROCEDURE — 80202 ASSAY OF VANCOMYCIN: CPT

## 2019-11-03 PROCEDURE — 770001 HCHG ROOM/CARE - MED/SURG/GYN PRIV*

## 2019-11-03 PROCEDURE — 85027 COMPLETE CBC AUTOMATED: CPT

## 2019-11-03 PROCEDURE — A9270 NON-COVERED ITEM OR SERVICE: HCPCS | Performed by: ORTHOPAEDIC SURGERY

## 2019-11-03 PROCEDURE — 700102 HCHG RX REV CODE 250 W/ 637 OVERRIDE(OP): Performed by: ORTHOPAEDIC SURGERY

## 2019-11-03 PROCEDURE — 36415 COLL VENOUS BLD VENIPUNCTURE: CPT

## 2019-11-03 RX ADMIN — ESCITALOPRAM OXALATE 20 MG: 10 TABLET ORAL at 04:32

## 2019-11-03 RX ADMIN — ACETAMINOPHEN 1000 MG: 500 TABLET ORAL at 04:31

## 2019-11-03 RX ADMIN — METFORMIN HYDROCHLORIDE 500 MG: 500 TABLET ORAL at 16:55

## 2019-11-03 RX ADMIN — ACETAMINOPHEN 1000 MG: 500 TABLET ORAL at 13:26

## 2019-11-03 RX ADMIN — METFORMIN HYDROCHLORIDE 500 MG: 500 TABLET ORAL at 08:31

## 2019-11-03 RX ADMIN — CARVEDILOL 6.25 MG: 6.25 TABLET, FILM COATED ORAL at 04:30

## 2019-11-03 RX ADMIN — AMLODIPINE BESYLATE 5 MG: 5 TABLET ORAL at 04:30

## 2019-11-03 RX ADMIN — OXYCODONE HYDROCHLORIDE 5 MG: 5 TABLET ORAL at 08:57

## 2019-11-03 RX ADMIN — ACETAMINOPHEN 1000 MG: 500 TABLET ORAL at 21:00

## 2019-11-03 RX ADMIN — ALBUTEROL SULFATE 2 PUFF: 90 AEROSOL, METERED RESPIRATORY (INHALATION) at 09:02

## 2019-11-03 RX ADMIN — OXYCODONE HYDROCHLORIDE 5 MG: 5 TABLET ORAL at 13:26

## 2019-11-03 RX ADMIN — ALBUTEROL SULFATE 2 PUFF: 90 AEROSOL, METERED RESPIRATORY (INHALATION) at 16:59

## 2019-11-03 RX ADMIN — GABAPENTIN 600 MG: 300 CAPSULE ORAL at 16:54

## 2019-11-03 RX ADMIN — LISINOPRIL 20 MG: 20 TABLET ORAL at 04:31

## 2019-11-03 RX ADMIN — OXYCODONE HYDROCHLORIDE 5 MG: 5 TABLET ORAL at 02:46

## 2019-11-03 RX ADMIN — ALBUTEROL SULFATE 2 PUFF: 90 AEROSOL, METERED RESPIRATORY (INHALATION) at 13:26

## 2019-11-03 RX ADMIN — ATORVASTATIN CALCIUM 20 MG: 20 TABLET, FILM COATED ORAL at 04:30

## 2019-11-03 ASSESSMENT — PAIN SCALES - WONG BAKER: WONGBAKER_NUMERICALRESPONSE: HURTS JUST A LITTLE BIT

## 2019-11-03 NOTE — CARE PLAN
Problem: Communication  Goal: The ability to communicate needs accurately and effectively will improve  Outcome: PROGRESSING AS EXPECTED  Pt has been educated on use of call light and expresses needs appropriately to staff.     Problem: Pain Management  Goal: Pain level will decrease to patient's comfort goal  Outcome: PROGRESSING AS EXPECTED  Pt's pain is being managed with repositioning and also with the use of pain medication, per protocol.

## 2019-11-03 NOTE — PROGRESS NOTES
Received report and assumed care of patient. Patient denied acute pain at this time. Patient was given call light and educated to call prior to attempting to get out of bed. Hourly rounding in place. Will monitor and follow up.

## 2019-11-03 NOTE — PROGRESS NOTES
2 RN skin check completed with BECKY Pate  Pressure ulcers observed: None  Bilateral heels, elbows, ears intact. Sacrum intact.  Devices in place: SCD on RLE, immobilizer on LLE.   Interventions in place: pillows for support and positioning. Patient turns self from side to side.

## 2019-11-03 NOTE — PROGRESS NOTES
"   Orthopaedic PA Progress Note    Interval changes:Feeling well    ROS - Patient denies any new issues. No chest pain, dyspnea, or fever.  Pain well controlled.    /71   Pulse 90   Temp 36.7 °C (98.1 °F) (Temporal)   Resp 15   Ht 1.702 m (5' 7\")   Wt 74.2 kg (163 lb 9.3 oz)   SpO2 97%     Patient seen and examined  No acute distress  Breathing non labored  RRR  Surgical dressing is clean, dry, and intact. Knee immmobilizer. Patient clearly fires tibialis anterior, EHL, and gastrocnemius/soleus. Sensation is intact to light touch throughout superficial peroneal, deep peroneal, tibial, saphenous, and sural nerve distributions. Strong and palpable 2+ dorsalis pedis and posterior tibial pulses with capillary refill less than 2 seconds. No lower leg tenderness or discomfort.    Recent Labs     11/02/19  1148 11/03/19  0017   WBC 7.9 7.3   RBC 4.15* 3.59*   HEMOGLOBIN 11.4* 10.0*   HEMATOCRIT 35.9* 30.9*   MCV 86.5 86.1   MCH 27.5 27.9   MCHC 31.8* 32.4*   RDW 47.9 46.5   PLATELETCT 646* 552*   MPV 8.8* 8.7*     Assessment/Plan:  POD#1 S/P I+D L knee  4 wks s/P L TKA  Wt bearing status - AT in immobilizer  PT/OT-initiated  Wound care:drssing left on  Drains - no  Chung-no  Sutures/Staples out- 10-14 days post operatively  Antibiotics: completed  DVT Prophylaxis- TEDS/SCDs/Foot pumps.   Future Procedures - Possible return to OR in AM with Dr. Charles  Case Coordination for Discharge Planning - Disposition pending      "

## 2019-11-03 NOTE — OR NURSING
Pt on room air.  No c/o nausea, tolerating PO fluids, medication and box lunch.  Left knee pain now tolerable, 4/10.  Left knee wound vac compressed, patent and functioning, serosanguineous drainage. Knee immobilizer in place. Hemovac compressed and patent with serosanguineous drainage.  Bilateral pedal pulses 3+.   VSS, afebrile, UMAÑA, A/O x4.    Glasses in place, ring on right hand.  Pt's daughter-in-law Alexandra has the rest of his belongings.

## 2019-11-04 PROBLEM — I25.10 CORONARY ARTERY DISEASE INVOLVING NATIVE CORONARY ARTERY OF NATIVE HEART WITHOUT ANGINA PECTORIS: Status: ACTIVE | Noted: 2017-01-12

## 2019-11-04 PROBLEM — T81.30XA WOUND DEHISCENCE: Status: ACTIVE | Noted: 2019-11-04

## 2019-11-04 PROBLEM — D50.0 ANEMIA, BLOOD LOSS: Status: ACTIVE | Noted: 2019-11-04

## 2019-11-04 PROBLEM — D75.839 THROMBOCYTOSIS: Status: ACTIVE | Noted: 2019-11-04

## 2019-11-04 PROBLEM — E11.42 TYPE 2 DIABETES MELLITUS WITH DIABETIC POLYNEUROPATHY, WITHOUT LONG-TERM CURRENT USE OF INSULIN (HCC): Status: ACTIVE | Noted: 2019-08-06

## 2019-11-04 LAB
BACTERIA WND AEROBE CULT: NORMAL
EKG IMPRESSION: NORMAL
GLUCOSE BLD-MCNC: 102 MG/DL (ref 65–99)
GLUCOSE BLD-MCNC: 110 MG/DL (ref 65–99)
GLUCOSE BLD-MCNC: 133 MG/DL (ref 65–99)
GRAM STN SPEC: NORMAL
SIGNIFICANT IND 70042: NORMAL
SITE SITE: NORMAL
SOURCE SOURCE: NORMAL
VANCOMYCIN SERPL-MCNC: 0.9 UG/ML

## 2019-11-04 PROCEDURE — 700105 HCHG RX REV CODE 258: Performed by: INTERNAL MEDICINE

## 2019-11-04 PROCEDURE — 770001 HCHG ROOM/CARE - MED/SURG/GYN PRIV*

## 2019-11-04 PROCEDURE — 82962 GLUCOSE BLOOD TEST: CPT

## 2019-11-04 PROCEDURE — 700111 HCHG RX REV CODE 636 W/ 250 OVERRIDE (IP): Mod: JG | Performed by: INTERNAL MEDICINE

## 2019-11-04 PROCEDURE — 80202 ASSAY OF VANCOMYCIN: CPT

## 2019-11-04 PROCEDURE — 700102 HCHG RX REV CODE 250 W/ 637 OVERRIDE(OP): Performed by: INTERNAL MEDICINE

## 2019-11-04 PROCEDURE — 99223 1ST HOSP IP/OBS HIGH 75: CPT | Performed by: INTERNAL MEDICINE

## 2019-11-04 PROCEDURE — 93010 ELECTROCARDIOGRAM REPORT: CPT | Performed by: INTERNAL MEDICINE

## 2019-11-04 PROCEDURE — 36415 COLL VENOUS BLD VENIPUNCTURE: CPT

## 2019-11-04 PROCEDURE — A9270 NON-COVERED ITEM OR SERVICE: HCPCS | Performed by: ORTHOPAEDIC SURGERY

## 2019-11-04 PROCEDURE — 93005 ELECTROCARDIOGRAM TRACING: CPT | Performed by: ORTHOPAEDIC SURGERY

## 2019-11-04 PROCEDURE — 700102 HCHG RX REV CODE 250 W/ 637 OVERRIDE(OP): Performed by: ORTHOPAEDIC SURGERY

## 2019-11-04 RX ORDER — SODIUM CHLORIDE 9 MG/ML
INJECTION, SOLUTION INTRAVENOUS
Status: ACTIVE
Start: 2019-11-04 | End: 2019-11-05

## 2019-11-04 RX ORDER — HEPARIN SODIUM 5000 [USP'U]/ML
5000 INJECTION, SOLUTION INTRAVENOUS; SUBCUTANEOUS EVERY 8 HOURS
Status: DISCONTINUED | OUTPATIENT
Start: 2019-11-05 | End: 2019-11-08 | Stop reason: HOSPADM

## 2019-11-04 RX ADMIN — LATANOPROST 1 DROP: 50 SOLUTION OPHTHALMIC at 20:25

## 2019-11-04 RX ADMIN — ACETAMINOPHEN 1000 MG: 500 TABLET ORAL at 23:52

## 2019-11-04 RX ADMIN — CEFTRIAXONE SODIUM 2 G: 2 INJECTION, POWDER, FOR SOLUTION INTRAMUSCULAR; INTRAVENOUS at 13:19

## 2019-11-04 RX ADMIN — ACETAMINOPHEN 1000 MG: 500 TABLET ORAL at 04:55

## 2019-11-04 RX ADMIN — GABAPENTIN 600 MG: 300 CAPSULE ORAL at 17:13

## 2019-11-04 RX ADMIN — ACETAMINOPHEN 1000 MG: 500 TABLET ORAL at 17:13

## 2019-11-04 RX ADMIN — CARVEDILOL 6.25 MG: 6.25 TABLET, FILM COATED ORAL at 04:56

## 2019-11-04 RX ADMIN — ESCITALOPRAM OXALATE 20 MG: 10 TABLET ORAL at 04:55

## 2019-11-04 RX ADMIN — ATORVASTATIN CALCIUM 20 MG: 20 TABLET, FILM COATED ORAL at 04:56

## 2019-11-04 RX ADMIN — AMLODIPINE BESYLATE 5 MG: 5 TABLET ORAL at 04:56

## 2019-11-04 RX ADMIN — DAPTOMYCIN 590 MG: 350 INJECTION, POWDER, LYOPHILIZED, FOR SOLUTION INTRAVENOUS at 14:02

## 2019-11-04 RX ADMIN — ACETAMINOPHEN 1000 MG: 500 TABLET ORAL at 13:18

## 2019-11-04 RX ADMIN — CARVEDILOL 6.25 MG: 6.25 TABLET, FILM COATED ORAL at 17:13

## 2019-11-04 ASSESSMENT — ENCOUNTER SYMPTOMS
VOMITING: 0
CONSTIPATION: 0
BLURRED VISION: 0
FEVER: 0
HEMOPTYSIS: 0
ABDOMINAL PAIN: 0
NAUSEA: 0
SPUTUM PRODUCTION: 0
SHORTNESS OF BREATH: 0
PND: 0
COUGH: 0
HEARTBURN: 0
CHILLS: 0
DEPRESSION: 0
PALPITATIONS: 0
WHEEZING: 0
BLOOD IN STOOL: 0
DOUBLE VISION: 0
DIARRHEA: 0
HEADACHES: 0
DIZZINESS: 0

## 2019-11-04 NOTE — CONSULTS
"ADULT INFECTIOUS DISEASE CONSULT     Date of Service: 11/4/2019    Consult Requested By: Andrew Molina P.A.-C.    Reason for Consultation: Infected left knee arthroplasty    History of Present Illness:   Ronny Gallegos is a 74 y.o. male with history of diabetes mellitus which is fairly controlled with last hemoglobin A1c of 6.4, coronary artery disease, degenerative joint disease underwent left knee arthroplasty on 10/8/2019.  He was doing fairly well with his physical therapy up until 10/30/2019.  He heard his knee pop and he started having drainage from the surgical site.  He was sent to the emergency room.  In the ER he underwent closure of the wound with the sutures.  But he continued to have drainage which prompted his visit to [facility] urgent care.  He was again sent back to the emergency room on 11/2/2019.  Since he has been here he was taken to the OR that day and underwent left knee I&D and placement of deep drains.  The cultures have been negative so far.  Infectious disease consult has been called for antibiotics    Review Of Systems:  Gen.-denies fevers. Denies any chills.  HEENT- denies any sore throat, headache or vision changes  Pulmonary- denies any cough, shortness of breath  Cardiovascular- denies any chest pain, leg swelling.    GI- denies any nausea vomiting diarrhea or abdominal pain  Musculoskeletal-complains of some pain in the left knee along.  Neuro- denies any weakness or sensory change  Psych- denies any depression or suicidal ideation  Genitourinary- denies any frequency or dysuria        PMH:   Past Medical History:   Diagnosis Date   • Arthritis     right ankle/left knee   • Asthma     inhaler PRN    • CAD (coronary artery disease)     + CT scoring, negative PET cardiac in '17   • Depression    • Diabetes (HCC)     oral medication    • Glaucoma    • High cholesterol    • Hypertension    • Hypopotassemia    • Hyposmolality and/or hyponatremia    • Jaundice 2014    \"from " "drinking after wife \"   • Neuropathy (HCC)     bilat legs/feet   • Pain     right ankle    • Personal history of peptic ulcer disease    • Pneumonia    • Unspecified cataract     removed bilat         PSH:  Past Surgical History:   Procedure Laterality Date   • IRRIGATION & DEBRIDEMENT ORTHO Left 2019    Procedure: IRRIGATION AND DEBRIDEMENT, WOUND;  Surgeon: Jesús Wesley M.D.;  Location: SURGERY [facility];  Service: Orthopedics   • ANKLE TOTAL ARTHROPLASTY Right 2018    Procedure: ANKLE TOTAL ARTHROPLASTY;  Surgeon: Jose De Jesus Medrano M.D.;  Location: SURGERY [facility];  Service: Orthopedics   • LIGAMENT REPAIR Right 2018    Procedure: LIGAMENT REPAIR- LATERAL;  Surgeon: Jose De Jesus Medrano M.D.;  Location: SURGERY [facility];  Service: Orthopedics   • LUMBAR LAMINECTOMY DISKECTOMY Right 2017    Procedure: LUMBAR LAMINECTOMY DISKECTOMY - RE-DO OPEN L4-S1 LAMINOTOMIES;  Surgeon: Clint Garsia M.D.;  Location: SURGERY [facility];  Service:    • FORAMINOTOMY  2017    Procedure: FORAMINOTOMY;  Surgeon: Clint Garsia M.D.;  Location: SURGERY [facility];  Service:    • HARDWARE REMOVAL NEURO  2017    Procedure: HARDWARE REMOVAL NEURO ;  Surgeon: Clint Garsia M.D.;  Location: SURGERY [facility];  Service:    • LAPAROSCOPY ROBOTIC XI  10/26/2016    Procedure: LAPAROSCOPY FOR LIVER CYST MARSUPIALIZATION AND RESECTION LIVER WALL CYST;  Surgeon: Frank Corona M.D.;  Location: SURGERY [facility];  Service:    • LUMBAR FUSION POSTERIOR  9/10/2015    Procedure: LUMBAR FUSION POSTERIOR L5-S1 ;  Surgeon: Clint Garsia M.D.;  Location: SURGERY [facility];  Service:    • LUMBAR LAMINECTOMY DISKECTOMY  9/10/2015    Procedure: LUMBAR LAMINECTOMY ;  Surgeon: Clint Garsia M.D.;  Location: SURGERY [facility];  Service:    • LAMINOTOMY  9/10/2015    Procedure: LAMINOTOMY;  Surgeon: Clint Garsia M.D.;  Location: SURGERY [facility];  " Service:    • OTHER      ANTERIOR NECK FUSION C5-7   • OTHER      BLEEDING ULCERS   • CATARACT EXTRACTION WITH IOL         FAMILY HX:  Family History   Problem Relation Age of Onset   • Stroke Mother    • Heart Disease Mother    • Stroke Father    • Heart Disease Father        SOCIAL HX:  Social History     Socioeconomic History   • Marital status:      Spouse name: Not on file   • Number of children: Not on file   • Years of education: Not on file   • Highest education level: Not on file   Occupational History   • Not on file   Social Needs   • Financial resource strain: Not on file   • Food insecurity:     Worry: Not on file     Inability: Not on file   • Transportation needs:     Medical: Not on file     Non-medical: Not on file   Tobacco Use   • Smoking status: Former Smoker     Packs/day: 1.50     Years: 13.00     Pack years: 19.50     Types: Cigarettes     Last attempt to quit: 1978     Years since quittin.8   • Smokeless tobacco: Never Used   Substance and Sexual Activity   • Alcohol use: Not Currently     Alcohol/week: 0.0 oz   • Drug use: No   • Sexual activity: Not on file   Lifestyle   • Physical activity:     Days per week: Not on file     Minutes per session: Not on file   • Stress: Not on file   Relationships   • Social connections:     Talks on phone: Not on file     Gets together: Not on file     Attends [employer] service: Not on file     Active member of club or organization: Not on file     Attends meetings of clubs or organizations: Not on file     Relationship status: Not on file   • Intimate partner violence:     Fear of current or ex partner: Not on file     Emotionally abused: Not on file     Physically abused: Not on file     Forced sexual activity: Not on file   Other Topics Concern   • Not on file   Social History Narrative   • Not on file     Social History     Tobacco Use   Smoking Status Former Smoker   • Packs/day: 1.50   • Years: 13.00   • Pack years: 19.50    • Types: Cigarettes   • Last attempt to quit: 1978   • Years since quittin.8   Smokeless Tobacco Never Used     Social History     Substance and Sexual Activity   Alcohol Use Not Currently   • Alcohol/week: 0.0 oz       Allergies/Intolerances:  Allergies   Allergen Reactions   • Nkda [No Known Drug Allergy]        History reviewed with the patient    Other Current Medications:    Current Facility-Administered Medications:   •  Pharmacy Consult Request ...Pain Management Review 1 Each, 1 Each, Other, PHARMACY TO DOSE, Jesús Wesley M.D.  •  acetaminophen (TYLENOL) tablet 1,000 mg, 1,000 mg, Oral, Q6HRS, Jesús Wesley M.D., 1,000 mg at 19 0455  •  oxyCODONE immediate-release (ROXICODONE) tablet 5 mg, 5 mg, Oral, Q3HRS PRN, Jesús Wesley M.D., 5 mg at 19 1326  •  oxyCODONE immediate-release (ROXICODONE) tablet 10 mg, 10 mg, Oral, Q3HRS PRN, Jesús Wesley M.D.  •  morphine (pf) 4 MG/ML injection 4 mg, 4 mg, Intravenous, Q3HRS PRN, Jesús Wesley M.D.  •  ondansetron (ZOFRAN) syringe/vial injection 4 mg, 4 mg, Intravenous, Q4HRS PRN, Jesús Wesley M.D.  •  amLODIPine (NORVASC) tablet 5 mg, 5 mg, Oral, DAILY, Jesús Wesley M.D., 5 mg at 19 0456  •  metFORMIN (GLUCOPHAGE) tablet 500 mg, 500 mg, Oral, BID WITH MEALS, Jesús Wesley M.D., Stopped at 19 0730  •  gabapentin (NEURONTIN) capsule 600 mg, 600 mg, Oral, Q EVENING, Jesús Wesley M.D., 600 mg at 19 1654  •  lisinopril (PRINIVIL) tablet 20 mg, 20 mg, Oral, DAILY, Jesús Wesley M.D., Stopped at 19 0600  •  atorvastatin (LIPITOR) tablet 20 mg, 20 mg, Oral, DAILY, Jesús Wesley M.D., 20 mg at 19 0456  •  carvedilol (COREG) tablet 6.25 mg, 6.25 mg, Oral, BID, Jesús Wesley M.D., 6.25 mg at 19 0456  •  escitalopram (LEXAPRO) tablet 20 mg, 20 mg, Oral, QAM, Jesús Wesley M.D., 20 mg at 19 6795  •  albuterol inhaler 2 Puff, 2 Puff, Inhalation, Q4H PRN (RT), Jesús Wesley M.D., 2 Puff at 19 4780  •  latanoprost (XALATAN) 0.005 %  "ophthalmic solution 1 Drop, 1 Drop, Both Eyes, Q EVENING, Jesús Wesley M.D., 1 Drop at 19 2334  [unfilled]    Most Recent Vital Signs:  /82   Pulse 64   Temp 37.4 °C (99.4 °F) (Temporal)   Resp 16   Ht 1.702 m (5' 7\")   Wt 74.2 kg (163 lb 9.3 oz)   SpO2 98%   BMI 25.62 kg/m²   Temp  Av.1 °C (98.7 °F)  Min: 36.7 °C (98 °F)  Max: 37.6 °C (99.7 °F)    Physical Exam:  General: Nontoxic, no acute distress  HEENT: sclera anicteric, PERRL, EOMI, MMM, no oral lesions  Neck: supple, no lymphadenopathy  Chest: CTAB, no r/r/w, normal work of breathing.  Cardiac: Regular, no murmurs no gallops heard  Abdomen: + bowel sounds, soft, non-tender, non-distended, no HSM  Extremities: Left knee is bandaged with Hemovac  Skin: no rashes or erythema  Neuro: Alert and oriented times 3, non-focal exam    Pertinent Lab Results:  Recent Labs     19  1148 19  0017   WBC 7.9 7.3      Recent Labs     19  1148 19  0017   HEMOGLOBIN 11.4* 10.0*   HEMATOCRIT 35.9* 30.9*   MCV 86.5 86.1   MCH 27.5 27.9   PLATELETCT 646* 552*         Recent Labs     19  1148   SODIUM 131*   POTASSIUM 4.1   CHLORIDE 97   CO2 26   CREATININE 0.65        Recent Labs     19  1148   ALBUMIN 3.9        Pertinent Micro:  Results     Procedure Component Value Units Date/Time    Acid Fast Stain [[id number]] Collected:  19    Order Status:  Completed Specimen:  Tissue Updated:  19     Significant Indicator NEG     Source TISS     Site Left Knee Deep Wound     AFB Smear Results No acid fast bacilli seen.    Narrative:       Surgery Specimen    GRAM STAIN [[id number]] Collected:  19    Order Status:  Completed Specimen:  Tissue Updated:  19     Significant Indicator .     Source TISS     Site Left Knee Deep Wound     Gram Stain Result No organisms seen.    Narrative:       Surgery Specimen    AFB Culture [[id number]] Collected:  19 1343    Order Status:  Completed " Specimen:  Tissue Updated:  11/04/19 0707     Significant Indicator NEG     Source TISS     Site Left Knee Deep Wound     Culture Result Culture in progress.     AFB Smear Results No acid fast bacilli seen.    Narrative:       Surgery Specimen    Acid Fast Stain [[id number]] Collected:  11/02/19 1246    Order Status:  Completed Specimen:  Tissue Updated:  11/04/19 0707     Significant Indicator NEG     Source TISS     Site Left Knee Tissue     AFB Smear Results No acid fast bacilli seen.    Narrative:       Surgery Specimen    GRAM STAIN [[id number]] Collected:  11/02/19 1246    Order Status:  Completed Specimen:  Tissue Updated:  11/04/19 0707     Significant Indicator .     Source TISS     Site Left Knee Tissue     Gram Stain Result Few WBCs.  No organisms seen.      Narrative:       Surgery Specimen    AFB Culture [[id number]] Collected:  11/02/19 1246    Order Status:  Completed Specimen:  Tissue Updated:  11/04/19 0707     Significant Indicator NEG     Source TISS     Site Left Knee Tissue     Culture Result Culture in progress.     AFB Smear Results No acid fast bacilli seen.    Narrative:       Surgery Specimen    CULTURE WOUND W/ GRAM STAIN [[id number]] Collected:  11/02/19 1343    Order Status:  Completed Specimen:  Wound Updated:  11/03/19 1604     Significant Indicator NEG     Source WND     Site Left Knee     Culture Result No growth at 24 hours.     Gram Stain Result Rare WBCs.  No organisms seen.      Narrative:       Surgery - swabs received    Anaerobic Culture [[id number]] Collected:  11/02/19 1343    Order Status:  Completed Specimen:  Wound Updated:  11/03/19 1604     Significant Indicator NEG     Source WND     Site Left Knee     Culture Result Culture in progress.    Narrative:       Surgery - swabs received    FLUID CULTURE W/GRAM STAIN [[id number]] Collected:  11/02/19 1327    Order Status:  Completed Specimen:  Synovial Updated:  11/03/19 1604     Significant Indicator NEG     Source SYNO     Site  "Left Knee     Culture Result No growth at 24 hours.     Gram Stain Result Rare WBCs.  No organisms seen.      Narrative:       Surgery Specimen    Anaerobic Culture [[id number]] Collected:  11/02/19 1327    Order Status:  Completed Specimen:  Synovial Updated:  11/03/19 1604     Significant Indicator NEG     Source SYNO     Site Left Knee     Culture Result Culture in progress.    Narrative:       Surgery Specimen    CULTURE WOUND W/ GRAM STAIN [[id number]] Collected:  11/02/19 1148    Order Status:  Completed Specimen:  Wound from Left Leg Updated:  11/03/19 1431     Significant Indicator NEG     Source WND     Site LEFT LEG     Culture Result Rare growth mixed skin jacky.     Gram Stain Result Few WBCs.  Rare Gram negative rods.      Narrative:       Left knee  Left knee    BLOOD CULTURE x2 [[id number]] Collected:  11/02/19 1208    Order Status:  Completed Specimen:  Blood from Peripheral Updated:  11/03/19 0945     Significant Indicator NEG     Source BLD     Site PERIPHERAL     Culture Result No Growth  Note: Blood cultures are incubated for 5 days and  are monitored continuously.Positive blood cultures  are called to the RN and reported as soon as  they are identified.      Narrative:       Per Hospital Policy: Only change Specimen Src: to \"Line\" if  specified by physician order.  Right Forearm/Arm    BLOOD CULTURE x2 [[id number]] Collected:  11/02/19 1148    Order Status:  Completed Specimen:  Blood from Peripheral Updated:  11/03/19 0945     Significant Indicator NEG     Source BLD     Site PERIPHERAL     Culture Result No Growth  Note: Blood cultures are incubated for 5 days and  are monitored continuously.Positive blood cultures  are called to the RN and reported as soon as  they are identified.      Narrative:       Per Hospital Policy: Only change Specimen Src: to \"Line\" if  specified by physician order.  No site indicated    CULTURE TISSUE W/ GRM STAIN [[id number]] Collected:  11/02/19 1343    Order Status:  " Completed Specimen:  Other Updated:  11/03/19 0715    Anaerobic Culture [[id number]] Collected:  11/02/19 1343    Order Status:  Completed Specimen:  Other Updated:  11/03/19 0715    Anaerobic Culture [[id number]] Collected:  11/02/19 1246    Order Status:  Completed Specimen:  Other Updated:  11/03/19 0713    CULTURE TISSUE W/ GRM STAIN [[id number]] Collected:  11/02/19 1246    Order Status:  Completed Specimen:  Other Updated:  11/03/19 0713    GRAM STAIN [[id number]] Collected:  11/02/19 1343    Order Status:  Completed Specimen:  Wound Updated:  11/02/19 1655     Significant Indicator .     Source WND     Site Left Knee     Gram Stain Result Rare WBCs.  No organisms seen.      Narrative:       Surgery - swabs received    GRAM STAIN [[id number]] Collected:  11/02/19 1327    Order Status:  Completed Specimen:  Body Fluid Updated:  11/02/19 1654     Significant Indicator .     Source BF     Site Left Knee     Gram Stain Result Rare WBCs.  No organisms seen.      GRAM STAIN [[id number]] Collected:  11/02/19 1148    Order Status:  Completed Specimen:  Wound Updated:  11/02/19 1433     Significant Indicator .     Source WND     Site LEFT LEG     Gram Stain Result Few WBCs.  Rare Gram negative rods.      Narrative:       Left knee  Left knee        Blood Culture Hold   Date Value Ref Range Status   10/30/2019 Collected  Final        Studies:  No results found.  IMPRESSION:     Postoperative infection  Infected left knee arthroplasty  Diabetes mellitus  Coronary artery disease      PLAN:   Ronny Gallegos is a 74 y.o. male with diabetes mellitus and coronary artery disease underwent left knee arthroplasty on 10/8/2019.  He is being admitted with wound infection.  During the I&D of the wound the synovial fluid was also cloudy and the synovium was inflamed.  The ESR is 61 and CRP is 3.1.  Start the patient on daptomycin and Rocephin.  Avoid vancomycin due to his underlying diabetes mellitus.  Patient is going for another  surgery soon.  He will likely need exchange arthroplasty or two-step procedure.  Follow the culture results.    Discussed with IM. Will continue to follow    Priya Caba M.D.     Please note that this dictation was created using voice recognition software. I have worked with technical experts from [facility] to optimize the interface.  I have made every reasonable attempt to correct obvious errors, but there may be errors of grammar and possibly content that I did not discover before finalizing the note.

## 2019-11-04 NOTE — DISCHARGE PLANNING
Care Transition Team Assessment    Information Source  Orientation : Oriented x 4  Information Given By: Patient(and medical record)  Informant's Name: Ronny  Who is responsible for making decisions for patient? : Patient    Readmission Evaluation  Is this a readmission?: No    Elopement Risk  Legal Hold: No  Ambulatory or Self Mobile in Wheelchair: Yes  Disoriented: No  Psychiatric Symptoms: None  History of Wandering: No  Elopement this Admit: No  Vocalizing Wanting to Leave: No  Displays Behaviors, Body Language Wanting to Leave: No-Not at Risk for Elopement  Elopement Risk: Not at Risk for Elopement    Interdisciplinary Discharge Planning  Patient or legal guardian wants to designate a caregiver (see row info): No    Discharge Preparedness  What is your plan after discharge?: Uncertain - pending medical team collaboration  What are your discharge supports?: Child  Prior Functional Level: Independent with Activities of Daily Living    Functional Assesment  Prior Functional Level: Independent with Activities of Daily Living    Finances  Financial Barriers to Discharge: No  Prescription Coverage: Yes    Vision / Hearing Impairment  Vision Impairment : Yes  Right Eye Vision: Wears Glasses  Left Eye Vision: Wears Glasses  Hearing Impairment : No              Domestic Abuse  Have you ever been the victim of abuse or violence?: No  Physical Abuse or Sexual Abuse: No  Verbal Abuse or Emotional Abuse: No  Possible Abuse Reported to:: Not Applicable    Psychological Assessment  History of Substance Abuse: None  History of Psychiatric Problems: No  Non-compliant with Treatment: No  Newly Diagnosed Illness: No         Anticipated Discharge Information  Anticipated discharge disposition: [facility] Home  Discharge Address: [facility] [address]  Discharge Contact Phone Number: [phone]

## 2019-11-04 NOTE — CARE PLAN
Problem: Safety  Goal: Will remain free from injury  Outcome: PROGRESSING AS EXPECTED  Pt abides by safety precautions and calls for assistance.     Problem: Knowledge Deficit  Goal: Knowledge of disease process/condition, treatment plan, diagnostic tests, and medications will improve  Outcome: PROGRESSING AS EXPECTED  Pt is receptive to information about his condition and treatment.

## 2019-11-04 NOTE — RESPIRATORY CARE
COPD EDUCATION by COPD CLINICAL EDUCATOR  11/4/2019 at 7:30 AM by Joanna Curry     Patient reviewed by COPD education team. Patient does not have a history or diagnosis of COPD and is a non-smoker, therefore does not qualify for the COPD program. Patient does not have an order for Respiratory Care Protocol, therefore the COPD education team cannot treat.

## 2019-11-04 NOTE — ASSESSMENT & PLAN NOTE
No prior heart attack   no active issues or concerns at this time  Continue aspirin 81 mg, beta-blockers and statins

## 2019-11-04 NOTE — CONSULTS
Hospital Medicine Consultation    Date of Service  11/4/2019    Referring Physician  Dr Jesús Wesley     Consulting Physician  Renetta Alan M.D.    Reason for Consultation  Comanagement of medical concerns    History of Presenting Illness  74 y.o. male who presented 11/2/2019 with infected prosthetic left knee joint.    Patient has underlying history of hypertension, dyslipidemia, diabetes mellitus type 2, peripheral neuropathy and history of alcohol use disorder per previous documentation.  Patient underwent an elective total knee arthroplasty by Dr. Charles approximately 4 weeks ago.  Subsequently he had some wound dehiscence, superficial wound dehiscence for which he presented to the emergency department on October 30, 2019, where orthopedics was consulted (Dr Mccray) -who recommended to close the wound with sutures and allow the patient for discharge.  Subsequently patient followed up with [facility], where in the outpatient setting he was noted to have gross purulence coming out of his wound and he was advised to present to the emergency department.  He was admitted by Dr. Jesús Wesley.  He was taken to the operating room by Dr. Wesley on November 2, 2019 where left knee irrigation and debridement with procurement of deep cultures performed and placement of deep drains and wound VAC sponge was done.  Patient was admitted to the orthopedics unit.  Broad-spectrum antibiotics were initiated.  Infectious disease consultation from Dr.Jasmine Caba obtained.  Patient's operative wound cultures/deep tissue cultures have remained negative thus far.  At this time, hospital medicine consultation has been requested for comanagement of underlying medical concerns and to help the surgical team address ongoing medical issues and concerns during hospitalization.    Patient was seen and evaluated by me at bedside, he reports doing well.  Alert and oriented x4.  Does not have any acute complaints apart from some left  knee pain which is stable at this time with ongoing management.  He has a drain and a wound VAC attached to the left knee.  He is anxious and eager to know if further medical interventions, I&D would be performed by the orthopedics team.  He is advised to discuss further with the primary team, orthopedics team regarding these interventions.  Otherwise no other acute medical complaints per patient at this time.    Review of Systems  Review of Systems   Constitutional: Negative for chills, fever and malaise/fatigue.   HENT: Negative for hearing loss.    Eyes: Negative for blurred vision and double vision.   Respiratory: Negative for cough, hemoptysis, sputum production, shortness of breath and wheezing.    Cardiovascular: Negative for chest pain, palpitations and PND.   Gastrointestinal: Negative for abdominal pain, blood in stool, constipation, diarrhea, heartburn, melena, nausea and vomiting.   Genitourinary: Negative for dysuria and urgency.   Musculoskeletal: Positive for joint pain.   Skin: Negative for rash.   Neurological: Negative for dizziness and headaches.   Psychiatric/Behavioral: Negative for depression and suicidal ideas.       Past Medical History   has a past medical history of Arthritis, Asthma, CAD (coronary artery disease), Depression, Diabetes (HCC), Glaucoma, High cholesterol, Hypertension, Hypopotassemia, Hyposmolality and/or hyponatremia, Jaundice (2014), Neuropathy (HCC), Pain, Personal history of peptic ulcer disease, Pneumonia (1978), and Unspecified cataract.    Surgical History   has a past surgical history that includes other (2005); other (1978); lumbar fusion posterior (9/10/2015); lumbar laminectomy diskectomy (9/10/2015); laminotomy (9/10/2015); laparoscopy robotic xi (10/26/2016); lumbar laminectomy diskectomy (Right, 6/13/2017); foraminotomy (6/13/2017); hardware removal neuro (6/13/2017); cataract extraction with iol; ankle total arthroplasty (Right, 6/18/2018); ligament repair  (Right, 6/18/2018); and irrigation & debridement ortho (Left, 11/2/2019).    Family History  family history includes Heart Disease in his father and mother; Stroke in his father and mother.    Social History   reports that he quit smoking about 40 years ago. His smoking use included cigarettes. He has a 19.50 pack-year smoking history. He has never used smokeless tobacco. He reports previous alcohol use. He reports that he does not use drugs.    Medications  Current Facility-Administered Medications   Medication Dose Route Frequency Provider Last Rate Last Dose   • insulin regular (HUMULIN R) injection 2-9 Units  2-9 Units Subcutaneous 4X/DAY ACHSUSY Alan M.D.        And   • glucose 4 g chewable tablet 16 g  16 g Oral Q15 MIN PRN Renetta Alan M.D.        And   • DEXTROSE 10% BOLUS 250 mL  250 mL Intravenous Q15 MIN PRN Renetta Alan M.D.       • Pharmacy Consult Request ...Pain Management Review 1 Each  1 Each Other PHARMACY TO DOSE Jesús Wesley M.D.       • acetaminophen (TYLENOL) tablet 1,000 mg  1,000 mg Oral Q6HRS Jesús Wesley M.D.   1,000 mg at 11/04/19 0455   • oxyCODONE immediate-release (ROXICODONE) tablet 5 mg  5 mg Oral Q3HRS PRN Jesús Wesley M.D.   5 mg at 11/03/19 1326   • oxyCODONE immediate-release (ROXICODONE) tablet 10 mg  10 mg Oral Q3HRS PRN Jesús Wesley M.D.       • morphine (pf) 4 MG/ML injection 4 mg  4 mg Intravenous Q3HRS PRN Jesús Wesley M.D.       • ondansetron (ZOFRAN) syringe/vial injection 4 mg  4 mg Intravenous Q4HRS PRN Jesús Wesley M.D.       • amLODIPine (NORVASC) tablet 5 mg  5 mg Oral DAILY Jesús Wesley M.D.   5 mg at 11/04/19 0456   • gabapentin (NEURONTIN) capsule 600 mg  600 mg Oral Q EVENING Jesús Wesley M.D.   600 mg at 11/03/19 1654   • lisinopril (PRINIVIL) tablet 20 mg  20 mg Oral DAILY Jesús Wesley M.D.   Stopped at 11/04/19 0600   • atorvastatin (LIPITOR) tablet 20 mg  20 mg Oral DAILY Jesús Wesley M.D.   20 mg at 11/04/19 0456   • carvedilol (COREG) tablet 6.25 mg  6.25 mg Oral  BID Jesús Wesley M.D.   6.25 mg at 11/04/19 0456   • escitalopram (LEXAPRO) tablet 20 mg  20 mg Oral QAM Jesús Wesley M.D.   20 mg at 11/04/19 0455   • albuterol inhaler 2 Puff  2 Puff Inhalation Q4H PRN (RT) Jesús Wesley M.D.   2 Puff at 11/03/19 1659   • latanoprost (XALATAN) 0.005 % ophthalmic solution 1 Drop  1 Drop Both Eyes Q EVENING Jesús Wesley M.D.   1 Drop at 11/02/19 2334       Allergies  Allergies   Allergen Reactions   • Nkda [No Known Drug Allergy]        Physical Exam  Temp:  [36.7 °C (98.1 °F)-37.4 °C (99.4 °F)] 37.4 °C (99.4 °F)  Pulse:  [64-85] 64  Resp:  [16-17] 16  BP: (106-150)/(62-82) 150/82  SpO2:  [96 %-98 %] 98 %    Physical Exam  Constitutional:       Appearance: Normal appearance.   HENT:      Head: Normocephalic and atraumatic.      Right Ear: External ear normal.      Left Ear: External ear normal.      Nose: Nose normal.      Mouth/Throat:      Pharynx: Oropharynx is clear.   Eyes:      Conjunctiva/sclera: Conjunctivae normal.      Pupils: Pupils are equal, round, and reactive to light.   Cardiovascular:      Rate and Rhythm: Normal rate and regular rhythm.      Heart sounds: No murmur. No gallop.    Pulmonary:      Effort: No respiratory distress.      Breath sounds: No wheezing, rhonchi or rales.   Chest:      Chest wall: No tenderness.   Abdominal:      General: Bowel sounds are normal. There is no distension.      Palpations: Abdomen is soft. There is no mass.      Tenderness: There is no tenderness. There is no guarding or rebound.      Hernia: No hernia is present.   Musculoskeletal:      Left lower leg: Edema present.      Comments: Left knee drain, wound VAC in place.  And postoperative surgical dressing.   Skin:     Capillary Refill: Capillary refill takes less than 2 seconds.      Coloration: Skin is not jaundiced.      Findings: No bruising.   Neurological:      General: No focal deficit present.      Mental Status: He is alert and oriented to person, place, and time.       Cranial Nerves: No cranial nerve deficit.         Fluids  Date 11/04/19 0700 - 11/05/19 0659   Shift 1824-2834 9574-5175 7529-1710 24 Hour Total   INTAKE   Shift Total       OUTPUT   Urine 500   500   Shift Total 500   500   Weight (kg) 74.2 74.2 74.2 74.2       Laboratory  Recent Labs     11/02/19  1148 11/03/19  0017   WBC 7.9 7.3   RBC 4.15* 3.59*   HEMOGLOBIN 11.4* 10.0*   HEMATOCRIT 35.9* 30.9*   MCV 86.5 86.1   MCH 27.5 27.9   MCHC 31.8* 32.4*   RDW 47.9 46.5   PLATELETCT 646* 552*   MPV 8.8* 8.7*     Recent Labs     11/02/19  1148   SODIUM 131*   POTASSIUM 4.1   CHLORIDE 97   CO2 26   GLUCOSE 105*   BUN 7*   CREATININE 0.65   CALCIUM 9.0                     Imaging  No orders to display       Assessment/Plan  Wound dehiscence- (present on admission)  Assessment & Plan  Postoperative, left knee arthroplasty    Primary team's orthopedics, hospital medicine consulting for comanagement of medical concerns  Further surgical interventions, need for irrigation and debridement per orthopedics team  For now on pain control regimen per primary team  Infectious disease team was consulted per primary team for recommendations for anti-infective therapy  We will defer need for antibiotic therapy to infectious disease team  Monitor culture data    Thrombocytosis (HCC)- (present on admission)  Assessment & Plan  Monitor CBC  ? Infectious   Defer anti-infective management infectious disease team    Anemia, blood loss- (present on admission)  Assessment & Plan  Operative blood loss  Monitor Hb / Restrictive transfusion strategy    Type 2 diabetes mellitus with diabetic polyneuropathy, without long-term current use of insulin (HCC)- (present on admission)  Assessment & Plan  Holding oral hypoglycemic regimen  Sliding scale insulin No. 2    Hyponatremia- (present on admission)  Assessment & Plan  Chronic - monitor - stable at this time     Coronary artery disease involving native coronary artery of native heart without angina  pectoris- (present on admission)  Assessment & Plan  No active issues or concerns at this time  Continue aspirin 81 mg, beta-blockers and statins    Essential hypertension- (present on admission)  Assessment & Plan  Continue amlodipine, lisinopril, carvedilol with holding parameters    Mixed hyperlipidemia- (present on admission)  Assessment & Plan  Continue atorvastatin    Major depressive disorder with single episode, in full remission (HCC)- (present on admission)  Assessment & Plan  Continue at home regimen    DVT PPX: SC HEPARIN

## 2019-11-05 ENCOUNTER — ANESTHESIA (OUTPATIENT)
Dept: SURGERY | Facility: MEDICAL CENTER | Age: 74
DRG: 486 | End: 2019-11-05
Payer: MEDICARE

## 2019-11-05 ENCOUNTER — ANESTHESIA EVENT (OUTPATIENT)
Dept: SURGERY | Facility: MEDICAL CENTER | Age: 74
DRG: 486 | End: 2019-11-05
Payer: MEDICARE

## 2019-11-05 LAB
ALBUMIN SERPL BCP-MCNC: 3.5 G/DL (ref 3.2–4.9)
ALBUMIN/GLOB SERPL: 1.3 G/DL
ALP SERPL-CCNC: 70 U/L (ref 30–99)
ALT SERPL-CCNC: 9 U/L (ref 2–50)
ANION GAP SERPL CALC-SCNC: 8 MMOL/L (ref 0–11.9)
AST SERPL-CCNC: 12 U/L (ref 12–45)
BACTERIA FLD AEROBE CULT: NORMAL
BACTERIA WND AEROBE CULT: NORMAL
BILIRUB SERPL-MCNC: 0.5 MG/DL (ref 0.1–1.5)
BUN SERPL-MCNC: 7 MG/DL (ref 8–22)
CALCIUM SERPL-MCNC: 8.7 MG/DL (ref 8.5–10.5)
CHLORIDE SERPL-SCNC: 100 MMOL/L (ref 96–112)
CK SERPL-CCNC: 46 U/L (ref 0–154)
CO2 SERPL-SCNC: 24 MMOL/L (ref 20–33)
CREAT SERPL-MCNC: 0.6 MG/DL (ref 0.5–1.4)
ERYTHROCYTE [DISTWIDTH] IN BLOOD BY AUTOMATED COUNT: 47.1 FL (ref 35.9–50)
GLOBULIN SER CALC-MCNC: 2.7 G/DL (ref 1.9–3.5)
GLUCOSE BLD-MCNC: 119 MG/DL (ref 65–99)
GLUCOSE BLD-MCNC: 187 MG/DL (ref 65–99)
GLUCOSE BLD-MCNC: 98 MG/DL (ref 65–99)
GLUCOSE SERPL-MCNC: 111 MG/DL (ref 65–99)
GRAM STN SPEC: NORMAL
HCT VFR BLD AUTO: 32.6 % (ref 42–52)
HGB BLD-MCNC: 10.4 G/DL (ref 14–18)
MAGNESIUM SERPL-MCNC: 1.7 MG/DL (ref 1.5–2.5)
MCH RBC QN AUTO: 27.4 PG (ref 27–33)
MCHC RBC AUTO-ENTMCNC: 31.9 G/DL (ref 33.7–35.3)
MCV RBC AUTO: 85.8 FL (ref 81.4–97.8)
PHOSPHATE SERPL-MCNC: 3.9 MG/DL (ref 2.5–4.5)
PLATELET # BLD AUTO: 487 K/UL (ref 164–446)
PMV BLD AUTO: 8.6 FL (ref 9–12.9)
POTASSIUM SERPL-SCNC: 3.5 MMOL/L (ref 3.6–5.5)
PROT SERPL-MCNC: 6.2 G/DL (ref 6–8.2)
RBC # BLD AUTO: 3.8 M/UL (ref 4.7–6.1)
SIGNIFICANT IND 70042: NORMAL
SITE SITE: NORMAL
SODIUM SERPL-SCNC: 132 MMOL/L (ref 135–145)
SOURCE SOURCE: NORMAL
WBC # BLD AUTO: 8.6 K/UL (ref 4.8–10.8)

## 2019-11-05 PROCEDURE — 501838 HCHG SUTURE GENERAL: Performed by: ORTHOPAEDIC SURGERY

## 2019-11-05 PROCEDURE — 502579 HCHG PACK, TOTAL KNEE: Performed by: ORTHOPAEDIC SURGERY

## 2019-11-05 PROCEDURE — 700112 HCHG RX REV CODE 229: Performed by: PHYSICIAN ASSISTANT

## 2019-11-05 PROCEDURE — A9270 NON-COVERED ITEM OR SERVICE: HCPCS | Performed by: ORTHOPAEDIC SURGERY

## 2019-11-05 PROCEDURE — C1776 JOINT DEVICE (IMPLANTABLE): HCPCS | Performed by: ORTHOPAEDIC SURGERY

## 2019-11-05 PROCEDURE — 0SUW09Z SUPPLEMENT LEFT KNEE JOINT, TIBIAL SURFACE WITH LINER, OPEN APPROACH: ICD-10-PCS | Performed by: ORTHOPAEDIC SURGERY

## 2019-11-05 PROCEDURE — 84100 ASSAY OF PHOSPHORUS: CPT

## 2019-11-05 PROCEDURE — A9270 NON-COVERED ITEM OR SERVICE: HCPCS | Performed by: PHYSICIAN ASSISTANT

## 2019-11-05 PROCEDURE — 160009 HCHG ANES TIME/MIN: Performed by: ORTHOPAEDIC SURGERY

## 2019-11-05 PROCEDURE — 36415 COLL VENOUS BLD VENIPUNCTURE: CPT

## 2019-11-05 PROCEDURE — 99233 SBSQ HOSP IP/OBS HIGH 50: CPT | Performed by: INTERNAL MEDICINE

## 2019-11-05 PROCEDURE — 83735 ASSAY OF MAGNESIUM: CPT

## 2019-11-05 PROCEDURE — 770001 HCHG ROOM/CARE - MED/SURG/GYN PRIV*

## 2019-11-05 PROCEDURE — 87205 SMEAR GRAM STAIN: CPT

## 2019-11-05 PROCEDURE — 80053 COMPREHEN METABOLIC PANEL: CPT

## 2019-11-05 PROCEDURE — 700111 HCHG RX REV CODE 636 W/ 250 OVERRIDE (IP): Performed by: ANESTHESIOLOGY

## 2019-11-05 PROCEDURE — 500002 HCHG ADHESIVE, DERMABOND: Performed by: ORTHOPAEDIC SURGERY

## 2019-11-05 PROCEDURE — 700101 HCHG RX REV CODE 250: Performed by: ORTHOPAEDIC SURGERY

## 2019-11-05 PROCEDURE — A9270 NON-COVERED ITEM OR SERVICE: HCPCS | Performed by: ANESTHESIOLOGY

## 2019-11-05 PROCEDURE — 700101 HCHG RX REV CODE 250: Performed by: INTERNAL MEDICINE

## 2019-11-05 PROCEDURE — 99232 SBSQ HOSP IP/OBS MODERATE 35: CPT | Performed by: INTERNAL MEDICINE

## 2019-11-05 PROCEDURE — 0SBD0ZZ EXCISION OF LEFT KNEE JOINT, OPEN APPROACH: ICD-10-PCS | Performed by: ORTHOPAEDIC SURGERY

## 2019-11-05 PROCEDURE — 160002 HCHG RECOVERY MINUTES (STAT): Performed by: ORTHOPAEDIC SURGERY

## 2019-11-05 PROCEDURE — 82962 GLUCOSE BLOOD TEST: CPT | Mod: 91

## 2019-11-05 PROCEDURE — 700111 HCHG RX REV CODE 636 W/ 250 OVERRIDE (IP): Performed by: INTERNAL MEDICINE

## 2019-11-05 PROCEDURE — 160036 HCHG PACU - EA ADDL 30 MINS PHASE I: Performed by: ORTHOPAEDIC SURGERY

## 2019-11-05 PROCEDURE — 160029 HCHG SURGERY MINUTES - 1ST 30 MINS LEVEL 4: Performed by: ORTHOPAEDIC SURGERY

## 2019-11-05 PROCEDURE — 503339 HCHG DRESSSING PICO: Performed by: ORTHOPAEDIC SURGERY

## 2019-11-05 PROCEDURE — 700102 HCHG RX REV CODE 250 W/ 637 OVERRIDE(OP): Performed by: PHYSICIAN ASSISTANT

## 2019-11-05 PROCEDURE — 87070 CULTURE OTHR SPECIMN AEROBIC: CPT

## 2019-11-05 PROCEDURE — 700105 HCHG RX REV CODE 258: Performed by: INTERNAL MEDICINE

## 2019-11-05 PROCEDURE — 160048 HCHG OR STATISTICAL LEVEL 1-5: Performed by: ORTHOPAEDIC SURGERY

## 2019-11-05 PROCEDURE — 87075 CULTR BACTERIA EXCEPT BLOOD: CPT

## 2019-11-05 PROCEDURE — 500881 HCHG PACK, EXTREMITY: Performed by: ORTHOPAEDIC SURGERY

## 2019-11-05 PROCEDURE — 82550 ASSAY OF CK (CPK): CPT

## 2019-11-05 PROCEDURE — 160041 HCHG SURGERY MINUTES - EA ADDL 1 MIN LEVEL 4: Performed by: ORTHOPAEDIC SURGERY

## 2019-11-05 PROCEDURE — 160035 HCHG PACU - 1ST 60 MINS PHASE I: Performed by: ORTHOPAEDIC SURGERY

## 2019-11-05 PROCEDURE — 700102 HCHG RX REV CODE 250 W/ 637 OVERRIDE(OP): Performed by: ORTHOPAEDIC SURGERY

## 2019-11-05 PROCEDURE — 500367 HCHG DRAIN KIT, HEMOVAC: Performed by: ORTHOPAEDIC SURGERY

## 2019-11-05 PROCEDURE — 160022 HCHG BLOCK: Performed by: ORTHOPAEDIC SURGERY

## 2019-11-05 PROCEDURE — 700101 HCHG RX REV CODE 250: Performed by: ANESTHESIOLOGY

## 2019-11-05 PROCEDURE — 0SPD09Z REMOVAL OF LINER FROM LEFT KNEE JOINT, OPEN APPROACH: ICD-10-PCS | Performed by: ORTHOPAEDIC SURGERY

## 2019-11-05 PROCEDURE — 500423 HCHG DRESSING, ABD COMBINE: Performed by: ORTHOPAEDIC SURGERY

## 2019-11-05 PROCEDURE — 85027 COMPLETE CBC AUTOMATED: CPT

## 2019-11-05 PROCEDURE — 700102 HCHG RX REV CODE 250 W/ 637 OVERRIDE(OP): Performed by: ANESTHESIOLOGY

## 2019-11-05 PROCEDURE — 700111 HCHG RX REV CODE 636 W/ 250 OVERRIDE (IP): Performed by: ORTHOPAEDIC SURGERY

## 2019-11-05 DEVICE — INSERT BCS XLPE ARIGHT JOUNEY II  SIZE 3-4 LEFT 10MM: Type: IMPLANTABLE DEVICE | Site: KNEE | Status: FUNCTIONAL

## 2019-11-05 RX ORDER — HYDROMORPHONE HYDROCHLORIDE 1 MG/ML
0.4 INJECTION, SOLUTION INTRAMUSCULAR; INTRAVENOUS; SUBCUTANEOUS
Status: DISCONTINUED | OUTPATIENT
Start: 2019-11-05 | End: 2019-11-05 | Stop reason: HOSPADM

## 2019-11-05 RX ORDER — ONDANSETRON 2 MG/ML
4 INJECTION INTRAMUSCULAR; INTRAVENOUS
Status: DISCONTINUED | OUTPATIENT
Start: 2019-11-05 | End: 2019-11-05 | Stop reason: HOSPADM

## 2019-11-05 RX ORDER — ONDANSETRON 2 MG/ML
INJECTION INTRAMUSCULAR; INTRAVENOUS PRN
Status: DISCONTINUED | OUTPATIENT
Start: 2019-11-05 | End: 2019-11-05 | Stop reason: SURG

## 2019-11-05 RX ORDER — HALOPERIDOL 5 MG/ML
1 INJECTION INTRAMUSCULAR
Status: DISCONTINUED | OUTPATIENT
Start: 2019-11-05 | End: 2019-11-05 | Stop reason: HOSPADM

## 2019-11-05 RX ORDER — HYDROMORPHONE HYDROCHLORIDE 1 MG/ML
0.2 INJECTION, SOLUTION INTRAMUSCULAR; INTRAVENOUS; SUBCUTANEOUS
Status: DISCONTINUED | OUTPATIENT
Start: 2019-11-05 | End: 2019-11-05 | Stop reason: HOSPADM

## 2019-11-05 RX ORDER — OXYCODONE HCL 5 MG/5 ML
10 SOLUTION, ORAL ORAL
Status: COMPLETED | OUTPATIENT
Start: 2019-11-05 | End: 2019-11-05

## 2019-11-05 RX ORDER — ONDANSETRON 2 MG/ML
4 INJECTION INTRAMUSCULAR; INTRAVENOUS EVERY 4 HOURS PRN
Status: DISCONTINUED | OUTPATIENT
Start: 2019-11-05 | End: 2019-11-08 | Stop reason: HOSPADM

## 2019-11-05 RX ORDER — DIPHENHYDRAMINE HYDROCHLORIDE 50 MG/ML
25 INJECTION INTRAMUSCULAR; INTRAVENOUS EVERY 6 HOURS PRN
Status: DISCONTINUED | OUTPATIENT
Start: 2019-11-05 | End: 2019-11-08 | Stop reason: HOSPADM

## 2019-11-05 RX ORDER — SCOLOPAMINE TRANSDERMAL SYSTEM 1 MG/1
1 PATCH, EXTENDED RELEASE TRANSDERMAL
Status: DISCONTINUED | OUTPATIENT
Start: 2019-11-05 | End: 2019-11-08 | Stop reason: HOSPADM

## 2019-11-05 RX ORDER — ROPIVACAINE HYDROCHLORIDE 5 MG/ML
INJECTION, SOLUTION EPIDURAL; INFILTRATION; PERINEURAL
Status: COMPLETED | OUTPATIENT
Start: 2019-11-05 | End: 2019-11-05

## 2019-11-05 RX ORDER — SODIUM CHLORIDE, SODIUM LACTATE, POTASSIUM CHLORIDE, CALCIUM CHLORIDE 600; 310; 30; 20 MG/100ML; MG/100ML; MG/100ML; MG/100ML
INJECTION, SOLUTION INTRAVENOUS CONTINUOUS
Status: DISCONTINUED | OUTPATIENT
Start: 2019-11-05 | End: 2019-11-05 | Stop reason: HOSPADM

## 2019-11-05 RX ORDER — MORPHINE SULFATE 4 MG/ML
4 INJECTION, SOLUTION INTRAMUSCULAR; INTRAVENOUS
Status: DISCONTINUED | OUTPATIENT
Start: 2019-11-05 | End: 2019-11-08 | Stop reason: HOSPADM

## 2019-11-05 RX ORDER — BISACODYL 10 MG
10 SUPPOSITORY, RECTAL RECTAL
Status: DISCONTINUED | OUTPATIENT
Start: 2019-11-05 | End: 2019-11-08 | Stop reason: HOSPADM

## 2019-11-05 RX ORDER — CHLORPROMAZINE HYDROCHLORIDE 10 MG/1
25 TABLET, FILM COATED ORAL EVERY 6 HOURS PRN
Status: DISCONTINUED | OUTPATIENT
Start: 2019-11-05 | End: 2019-11-08 | Stop reason: HOSPADM

## 2019-11-05 RX ORDER — DEXAMETHASONE SODIUM PHOSPHATE 4 MG/ML
INJECTION, SOLUTION INTRA-ARTICULAR; INTRALESIONAL; INTRAMUSCULAR; INTRAVENOUS; SOFT TISSUE PRN
Status: DISCONTINUED | OUTPATIENT
Start: 2019-11-05 | End: 2019-11-05 | Stop reason: SURG

## 2019-11-05 RX ORDER — OXYCODONE HYDROCHLORIDE 5 MG/1
5 TABLET ORAL
Status: DISCONTINUED | OUTPATIENT
Start: 2019-11-05 | End: 2019-11-08 | Stop reason: HOSPADM

## 2019-11-05 RX ORDER — HALOPERIDOL 5 MG/ML
1 INJECTION INTRAMUSCULAR EVERY 6 HOURS PRN
Status: DISCONTINUED | OUTPATIENT
Start: 2019-11-05 | End: 2019-11-08 | Stop reason: HOSPADM

## 2019-11-05 RX ORDER — OXYCODONE HYDROCHLORIDE 10 MG/1
10 TABLET ORAL
Status: DISCONTINUED | OUTPATIENT
Start: 2019-11-05 | End: 2019-11-08 | Stop reason: HOSPADM

## 2019-11-05 RX ORDER — AMOXICILLIN 250 MG
1 CAPSULE ORAL NIGHTLY
Status: DISCONTINUED | OUTPATIENT
Start: 2019-11-05 | End: 2019-11-08 | Stop reason: HOSPADM

## 2019-11-05 RX ORDER — IPRATROPIUM BROMIDE AND ALBUTEROL SULFATE 2.5; .5 MG/3ML; MG/3ML
3 SOLUTION RESPIRATORY (INHALATION)
Status: DISCONTINUED | OUTPATIENT
Start: 2019-11-05 | End: 2019-11-05 | Stop reason: HOSPADM

## 2019-11-05 RX ORDER — MEPERIDINE HYDROCHLORIDE 25 MG/ML
12.5 INJECTION INTRAMUSCULAR; INTRAVENOUS; SUBCUTANEOUS
Status: DISCONTINUED | OUTPATIENT
Start: 2019-11-05 | End: 2019-11-05 | Stop reason: HOSPADM

## 2019-11-05 RX ORDER — OXYCODONE HCL 5 MG/5 ML
5 SOLUTION, ORAL ORAL
Status: COMPLETED | OUTPATIENT
Start: 2019-11-05 | End: 2019-11-05

## 2019-11-05 RX ORDER — DOCUSATE SODIUM 100 MG/1
100 CAPSULE, LIQUID FILLED ORAL 2 TIMES DAILY
Status: DISCONTINUED | OUTPATIENT
Start: 2019-11-05 | End: 2019-11-08 | Stop reason: HOSPADM

## 2019-11-05 RX ORDER — KETOROLAC TROMETHAMINE 30 MG/ML
INJECTION, SOLUTION INTRAMUSCULAR; INTRAVENOUS
Status: DISCONTINUED | OUTPATIENT
Start: 2019-11-05 | End: 2019-11-05 | Stop reason: HOSPADM

## 2019-11-05 RX ORDER — DEXAMETHASONE SODIUM PHOSPHATE 4 MG/ML
4 INJECTION, SOLUTION INTRA-ARTICULAR; INTRALESIONAL; INTRAMUSCULAR; INTRAVENOUS; SOFT TISSUE
Status: DISCONTINUED | OUTPATIENT
Start: 2019-11-05 | End: 2019-11-08 | Stop reason: HOSPADM

## 2019-11-05 RX ORDER — BUPIVACAINE HYDROCHLORIDE AND EPINEPHRINE 2.5; 5 MG/ML; UG/ML
INJECTION, SOLUTION EPIDURAL; INFILTRATION; INTRACAUDAL; PERINEURAL
Status: DISCONTINUED | OUTPATIENT
Start: 2019-11-05 | End: 2019-11-05 | Stop reason: HOSPADM

## 2019-11-05 RX ORDER — DIPHENHYDRAMINE HYDROCHLORIDE 50 MG/ML
12.5 INJECTION INTRAMUSCULAR; INTRAVENOUS
Status: DISCONTINUED | OUTPATIENT
Start: 2019-11-05 | End: 2019-11-05 | Stop reason: HOSPADM

## 2019-11-05 RX ORDER — SODIUM CHLORIDE 0.9 % (FLUSH) 0.9 %
SYRINGE (ML) INJECTION
Status: DISCONTINUED | OUTPATIENT
Start: 2019-11-05 | End: 2019-11-05 | Stop reason: HOSPADM

## 2019-11-05 RX ORDER — HYDROMORPHONE HYDROCHLORIDE 1 MG/ML
0.1 INJECTION, SOLUTION INTRAMUSCULAR; INTRAVENOUS; SUBCUTANEOUS
Status: DISCONTINUED | OUTPATIENT
Start: 2019-11-05 | End: 2019-11-05 | Stop reason: HOSPADM

## 2019-11-05 RX ORDER — ENEMA 19; 7 G/133ML; G/133ML
1 ENEMA RECTAL
Status: DISCONTINUED | OUTPATIENT
Start: 2019-11-05 | End: 2019-11-08 | Stop reason: HOSPADM

## 2019-11-05 RX ORDER — DIPHENHYDRAMINE HCL 25 MG
25 TABLET ORAL EVERY 6 HOURS PRN
Status: DISCONTINUED | OUTPATIENT
Start: 2019-11-05 | End: 2019-11-08 | Stop reason: HOSPADM

## 2019-11-05 RX ORDER — AMOXICILLIN 250 MG
1 CAPSULE ORAL
Status: DISCONTINUED | OUTPATIENT
Start: 2019-11-05 | End: 2019-11-08 | Stop reason: HOSPADM

## 2019-11-05 RX ORDER — LIDOCAINE HYDROCHLORIDE 20 MG/ML
INJECTION, SOLUTION EPIDURAL; INFILTRATION; INTRACAUDAL; PERINEURAL PRN
Status: DISCONTINUED | OUTPATIENT
Start: 2019-11-05 | End: 2019-11-05 | Stop reason: SURG

## 2019-11-05 RX ORDER — POLYETHYLENE GLYCOL 3350 17 G/17G
1 POWDER, FOR SOLUTION ORAL 2 TIMES DAILY PRN
Status: DISCONTINUED | OUTPATIENT
Start: 2019-11-05 | End: 2019-11-08 | Stop reason: HOSPADM

## 2019-11-05 RX ORDER — SODIUM CHLORIDE AND POTASSIUM CHLORIDE 150; 900 MG/100ML; MG/100ML
INJECTION, SOLUTION INTRAVENOUS CONTINUOUS
Status: DISCONTINUED | OUTPATIENT
Start: 2019-11-05 | End: 2019-11-06

## 2019-11-05 RX ORDER — CHLORPROMAZINE HYDROCHLORIDE 25 MG/ML
25 INJECTION INTRAMUSCULAR EVERY 6 HOURS PRN
Status: DISCONTINUED | OUTPATIENT
Start: 2019-11-05 | End: 2019-11-08 | Stop reason: HOSPADM

## 2019-11-05 RX ADMIN — ACETAMINOPHEN 1000 MG: 500 TABLET ORAL at 21:48

## 2019-11-05 RX ADMIN — CEFTRIAXONE SODIUM 2 G: 2 INJECTION, POWDER, FOR SOLUTION INTRAMUSCULAR; INTRAVENOUS at 13:00

## 2019-11-05 RX ADMIN — DEXAMETHASONE SODIUM PHOSPHATE 4 MG: 4 INJECTION, SOLUTION INTRA-ARTICULAR; INTRALESIONAL; INTRAMUSCULAR; INTRAVENOUS; SOFT TISSUE at 15:58

## 2019-11-05 RX ADMIN — LIDOCAINE HYDROCHLORIDE 5 ML: 20 INJECTION, SOLUTION EPIDURAL; INFILTRATION; INTRACAUDAL at 16:15

## 2019-11-05 RX ADMIN — LISINOPRIL 20 MG: 20 TABLET ORAL at 04:21

## 2019-11-05 RX ADMIN — ROPIVACAINE HYDROCHLORIDE 30 ML: 5 INJECTION, SOLUTION EPIDURAL; INFILTRATION; PERINEURAL at 16:12

## 2019-11-05 RX ADMIN — POTASSIUM CHLORIDE AND SODIUM CHLORIDE: 900; 150 INJECTION, SOLUTION INTRAVENOUS at 09:50

## 2019-11-05 RX ADMIN — CARVEDILOL 6.25 MG: 6.25 TABLET, FILM COATED ORAL at 20:36

## 2019-11-05 RX ADMIN — FENTANYL CITRATE 50 MCG: 50 INJECTION, SOLUTION INTRAMUSCULAR; INTRAVENOUS at 18:42

## 2019-11-05 RX ADMIN — HYDROMORPHONE HYDROCHLORIDE 0.2 MG: 1 INJECTION, SOLUTION INTRAMUSCULAR; INTRAVENOUS; SUBCUTANEOUS at 18:11

## 2019-11-05 RX ADMIN — FENTANYL CITRATE 50 MCG: 50 INJECTION, SOLUTION INTRAMUSCULAR; INTRAVENOUS at 17:49

## 2019-11-05 RX ADMIN — FENTANYL CITRATE 50 MCG: 50 INJECTION, SOLUTION INTRAMUSCULAR; INTRAVENOUS at 18:16

## 2019-11-05 RX ADMIN — FENTANYL CITRATE 50 MCG: 50 INJECTION, SOLUTION INTRAMUSCULAR; INTRAVENOUS at 16:15

## 2019-11-05 RX ADMIN — INSULIN HUMAN 2 UNITS: 100 INJECTION, SOLUTION PARENTERAL at 20:45

## 2019-11-05 RX ADMIN — HYDROMORPHONE HYDROCHLORIDE 0.4 MG: 1 INJECTION, SOLUTION INTRAMUSCULAR; INTRAVENOUS; SUBCUTANEOUS at 17:59

## 2019-11-05 RX ADMIN — OXYCODONE HYDROCHLORIDE 10 MG: 5 SOLUTION ORAL at 17:43

## 2019-11-05 RX ADMIN — CARVEDILOL 6.25 MG: 6.25 TABLET, FILM COATED ORAL at 04:20

## 2019-11-05 RX ADMIN — HEPARIN SODIUM 5000 UNITS: 5000 INJECTION, SOLUTION INTRAVENOUS; SUBCUTANEOUS at 22:00

## 2019-11-05 RX ADMIN — PROPOFOL 150 MG: 10 INJECTION, EMULSION INTRAVENOUS at 16:15

## 2019-11-05 RX ADMIN — FENTANYL CITRATE 50 MCG: 50 INJECTION, SOLUTION INTRAMUSCULAR; INTRAVENOUS at 15:58

## 2019-11-05 RX ADMIN — ONDANSETRON 4 MG: 2 INJECTION INTRAMUSCULAR; INTRAVENOUS at 15:58

## 2019-11-05 RX ADMIN — GABAPENTIN 600 MG: 300 CAPSULE ORAL at 20:35

## 2019-11-05 RX ADMIN — ESCITALOPRAM OXALATE 20 MG: 10 TABLET ORAL at 04:21

## 2019-11-05 RX ADMIN — ATORVASTATIN CALCIUM 20 MG: 20 TABLET, FILM COATED ORAL at 04:21

## 2019-11-05 RX ADMIN — HYDROMORPHONE HYDROCHLORIDE 0.4 MG: 1 INJECTION, SOLUTION INTRAMUSCULAR; INTRAVENOUS; SUBCUTANEOUS at 18:06

## 2019-11-05 RX ADMIN — FENTANYL CITRATE 50 MCG: 50 INJECTION, SOLUTION INTRAMUSCULAR; INTRAVENOUS at 17:45

## 2019-11-05 RX ADMIN — ACETAMINOPHEN 1000 MG: 500 TABLET ORAL at 04:20

## 2019-11-05 RX ADMIN — AMLODIPINE BESYLATE 5 MG: 5 TABLET ORAL at 04:21

## 2019-11-05 RX ADMIN — SENNOSIDES AND DOCUSATE SODIUM 1 TABLET: 8.6; 5 TABLET ORAL at 21:49

## 2019-11-05 RX ADMIN — DOCUSATE SODIUM 100 MG: 100 CAPSULE, LIQUID FILLED ORAL at 21:49

## 2019-11-05 ASSESSMENT — ENCOUNTER SYMPTOMS
NAUSEA: 0
CONSTIPATION: 0
ABDOMINAL PAIN: 0
VOMITING: 0
SPEECH CHANGE: 0
SENSORY CHANGE: 0
FEVER: 0
COUGH: 0
DIZZINESS: 0
MYALGIAS: 0
SHORTNESS OF BREATH: 0
HEADACHES: 0
BLURRED VISION: 0

## 2019-11-05 ASSESSMENT — PAIN SCALES - GENERAL: PAIN_LEVEL: 3

## 2019-11-05 NOTE — ANESTHESIA PREPROCEDURE EVALUATION
Left septic knee following arthroplasty.     Denies problems with anesthesia.    Denies angina, dyspnea, GERD.     Asthma requiring occasional albuterol.     Relevant Problems   NEURO   (+) History of peptic ulcer disease      CARDIAC   (+) Coronary artery disease involving native coronary artery of native heart without angina pectoris   (+) Essential hypertension      ENDO   (+) Type 2 diabetes mellitus with diabetic polyneuropathy, without long-term current use of insulin (HCC)       Physical Exam    Airway   Mallampati: II  TM distance: >3 FB  Neck ROM: full       Cardiovascular - normal exam  Rhythm: regular  Rate: normal  (-) murmur     Dental - normal exam         Pulmonary - normal exam  Breath sounds clear to auscultation     Abdominal    Neurological - normal exam                 Anesthesia Plan    ASA 3- EMERGENT   ASA physical status 3 criteria: CAD/stents (> 3 months)ASA physical status emergent criteria: acutely contaminated wound or identified infection source    Plan - general and peripheral nerve block     Peripheral nerve block will be post-op pain control  Airway plan will be LMA        Induction: intravenous    Postoperative Plan: Postoperative administration of opioids is intended.    Pertinent diagnostic labs and testing reviewed    Informed Consent:    Anesthetic plan and risks discussed with patient.    Use of blood products discussed with: patient whom consented to blood products.

## 2019-11-05 NOTE — CARE PLAN
Problem: Safety  Goal: Will remain free from injury  Outcome: PROGRESSING AS EXPECTED  Goal: Will remain free from falls  Outcome: PROGRESSING AS EXPECTED  Pt uses call light approprietly.      Problem: Pain Management  Goal: Pain level will decrease to patient's comfort goal  Outcome: PROGRESSING AS EXPECTED  Pt denies pain and need for pain meds.

## 2019-11-05 NOTE — PROGRESS NOTES
Infectious Disease Progress Note    Author: Priya Caba M.D. Date & Time of service: 2019  11:58 AM    Chief Complaint:  Prostatic knee infection  Interval History:  2019-no fevers.  No new issues overnight.  Going for surgery today.  WBC is 8.6.  Labs Reviewed and Medications Reviewed.    Review of Systems:  Review of Systems   Constitutional: Positive for malaise/fatigue. Negative for fever.   HENT: Negative for hearing loss.    Respiratory: Negative for cough and shortness of breath.    Cardiovascular: Negative for chest pain and leg swelling.   Gastrointestinal: Negative for nausea and vomiting.   Genitourinary: Negative for dysuria.   Musculoskeletal: Positive for joint pain. Negative for myalgias.        Left knee bandaged.   Neurological: Negative for sensory change and speech change.       Hemodynamics:  Temp (24hrs), Av.9 °C (98.4 °F), Min:36.2 °C (97.2 °F), Max:37.3 °C (99.1 °F)  Temperature: 37.2 °C (98.9 °F)  Pulse  Av.9  Min: 61  Max: 90   Blood Pressure : 153/71       Physical Exam:  Physical Exam  Vitals signs reviewed.   Constitutional:       Appearance: Normal appearance. He is normal weight.   HENT:      Head: Normocephalic and atraumatic.      Right Ear: External ear normal.      Left Ear: External ear normal.      Nose: Nose normal. No congestion.      Mouth/Throat:      Mouth: Mucous membranes are moist.      Pharynx: No oropharyngeal exudate.   Eyes:      General: No scleral icterus.     Pupils: Pupils are equal, round, and reactive to light.   Neck:      Musculoskeletal: Neck supple.   Cardiovascular:      Rate and Rhythm: Normal rate and regular rhythm.      Heart sounds: Normal heart sounds. No murmur.   Pulmonary:      Effort: Pulmonary effort is normal. No respiratory distress.      Breath sounds: No wheezing.   Abdominal:      General: Bowel sounds are normal. There is no distension.      Palpations: Abdomen is soft.      Tenderness: There is no tenderness. There  is no guarding.   Musculoskeletal: Normal range of motion.         General: Tenderness present. No swelling or deformity.      Comments: Left knee is bandaged with Hemovac   Lymphadenopathy:      Cervical: No cervical adenopathy.   Skin:     General: Skin is warm and dry.      Findings: No erythema.   Neurological:      General: No focal deficit present.      Mental Status: He is alert and oriented to person, place, and time.   Psychiatric:         Mood and Affect: Mood normal.         Meds:    Current Facility-Administered Medications:   •  0.9 % NaCl with KCl 20 mEq 1,000 mL  •  insulin regular **AND** Accu-Chek ACHS **AND** NOTIFY MD and PharmD **AND** glucose **AND** dextrose 10% bolus  •  aspirin EC  •  heparin  •  DAPTOmycin  •  cefTRIAXone (ROCEPHIN) IV  •  Pharmacy Consult Request  •  acetaminophen  •  oxyCODONE immediate-release  •  oxyCODONE immediate release  •  morphine injection  •  ondansetron  •  amLODIPine  •  gabapentin  •  lisinopril  •  atorvastatin  •  carvedilol  •  escitalopram  •  albuterol  •  latanoprost    Labs:  Recent Labs     11/03/19  0017 11/05/19 0054   WBC 7.3 8.6   RBC 3.59* 3.80*   HEMOGLOBIN 10.0* 10.4*   HEMATOCRIT 30.9* 32.6*   MCV 86.1 85.8   MCH 27.9 27.4   RDW 46.5 47.1   PLATELETCT 552* 487*   MPV 8.7* 8.6*     Recent Labs     11/05/19 0054   SODIUM 132*   POTASSIUM 3.5*   CHLORIDE 100   CO2 24   GLUCOSE 111*   BUN 7*   CPKTOTAL 46     Recent Labs     11/05/19 0054   ALBUMIN 3.5   TBILIRUBIN 0.5   ALKPHOSPHAT 70   TOTPROTEIN 6.2   ALTSGPT 9   ASTSGOT 12   CREATININE 0.60       Imaging:  No results found.    Micro:  Results     Procedure Component Value Units Date/Time    CULTURE TISSUE W/ GRM STAIN [[id number]] Collected:  11/02/19 1343    Order Status:  Completed Specimen:  Tissue Updated:  11/05/19 0918     Significant Indicator NEG     Source TISS     Site Left Knee Deep Wound     Culture Result No growth at 48 hours.     Gram Stain Result No organisms seen.     Narrative:       Surgery Specimen    Anaerobic Culture [[id number]] Collected:  11/02/19 1343    Order Status:  Completed Specimen:  Tissue Updated:  11/05/19 0918     Significant Indicator NEG     Source TISS     Site Left Knee Deep Wound     Culture Result Culture in progress.    Narrative:       Surgery Specimen    AFB Culture [[id number]] Collected:  11/02/19 1343    Order Status:  Completed Specimen:  Tissue Updated:  11/05/19 0918     Significant Indicator NEG     Source TISS     Site Left Knee Deep Wound     Culture Result Culture in progress.     AFB Smear Results No acid fast bacilli seen.    Narrative:       Surgery Specimen    CULTURE TISSUE W/ GRM STAIN [[id number]] Collected:  11/02/19 1246    Order Status:  Completed Specimen:  Tissue Updated:  11/05/19 0918     Significant Indicator NEG     Source TISS     Site Left Knee Tissue     Culture Result No growth at 48 hours.     Gram Stain Result Few WBCs.  No organisms seen.      Narrative:       Surgery Specimen    Anaerobic Culture [[id number]] Collected:  11/02/19 1246    Order Status:  Completed Specimen:  Tissue Updated:  11/05/19 0918     Significant Indicator NEG     Source TISS     Site Left Knee Tissue     Culture Result Culture in progress.    Narrative:       Surgery Specimen    AFB Culture [[id number]] Collected:  11/02/19 1246    Order Status:  Completed Specimen:  Tissue Updated:  11/05/19 0918     Significant Indicator NEG     Source TISS     Site Left Knee Tissue     Culture Result Culture in progress.     AFB Smear Results No acid fast bacilli seen.    Narrative:       Surgery Specimen    Anaerobic Culture [[id number]] Collected:  11/02/19 1343    Order Status:  Completed Specimen:  Wound Updated:  11/05/19 0911     Significant Indicator NEG     Source WND     Site Left Knee     Culture Result Culture in progress.    Narrative:       Surgery - swabs received    CULTURE WOUND W/ GRAM STAIN [[id number]] Collected:  11/02/19 1343    Order Status:   Completed Specimen:  Wound Updated:  11/05/19 0911     Significant Indicator NEG     Source WND     Site Left Knee     Culture Result No growth at 72 hours.     Gram Stain Result Rare WBCs.  No organisms seen.      Narrative:       Surgery - swabs received    FLUID CULTURE W/GRAM STAIN [[id number]] Collected:  11/02/19 1327    Order Status:  Completed Specimen:  Synovial Updated:  11/05/19 0911     Significant Indicator NEG     Source SYNO     Site Left Knee     Culture Result No growth at 72 hours.     Gram Stain Result Rare WBCs.  No organisms seen.      Narrative:       Surgery Specimen    Anaerobic Culture [[id number]] Collected:  11/02/19 1327    Order Status:  Completed Specimen:  Synovial Updated:  11/05/19 0911     Significant Indicator NEG     Source SYNO     Site Left Knee     Culture Result Culture in progress.    Narrative:       Surgery Specimen    CULTURE WOUND W/ GRAM STAIN [[id number]] Collected:  11/02/19 1148    Order Status:  Completed Specimen:  Wound from Left Leg Updated:  11/04/19 1026     Significant Indicator NEG     Source WND     Site LEFT LEG     Culture Result Rare growth mixed skin jacky.     Gram Stain Result Few WBCs.  Rare Gram negative rods.      Narrative:       Left knee  Left knee    Acid Fast Stain [[id number]] Collected:  11/02/19 1343    Order Status:  Completed Specimen:  Tissue Updated:  11/04/19 0707     Significant Indicator NEG     Source TISS     Site Left Knee Deep Wound     AFB Smear Results No acid fast bacilli seen.    Narrative:       Surgery Specimen    GRAM STAIN [[id number]] Collected:  11/02/19 1343    Order Status:  Completed Specimen:  Tissue Updated:  11/04/19 0707     Significant Indicator .     Source TISS     Site Left Knee Deep Wound     Gram Stain Result No organisms seen.    Narrative:       Surgery Specimen    Acid Fast Stain [[id number]] Collected:  11/02/19 1246    Order Status:  Completed Specimen:  Tissue Updated:  11/04/19 0707     Significant Indicator  "NEG     Source TISS     Site Left Knee Tissue     AFB Smear Results No acid fast bacilli seen.    Narrative:       Surgery Specimen    GRAM STAIN [[id number]] Collected:  11/02/19 1246    Order Status:  Completed Specimen:  Tissue Updated:  11/04/19 0707     Significant Indicator .     Source TISS     Site Left Knee Tissue     Gram Stain Result Few WBCs.  No organisms seen.      Narrative:       Surgery Specimen    BLOOD CULTURE x2 [[id number]] Collected:  11/02/19 1208    Order Status:  Completed Specimen:  Blood from Peripheral Updated:  11/03/19 0945     Significant Indicator NEG     Source BLD     Site PERIPHERAL     Culture Result No Growth  Note: Blood cultures are incubated for 5 days and  are monitored continuously.Positive blood cultures  are called to the RN and reported as soon as  they are identified.      Narrative:       Per Hospital Policy: Only change Specimen Src: to \"Line\" if  specified by physician order.  Right Forearm/Arm    BLOOD CULTURE x2 [[id number]] Collected:  11/02/19 1148    Order Status:  Completed Specimen:  Blood from Peripheral Updated:  11/03/19 0945     Significant Indicator NEG     Source BLD     Site PERIPHERAL     Culture Result No Growth  Note: Blood cultures are incubated for 5 days and  are monitored continuously.Positive blood cultures  are called to the RN and reported as soon as  they are identified.      Narrative:       Per Hospital Policy: Only change Specimen Src: to \"Line\" if  specified by physician order.  No site indicated    GRAM STAIN [[id number]] Collected:  11/02/19 1343    Order Status:  Completed Specimen:  Wound Updated:  11/02/19 1655     Significant Indicator .     Source WND     Site Left Knee     Gram Stain Result Rare WBCs.  No organisms seen.      Narrative:       Surgery - swabs received    GRAM STAIN [[id number]] Collected:  11/02/19 1327    Order Status:  Completed Specimen:  Body Fluid Updated:  11/02/19 1654     Significant Indicator .     Source BF     " Site Left Knee     Gram Stain Result Rare WBCs.  No organisms seen.      GRAM STAIN [[id number]] Collected:  11/02/19 1148    Order Status:  Completed Specimen:  Wound Updated:  11/02/19 1433     Significant Indicator .     Source WND     Site LEFT LEG     Gram Stain Result Few WBCs.  Rare Gram negative rods.      Narrative:       Left knee  Left knee          Assessment:  Active Hospital Problems    Diagnosis   • Wound dehiscence [T81.30XA]   • Anemia, blood loss [D50.0]   • Thrombocytosis (HCC) [D47.3]   • Type 2 diabetes mellitus with diabetic polyneuropathy, without long-term current use of insulin ([facility]) [E11.42]   • Hyponatremia [E87.1]   • Coronary artery disease involving native coronary artery of native heart without angina pectoris [I25.10]   • Mixed hyperlipidemia [E78.2]   • Essential hypertension [I10]   • Major depressive disorder with single episode, in full remission ([facility]) [F32.5]   Prosthetic knee infection    Plan:  Prosthetic knee infection  Patient is going for surgery today  He had I&D of the wound on 11/2/2019-those cultures are negative so far.  The Gram stain is showing rare gram-negative rods.  Will change Rocephin to cefepime  Will DC daptomycin if no MRSA grows    Diabetes mellitus  Control the blood sugars for wound healing    Coronary artery disease  Discussed with internal medicine.

## 2019-11-05 NOTE — CONSULTS
Hospital Medicine Consultation    Date of Service  11/5/2019    Referring Physician  Regis Haile M.D.    Consulting Physician  Regis Haile M.D.    Reason for Consultation  Medical management    Interval history  74 y.o. male who presented 11/2/2019 with PMH HTN, HDL, DM, peripheral neuropathy, history of alcohol use disorder who presented with septic prosthetic left knee and wound dehiscence.  Patient underwent I&D with Dr. Wesley 11/2 with placement of deep drains and wound VAC sponge.  Infectious disease was consulted for antibiotics.    11/5 - He denies chest pain.  He denies knee pain.  His glucose is well controlled.  He is anxious and concerned because his brother had issues with leg surgery and had to had extensive procedures and rehabilitation. He hopes that this does not happen to him.  Hypokalemia, repleted.  Surgery planned for this afternoon.    Review of Systems  Review of Systems   Constitutional: Negative for fever.   HENT: Negative for congestion.    Eyes: Negative for blurred vision.   Respiratory: Negative for cough and shortness of breath.    Cardiovascular: Negative for chest pain and leg swelling.   Gastrointestinal: Negative for abdominal pain, constipation and vomiting.   Genitourinary: Negative for dysuria.   Musculoskeletal: Negative for joint pain.   Skin: Negative for itching.   Neurological: Negative for dizziness and headaches.       Past Medical History   has a past medical history of Arthritis, Asthma, CAD (coronary artery disease), Depression, Diabetes (HCC), Glaucoma, High cholesterol, Hypertension, Hypopotassemia, Hyposmolality and/or hyponatremia, Jaundice (2014), Neuropathy (HCC), Pain, Personal history of peptic ulcer disease, Pneumonia (1978), and Unspecified cataract.    Surgical History   has a past surgical history that includes other (2005); other (1978); lumbar fusion posterior (9/10/2015); lumbar laminectomy diskectomy (9/10/2015); laminotomy (9/10/2015); laparoscopy  robotic xi (10/26/2016); lumbar laminectomy diskectomy (Right, 6/13/2017); foraminotomy (6/13/2017); hardware removal neuro (6/13/2017); cataract extraction with iol; ankle total arthroplasty (Right, 6/18/2018); ligament repair (Right, 6/18/2018); and irrigation & debridement ortho (Left, 11/2/2019).    Family History  family history includes Heart Disease in his father and mother; Stroke in his father and mother.    Social History   reports that he quit smoking about 40 years ago. His smoking use included cigarettes. He has a 19.50 pack-year smoking history. He has never used smokeless tobacco. He reports previous alcohol use. He reports that he does not use drugs.    Medications  Prior to Admission Medications   Prescriptions Last Dose Informant Patient Reported? Taking?   LUMIGAN 0.01 % Solution  Patient Yes No   Sig: Place 1 Drop in both eyes every bedtime.   Multiple Vitamins-Minerals (CENTRUM SILVER 50+MEN) Tab  Patient Yes No   Sig: Take 1 Tab by mouth every morning.   PROAIR  (90 Base) MCG/ACT Aero Soln inhalation aerosol  Patient Yes No   Sig: Inhale 2 Puffs by mouth every four hours as needed for Shortness of Breath.   amLODIPine (NORVASC) 5 MG Tab  Patient Yes No   Sig: Take 5 mg by mouth every day.   aspirin (ASA) 81 MG Chew Tab chewable tablet  Patient Yes No   Sig: Take 162 mg by mouth every day.   atorvastatin (LIPITOR) 20 MG Tab  Patient Yes No   Sig: Take 20 mg by mouth every day.   carvedilol (COREG) 6.25 MG Tab  Patient Yes No   Sig: Take 6.25 mg by mouth 2 times a day.   escitalopram (LEXAPRO) 20 MG tablet  Patient Yes No   Sig: Take 20 mg by mouth every morning. Indications: Major Depressive Disorder   gabapentin (NEURONTIN) 600 MG tablet  Patient Yes No   Sig: Take 600 mg by mouth 2 times a day.   lisinopril (PRINIVIL) 20 MG Tab  Patient Yes No   Sig: Take 20 mg by mouth every day.   metFORMIN (GLUCOPHAGE) 500 MG Tab  Patient Yes No   Sig: Take 500 mg by mouth every day.   naproxen  (NAPROSYN) 500 MG Tab  Patient Yes No   Sig: Take 500 mg by mouth 2 times a day, with meals.      Facility-Administered Medications: None       Allergies  Allergies   Allergen Reactions   • Nkda [No Known Drug Allergy]        Physical Exam  Temp:  [36.2 °C (97.2 °F)-37.3 °C (99.1 °F)] 37.2 °C (98.9 °F)  Pulse:  [61-75] 72  Resp:  [14-18] 17  BP: (149-159)/(71-85) 153/71  SpO2:  [95 %-100 %] 100 %    Physical Exam  Vitals signs and nursing note reviewed.   Constitutional:       General: He is not in acute distress.     Appearance: He is not ill-appearing.   HENT:      Right Ear: External ear normal.      Left Ear: External ear normal.      Mouth/Throat:      Mouth: Mucous membranes are moist.   Eyes:      General:         Right eye: No discharge.         Left eye: No discharge.   Neck:      Musculoskeletal: Neck supple.   Cardiovascular:      Rate and Rhythm: Normal rate and regular rhythm.   Pulmonary:      Effort: No respiratory distress.      Breath sounds: No wheezing or rales.   Abdominal:      General: There is no distension.      Palpations: Abdomen is soft.      Tenderness: There is no tenderness.   Musculoskeletal:      Comments: Left knee with wound VAC in place   Skin:     General: Skin is warm and dry.   Neurological:      Mental Status: He is alert and oriented to person, place, and time.         Fluids      Laboratory  Recent Labs     11/03/19  0017 11/05/19  0054   WBC 7.3 8.6   RBC 3.59* 3.80*   HEMOGLOBIN 10.0* 10.4*   HEMATOCRIT 30.9* 32.6*   MCV 86.1 85.8   MCH 27.9 27.4   MCHC 32.4* 31.9*   RDW 46.5 47.1   PLATELETCT 552* 487*   MPV 8.7* 8.6*     Recent Labs     11/05/19  0054   SODIUM 132*   POTASSIUM 3.5*   CHLORIDE 100   CO2 24   GLUCOSE 111*   BUN 7*   CREATININE 0.60   CALCIUM 8.7                     Imaging  No orders to display       Assessment/Plan  Wound dehiscence- (present on admission)  Assessment & Plan  Postoperative, left knee arthroplasty    Primary team's orthopedics, [facility] [place]  medicine consulting for comanagement of medical concerns  Further surgical interventions, need for irrigation and debridement per orthopedics team  For now on pain control regimen per primary team  Infectious disease team was consulted per primary team for recommendations for anti-infective therapy  We will defer need for antibiotic therapy to infectious disease team, continue on daptomycin and Rocephin  Monitor culture data    Hypokalemia  Assessment & Plan  Repletion ordered.  Recheck K and mag level    Thrombocytosis (HCC)- (present on admission)  Assessment & Plan  Monitor CBC  New this admission, Infectious?  Continue on antibiotics    Anemia, blood loss- (present on admission)  Assessment & Plan  Operative blood loss  Monitor Hb / Restrictive transfusion strategy    Type 2 diabetes mellitus with diabetic polyneuropathy, without long-term current use of insulin (HCC)- (present on admission)  Assessment & Plan  Holding oral hypoglycemic regimen  Sliding scale insulin No. 2    Hyponatremia- (present on admission)  Assessment & Plan  Chronic - monitor - stable at this time     Coronary artery disease involving native coronary artery of native heart without angina pectoris- (present on admission)  Assessment & Plan  No prior heart attack   no active issues or concerns at this time  Continue aspirin 81 mg, beta-blockers and statins    Essential hypertension- (present on admission)  Assessment & Plan  Continue amlodipine, lisinopril, carvedilol with holding parameters    Mixed hyperlipidemia- (present on admission)  Assessment & Plan  Continue atorvastatin    Major depressive disorder with single episode, in full remission (HCC)- (present on admission)  Assessment & Plan  Stable  Continue at home regimen

## 2019-11-06 ENCOUNTER — APPOINTMENT (OUTPATIENT)
Dept: RADIOLOGY | Facility: MEDICAL CENTER | Age: 74
DRG: 486 | End: 2019-11-06
Attending: INTERNAL MEDICINE
Payer: MEDICARE

## 2019-11-06 LAB
ANION GAP SERPL CALC-SCNC: 7 MMOL/L (ref 0–11.9)
BACTERIA TISS AEROBE CULT: NORMAL
BACTERIA TISS AEROBE CULT: NORMAL
BASOPHILS # BLD AUTO: 0.1 % (ref 0–1.8)
BASOPHILS # BLD: 0.01 K/UL (ref 0–0.12)
BUN SERPL-MCNC: 11 MG/DL (ref 8–22)
CALCIUM SERPL-MCNC: 8.7 MG/DL (ref 8.5–10.5)
CHLORIDE SERPL-SCNC: 101 MMOL/L (ref 96–112)
CO2 SERPL-SCNC: 25 MMOL/L (ref 20–33)
CREAT SERPL-MCNC: 0.61 MG/DL (ref 0.5–1.4)
EOSINOPHIL # BLD AUTO: 0 K/UL (ref 0–0.51)
EOSINOPHIL NFR BLD: 0 % (ref 0–6.9)
ERYTHROCYTE [DISTWIDTH] IN BLOOD BY AUTOMATED COUNT: 49 FL (ref 35.9–50)
GLUCOSE BLD-MCNC: 113 MG/DL (ref 65–99)
GLUCOSE BLD-MCNC: 113 MG/DL (ref 65–99)
GLUCOSE BLD-MCNC: 172 MG/DL (ref 65–99)
GLUCOSE BLD-MCNC: 84 MG/DL (ref 65–99)
GLUCOSE SERPL-MCNC: 145 MG/DL (ref 65–99)
GRAM STN SPEC: NORMAL
GRAM STN SPEC: NORMAL
HCT VFR BLD AUTO: 32 % (ref 42–52)
HGB BLD-MCNC: 10.3 G/DL (ref 14–18)
IMM GRANULOCYTES # BLD AUTO: 0.05 K/UL (ref 0–0.11)
IMM GRANULOCYTES NFR BLD AUTO: 0.5 % (ref 0–0.9)
LYMPHOCYTES # BLD AUTO: 0.68 K/UL (ref 1–4.8)
LYMPHOCYTES NFR BLD: 7.2 % (ref 22–41)
MAGNESIUM SERPL-MCNC: 2 MG/DL (ref 1.5–2.5)
MCH RBC QN AUTO: 28.1 PG (ref 27–33)
MCHC RBC AUTO-ENTMCNC: 32.2 G/DL (ref 33.7–35.3)
MCV RBC AUTO: 87.2 FL (ref 81.4–97.8)
MONOCYTES # BLD AUTO: 0.61 K/UL (ref 0–0.85)
MONOCYTES NFR BLD AUTO: 6.4 % (ref 0–13.4)
NEUTROPHILS # BLD AUTO: 8.16 K/UL (ref 1.82–7.42)
NEUTROPHILS NFR BLD: 85.8 % (ref 44–72)
NRBC # BLD AUTO: 0 K/UL
NRBC BLD-RTO: 0 /100 WBC
PLATELET # BLD AUTO: 498 K/UL (ref 164–446)
PMV BLD AUTO: 8.8 FL (ref 9–12.9)
POTASSIUM SERPL-SCNC: 4.8 MMOL/L (ref 3.6–5.5)
RBC # BLD AUTO: 3.67 M/UL (ref 4.7–6.1)
SIGNIFICANT IND 70042: NORMAL
SIGNIFICANT IND 70042: NORMAL
SITE SITE: NORMAL
SITE SITE: NORMAL
SODIUM SERPL-SCNC: 133 MMOL/L (ref 135–145)
SOURCE SOURCE: NORMAL
SOURCE SOURCE: NORMAL
WBC # BLD AUTO: 9.5 K/UL (ref 4.8–10.8)

## 2019-11-06 PROCEDURE — 700102 HCHG RX REV CODE 250 W/ 637 OVERRIDE(OP): Performed by: ORTHOPAEDIC SURGERY

## 2019-11-06 PROCEDURE — A9270 NON-COVERED ITEM OR SERVICE: HCPCS | Performed by: PHYSICIAN ASSISTANT

## 2019-11-06 PROCEDURE — 770001 HCHG ROOM/CARE - MED/SURG/GYN PRIV*

## 2019-11-06 PROCEDURE — 97165 OT EVAL LOW COMPLEX 30 MIN: CPT

## 2019-11-06 PROCEDURE — A9270 NON-COVERED ITEM OR SERVICE: HCPCS | Performed by: ORTHOPAEDIC SURGERY

## 2019-11-06 PROCEDURE — 700111 HCHG RX REV CODE 636 W/ 250 OVERRIDE (IP): Performed by: INTERNAL MEDICINE

## 2019-11-06 PROCEDURE — 700102 HCHG RX REV CODE 250 W/ 637 OVERRIDE(OP): Performed by: PHYSICIAN ASSISTANT

## 2019-11-06 PROCEDURE — 83735 ASSAY OF MAGNESIUM: CPT

## 2019-11-06 PROCEDURE — A9270 NON-COVERED ITEM OR SERVICE: HCPCS | Performed by: INTERNAL MEDICINE

## 2019-11-06 PROCEDURE — 85025 COMPLETE CBC W/AUTO DIFF WBC: CPT

## 2019-11-06 PROCEDURE — 700112 HCHG RX REV CODE 229: Performed by: PHYSICIAN ASSISTANT

## 2019-11-06 PROCEDURE — 97161 PT EVAL LOW COMPLEX 20 MIN: CPT

## 2019-11-06 PROCEDURE — 36415 COLL VENOUS BLD VENIPUNCTURE: CPT

## 2019-11-06 PROCEDURE — 80048 BASIC METABOLIC PNL TOTAL CA: CPT

## 2019-11-06 PROCEDURE — 99232 SBSQ HOSP IP/OBS MODERATE 35: CPT | Performed by: INTERNAL MEDICINE

## 2019-11-06 PROCEDURE — 700101 HCHG RX REV CODE 250: Performed by: INTERNAL MEDICINE

## 2019-11-06 PROCEDURE — 36573 INSJ PICC RS&I 5 YR+: CPT

## 2019-11-06 PROCEDURE — 99223 1ST HOSP IP/OBS HIGH 75: CPT | Performed by: INTERNAL MEDICINE

## 2019-11-06 PROCEDURE — 05HA33Z INSERTION OF INFUSION DEVICE INTO LEFT BRACHIAL VEIN, PERCUTANEOUS APPROACH: ICD-10-PCS | Performed by: INTERNAL MEDICINE

## 2019-11-06 PROCEDURE — 700102 HCHG RX REV CODE 250 W/ 637 OVERRIDE(OP): Performed by: INTERNAL MEDICINE

## 2019-11-06 PROCEDURE — 700105 HCHG RX REV CODE 258: Performed by: INTERNAL MEDICINE

## 2019-11-06 PROCEDURE — 82962 GLUCOSE BLOOD TEST: CPT

## 2019-11-06 RX ORDER — HYDROXYZINE HYDROCHLORIDE 10 MG/1
10 TABLET, FILM COATED ORAL 3 TIMES DAILY PRN
Status: DISCONTINUED | OUTPATIENT
Start: 2019-11-06 | End: 2019-11-08 | Stop reason: HOSPADM

## 2019-11-06 RX ORDER — HYDRALAZINE HYDROCHLORIDE 20 MG/ML
10 INJECTION INTRAMUSCULAR; INTRAVENOUS EVERY 6 HOURS PRN
Status: DISCONTINUED | OUTPATIENT
Start: 2019-11-06 | End: 2019-11-08 | Stop reason: HOSPADM

## 2019-11-06 RX ADMIN — HEPARIN SODIUM 5000 UNITS: 5000 INJECTION, SOLUTION INTRAVENOUS; SUBCUTANEOUS at 21:42

## 2019-11-06 RX ADMIN — CEFEPIME 2 G: 2 INJECTION, POWDER, FOR SOLUTION INTRAVENOUS at 12:15

## 2019-11-06 RX ADMIN — ACETAMINOPHEN 1000 MG: 500 TABLET ORAL at 04:29

## 2019-11-06 RX ADMIN — HEPARIN SODIUM 5000 UNITS: 5000 INJECTION, SOLUTION INTRAVENOUS; SUBCUTANEOUS at 04:30

## 2019-11-06 RX ADMIN — HYDROXYZINE HYDROCHLORIDE 10 MG: 10 TABLET, FILM COATED ORAL at 18:02

## 2019-11-06 RX ADMIN — GABAPENTIN 600 MG: 300 CAPSULE ORAL at 18:45

## 2019-11-06 RX ADMIN — INSULIN HUMAN 2 UNITS: 100 INJECTION, SOLUTION PARENTERAL at 18:55

## 2019-11-06 RX ADMIN — CARVEDILOL 6.25 MG: 6.25 TABLET, FILM COATED ORAL at 18:45

## 2019-11-06 RX ADMIN — ASPIRIN 81 MG: 81 TABLET, COATED ORAL at 04:29

## 2019-11-06 RX ADMIN — ATORVASTATIN CALCIUM 20 MG: 20 TABLET, FILM COATED ORAL at 04:30

## 2019-11-06 RX ADMIN — POTASSIUM CHLORIDE AND SODIUM CHLORIDE: 900; 150 INJECTION, SOLUTION INTRAVENOUS at 00:34

## 2019-11-06 RX ADMIN — HEPARIN SODIUM 5000 UNITS: 5000 INJECTION, SOLUTION INTRAVENOUS; SUBCUTANEOUS at 14:50

## 2019-11-06 RX ADMIN — LISINOPRIL 20 MG: 20 TABLET ORAL at 04:30

## 2019-11-06 RX ADMIN — AMLODIPINE BESYLATE 5 MG: 5 TABLET ORAL at 04:29

## 2019-11-06 RX ADMIN — ESCITALOPRAM OXALATE 20 MG: 10 TABLET ORAL at 04:30

## 2019-11-06 RX ADMIN — CARVEDILOL 6.25 MG: 6.25 TABLET, FILM COATED ORAL at 04:29

## 2019-11-06 RX ADMIN — SENNOSIDES AND DOCUSATE SODIUM 1 TABLET: 8.6; 5 TABLET ORAL at 21:42

## 2019-11-06 RX ADMIN — DOCUSATE SODIUM 100 MG: 100 CAPSULE, LIQUID FILLED ORAL at 04:29

## 2019-11-06 RX ADMIN — LATANOPROST 1 DROP: 50 SOLUTION OPHTHALMIC at 21:43

## 2019-11-06 RX ADMIN — ACETAMINOPHEN 1000 MG: 500 TABLET ORAL at 12:15

## 2019-11-06 RX ADMIN — DOCUSATE SODIUM 100 MG: 100 CAPSULE, LIQUID FILLED ORAL at 18:45

## 2019-11-06 RX ADMIN — ACETAMINOPHEN 1000 MG: 500 TABLET ORAL at 18:45

## 2019-11-06 RX ADMIN — CEFEPIME 2 G: 2 INJECTION, POWDER, FOR SOLUTION INTRAVENOUS at 18:44

## 2019-11-06 ASSESSMENT — ENCOUNTER SYMPTOMS
DIZZINESS: 0
ABDOMINAL PAIN: 0
HEADACHES: 0
SHORTNESS OF BREATH: 0
NAUSEA: 0
VOMITING: 0
FEVER: 0
SPEECH CHANGE: 0
MYALGIAS: 0
SENSORY CHANGE: 0
BLURRED VISION: 0
COUGH: 0
CONSTIPATION: 0

## 2019-11-06 ASSESSMENT — GAIT ASSESSMENTS
DEVIATION: ANTALGIC
ASSISTIVE DEVICE: FRONT WHEEL WALKER
GAIT LEVEL OF ASSIST: SUPERVISED
DISTANCE (FEET): 500

## 2019-11-06 ASSESSMENT — COGNITIVE AND FUNCTIONAL STATUS - GENERAL
DAILY ACTIVITIY SCORE: 19
DRESSING REGULAR UPPER BODY CLOTHING: A LITTLE
STANDING UP FROM CHAIR USING ARMS: A LITTLE
SUGGESTED CMS G CODE MODIFIER MOBILITY: CK
TURNING FROM BACK TO SIDE WHILE IN FLAT BAD: A LITTLE
SUGGESTED CMS G CODE MODIFIER DAILY ACTIVITY: CK
MOVING TO AND FROM BED TO CHAIR: A LITTLE
PERSONAL GROOMING: A LITTLE
CLIMB 3 TO 5 STEPS WITH RAILING: A LITTLE
WALKING IN HOSPITAL ROOM: A LITTLE
MOVING FROM LYING ON BACK TO SITTING ON SIDE OF FLAT BED: A LITTLE
DRESSING REGULAR LOWER BODY CLOTHING: A LITTLE
HELP NEEDED FOR BATHING: A LITTLE
MOBILITY SCORE: 18
TOILETING: A LITTLE

## 2019-11-06 ASSESSMENT — ACTIVITIES OF DAILY LIVING (ADL): TOILETING: INDEPENDENT

## 2019-11-06 NOTE — ANESTHESIA PROCEDURE NOTES
Peripheral Block  Date/Time: 11/5/2019 4:12 PM  Performed by: Jun Stewart M.D.  Authorized by: Jun Stewart M.D.     Start Time:  11/5/2019 4:12 PM  End Time:  11/5/2019 4:14 PM  Reason for Block: at surgeon's request and post-op pain management    patient identified, IV checked, site marked, risks and benefits discussed, surgical consent, monitors and equipment checked, pre-op evaluation and timeout performed    Patient Position:  Supine  Prep: ChloraPrep    Monitoring:  Heart rate, continuous pulse ox and cardiac monitor  Block Region:  Lower Extremity  Lower Extremity - Block Type:  Selective FEMORAL nerve block at the Adductor Canal    Laterality:  Left  Procedures: ultrasound guided  Image captured, interpreted and electronically stored.  Local Infiltration:  Lidocaine  Strength:  1 %  Dose:  3 ml  Block Type:  Single-shot  Needle Length:  100mm  Needle Gauge:  21 G  Needle Localization:  Ultrasound guidance  Injection Assessment:  Negative aspiration for heme, no paresthesia on injection, incremental injection and local visualized surrounding nerve on ultrasound  Evidence of intravascular injection: No

## 2019-11-06 NOTE — ANESTHESIA PROCEDURE NOTES
Airway  Date/Time: 11/5/2019 4:17 PM  Performed by: Jun Stewart M.D.  Authorized by: Jun Stewart M.D.     Location:  OR  Urgency:  Elective  Indications for Airway Management:  Anesthesia  Spontaneous Ventilation: absent    Sedation Level:  Deep  Preoxygenated: Yes    Mask Difficulty Assessment:  0 - not attempted  Final Airway Type:  Supraglottic airway  Final Supraglottic Airway:  Standard LMA  SGA Size:  4  Number of Attempts at Approach:  1

## 2019-11-06 NOTE — THERAPY
"Occupational Therapy Evaluation completed.   Plan of Care: Patient with no further skilled OT needs in the acute care setting at this time  Discharge Recommendations:  Equipment: No Equipment Needed. Post-acute therapy Recommend outpatient occupational therapy services to address higher level deficits.      Patient at / near baseline. DC OT. OP as needed.     See \"Rehab Therapy-Acute\" Patient Summary Report for complete documentation.    "

## 2019-11-06 NOTE — ANESTHESIA POSTPROCEDURE EVALUATION
Patient: Ronny Gallegos    Procedure Summary     Date:  11/05/19 Room / Location:  [facility] 14 / SURGERY [facility]    Anesthesia Start:  1558 Anesthesia Stop:  1746    Procedures:       IRRIGATION AND DEBRIDEMENT, WOUND - KNEE (Knee)      REVISION, TOTAL ARTHROPLASTY, KNEE, ALL COMPONENTS - POLY EXCHANGE ONLY (Left Knee) Diagnosis:  (left knee prosthetic infection)    Surgeon:  Parmjit Charles M.D. Responsible Provider:  Jun Stewart M.D.    Anesthesia Type:  general, peripheral nerve block ASA Status:  3 - Emergent          Final Anesthesia Type: general, peripheral nerve block  Last vitals  BP   Blood Pressure : 156/79, NIBP: 150/82    Temp   36.7 °C (98 °F)    Pulse   Pulse: 93   Resp   14    SpO2   98 %      Anesthesia Post Evaluation    Patient location during evaluation: PACU  Patient participation: complete - patient participated  Level of consciousness: awake and alert  Pain score: 3    Airway patency: patent  Anesthetic complications: no  Cardiovascular status: hemodynamically stable  Respiratory status: acceptable  Hydration status: euvolemic    PONV: none           Nurse Pain Score: 7 (NPRS)

## 2019-11-06 NOTE — THERAPY
"Physical Therapy Evaluation completed.   Bed Mobility:  Supine to Sit: (NT, up in recliner)  Transfers: Sit to Stand: Supervised  Gait: Level Of Assist: Supervised with Front-Wheel Walker  Plan of Care: Patient with no further skilled PT needs in the acute care setting and demonstrates safety with mobility in this environment at this time.   Discharge Recommendations: Equipment: No Equipment Needed. Recommend outpatient physical therapy services to address higher level deficits.    See \"Rehab Therapy-Acute\" Patient Summary Report for complete documentation.     Pt is a 75yo male presenting to acute with wound dehiscence from L TKA ~ 4 weeks ago. Pt s/p I&D and wound closure on 11/5. LLE is WBAT. Pt presents to PT very motivated to participate and return to PLOF with outpatient PT. Pt performed all mobility at SPV level, ambulated 500ft with FWW, and negotiated stairs without LOB. Educated and provided handout re LE therapeutic exercises, pt with good return demo. Encouraged pt to return to outpatient PT when medically cleared and to use FWW until able to progress to SPC with therapy. Patient will not be actively followed for physical therapy services at this time, however may be seen if requested by physician for 1 more visit within 30 days to address any discharge or equipment needs.  " 36.7

## 2019-11-06 NOTE — CONSULTS
Hospital Medicine Consultation    Date of Service  11/6/2019    Referring Physician  Regis Haile M.D.    Consulting Physician  Regis Haile M.D.    Reason for Consultation  Medical management    Interval history  74 y.o. male who presented 11/2/2019 with PMH HTN, HDL, DM, peripheral neuropathy, history of alcohol use disorder who presented with septic prosthetic left knee and wound dehiscence.  Patient underwent I&D with Dr. Wesley 11/2 with placement of deep drains and wound VAC sponge.  Infectious disease was consulted for antibiotics.    11/5 - He denies chest pain.  He denies knee pain.  His glucose is well controlled.  He is anxious and concerned because his brother had issues with leg surgery and had to had extensive procedures and rehabilitation. He hopes that this does not happen to him.  Hypokalemia, repleted.  Surgery planned for this afternoon.    11/6 - He feels well, minimal pain. He still feels anxious about whether he can recover from this. PICC was ordered    Review of Systems  Review of Systems   Constitutional: Negative for fever.   HENT: Negative for congestion.    Eyes: Negative for blurred vision.   Respiratory: Negative for cough and shortness of breath.    Cardiovascular: Negative for chest pain and leg swelling.   Gastrointestinal: Negative for abdominal pain, constipation and vomiting.   Genitourinary: Negative for dysuria.   Musculoskeletal: Positive for joint pain (mild).   Skin: Negative for itching.   Neurological: Negative for dizziness and headaches.       Past Medical History   has a past medical history of Arthritis, Asthma, CAD (coronary artery disease), Depression, Diabetes (HCC), Glaucoma, High cholesterol, Hypertension, Hypopotassemia, Hyposmolality and/or hyponatremia, Jaundice (2014), Neuropathy (HCC), Pain, Personal history of peptic ulcer disease, Pneumonia (1978), and Unspecified cataract.    Surgical History   has a past surgical history that includes other (2005); other  (1978); lumbar fusion posterior (9/10/2015); lumbar laminectomy diskectomy (9/10/2015); laminotomy (9/10/2015); laparoscopy robotic xi (10/26/2016); lumbar laminectomy diskectomy (Right, 6/13/2017); foraminotomy (6/13/2017); hardware removal neuro (6/13/2017); cataract extraction with iol; ankle total arthroplasty (Right, 6/18/2018); ligament repair (Right, 6/18/2018); irrigation & debridement ortho (Left, 11/2/2019); pr revise knee joint replace,all parts (Left, 11/5/2019); and irrigation & debridement ortho (11/5/2019).    Family History  family history includes Heart Disease in his father and mother; Stroke in his father and mother.    Social History   reports that he quit smoking about 40 years ago. His smoking use included cigarettes. He has a 19.50 pack-year smoking history. He has never used smokeless tobacco. He reports previous alcohol use. He reports that he does not use drugs.    Medications  Prior to Admission Medications   Prescriptions Last Dose Informant Patient Reported? Taking?   LUMIGAN 0.01 % Solution  Patient Yes No   Sig: Place 1 Drop in both eyes every bedtime.   Multiple Vitamins-Minerals (CENTRUM SILVER 50+MEN) Tab  Patient Yes No   Sig: Take 1 Tab by mouth every morning.   PROAIR  (90 Base) MCG/ACT Aero Soln inhalation aerosol  Patient Yes No   Sig: Inhale 2 Puffs by mouth every four hours as needed for Shortness of Breath.   amLODIPine (NORVASC) 5 MG Tab  Patient Yes No   Sig: Take 5 mg by mouth every day.   aspirin (ASA) 81 MG Chew Tab chewable tablet  Patient Yes No   Sig: Take 162 mg by mouth every day.   atorvastatin (LIPITOR) 20 MG Tab  Patient Yes No   Sig: Take 20 mg by mouth every day.   carvedilol (COREG) 6.25 MG Tab  Patient Yes No   Sig: Take 6.25 mg by mouth 2 times a day.   escitalopram (LEXAPRO) 20 MG tablet  Patient Yes No   Sig: Take 20 mg by mouth every morning. Indications: Major Depressive Disorder   gabapentin (NEURONTIN) 600 MG tablet  Patient Yes No   Sig: Take  600 mg by mouth 2 times a day.   lisinopril (PRINIVIL) 20 MG Tab  Patient Yes No   Sig: Take 20 mg by mouth every day.   metFORMIN (GLUCOPHAGE) 500 MG Tab  Patient Yes No   Sig: Take 500 mg by mouth every day.   naproxen (NAPROSYN) 500 MG Tab  Patient Yes No   Sig: Take 500 mg by mouth 2 times a day, with meals.      Facility-Administered Medications: None       Allergies  Allergies   Allergen Reactions   • Nkda [No Known Drug Allergy]        Physical Exam  Temp:  [36.3 °C (97.3 °F)-37.8 °C (100.1 °F)] 36.7 °C (98.1 °F)  Pulse:  [70-95] 70  Resp:  [14-20] 17  BP: (116-162)/(54-83) 162/83  SpO2:  [96 %-100 %] 99 %    Physical Exam  Vitals signs and nursing note reviewed.   Constitutional:       General: He is not in acute distress.     Appearance: He is not ill-appearing.   HENT:      Mouth/Throat:      Mouth: Mucous membranes are moist.   Eyes:      General:         Right eye: No discharge.         Left eye: No discharge.   Neck:      Musculoskeletal: Neck supple.   Cardiovascular:      Rate and Rhythm: Normal rate and regular rhythm.   Pulmonary:      Effort: No respiratory distress.      Breath sounds: No wheezing or rales.   Abdominal:      General: There is no distension.      Palpations: Abdomen is soft.      Tenderness: There is no tenderness.   Musculoskeletal:      Comments: Left leg with clean dressings in place   Skin:     General: Skin is warm and dry.   Neurological:      Mental Status: He is alert and oriented to person, place, and time.         Fluids      Laboratory  Recent Labs     11/05/19 0054 11/06/19 0027   WBC 8.6 9.5   RBC 3.80* 3.67*   HEMOGLOBIN 10.4* 10.3*   HEMATOCRIT 32.6* 32.0*   MCV 85.8 87.2   MCH 27.4 28.1   MCHC 31.9* 32.2*   RDW 47.1 49.0   PLATELETCT 487* 498*   MPV 8.6* 8.8*     Recent Labs     11/05/19 0054 11/06/19 0027   SODIUM 132* 133*   POTASSIUM 3.5* 4.8   CHLORIDE 100 101   CO2 24 25   GLUCOSE 111* 145*   BUN 7* 11   CREATININE 0.60 0.61   CALCIUM 8.7 8.7                      Imaging  IR-PICC LINE PLACEMENT W/ GUIDANCE > AGE 5    (Results Pending)       Assessment/Plan  Wound dehiscence- (present on admission)  Assessment & Plan  Postoperative, left knee arthroplasty    Primary team's orthopedics, hospital medicine consulting for comanagement of medical concerns  Further surgical interventions, need for irrigation and debridement per orthopedics team  For now on pain control regimen per primary team  Infectious disease team was consulted per primary team for recommendations for anti-infective therapy  We will defer need for antibiotic therapy to infectious disease team, ordered for cefepime and PICC ordered    Hypokalemia  Assessment & Plan  Improved    Thrombocytosis (HCC)- (present on admission)  Assessment & Plan  Monitor CBC  New this admission, Infectious?  Continue on antibiotics    Anemia, blood loss- (present on admission)  Assessment & Plan  Operative blood loss  Monitor Hb / Restrictive transfusion strategy    Type 2 diabetes mellitus with diabetic polyneuropathy, without long-term current use of insulin (HCC)- (present on admission)  Assessment & Plan  Holding oral hypoglycemic regimen  Sliding scale insulin No. 2    Hyponatremia- (present on admission)  Assessment & Plan  Chronic - monitor - stable at this time     Coronary artery disease involving native coronary artery of native heart without angina pectoris- (present on admission)  Assessment & Plan  No prior heart attack   no active issues or concerns at this time  Continue aspirin 81 mg, beta-blockers and statins    Essential hypertension- (present on admission)  Assessment & Plan  Continue amlodipine, lisinopril, carvedilol with holding parameters    Mixed hyperlipidemia- (present on admission)  Assessment & Plan  Continue atorvastatin    Major depressive disorder with single episode, in full remission (HCC)- (present on admission)  Assessment & Plan  Stable  Continue at home regimen

## 2019-11-06 NOTE — PROGRESS NOTES
Infectious Disease Progress Note    Author: Priya Caba M.D. Date & Time of service: 2019  10:05 AM    Chief Complaint:  Prostatic knee infection  Interval History:  2019-no fevers.  No new issues overnight.  Going for surgery today.  WBC is 8.6.  2019 low-grade fevers.  Feels much better.  The knee feels better.  Significantly anxious.  WBC is 9.5.  Underwent surgery on 2019  Labs Reviewed and Medications Reviewed.    Review of Systems:  Review of Systems   Constitutional: Positive for malaise/fatigue. Negative for fever.   HENT: Negative for hearing loss.    Respiratory: Negative for cough and shortness of breath.    Cardiovascular: Negative for chest pain and leg swelling.   Gastrointestinal: Negative for nausea and vomiting.   Genitourinary: Negative for dysuria.   Musculoskeletal: Positive for joint pain. Negative for myalgias.        Left knee bandaged.   Neurological: Negative for sensory change and speech change.       Hemodynamics:  Temp (24hrs), Av.7 °C (98.1 °F), Min:36.3 °C (97.3 °F), Max:37.8 °C (100.1 °F)  Temperature: 36.7 °C (98 °F)  Pulse  Av.6  Min: 61  Max: 95   Blood Pressure : 148/79, NIBP: 150/82       Physical Exam:  Physical Exam  Vitals signs reviewed.   Constitutional:       Appearance: Normal appearance. He is normal weight.   HENT:      Head: Normocephalic and atraumatic.      Right Ear: External ear normal.      Left Ear: External ear normal.      Nose: Nose normal. No congestion.      Mouth/Throat:      Mouth: Mucous membranes are moist.      Pharynx: No oropharyngeal exudate.   Eyes:      General: No scleral icterus.     Pupils: Pupils are equal, round, and reactive to light.   Neck:      Musculoskeletal: Neck supple.   Cardiovascular:      Rate and Rhythm: Normal rate and regular rhythm.      Heart sounds: Normal heart sounds. No murmur.   Pulmonary:      Effort: Pulmonary effort is normal. No respiratory distress.      Breath sounds: No wheezing.    Abdominal:      General: Bowel sounds are normal. There is no distension.      Palpations: Abdomen is soft.      Tenderness: There is no tenderness. There is no guarding.   Musculoskeletal: Normal range of motion.         General: Tenderness present. No swelling or deformity.      Comments: Left knee is bandaged with Hemovac   Lymphadenopathy:      Cervical: No cervical adenopathy.   Skin:     General: Skin is warm and dry.      Findings: No erythema.   Neurological:      General: No focal deficit present.      Mental Status: He is alert and oriented to person, place, and time.   Psychiatric:         Mood and Affect: Mood normal.         Meds:    Current Facility-Administered Medications:   •  Pharmacy Consult Request  •  ondansetron  •  dexamethasone  •  diphenhydrAMINE  •  haloperidol lactate  •  scopolamine  •  docusate sodium  •  senna-docusate  •  senna-docusate  •  polyethylene glycol/lytes  •  magnesium hydroxide  •  bisacodyl  •  fleet  •  oxyCODONE immediate-release  •  oxyCODONE immediate release  •  morphine injection  •  benzocaine-menthol  •  chlorproMAZINE **OR** chlorproMAZINE  •  diphenhydrAMINE **OR** diphenhydrAMINE  •  insulin regular **AND** Accu-Chek ACHS **AND** NOTIFY MD and PharmD **AND** glucose **AND** dextrose 10% bolus  •  aspirin EC  •  heparin  •  DAPTOmycin  •  cefTRIAXone (ROCEPHIN) IV  •  acetaminophen  •  amLODIPine  •  gabapentin  •  lisinopril  •  atorvastatin  •  carvedilol  •  escitalopram  •  albuterol  •  latanoprost    Labs:  Recent Labs     11/05/19 0054 11/06/19  0027   WBC 8.6 9.5   RBC 3.80* 3.67*   HEMOGLOBIN 10.4* 10.3*   HEMATOCRIT 32.6* 32.0*   MCV 85.8 87.2   MCH 27.4 28.1   RDW 47.1 49.0   PLATELETCT 487* 498*   MPV 8.6* 8.8*   NEUTSPOLYS  --  85.80*   LYMPHOCYTES  --  7.20*   MONOCYTES  --  6.40   EOSINOPHILS  --  0.00   BASOPHILS  --  0.10     Recent Labs     11/05/19 0054 11/06/19  0027   SODIUM 132* 133*   POTASSIUM 3.5* 4.8   CHLORIDE 100 101   CO2 24 25    GLUCOSE 111* 145*   BUN 7* 11   CPKTOTAL 46  --      Recent Labs     11/05/19  0054 11/06/19  0027   ALBUMIN 3.5  --    TBILIRUBIN 0.5  --    ALKPHOSPHAT 70  --    TOTPROTEIN 6.2  --    ALTSGPT 9  --    ASTSGOT 12  --    CREATININE 0.60 0.61       Imaging:  No results found.    Micro:  Results     Procedure Component Value Units Date/Time    Anaerobic Culture [[id number]] Collected:  11/05/19 1628    Order Status:  Completed Specimen:  Wound Updated:  11/06/19 0951     Significant Indicator NEG     Source WND     Site Left Knee     Culture Result Culture in progress.    Narrative:       Surgery - swabs received    CULTURE TISSUE W/ GRM STAIN [[id number]] Collected:  11/02/19 1246    Order Status:  Completed Specimen:  Tissue Updated:  11/06/19 0740     Significant Indicator NEG     Source TISS     Site Left Knee Tissue     Culture Result No growth at 72 hours.     Gram Stain Result Few WBCs.  No organisms seen.      Narrative:       Surgery Specimen    Anaerobic Culture [[id number]] Collected:  11/02/19 1246    Order Status:  Completed Specimen:  Tissue Updated:  11/06/19 0740     Significant Indicator NEG     Source TISS     Site Left Knee Tissue     Culture Result Culture in progress.    Narrative:       Surgery Specimen    AFB Culture [[id number]] Collected:  11/02/19 1246    Order Status:  Completed Specimen:  Tissue Updated:  11/06/19 0740     Significant Indicator NEG     Source TISS     Site Left Knee Tissue     Culture Result Culture in progress.     AFB Smear Results No acid fast bacilli seen.    Narrative:       Surgery Specimen    AFB Culture [[id number]] Collected:  11/02/19 1343    Order Status:  Completed Specimen:  Tissue Updated:  11/06/19 0740     Significant Indicator NEG     Source TISS     Site Left Knee Deep Wound     Culture Result Culture in progress.     AFB Smear Results No acid fast bacilli seen.    Narrative:       Surgery Specimen    CULTURE TISSUE W/ GRM STAIN [[id number]] Collected:   11/02/19 1343    Order Status:  Completed Specimen:  Tissue Updated:  11/06/19 0740     Significant Indicator NEG     Source TISS     Site Left Knee Deep Wound     Culture Result No growth at 72 hours.     Gram Stain Result No organisms seen.    Narrative:       Surgery Specimen    Anaerobic Culture [[id number]] Collected:  11/02/19 1343    Order Status:  Completed Specimen:  Tissue Updated:  11/06/19 0740     Significant Indicator NEG     Source TISS     Site Left Knee Deep Wound     Culture Result Culture in progress.    Narrative:       Surgery Specimen    GRAM STAIN [[id number]] Collected:  11/05/19 1628    Order Status:  Completed Specimen:  Wound Updated:  11/05/19 2339     Significant Indicator .     Source WND     Site Left Knee     Gram Stain Result Few WBCs.  No organisms seen.      Narrative:       Surgery - swabs received    CULTURE WOUND W/ GRAM STAIN [[id number]] Collected:  11/05/19 1628    Order Status:  Completed Specimen:  Other Updated:  11/05/19 1859    Anaerobic Culture [[id number]] Collected:  11/02/19 1343    Order Status:  Completed Specimen:  Wound Updated:  11/05/19 0911     Significant Indicator NEG     Source WND     Site Left Knee     Culture Result Culture in progress.    Narrative:       Surgery - swabs received    CULTURE WOUND W/ GRAM STAIN [[id number]] Collected:  11/02/19 1343    Order Status:  Completed Specimen:  Wound Updated:  11/05/19 0911     Significant Indicator NEG     Source WND     Site Left Knee     Culture Result No growth at 72 hours.     Gram Stain Result Rare WBCs.  No organisms seen.      Narrative:       Surgery - swabs received    FLUID CULTURE W/GRAM STAIN [[id number]] Collected:  11/02/19 1327    Order Status:  Completed Specimen:  Synovial Updated:  11/05/19 0911     Significant Indicator NEG     Source SYNO     Site Left Knee     Culture Result No growth at 72 hours.     Gram Stain Result Rare WBCs.  No organisms seen.      Narrative:       Surgery Specimen     Anaerobic Culture [[id number]] Collected:  11/02/19 1327    Order Status:  Completed Specimen:  Synovial Updated:  11/05/19 0911     Significant Indicator NEG     Source SYNO     Site Left Knee     Culture Result Culture in progress.    Narrative:       Surgery Specimen    CULTURE WOUND W/ GRAM STAIN [[id number]] Collected:  11/02/19 1148    Order Status:  Completed Specimen:  Wound from Left Leg Updated:  11/04/19 1026     Significant Indicator NEG     Source WND     Site LEFT LEG     Culture Result Rare growth mixed skin jacky.     Gram Stain Result Few WBCs.  Rare Gram negative rods.      Narrative:       Left knee  Left knee    Acid Fast Stain [[id number]] Collected:  11/02/19 1343    Order Status:  Completed Specimen:  Tissue Updated:  11/04/19 0707     Significant Indicator NEG     Source TISS     Site Left Knee Deep Wound     AFB Smear Results No acid fast bacilli seen.    Narrative:       Surgery Specimen    GRAM STAIN [[id number]] Collected:  11/02/19 1343    Order Status:  Completed Specimen:  Tissue Updated:  11/04/19 0707     Significant Indicator .     Source TISS     Site Left Knee Deep Wound     Gram Stain Result No organisms seen.    Narrative:       Surgery Specimen    Acid Fast Stain [[id number]] Collected:  11/02/19 1246    Order Status:  Completed Specimen:  Tissue Updated:  11/04/19 0707     Significant Indicator NEG     Source TISS     Site Left Knee Tissue     AFB Smear Results No acid fast bacilli seen.    Narrative:       Surgery Specimen    GRAM STAIN [[id number]] Collected:  11/02/19 1246    Order Status:  Completed Specimen:  Tissue Updated:  11/04/19 0707     Significant Indicator .     Source TISS     Site Left Knee Tissue     Gram Stain Result Few WBCs.  No organisms seen.      Narrative:       Surgery Specimen    BLOOD CULTURE x2 [[id number]] Collected:  11/02/19 1208    Order Status:  Completed Specimen:  Blood from Peripheral Updated:  11/03/19 0945     Significant Indicator NEG     Source  "BLD     Site PERIPHERAL     Culture Result No Growth  Note: Blood cultures are incubated for 5 days and  are monitored continuously.Positive blood cultures  are called to the RN and reported as soon as  they are identified.      Narrative:       Per Hospital Policy: Only change Specimen Src: to \"Line\" if  specified by physician order.  Right Forearm/Arm    BLOOD CULTURE x2 [[id number]] Collected:  11/02/19 1148    Order Status:  Completed Specimen:  Blood from Peripheral Updated:  11/03/19 0945     Significant Indicator NEG     Source BLD     Site PERIPHERAL     Culture Result No Growth  Note: Blood cultures are incubated for 5 days and  are monitored continuously.Positive blood cultures  are called to the RN and reported as soon as  they are identified.      Narrative:       Per Hospital Policy: Only change Specimen Src: to \"Line\" if  specified by physician order.  No site indicated    GRAM STAIN [[id number]] Collected:  11/02/19 1343    Order Status:  Completed Specimen:  Wound Updated:  11/02/19 1655     Significant Indicator .     Source WND     Site Left Knee     Gram Stain Result Rare WBCs.  No organisms seen.      Narrative:       Surgery - swabs received    GRAM STAIN [[id number]] Collected:  11/02/19 1327    Order Status:  Completed Specimen:  Body Fluid Updated:  11/02/19 1654     Significant Indicator .     Source BF     Site Left Knee     Gram Stain Result Rare WBCs.  No organisms seen.      GRAM STAIN [[id number]] Collected:  11/02/19 1148    Order Status:  Completed Specimen:  Wound Updated:  11/02/19 1433     Significant Indicator .     Source WND     Site LEFT LEG     Gram Stain Result Few WBCs.  Rare Gram negative rods.      Narrative:       Left knee  Left knee          Assessment:  Active Hospital Problems    Diagnosis   • Wound dehiscence [T81.30XA]   • Anemia, blood loss [D50.0]   • Thrombocytosis (HCC) [D47.3]   • Type 2 diabetes mellitus with diabetic polyneuropathy, without long-term current use " of insulin ([facility]) [E11.42]   • Hyponatremia [E87.1]   • Coronary artery disease involving native coronary artery of native heart without angina pectoris [I25.10]   • Mixed hyperlipidemia [E78.2]   • Essential hypertension [I10]   • Major depressive disorder with single episode, in full remission ([facility]) [F32.5]   Prosthetic knee infection    Plan:  Prosthetic knee infection  Patient is going for surgery today  He had I&D of the wound on 11/2/2019-those cultures are negative so far.  The Gram stain is showing rare gram-negative rods.  Change Rocephin to cefepime  Discontinue daptomycin  PICC line  Patient can likely be discharged on once a day Invanz if the cultures remain negative    Diabetes mellitus  Control the blood sugars for wound healing    Coronary artery disease  Discussed with internal medicine.

## 2019-11-06 NOTE — OP REPORT
DATE OF SERVICE:  11/05/2019    INDICATIONS:  Patient with wound dehiscence that had a washout but ___ was   unable to close the wound.    PREOPERATIVE DIAGNOSIS:  Wound dehiscence and possible infection.    POSTOPERATIVE DIAGNOSIS:  Wound dehiscence, possible infection, left knee.    PROCEDURE:  Irrigation and debridement, left knee with polyethylene liner   exchange and complex wound closure.    DESCRIPTION OF PROCEDURE:  The patient was identified in the preoperative   area, site was marked, taken back to the operating room and underwent general   anesthesia.  Left lower extremity was prepped and draped in sterile manner.    Preoperative timeout was held and antibiotics were given.  A wound VAC was   taken off and fully opened the incision approximately 20 cm down to fascia   with a small opening in the fascia distally.  The suture that was in place was   removed and then a full synovectomy and debridement was performed.  Wound was   then irrigated with 9 liters of normal saline, Betadine and hydrogen peroxide   was used to soak the wound as well after the polyethylene liner was removed.    New polyethylene liner for 4.5 tibia, and a size 10 thickness BCS liner was   placed.  The fascia was closed with Vicryl, skin could be closed with a   combination of Vicryl and Monocryl.  Dermabond was placed throughout the ___.    Patient was then woken up, taken back to PACU, will weightbear as tolerated.    IV antibiotics per infectious disease.  Deep cultures were taken during the   procedure.  The wound that needed to be closed that had been opened was   approximately 20 cm in length.             ____________________________________     MD MINO Henson / [place]    DD:  11/05/2019 17:40:46  DT:  11/05/2019 21:52:48    D#:  [id number]  Job#:  [id number]

## 2019-11-06 NOTE — ANESTHESIA QCDR
2019 [facility] Clinical Data Registry (for Quality Improvement)     Postoperative nausea/vomiting risk protocol (Adult = 18 yrs and Pediatric 3-17 yrs)- (430 and 463)  General inhalation anesthetic (NOT TIVA) with PONV risk factors: Yes  Provision of anti-emetic therapy with at least 2 different classes of agents: Yes   Patient DID NOT receive anti-emetic therapy and reason is documented in Medical Record:  N/A    Multimodal Pain Management- (AQI59)  Patient undergoing Elective Surgery (i.e. Outpatient, or ASC, or Prescheduled Surgery prior to Hospital Admission): No  Use of Multimodal Pain Management, two or more drugs and/or interventions, NOT including systemic opioids: N/A  Exception: Documented allergy to multiple classes of analgesics: N/A    PACU assessment of acute postoperative pain prior to Anesthesia Care End- Applies to Patients Age = 18- (ABG7)  Initial PACU pain score is which of the following: < 7/10  Patient unable to report pain score: N/A    Post-anesthetic transfer of care checklist/protocol to PACU/ICU- (426 and 427)  Upon conclusion of case, patient transferred to which of the following locations: PACU/Non-ICU  Use of transfer checklist/protocol: Yes  Exclusion: Service Performed in Patient Hospital Room (and thus did not require transfer): N/A    PACU Reintubation- (AQI31)  General anesthesia requiring endotracheal intubation (ETT) along with subsequent extubation in OR or PACU: No  Required reintubation in the PACU: N/A  Extubation was a planned trial documented in the medical record prior to removal of the original airway device: N/A    Unplanned admission to ICU related to anesthesia service up through end of PACU care- (MD51)  Unplanned admission to ICU (not initially anticipated at anesthesia start time): No

## 2019-11-06 NOTE — ANESTHESIA TIME REPORT
Anesthesia Start and Stop Event Times     Date Time Event    11/5/2019 1550 Ready for Procedure     1558 Anesthesia Start     1746 Anesthesia Stop        Responsible Staff  11/05/19    Name Role Begin End    Jun Stewart M.D. Anesth 1555 1747        Preop Diagnosis (Free Text):  Pre-op Diagnosis     left knee prosthetic infection        Preop Diagnosis (Codes):    Post op Diagnosis  Prosthetic joint infection (HCC)      Premium Reason  A. 3PM - 7AM    Comments:

## 2019-11-06 NOTE — OR NURSING
Pt on 10 L oxy-mask per ERAS, six hours complete at 2330.  No c/o nausea, tolerating PO fluids/juice and medication.  Left knee pain/discomfort initially hard to decrease.  Pt did receive adductor block. C/o left sided quadricep pain and knee stiffness. Ice packs on knee. Pain now down to 4/10, tolerable.  Left knee surgical dressing CDI, christine in place, functioning properly.  QueaseEASE provided for nausea, relieved now. Left foot warm, 2 + pedal pulse.  VSS, afebrile, UMAÑA, A/O x4.    Glasses in place, right hand ring in place.   Pt's duaghter-in-law Alexandra at bedside.

## 2019-11-07 ENCOUNTER — HOME HEALTH ADMISSION (OUTPATIENT)
Dept: HOME HEALTH SERVICES | Facility: HOME HEALTHCARE | Age: 74
End: 2019-11-07
Payer: MEDICARE

## 2019-11-07 DIAGNOSIS — T84.59XD INFECTION OF PROSTHETIC KNEE JOINT, SUBSEQUENT ENCOUNTER: ICD-10-CM

## 2019-11-07 DIAGNOSIS — Z96.659 INFECTION OF PROSTHETIC KNEE JOINT, SUBSEQUENT ENCOUNTER: ICD-10-CM

## 2019-11-07 PROBLEM — T84.59XA INFECTION OF PROSTHETIC KNEE JOINT (HCC): Status: ACTIVE | Noted: 2019-11-07

## 2019-11-07 LAB
ANION GAP SERPL CALC-SCNC: 9 MMOL/L (ref 0–11.9)
BACTERIA BLD CULT: NORMAL
BACTERIA BLD CULT: NORMAL
BACTERIA SPEC ANAEROBE CULT: NORMAL
BACTERIA SPEC ANAEROBE CULT: NORMAL
BASOPHILS # BLD AUTO: 0.5 % (ref 0–1.8)
BASOPHILS # BLD: 0.04 K/UL (ref 0–0.12)
BUN SERPL-MCNC: 7 MG/DL (ref 8–22)
CALCIUM SERPL-MCNC: 8.2 MG/DL (ref 8.5–10.5)
CHLORIDE SERPL-SCNC: 99 MMOL/L (ref 96–112)
CO2 SERPL-SCNC: 23 MMOL/L (ref 20–33)
CREAT SERPL-MCNC: 0.58 MG/DL (ref 0.5–1.4)
EOSINOPHIL # BLD AUTO: 0.14 K/UL (ref 0–0.51)
EOSINOPHIL NFR BLD: 1.7 % (ref 0–6.9)
ERYTHROCYTE [DISTWIDTH] IN BLOOD BY AUTOMATED COUNT: 46.5 FL (ref 35.9–50)
GLUCOSE BLD-MCNC: 116 MG/DL (ref 65–99)
GLUCOSE BLD-MCNC: 128 MG/DL (ref 65–99)
GLUCOSE BLD-MCNC: 133 MG/DL (ref 65–99)
GLUCOSE BLD-MCNC: 95 MG/DL (ref 65–99)
GLUCOSE SERPL-MCNC: 100 MG/DL (ref 65–99)
HCT VFR BLD AUTO: 27.3 % (ref 42–52)
HGB BLD-MCNC: 8.9 G/DL (ref 14–18)
HGB BLD-MCNC: 9.2 G/DL (ref 14–18)
IMM GRANULOCYTES # BLD AUTO: 0.04 K/UL (ref 0–0.11)
IMM GRANULOCYTES NFR BLD AUTO: 0.5 % (ref 0–0.9)
LYMPHOCYTES # BLD AUTO: 1.91 K/UL (ref 1–4.8)
LYMPHOCYTES NFR BLD: 23.6 % (ref 22–41)
MCH RBC QN AUTO: 27.6 PG (ref 27–33)
MCHC RBC AUTO-ENTMCNC: 32.6 G/DL (ref 33.7–35.3)
MCV RBC AUTO: 84.8 FL (ref 81.4–97.8)
MONOCYTES # BLD AUTO: 0.81 K/UL (ref 0–0.85)
MONOCYTES NFR BLD AUTO: 10 % (ref 0–13.4)
NEUTROPHILS # BLD AUTO: 5.16 K/UL (ref 1.82–7.42)
NEUTROPHILS NFR BLD: 63.7 % (ref 44–72)
NRBC # BLD AUTO: 0 K/UL
NRBC BLD-RTO: 0 /100 WBC
PLATELET # BLD AUTO: 486 K/UL (ref 164–446)
PMV BLD AUTO: 9.2 FL (ref 9–12.9)
POTASSIUM SERPL-SCNC: 3.5 MMOL/L (ref 3.6–5.5)
RBC # BLD AUTO: 3.22 M/UL (ref 4.7–6.1)
SIGNIFICANT IND 70042: NORMAL
SITE SITE: NORMAL
SODIUM SERPL-SCNC: 131 MMOL/L (ref 135–145)
SOURCE SOURCE: NORMAL
WBC # BLD AUTO: 8.1 K/UL (ref 4.8–10.8)

## 2019-11-07 PROCEDURE — 99232 SBSQ HOSP IP/OBS MODERATE 35: CPT | Performed by: INTERNAL MEDICINE

## 2019-11-07 PROCEDURE — 700111 HCHG RX REV CODE 636 W/ 250 OVERRIDE (IP): Performed by: INTERNAL MEDICINE

## 2019-11-07 PROCEDURE — 700105 HCHG RX REV CODE 258: Performed by: INTERNAL MEDICINE

## 2019-11-07 PROCEDURE — 770001 HCHG ROOM/CARE - MED/SURG/GYN PRIV*

## 2019-11-07 PROCEDURE — 700102 HCHG RX REV CODE 250 W/ 637 OVERRIDE(OP): Performed by: INTERNAL MEDICINE

## 2019-11-07 PROCEDURE — 85018 HEMOGLOBIN: CPT

## 2019-11-07 PROCEDURE — 700112 HCHG RX REV CODE 229: Performed by: PHYSICIAN ASSISTANT

## 2019-11-07 PROCEDURE — 700102 HCHG RX REV CODE 250 W/ 637 OVERRIDE(OP): Performed by: ORTHOPAEDIC SURGERY

## 2019-11-07 PROCEDURE — 85025 COMPLETE CBC W/AUTO DIFF WBC: CPT

## 2019-11-07 PROCEDURE — A9270 NON-COVERED ITEM OR SERVICE: HCPCS | Performed by: INTERNAL MEDICINE

## 2019-11-07 PROCEDURE — 80048 BASIC METABOLIC PNL TOTAL CA: CPT

## 2019-11-07 PROCEDURE — 82962 GLUCOSE BLOOD TEST: CPT | Mod: 91

## 2019-11-07 PROCEDURE — A9270 NON-COVERED ITEM OR SERVICE: HCPCS | Performed by: ORTHOPAEDIC SURGERY

## 2019-11-07 PROCEDURE — A9270 NON-COVERED ITEM OR SERVICE: HCPCS | Performed by: PHYSICIAN ASSISTANT

## 2019-11-07 RX ORDER — 0.9 % SODIUM CHLORIDE 0.9 %
10-20 VIAL (ML) INJECTION PRN
Status: CANCELLED | OUTPATIENT
Start: 2019-11-08

## 2019-11-07 RX ORDER — 0.9 % SODIUM CHLORIDE 0.9 %
5 VIAL (ML) INJECTION PRN
Status: CANCELLED | OUTPATIENT
Start: 2019-11-08

## 2019-11-07 RX ORDER — POTASSIUM CHLORIDE 20 MEQ/1
40 TABLET, EXTENDED RELEASE ORAL 2 TIMES DAILY
Status: COMPLETED | OUTPATIENT
Start: 2019-11-07 | End: 2019-11-07

## 2019-11-07 RX ORDER — AMOXICILLIN 250 MG
1 CAPSULE ORAL DAILY
Qty: 30 TAB | Refills: 0 | Status: SHIPPED | OUTPATIENT
Start: 2019-11-07 | End: 2020-12-15

## 2019-11-07 RX ORDER — ASPIRIN 81 MG/1
81 TABLET ORAL DAILY
Qty: 30 TAB | Refills: 0 | Status: SHIPPED | OUTPATIENT
Start: 2019-11-08 | End: 2022-02-18

## 2019-11-07 RX ORDER — OXYCODONE HYDROCHLORIDE 5 MG/1
5 TABLET ORAL EVERY 6 HOURS PRN
Qty: 10 TAB | Refills: 0 | Status: SHIPPED | OUTPATIENT
Start: 2019-11-07 | End: 2019-11-12

## 2019-11-07 RX ORDER — POTASSIUM CHLORIDE 20 MEQ/1
40 TABLET, EXTENDED RELEASE ORAL DAILY
Qty: 10 TAB | Refills: 0 | Status: SHIPPED | OUTPATIENT
Start: 2019-11-07 | End: 2019-11-12

## 2019-11-07 RX ADMIN — POTASSIUM CHLORIDE 40 MEQ: 20 TABLET, EXTENDED RELEASE ORAL at 09:08

## 2019-11-07 RX ADMIN — POTASSIUM CHLORIDE 40 MEQ: 20 TABLET, EXTENDED RELEASE ORAL at 18:01

## 2019-11-07 RX ADMIN — CEFEPIME 2 G: 2 INJECTION, POWDER, FOR SOLUTION INTRAVENOUS at 05:15

## 2019-11-07 RX ADMIN — ACETAMINOPHEN 1000 MG: 500 TABLET ORAL at 12:40

## 2019-11-07 RX ADMIN — HEPARIN SODIUM 5000 UNITS: 5000 INJECTION, SOLUTION INTRAVENOUS; SUBCUTANEOUS at 05:15

## 2019-11-07 RX ADMIN — ERTAPENEM SODIUM 1000 MG: 1 INJECTION, POWDER, LYOPHILIZED, FOR SOLUTION INTRAMUSCULAR; INTRAVENOUS at 14:51

## 2019-11-07 RX ADMIN — AMLODIPINE BESYLATE 5 MG: 5 TABLET ORAL at 05:16

## 2019-11-07 RX ADMIN — CARVEDILOL 6.25 MG: 6.25 TABLET, FILM COATED ORAL at 05:16

## 2019-11-07 RX ADMIN — DOCUSATE SODIUM 100 MG: 100 CAPSULE, LIQUID FILLED ORAL at 18:01

## 2019-11-07 RX ADMIN — DOCUSATE SODIUM 100 MG: 100 CAPSULE, LIQUID FILLED ORAL at 05:15

## 2019-11-07 RX ADMIN — ALBUTEROL SULFATE 2 PUFF: 90 AEROSOL, METERED RESPIRATORY (INHALATION) at 12:48

## 2019-11-07 RX ADMIN — HEPARIN SODIUM 5000 UNITS: 5000 INJECTION, SOLUTION INTRAVENOUS; SUBCUTANEOUS at 20:46

## 2019-11-07 RX ADMIN — LATANOPROST 1 DROP: 50 SOLUTION OPHTHALMIC at 20:47

## 2019-11-07 RX ADMIN — ASPIRIN 81 MG: 81 TABLET, COATED ORAL at 05:16

## 2019-11-07 RX ADMIN — GABAPENTIN 600 MG: 300 CAPSULE ORAL at 18:01

## 2019-11-07 RX ADMIN — ACETAMINOPHEN 1000 MG: 500 TABLET ORAL at 01:09

## 2019-11-07 RX ADMIN — LISINOPRIL 20 MG: 20 TABLET ORAL at 05:16

## 2019-11-07 RX ADMIN — HYDROXYZINE HYDROCHLORIDE 10 MG: 10 TABLET, FILM COATED ORAL at 05:26

## 2019-11-07 RX ADMIN — ACETAMINOPHEN 1000 MG: 500 TABLET ORAL at 06:26

## 2019-11-07 RX ADMIN — ATORVASTATIN CALCIUM 20 MG: 20 TABLET, FILM COATED ORAL at 05:16

## 2019-11-07 RX ADMIN — CARVEDILOL 6.25 MG: 6.25 TABLET, FILM COATED ORAL at 18:01

## 2019-11-07 RX ADMIN — ESCITALOPRAM OXALATE 20 MG: 10 TABLET ORAL at 05:16

## 2019-11-07 RX ADMIN — HEPARIN SODIUM 5000 UNITS: 5000 INJECTION, SOLUTION INTRAVENOUS; SUBCUTANEOUS at 12:40

## 2019-11-07 ASSESSMENT — ENCOUNTER SYMPTOMS
CHILLS: 0
DIZZINESS: 0
VOMITING: 0
MYALGIAS: 0
SHORTNESS OF BREATH: 0
NERVOUS/ANXIOUS: 1
SENSORY CHANGE: 0
SPEECH CHANGE: 0
COUGH: 0
NAUSEA: 0
ABDOMINAL PAIN: 0
FEVER: 0

## 2019-11-07 NOTE — PROGRESS NOTES
"Patient seen and examined  Doing well    /79   Pulse 74   Temp 37.1 °C (98.7 °F) (Temporal)   Resp 16   Ht 1.702 m (5' 7\")   Wt 74.2 kg (163 lb 9.3 oz)   SpO2 98%     Recent Labs     11/05/19  0054 11/06/19  0027 11/07/19  0511 11/07/19  0915   WBC 8.6 9.5 8.1  --    RBC 3.80* 3.67* 3.22*  --    HEMOGLOBIN 10.4* 10.3* 8.9* 9.2*   HEMATOCRIT 32.6* 32.0* 27.3*  --    MCV 85.8 87.2 84.8  --    MCH 27.4 28.1 27.6  --    MCHC 31.9* 32.2* 32.6*  --    RDW 47.1 49.0 46.5  --    PLATELETCT 487* 498* 486*  --    MPV 8.6* 8.8* 9.2  --        No acute distress  Dressing clean dry and intact  Neurovascularly intact      Plan:  WBAT  Follow up in 2 weeks.  Keep dressing on till followup            "

## 2019-11-07 NOTE — FACE TO FACE
Face to Face Supporting Documentation - Home Health    The encounter with this patient was in whole or in part the primary reason for home health admission.    Date of encounter:   Patient:                    MRN:                       YOB: 2017  Ronny Gallegos  [id number]  1945     Home health to see patient for:  Skilled Nursing care for assessment, interventions & education, Physical Therapy evaluation and treatment and Occupational therapy evaluation and treatment    Skilled need for:  Surgical Aftercare prosthetic knee infection    Skilled nursing interventions to include:  Venous access care, Home IV infusion therapy and Wound Care    Homebound status evidenced by:  Needs the assistance of another person in order to leave the home. Leaving home requires a considerable and taxing effort. There is a normal inability to leave the home.    Community Physician to provide follow up care: KENDELL Fernández     Optional Interventions? No      I certify the face to face encounter for this home health care referral meets the CMS requirements and the encounter/clinical assessment with the patient was, in whole, or in part, for the medical condition(s) listed above, which is the primary reason for home health care. Based on my clinical findings: the service(s) are medically necessary, support the need for home health care, and the homebound criteria are met.  I certify that this patient has had a face to face encounter by myself.  Regis Haile M.D. - NPI: [id number]

## 2019-11-07 NOTE — PROGRESS NOTES
"   Orthopaedic Progress Note    Interval changes:  Doing well post op  Case discussed with Dr Charles  No need for further use of knee immobilizer brace    ROS - Patient denies any new issues.  Pain well controlled.    /79   Pulse 74   Temp 37.1 °C (98.7 °F) (Temporal)   Resp 16   Ht 1.702 m (5' 7\")   Wt 74.2 kg (163 lb 9.3 oz)   SpO2 98%       Patient seen and examined  No acute distress  Breathing non labored  RRR  Left knee immobilizer brace removed, surgical dressing is clean, dry, and intact. Patient clearly fires tibialis anterior, EHL, and gastrocnemius/soleus. Sensation is intact to light touch throughout superficial peroneal, deep peroneal, tibial, saphenous, and sural nerve distributions. Strong and palpable 2+ dorsalis pedis and posterior tibial pulses with capillary refill less than 2 seconds. No lower leg tenderness or discomfort.       Recent Labs     11/05/19  0054 11/06/19  0027 11/07/19  0511 11/07/19  0915   WBC 8.6 9.5 8.1  --    RBC 3.80* 3.67* 3.22*  --    HEMOGLOBIN 10.4* 10.3* 8.9* 9.2*   HEMATOCRIT 32.6* 32.0* 27.3*  --    MCV 85.8 87.2 84.8  --    MCH 27.4 28.1 27.6  --    MCHC 31.9* 32.2* 32.6*  --    RDW 47.1 49.0 46.5  --    PLATELETCT 487* 498* 486*  --    MPV 8.6* 8.8* 9.2  --        Active Hospital Problems    Diagnosis   • Wound dehiscence [T81.30XA]     Priority: High   • Anemia, blood loss [D50.0]     Priority: Low   • Thrombocytosis (HCC) [D47.3]     Priority: Low   • Type 2 diabetes mellitus with diabetic polyneuropathy, without long-term current use of insulin (HCC) [E11.42]     Priority: Low        • Hyponatremia [E87.1]     Priority: Low   • Coronary artery disease involving native coronary artery of native heart without angina pectoris [I25.10]     Priority: Low     Positive coronary calcium score of 819 done in 2015.     • Mixed hyperlipidemia [E78.2]     Priority: Low   • Essential hypertension [I10]     Priority: Low     ICD-10 transition     • Major depressive " disorder with single episode, in full remission (HCC) [F32.5]     Priority: Low   • Hypokalemia [E87.6]       Assessment/Plan:  Cleared for DC pending medicine and ID team clearance  No need for immobilizer   POD#1 S/P  Irrigation and debridement, left knee with polyethylene liner exchange and complex wound closure  Wt bearing status - WBAT  Wound care/Drains - dressing left in place  Future Procedures - none planned   Sutures/Staples out- 10-14 days post operatively  PT/OT-initiated  Antibiotics: maxipime 2g iv q8,   DVT Prophylaxis- TEDS/SCDs/Foot pumps  Chung-none  Case Coordination for Discharge Planning - Disposition pending abx needs

## 2019-11-07 NOTE — PROCEDURES
Vascular Access Team     Date of Insertion: 11/6/19  Arm Circumference: 28.5  Internal length: 47  External Length: hub  Vein Occupancy %: 35  Reason for PICC: antibiotic therapy   Labs: WBC 9.5, , BUN 11, Cr 0.61, GFR >60, INR na    Consents confirmed, vessel patency confirmed with ultrasound. Risks and benefits of procedure explained to patient and education regarding central line associated bloodstream infections provided. Questions answered.     PICC placed in LUE per licensed provider order with ultrasound guidance.  4 Fr, single lumen PICC placed in brachial vein after 1 attempt(s). 2 mL of 1% lidocaine injected intradermally, 21 gauge microintroducer needle and modified Seldinger technique used. 47 cm catheter inserted with good blood return. Secured at hub cm marker. Each lumen flushed without resistance with 10 mL 0.9% normal saline. PICC line secured with Biopatch and Tegaderm.     PICC tip placement location is confirmed by nurse to be in the Superior Vena Cava (SVC) utilizing 3CG technology. PICC line is appropriate for use at this time. Patient tolerated procedure well, without complications.  Patient condition relayed to unit RN or ordering physician via this post procedure note in the EMR.     Ultrasound images uploaded to PACS and viewable in the EMR - yes  Ultrasound imaged printed and placed in paper chart - no     BARD Power PICC ref # [id number], Lot # [id number]

## 2019-11-07 NOTE — DISCHARGE PLANNING
Anticipated Discharge Disposition: Home with HH and Infusion vs outpatient infusion room    Action: Met with patient to discuss discharge plans and spoke with his daughter in law Alexandra who he lives with. Patient is looking into the first option of going home with HH and IV antibiotic infusion at home vs [facility] outpatient infusion room. Choice obtained for [facility] and [facility]. Spoke with Kanika from [facility] and face sheet with infusion order faxed to [facility] for price quote.     Barriers to Discharge: Acceptance for Infusion and HH/ Outpatient infusion     Plan: Home with HH vs [facility] outpatient infusion room

## 2019-11-07 NOTE — PROGRESS NOTES
Hospital Medicine Daily Progress Note    Date of Service  11/7/2019    Chief Complaint  74 y.o. male admitted 11/2/2019 with wound infection.    Hospital Course    74 y.o. male who presented 11/2/2019 with PMH HTN, HDL, DM, peripheral neuropathy, history of alcohol use disorder who presented with septic prosthetic left knee and wound dehiscence.  Patient underwent I&D with Dr. Wesley 11/2 with placement of deep drains and wound VAC sponge.  He went back for I&D, liner exchange and wound closure with Dr. Charles 11/5. Dr. Caba with infectious disease was consulted for antibiotics. His wound cultures had no growth to date and recommended discharge on daily ertapenem. PICC was placed.       Interval Problem Update  He still feels anxious. Pain is controlled.     Consultants/Specialty  Ortho  ID    Code Status  Full Code    Disposition  Home tomorrow after 1st dose ertapenem    Review of Systems  Review of Systems   Constitutional: Negative for chills, fever and malaise/fatigue.   Respiratory: Negative for shortness of breath.    Cardiovascular: Negative for chest pain.   Gastrointestinal: Negative for abdominal pain and nausea.   Genitourinary: Negative for dysuria.   Musculoskeletal: Positive for joint pain.   Neurological: Negative for dizziness.   Psychiatric/Behavioral: The patient is nervous/anxious.         Physical Exam  Temp:  [36.6 °C (97.9 °F)-37.1 °C (98.7 °F)] 37.1 °C (98.7 °F)  Pulse:  [72-84] 74  Resp:  [16-18] 16  BP: (121-191)/(78-94) 149/79  SpO2:  [98 %-100 %] 98 %    Physical Exam  Vitals signs and nursing note reviewed.   Constitutional:       General: He is not in acute distress.     Appearance: He is not ill-appearing.   HENT:      Head: Normocephalic.      Mouth/Throat:      Mouth: Mucous membranes are moist.   Eyes:      General:         Right eye: No discharge.         Left eye: No discharge.   Cardiovascular:      Rate and Rhythm: Normal rate and regular rhythm.   Pulmonary:      Effort:  Pulmonary effort is normal.      Breath sounds: No wheezing or rales.   Abdominal:      General: Abdomen is flat.      Palpations: Abdomen is soft.      Tenderness: There is no tenderness.   Musculoskeletal:      Comments: Left leg in clean dressings  Left arm picc with no swelling or tenderness   Skin:     General: Skin is warm and dry.   Neurological:      Mental Status: He is alert and oriented to person, place, and time.         Fluids    Intake/Output Summary (Last 24 hours) at 11/7/2019 1431  Last data filed at 11/7/2019 1400  Gross per 24 hour   Intake 540 ml   Output 2650 ml   Net -2110 ml       Laboratory  Recent Labs     11/05/19 0054 11/06/19 0027 11/07/19  0511 11/07/19  0915   WBC 8.6 9.5 8.1  --    RBC 3.80* 3.67* 3.22*  --    HEMOGLOBIN 10.4* 10.3* 8.9* 9.2*   HEMATOCRIT 32.6* 32.0* 27.3*  --    MCV 85.8 87.2 84.8  --    MCH 27.4 28.1 27.6  --    MCHC 31.9* 32.2* 32.6*  --    RDW 47.1 49.0 46.5  --    PLATELETCT 487* 498* 486*  --    MPV 8.6* 8.8* 9.2  --      Recent Labs     11/05/19 0054 11/06/19 0027 11/07/19  0511   SODIUM 132* 133* 131*   POTASSIUM 3.5* 4.8 3.5*   CHLORIDE 100 101 99   CO2 24 25 23   GLUCOSE 111* 145* 100*   BUN 7* 11 7*   CREATININE 0.60 0.61 0.58   CALCIUM 8.7 8.7 8.2*                   Imaging  IR-PICC LINE PLACEMENT W/ GUIDANCE > AGE 5   Final Result                  Ultrasound-guided PICC placement performed by qualified nursing staff as    above.               Assessment/Plan  Wound dehiscence- (present on admission)  Assessment & Plan  Postoperative, left knee arthroplasty    Primary team's orthopedics, hospital medicine consulting for comanagement of medical concerns  Further surgical interventions, need for irrigation and debridement per orthopedics team  Infectious disease team was consulted per primary team for recommendations for anti-infective therapy  PICC and plan for ertapenem per ID    Hypokalemia  Assessment & Plan  Improved    Thrombocytosis (HCC)- (present  on admission)  Assessment & Plan  Monitor CBC  New this admission, Infectious?  Continue on antibiotics    Anemia, blood loss- (present on admission)  Assessment & Plan  Operative blood loss  Monitor Hb / Restrictive transfusion strategy    Type 2 diabetes mellitus with diabetic polyneuropathy, without long-term current use of insulin (HCC)- (present on admission)  Assessment & Plan  Holding oral hypoglycemic regimen  Sliding scale insulin No. 2    Hyponatremia- (present on admission)  Assessment & Plan  Chronic - monitor - stable at this time     Coronary artery disease involving native coronary artery of native heart without angina pectoris- (present on admission)  Assessment & Plan  No prior heart attack   no active issues or concerns at this time  Continue aspirin 81 mg, beta-blockers and statins    Essential hypertension- (present on admission)  Assessment & Plan  Continue amlodipine, lisinopril, carvedilol with holding parameters    Mixed hyperlipidemia- (present on admission)  Assessment & Plan  Continue atorvastatin    Major depressive disorder with single episode, in full remission (HCC)- (present on admission)  Assessment & Plan  Stable  Continue at home regimen       VTE prophylaxis: heparin

## 2019-11-07 NOTE — PROGRESS NOTES
Infectious Disease Progress Note    Author: Priya Caba M.D. Date & Time of service: 2019  1:14 PM    Chief Complaint:  Prostatic knee infection  Interval History:  2019-no fevers.  No new issues overnight.  Going for surgery today.  WBC is 8.6.  2019 low-grade fevers.  Feels much better.  The knee feels better.  Significantly anxious.  WBC is 9.5.  Underwent surgery on 2019.  2019- no fevers. No new issues. Feels better.  Wants to go home    Labs Reviewed and Medications Reviewed.    Review of Systems:  Review of Systems   Constitutional: Positive for malaise/fatigue. Negative for fever.   HENT: Negative for hearing loss.    Respiratory: Negative for cough and shortness of breath.    Cardiovascular: Negative for chest pain and leg swelling.   Gastrointestinal: Negative for nausea and vomiting.   Genitourinary: Negative for dysuria.   Musculoskeletal: Negative for joint pain and myalgias.        Left knee bandaged.   Neurological: Negative for sensory change and speech change.       Hemodynamics:  Temp (24hrs), Av.8 °C (98.3 °F), Min:36.6 °C (97.9 °F), Max:37.1 °C (98.7 °F)  Temperature: 37.1 °C (98.7 °F)  Pulse  Av.6  Min: 61  Max: 95   Blood Pressure : 149/79       Physical Exam:  Physical Exam  Vitals signs reviewed.   Constitutional:       Appearance: Normal appearance. He is normal weight.   HENT:      Head: Normocephalic and atraumatic.      Right Ear: External ear normal.      Left Ear: External ear normal.      Nose: Nose normal. No congestion.      Mouth/Throat:      Mouth: Mucous membranes are moist.      Pharynx: No oropharyngeal exudate.   Eyes:      General: No scleral icterus.     Pupils: Pupils are equal, round, and reactive to light.   Neck:      Musculoskeletal: Neck supple.   Cardiovascular:      Rate and Rhythm: Normal rate and regular rhythm.      Heart sounds: Normal heart sounds. No murmur.   Pulmonary:      Effort: Pulmonary effort is normal. No  respiratory distress.      Breath sounds: No wheezing.   Abdominal:      General: Bowel sounds are normal. There is no distension.      Palpations: Abdomen is soft.      Tenderness: There is no tenderness. There is no guarding.   Musculoskeletal: Normal range of motion.         General: Tenderness present. No swelling or deformity.      Comments: Left knee is bandaged    Lymphadenopathy:      Cervical: No cervical adenopathy.   Skin:     General: Skin is warm and dry.      Findings: No erythema.   Neurological:      General: No focal deficit present.      Mental Status: He is alert and oriented to person, place, and time.   Psychiatric:         Mood and Affect: Mood normal.         Meds:    Current Facility-Administered Medications:   •  potassium chloride SA  •  cefepime  •  hydrALAZINE  •  hydrOXYzine HCl  •  Pharmacy Consult Request  •  ondansetron  •  dexamethasone  •  diphenhydrAMINE  •  haloperidol lactate  •  scopolamine  •  docusate sodium  •  senna-docusate  •  senna-docusate  •  polyethylene glycol/lytes  •  magnesium hydroxide  •  bisacodyl  •  fleet  •  oxyCODONE immediate-release  •  oxyCODONE immediate release  •  morphine injection  •  benzocaine-menthol  •  chlorproMAZINE **OR** chlorproMAZINE  •  diphenhydrAMINE **OR** diphenhydrAMINE  •  insulin regular **AND** Accu-Chek ACHS **AND** NOTIFY MD and PharmD **AND** glucose **AND** dextrose 10% bolus  •  aspirin EC  •  heparin  •  acetaminophen  •  amLODIPine  •  gabapentin  •  lisinopril  •  atorvastatin  •  carvedilol  •  escitalopram  •  albuterol  •  latanoprost    Labs:  Recent Labs     11/05/19  0054 11/06/19  0027 11/07/19  0511 11/07/19  0915   WBC 8.6 9.5 8.1  --    RBC 3.80* 3.67* 3.22*  --    HEMOGLOBIN 10.4* 10.3* 8.9* 9.2*   HEMATOCRIT 32.6* 32.0* 27.3*  --    MCV 85.8 87.2 84.8  --    MCH 27.4 28.1 27.6  --    RDW 47.1 49.0 46.5  --    PLATELETCT 487* 498* 486*  --    MPV 8.6* 8.8* 9.2  --    NEUTSPOLYS  --  85.80* 63.70  --   "  LYMPHOCYTES  --  7.20* 23.60  --    MONOCYTES  --  6.40 10.00  --    EOSINOPHILS  --  0.00 1.70  --    BASOPHILS  --  0.10 0.50  --      Recent Labs     11/05/19 0054 11/06/19 0027 11/07/19 0511   SODIUM 132* 133* 131*   POTASSIUM 3.5* 4.8 3.5*   CHLORIDE 100 101 99   CO2 24 25 23   GLUCOSE 111* 145* 100*   BUN 7* 11 7*   CPKTOTAL 46  --   --      Recent Labs     11/05/19 0054 11/06/19 0027 11/07/19 0511   ALBUMIN 3.5  --   --    TBILIRUBIN 0.5  --   --    ALKPHOSPHAT 70  --   --    TOTPROTEIN 6.2  --   --    ALTSGPT 9  --   --    ASTSGOT 12  --   --    CREATININE 0.60 0.61 0.58       Imaging:  No results found.    Micro:  Results     Procedure Component Value Units Date/Time    BLOOD CULTURE x2 [[id number]] Collected:  11/02/19 1148    Order Status:  Completed Specimen:  Blood from Peripheral Updated:  11/07/19 1301     Significant Indicator NEG     Source BLD     Site PERIPHERAL     Culture Result No growth after 5 days of incubation.    Narrative:       Per Hospital Policy: Only change Specimen Src: to \"Line\" if  specified by physician order.  No site indicated    BLOOD CULTURE x2 [[id number]] Collected:  11/02/19 1208    Order Status:  Completed Specimen:  Blood from Peripheral Updated:  11/07/19 1301     Significant Indicator NEG     Source BLD     Site PERIPHERAL     Culture Result No growth after 5 days of incubation.    Narrative:       Per Hospital Policy: Only change Specimen Src: to \"Line\" if  specified by physician order.  Right Forearm/Arm    CULTURE WOUND W/ GRAM STAIN [[id number]] Collected:  11/02/19 1343    Order Status:  Completed Specimen:  Wound Updated:  11/07/19 0916     Significant Indicator NEG     Source WND     Site Left Knee     Culture Result No growth at 72 hours.     Gram Stain Result Rare WBCs.  No organisms seen.      Narrative:       Surgery - swabs received    Anaerobic Culture [[id number]] Collected:  11/02/19 1343    Order Status:  Completed Specimen:  Wound Updated:  11/07/19 " 0916     Significant Indicator NEG     Source WND     Site Left Knee     Culture Result No Anaerobes isolated.    Narrative:       Surgery - swabs received    FLUID CULTURE W/GRAM STAIN [[id number]] Collected:  11/02/19 1327    Order Status:  Completed Specimen:  Synovial Updated:  11/07/19 0916     Significant Indicator NEG     Source SYNO     Site Left Knee     Culture Result No growth at 72 hours.     Gram Stain Result Rare WBCs.  No organisms seen.      Narrative:       Surgery Specimen    Anaerobic Culture [[id number]] Collected:  11/02/19 1327    Order Status:  Completed Specimen:  Synovial Updated:  11/07/19 0916     Significant Indicator NEG     Source SYNO     Site Left Knee     Culture Result No Anaerobes isolated.    Narrative:       Surgery Specimen    CULTURE WOUND W/ GRAM STAIN [[id number]] Collected:  11/05/19 1628    Order Status:  Completed Specimen:  Wound Updated:  11/07/19 0816     Significant Indicator NEG     Source WND     Site Left Knee     Culture Result No growth at 48 hours.     Gram Stain Result Few WBCs.  No organisms seen.      Narrative:       Surgery - swabs received    Anaerobic Culture [[id number]] Collected:  11/05/19 1628    Order Status:  Completed Specimen:  Wound Updated:  11/07/19 0816     Significant Indicator NEG     Source WND     Site Left Knee     Culture Result Culture in progress.    Narrative:       Surgery - swabs received    CULTURE TISSUE W/ GRM STAIN [[id number]] Collected:  11/02/19 1246    Order Status:  Completed Specimen:  Tissue Updated:  11/06/19 0740     Significant Indicator NEG     Source TISS     Site Left Knee Tissue     Culture Result No growth at 72 hours.     Gram Stain Result Few WBCs.  No organisms seen.      Narrative:       Surgery Specimen    Anaerobic Culture [[id number]] Collected:  11/02/19 1246    Order Status:  Completed Specimen:  Tissue Updated:  11/06/19 0740     Significant Indicator NEG     Source TISS     Site Left Knee Tissue     Culture  Result Culture in progress.    Narrative:       Surgery Specimen    AFB Culture [[id number]] Collected:  11/02/19 1246    Order Status:  Completed Specimen:  Tissue Updated:  11/06/19 0740     Significant Indicator NEG     Source TISS     Site Left Knee Tissue     Culture Result Culture in progress.     AFB Smear Results No acid fast bacilli seen.    Narrative:       Surgery Specimen    AFB Culture [[id number]] Collected:  11/02/19 1343    Order Status:  Completed Specimen:  Tissue Updated:  11/06/19 0740     Significant Indicator NEG     Source TISS     Site Left Knee Deep Wound     Culture Result Culture in progress.     AFB Smear Results No acid fast bacilli seen.    Narrative:       Surgery Specimen    CULTURE TISSUE W/ GRM STAIN [[id number]] Collected:  11/02/19 1343    Order Status:  Completed Specimen:  Tissue Updated:  11/06/19 0740     Significant Indicator NEG     Source TISS     Site Left Knee Deep Wound     Culture Result No growth at 72 hours.     Gram Stain Result No organisms seen.    Narrative:       Surgery Specimen    Anaerobic Culture [[id number]] Collected:  11/02/19 1343    Order Status:  Completed Specimen:  Tissue Updated:  11/06/19 0740     Significant Indicator NEG     Source TISS     Site Left Knee Deep Wound     Culture Result Culture in progress.    Narrative:       Surgery Specimen    GRAM STAIN [[id number]] Collected:  11/05/19 1628    Order Status:  Completed Specimen:  Wound Updated:  11/05/19 2339     Significant Indicator .     Source WND     Site Left Knee     Gram Stain Result Few WBCs.  No organisms seen.      Narrative:       Surgery - swabs received    CULTURE WOUND W/ GRAM STAIN [[id number]] Collected:  11/02/19 1148    Order Status:  Completed Specimen:  Wound from Left Leg Updated:  11/04/19 1026     Significant Indicator NEG     Source WND     Site LEFT LEG     Culture Result Rare growth mixed skin jacky.     Gram Stain Result Few WBCs.  Rare Gram negative rods.      Narrative:        Left knee  Left knee    Acid Fast Stain [[id number]] Collected:  11/02/19 1343    Order Status:  Completed Specimen:  Tissue Updated:  11/04/19 0707     Significant Indicator NEG     Source TISS     Site Left Knee Deep Wound     AFB Smear Results No acid fast bacilli seen.    Narrative:       Surgery Specimen    GRAM STAIN [[id number]] Collected:  11/02/19 1343    Order Status:  Completed Specimen:  Tissue Updated:  11/04/19 0707     Significant Indicator .     Source TISS     Site Left Knee Deep Wound     Gram Stain Result No organisms seen.    Narrative:       Surgery Specimen    Acid Fast Stain [[id number]] Collected:  11/02/19 1246    Order Status:  Completed Specimen:  Tissue Updated:  11/04/19 0707     Significant Indicator NEG     Source TISS     Site Left Knee Tissue     AFB Smear Results No acid fast bacilli seen.    Narrative:       Surgery Specimen    GRAM STAIN [[id number]] Collected:  11/02/19 1246    Order Status:  Completed Specimen:  Tissue Updated:  11/04/19 0707     Significant Indicator .     Source TISS     Site Left Knee Tissue     Gram Stain Result Few WBCs.  No organisms seen.      Narrative:       Surgery Specimen    GRAM STAIN [[id number]] Collected:  11/02/19 1343    Order Status:  Completed Specimen:  Wound Updated:  11/02/19 1655     Significant Indicator .     Source WND     Site Left Knee     Gram Stain Result Rare WBCs.  No organisms seen.      Narrative:       Surgery - swabs received    GRAM STAIN [[id number]] Collected:  11/02/19 1327    Order Status:  Completed Specimen:  Body Fluid Updated:  11/02/19 1654     Significant Indicator .     Source BF     Site Left Knee     Gram Stain Result Rare WBCs.  No organisms seen.      GRAM STAIN [[id number]] Collected:  11/02/19 1148    Order Status:  Completed Specimen:  Wound Updated:  11/02/19 1433     Significant Indicator .     Source WND     Site LEFT LEG     Gram Stain Result Few WBCs.  Rare Gram negative rods.      Narrative:       Left  knee  Left knee          Assessment:  Active Hospital Problems    Diagnosis   • Wound dehiscence [T81.30XA]   • Anemia, blood loss [D50.0]   • Thrombocytosis ([facility]) [D47.3]   • Type 2 diabetes mellitus with diabetic polyneuropathy, without long-term current use of insulin ([facility]) [E11.42]   • Hyponatremia [E87.1]   • Coronary artery disease involving native coronary artery of native heart without angina pectoris [I25.10]   • Mixed hyperlipidemia [E78.2]   • Essential hypertension [I10]   • Major depressive disorder with single episode, in full remission ([facility]) [F32.5]   Prosthetic knee infection    Plan:  Prosthetic knee infection  Patient is going for surgery today  He had I&D of the wound on 11/2/2019-those cultures are negative so far.  The Gram stain is showing rare gram-negative rods.  Change Rocephin to cefepime  PICC line placed  Orders for Invanz through 12/15/2019 written  for outpatient infusion clinic as well as home infusion and given to the case management    Diabetes mellitus  Control the blood sugars for wound healing    Coronary artery disease  Discussed with internal medicine Dr. Haile

## 2019-11-07 NOTE — DISCHARGE PLANNING
Received Choice form at [id number]  Agency/Facility Name: [facility]  Referral sent per Choice form @ [id number]

## 2019-11-08 ENCOUNTER — PATIENT OUTREACH (OUTPATIENT)
Dept: HEALTH INFORMATION MANAGEMENT | Facility: OTHER | Age: 74
End: 2019-11-08

## 2019-11-08 VITALS
RESPIRATION RATE: 17 BRPM | DIASTOLIC BLOOD PRESSURE: 68 MMHG | BODY MASS INDEX: 25.67 KG/M2 | OXYGEN SATURATION: 97 % | HEIGHT: 67 IN | TEMPERATURE: 98.3 F | HEART RATE: 86 BPM | SYSTOLIC BLOOD PRESSURE: 117 MMHG | WEIGHT: 163.58 LBS

## 2019-11-08 LAB
BACTERIA SPEC ANAEROBE CULT: NORMAL
BACTERIA SPEC ANAEROBE CULT: NORMAL
BACTERIA WND AEROBE CULT: NORMAL
GLUCOSE BLD-MCNC: 116 MG/DL (ref 65–99)
GLUCOSE BLD-MCNC: 119 MG/DL (ref 65–99)
GRAM STN SPEC: NORMAL
SIGNIFICANT IND 70042: NORMAL
SITE SITE: NORMAL
SOURCE SOURCE: NORMAL

## 2019-11-08 PROCEDURE — 700102 HCHG RX REV CODE 250 W/ 637 OVERRIDE(OP): Performed by: INTERNAL MEDICINE

## 2019-11-08 PROCEDURE — 700102 HCHG RX REV CODE 250 W/ 637 OVERRIDE(OP): Performed by: PHYSICIAN ASSISTANT

## 2019-11-08 PROCEDURE — A9270 NON-COVERED ITEM OR SERVICE: HCPCS | Performed by: ORTHOPAEDIC SURGERY

## 2019-11-08 PROCEDURE — A9270 NON-COVERED ITEM OR SERVICE: HCPCS | Performed by: INTERNAL MEDICINE

## 2019-11-08 PROCEDURE — 700111 HCHG RX REV CODE 636 W/ 250 OVERRIDE (IP): Performed by: INTERNAL MEDICINE

## 2019-11-08 PROCEDURE — A9270 NON-COVERED ITEM OR SERVICE: HCPCS | Performed by: PHYSICIAN ASSISTANT

## 2019-11-08 PROCEDURE — 82962 GLUCOSE BLOOD TEST: CPT

## 2019-11-08 PROCEDURE — 99239 HOSP IP/OBS DSCHRG MGMT >30: CPT | Performed by: INTERNAL MEDICINE

## 2019-11-08 PROCEDURE — 700105 HCHG RX REV CODE 258: Performed by: INTERNAL MEDICINE

## 2019-11-08 PROCEDURE — 700102 HCHG RX REV CODE 250 W/ 637 OVERRIDE(OP): Performed by: ORTHOPAEDIC SURGERY

## 2019-11-08 RX ORDER — ACETAMINOPHEN 325 MG/1
325 TABLET ORAL ONCE
Status: COMPLETED | OUTPATIENT
Start: 2019-11-08 | End: 2019-11-08

## 2019-11-08 RX ADMIN — ASPIRIN 81 MG: 81 TABLET, COATED ORAL at 06:28

## 2019-11-08 RX ADMIN — OXYCODONE HYDROCHLORIDE 5 MG: 5 TABLET ORAL at 06:28

## 2019-11-08 RX ADMIN — ERTAPENEM SODIUM 1000 MG: 1 INJECTION, POWDER, LYOPHILIZED, FOR SOLUTION INTRAMUSCULAR; INTRAVENOUS at 08:10

## 2019-11-08 RX ADMIN — CARVEDILOL 6.25 MG: 6.25 TABLET, FILM COATED ORAL at 06:27

## 2019-11-08 RX ADMIN — ATORVASTATIN CALCIUM 20 MG: 20 TABLET, FILM COATED ORAL at 06:27

## 2019-11-08 RX ADMIN — ACETAMINOPHEN 325 MG: 325 TABLET, FILM COATED ORAL at 09:58

## 2019-11-08 RX ADMIN — AMLODIPINE BESYLATE 5 MG: 5 TABLET ORAL at 06:27

## 2019-11-08 RX ADMIN — LISINOPRIL 20 MG: 20 TABLET ORAL at 06:27

## 2019-11-08 RX ADMIN — HEPARIN SODIUM 5000 UNITS: 5000 INJECTION, SOLUTION INTRAVENOUS; SUBCUTANEOUS at 06:28

## 2019-11-08 RX ADMIN — ESCITALOPRAM OXALATE 20 MG: 10 TABLET ORAL at 06:28

## 2019-11-08 NOTE — CARE PLAN
Problem: Safety  Goal: Will remain free from injury  Outcome: PROGRESSING AS EXPECTED  Goal: Will remain free from falls  Outcome: PROGRESSING AS EXPECTED     Problem: Pain Management  Goal: Pain level will decrease to patient's comfort goal  Outcome: PROGRESSING AS EXPECTED     Patient utilizes call light appropriately, fall risk interventions in place. Minimal pain managed with PRN medications.

## 2019-11-08 NOTE — DISCHARGE SUMMARY
Discharge Summary    CHIEF COMPLAINT ON ADMISSION  Chief Complaint   Patient presents with   • Wound Check     hx of knee replacement, wound has opened twice        Reason for Admission  Wound Check     Admission Date  11/2/2019    CODE STATUS  Prior    HPI & HOSPITAL COURSE  74 y.o. male who presented 11/2/2019 with PMH HTN, HDL, DM, peripheral neuropathy, history of alcohol use disorder who presented with septic prosthetic left knee and wound dehiscence.  Patient underwent I&D with Dr. Wesley 11/2 with placement of deep drains and wound VAC sponge.   He went back for I&D, liner exchange and wound closure with Dr. Charles 11/5. Dr. Caba with infectious disease was consulted for antibiotics. His wound cultures had no growth to date and recommended discharge on daily ertapenem until 12/15/2019. PICC was placed.  He tolerated dose of ertapenem while inpatient.  Patient was set up with [facility] and he and his daughter-in-law received teaching from [facility] regarding infusion.  He will need to follow-up with Dr. Charles as outpatient in 2 weeks.  He has some postop pain that required occasional narcotics, I will prescribe him a short course which he will taper off as I discussed with him.    Therefore, he is discharged in good and stable condition to home with organized home healthcare and close outpatient follow-up.    The patient met 2-midnight criteria for an inpatient stay at the time of discharge.    Discharge Date  11/8/2019    FOLLOW UP ITEMS POST DISCHARGE  Follow-up with Dr. Charles in 2 weeks for postop follow-up  Ertapenem until 12/15/2019 per Dr. Caba    DISCHARGE DIAGNOSES  Active Problems:    Wound dehiscence POA: Yes    Hypokalemia POA: No    Major depressive disorder with single episode, in full remission (HCC) POA: Yes    Mixed hyperlipidemia POA: Yes    Essential hypertension POA: Yes      Overview: ICD-10 transition    Coronary artery disease involving native coronary artery of native  heart without angina pectoris POA: Yes      Overview: Positive coronary calcium score of 819 done in 2015.    Hyponatremia POA: Yes    Type 2 diabetes mellitus with diabetic polyneuropathy, without long-term current use of insulin (HCC) POA: Yes    Anemia, blood loss POA: Yes    Thrombocytosis (HCC) POA: Yes  Resolved Problems:    * No resolved hospital problems. *      FOLLOW UP  Future Appointments   Date Time Provider Department Center   12/9/2019  1:20 PM Alberta Foley M.D. [facility] None     Parmjit Charles M.D.  [address]  [place] [address]  [phone]    Schedule an appointment as soon as possible for a visit in 2 weeks  For wound re-check    KENDELL Fernández  [address]  [facility] [id number] [phone]      [facility]  left a message with your physician regarding need for a follow up appointment. Please call their office directly if you do not hear from them within 2 business days.Thank you      MEDICATIONS ON DISCHARGE     Medication List      START taking these medications      Instructions   aspirin 81 MG EC tablet  Replaces:  aspirin 81 MG Chew chewable tablet   Take 1 Tab by mouth every day.  Dose:  81 mg     NS SOLN 100 mL with ertapenem 1 GM SOLR 1,000 mg   1,000 mg by Intravenous route every 24 hours.  Dose:  1,000 mg     oxyCODONE immediate-release 5 MG Tabs  Commonly known as:  ROXICODONE   Take 1 Tab by mouth every 6 hours as needed for Severe Pain for up to 5 days.  Dose:  5 mg     potassium chloride SA 20 MEQ Tbcr  Commonly known as:  Kdur   Take 2 Tabs by mouth every day for 5 days.  Dose:  40 mEq     senna-docusate 8.6-50 MG Tabs  Commonly known as:  PERICOLACE or SENOKOT S   Take 1 Tab by mouth every day.  Dose:  1 Tab        CONTINUE taking these medications      Instructions   amLODIPine 5 MG Tabs  Commonly known as:  NORVASC   Take 5 mg by mouth every day.  Dose:  5 mg     atorvastatin 20 MG Tabs  Commonly known as:  LIPITOR   Take 20 mg by mouth every  day.  Dose:  20 mg     carvedilol 6.25 MG Tabs  Commonly known as:  COREG   Take 6.25 mg by mouth 2 times a day.  Dose:  6.25 mg     CENTRUM SILVER 50+MEN Tabs   Take 1 Tab by mouth every morning.  Dose:  1 Tab     gabapentin 600 MG tablet  Commonly known as:  NEURONTIN   Take 600 mg by mouth 2 times a day.  Dose:  600 mg     LEXAPRO 20 MG tablet  Generic drug:  escitalopram   Take 20 mg by mouth every morning. Indications: Major Depressive Disorder  Dose:  20 mg     lisinopril 20 MG Tabs  Commonly known as:  PRINIVIL   Take 20 mg by mouth every day.  Dose:  20 mg     LUMIGAN 0.01 % Soln  Generic drug:  bimatoprost   Place 1 Drop in both eyes every bedtime.  Dose:  1 Drop     metFORMIN 500 MG Tabs  Commonly known as:  GLUCOPHAGE   Take 500 mg by mouth every day.  Dose:  500 mg     naproxen 500 MG Tabs  Commonly known as:  NAPROSYN   Take 500 mg by mouth 2 times a day, with meals.  Dose:  500 mg     PROAIR  (90 Base) MCG/ACT Aers inhalation aerosol  Generic drug:  albuterol   Inhale 2 Puffs by mouth every four hours as needed for Shortness of Breath.  Dose:  2 Puff        STOP taking these medications    aspirin 81 MG Chew chewable tablet  Commonly known as:  ASA  Replaced by:  aspirin 81 MG EC tablet            Allergies  Allergies   Allergen Reactions   • Nkda [No Known Drug Allergy]        DIET  No orders of the defined types were placed in this encounter.      ACTIVITY  As tolerated.  Weight bearing as tolerated    CONSULTATIONS  Ortho  ID    PROCEDURES  IR-PICC LINE PLACEMENT W/ GUIDANCE > AGE 5   Final Result                  Ultrasound-guided PICC placement performed by qualified nursing staff as    above.            11/2:  PROCEDURES:  1.  Left knee irrigation and debridement with procurement of deep cultures.  2.  Placement of deep drains and wound VAC sponge.    11/5:  PROCEDURE:  Irrigation and debridement, left knee with polyethylene liner   exchange and complex wound closure.    LABORATORY  Lab  Results   Component Value Date    SODIUM 131 (L) 11/07/2019    POTASSIUM 3.5 (L) 11/07/2019    CHLORIDE 99 11/07/2019    CO2 23 11/07/2019    GLUCOSE 100 (H) 11/07/2019    BUN 7 (L) 11/07/2019    CREATININE 0.58 11/07/2019    CREATININE 0.93 06/18/2012        Lab Results   Component Value Date    WBC 8.1 11/07/2019    HEMOGLOBIN 9.2 (L) 11/07/2019    HEMATOCRIT 27.3 (L) 11/07/2019    PLATELETCT 486 (H) 11/07/2019        Total time of the discharge process exceeds 32 minutes.

## 2019-11-08 NOTE — PROGRESS NOTES
Patient discharged via wheelchair by transport.  Patient was given PICC line education and infusion teaching by Kanika.  Patient and daughter in law given discharge instructions and education.  LISA Jaramillo gave patient wound care instructions and patient provided with a couple days supply for wound care.All questions and concerns addressed.

## 2019-11-08 NOTE — DISCHARGE INSTRUCTIONS
Incision and Drainage, Care After  Refer to this sheet in the next few weeks. These instructions provide you with information on caring for yourself after your procedure. Your caregiver may also give you more specific instructions. Your treatment has been planned according to current medical practices, but problems sometimes occur. Call your caregiver if you have any problems or questions after your procedure.  HOME CARE INSTRUCTIONS   · If antibiotic medicine is given, take it as directed. Finish it even if you start to feel better.  · Only take over-the-counter or prescription medicines for pain, discomfort, or fever as directed by your caregiver.  · Keep all follow-up appointments as directed by your caregiver.  · Change any bandages (dressings) as directed by your caregiver. Replace old dressings with clean dressings.  · Wash your hands before and after caring for your wound.  You will receive specific instructions for cleansing and caring for your wound.   SEEK MEDICAL CARE IF:   · You have increased pain, swelling, or redness around the wound.  · You have increased drainage, smell, or bleeding from the wound.  · You have muscle aches, chills, or you feel generally sick.  · You have a fever.  MAKE SURE YOU:   · Understand these instructions.  · Will watch your condition.  · Will get help right away if you are not doing well or get worse.     This information is not intended to replace advice given to you by your health care provider. Make sure you discuss any questions you have with your health care provider.       Discharge Instructions    Discharged to home by car with relative. Discharged via wheelchair, hospital escort: Yes.  Special equipment needed: Wheelchair    Be sure to schedule a follow-up appointment with your primary care doctor or any specialists as instructed.     Discharge Plan:   Diet Plan: Discussed  Activity Level: Discussed  Confirmed Follow up Appointment: Appointment Scheduled  Confirmed  Symptoms Management: Discussed  Medication Reconciliation Updated: Yes  Influenza Vaccine Indication: Not indicated: Previously immunized this influenza season and > 8 years of age    I understand that a diet low in cholesterol, fat, and sodium is recommended for good health. Unless I have been given specific instructions below for another diet, I accept this instruction as my diet prescription.   Other diet: diabetic    Special Instructions: None    · Is patient discharged on Warfarin / Coumadin?   No     Depression / Suicide Risk    As you are discharged from this [facility] facility, it is important to learn how to keep safe from harming yourself.    Recognize the warning signs:  · Abrupt changes in personality, positive or negative- including increase in energy   · Giving away possessions  · Change in eating patterns- significant weight changes-  positive or negative  · Change in sleeping patterns- unable to sleep or sleeping all the time   · Unwillingness or inability to communicate  · Depression  · Unusual sadness, discouragement and loneliness  · Talk of wanting to die  · Neglect of personal appearance   · Rebelliousness- reckless behavior  · Withdrawal from people/activities they love  · Confusion- inability to concentrate     If you or a loved one observes any of these behaviors or has concerns about self-harm, here's what you can do:  · Talk about it- your feelings and reasons for harming yourself  · Remove any means that you might use to hurt yourself (examples: pills, rope, extension cords, firearm)  · Get professional help from the community (Mental Health, Substance Abuse, psychological counseling)  · Do not be alone:Call your Safe Contact- someone whom you trust who will be there for you.  · Call your local CRISIS HOTLINE 133-4422 or 850-537-1620  · Call your local Children's Mobile Crisis Response Team [place] (830) 305-9985 or www.Makeblock  · Call the toll free National Suicide  "Prevention Hotlines   · National Suicide Prevention Lifeline 761-019-ETPT (8969)  · [facility] Line Network 800-SUICIDE (409-9163)      Discharge Instructions per Regis Haile M.D.    DIAGNOSIS: Knee infection.   Continue daily antibiotic infusions, follow-up with Dr. Charles in 2 weeks.  Do not get your PICC line wet. Monitor for swelling or pain around the PICC line.    Wound care per Ortho PA  Once batter on wound vac has , remove ace wrap and wound vac.  Cover incision with guaze and tegaderm.  Make sure guaze is covering full incision site.  Change site every three days or as needed if increased drainage until appointment with Dr. Charles.         PICC Home Guide  A peripherally inserted central catheter (PICC) is a long, thin, flexible tube that is inserted into a vein in the upper arm. It is a form of intravenous (IV) access. It is considered to be a \"central\" line because the tip of the PICC ends in a large vein in your chest. This large vein is called the superior vena cava (SVC). The PICC tip ends in the SVC because there is a lot of blood flow in the SVC. This allows medicines and IV fluids to be quickly distributed throughout the body. The PICC is inserted using a sterile technique by a specially trained nurse or physician. After the PICC is inserted, a chest X-ray exam is done to be sure it is in the correct place.   A PICC may be placed for different reasons, such as:  · To give medicines and liquid nutrition that can only be given through a central line. Examples are:  ¨ Certain antibiotic treatments.  ¨ Chemotherapy.  ¨ Total parenteral nutrition (TPN).  · To take frequent blood samples.  · To give IV fluids and blood products.  · If there is difficulty placing a peripheral intravenous (PIV) catheter.  If taken care of properly, a PICC can remain in place for several months. A PICC can also allow a person to go home from the hospital early. Medicine and PICC care can be managed at home by a family " "member or home health care team.    WHAT PROBLEMS CAN HAPPEN WHEN I HAVE A PICC?  Problems with a PICC can occasionally occur. These may include the following:  · A blood clot (thrombus) forming in or at the tip of the PICC. This can cause the PICC to become clogged. A clot-dissolving medicine called tissue plasminogen activator (tPA) can be given through the PICC to help break up the clot.  · Inflammation of the vein (phlebitis) in which the PICC is placed. Signs of inflammation may include redness, pain at the insertion site, red streaks, or being able to feel a \"cord\" in the vein where the PICC is located.  · Infection in the PICC or at the insertion site. Signs of infection may include fever, chills, redness, swelling, or pus drainage from the PICC insertion site.  · PICC movement (malposition). The PICC tip may move from its original position due to excessive physical activity, forceful coughing, sneezing, or vomiting.  · A break or cut in the PICC. It is important to not use scissors near the PICC.  · Nerve or tendon irritation or injury during PICC insertion.    WHAT SHOULD I KEEP IN MIND ABOUT ACTIVITIES WHEN I HAVE A PICC?  · You may bend your arm and move it freely. If your PICC is near or at the bend of your elbow, avoid activity with repeated motion at the elbow.  · Rest at home for the remainder of the day following PICC line insertion.  · Avoid lifting heavy objects as instructed by your health care provider.  · Avoid using a crutch with the arm on the same side as your PICC. You may need to use a walker.    WHAT SHOULD I KNOW ABOUT MY PICC DRESSING?  · Keep your PICC bandage (dressing) clean and dry to prevent infection.  ¨ Ask your health care provider when you may shower. Ask your health care provider to teach you how to wrap the PICC when you do take a shower.  · Change the PICC dressing as instructed by your health care provider.  · Change your PICC dressing if it becomes loose or wet.    WHAT " "SHOULD I KNOW ABOUT PICC CARE?  · Check the PICC insertion site daily for leakage, redness, swelling, or pain.  · Do not take a bath, swim, or use hot tubs when you have a PICC. Cover PICC line with clear plastic wrap and tape to keep it dry while showering.  · Flush the PICC as directed by your health care provider. Let your health care provider know right away if the PICC is difficult to flush or does not flush. Do not use force to flush the PICC.  · Do not use a syringe that is less than 10 mL to flush the PICC.  · Never pull or tug on the PICC.  · Avoid blood pressure checks on the arm with the PICC.  · Keep your PICC identification card with you at all times.  · Do not take the PICC out yourself. Only a trained clinical professional should remove the PICC.    SEEK IMMEDIATE MEDICAL CARE IF:  · Your PICC is accidentally pulled all the way out. If this happens, cover the insertion site with a bandage or gauze dressing. Do not throw the PICC away. Your health care provider will need to inspect it.  · Your PICC was tugged or pulled and has partially come out. Do not  push the PICC back in.  · There is any type of drainage, redness, or swelling where the PICC enters the skin.  · You cannot flush the PICC, it is difficult to flush, or the PICC leaks around the insertion site when it is flushed.  · You hear a \"flushing\" sound when the PICC is flushed.  · You have pain, discomfort, or numbness in your arm, shoulder, or jaw on the same side as the PICC.  · You feel your heart \"racing\" or skipping beats.  · You notice a hole or tear in the PICC.  · You develop chills or a fever.    MAKE SURE YOU:   · Understand these instructions.  · Will watch your condition.  · Will get help right away if you are not doing well or get worse.     This information is not intended to replace advice given to you by your health care provider. Make sure you discuss any questions you have with your health care provider.              "

## 2019-11-08 NOTE — DISCHARGE PLANNING
Spoke with Brandi from [facility] and they have accepted patient to their HH. Patient's PCP is out of town for 2 weeks so she is going to contact [facility] to follow for Nursing. Kanika from [facility] Care at bedside with patient and his daughter-in-law Alexandra for teaching of the infusion.

## 2019-11-08 NOTE — DISCHARGE PLANNING
ATTN: Case Management  RE: Referral for Home Health    Reason for referral denial: DUE TO  ACUITY              Unfortunately, we are not able to accept this referral for the reason listed above. If further clarity is needed, our Transitional Care Specialists are available to discuss any barriers to service at [phone].      We look forward to collaborating with you in the future,  [facility] Team

## 2019-11-08 NOTE — DISCHARGE PLANNING
Received Choice form at [phone]  Agency/Facility Name: [facility]  Referral sent per Choice form @ [phone]

## 2019-11-10 LAB
BACTERIA SPEC ANAEROBE CULT: NORMAL
SIGNIFICANT IND 70042: NORMAL
SITE SITE: NORMAL
SOURCE SOURCE: NORMAL

## 2019-11-12 DIAGNOSIS — I10 ESSENTIAL HYPERTENSION: Primary | ICD-10-CM

## 2019-11-12 RX ORDER — AMLODIPINE BESYLATE 5 MG/1
5 TABLET ORAL DAILY
Qty: 90 TAB | Refills: 0 | Status: SHIPPED | OUTPATIENT
Start: 2019-11-12 | End: 2020-01-03 | Stop reason: SDUPTHER

## 2019-11-13 ENCOUNTER — HOSPITAL ENCOUNTER (OUTPATIENT)
Facility: MEDICAL CENTER | Age: 74
End: 2019-11-13
Attending: INTERNAL MEDICINE
Payer: MEDICARE

## 2019-11-13 LAB
ALBUMIN SERPL BCP-MCNC: 3.7 G/DL (ref 3.2–4.9)
ALBUMIN/GLOB SERPL: 1.5 G/DL
ALP SERPL-CCNC: 78 U/L (ref 30–99)
ALT SERPL-CCNC: 11 U/L (ref 2–50)
ANION GAP SERPL CALC-SCNC: 10 MMOL/L (ref 0–11.9)
AST SERPL-CCNC: 20 U/L (ref 12–45)
BASOPHILS # BLD AUTO: 0.5 % (ref 0–1.8)
BASOPHILS # BLD: 0.05 K/UL (ref 0–0.12)
BILIRUB SERPL-MCNC: 1.6 MG/DL (ref 0.1–1.5)
BUN SERPL-MCNC: 8 MG/DL (ref 8–22)
CALCIUM SERPL-MCNC: 8.6 MG/DL (ref 8.5–10.5)
CHLORIDE SERPL-SCNC: 93 MMOL/L (ref 96–112)
CO2 SERPL-SCNC: 24 MMOL/L (ref 20–33)
CREAT SERPL-MCNC: 0.5 MG/DL (ref 0.5–1.4)
CRP SERPL HS-MCNC: 7.95 MG/DL (ref 0–0.75)
EOSINOPHIL # BLD AUTO: 0.09 K/UL (ref 0–0.51)
EOSINOPHIL NFR BLD: 0.9 % (ref 0–6.9)
ERYTHROCYTE [DISTWIDTH] IN BLOOD BY AUTOMATED COUNT: 49.3 FL (ref 35.9–50)
GLOBULIN SER CALC-MCNC: 2.5 G/DL (ref 1.9–3.5)
GLUCOSE SERPL-MCNC: 106 MG/DL (ref 65–99)
HCT VFR BLD AUTO: 30.3 % (ref 42–52)
HGB BLD-MCNC: 9.5 G/DL (ref 14–18)
IMM GRANULOCYTES # BLD AUTO: 0.05 K/UL (ref 0–0.11)
IMM GRANULOCYTES NFR BLD AUTO: 0.5 % (ref 0–0.9)
LYMPHOCYTES # BLD AUTO: 1.27 K/UL (ref 1–4.8)
LYMPHOCYTES NFR BLD: 12.4 % (ref 22–41)
MCH RBC QN AUTO: 27.3 PG (ref 27–33)
MCHC RBC AUTO-ENTMCNC: 31.4 G/DL (ref 33.7–35.3)
MCV RBC AUTO: 87.1 FL (ref 81.4–97.8)
MONOCYTES # BLD AUTO: 1.12 K/UL (ref 0–0.85)
MONOCYTES NFR BLD AUTO: 10.9 % (ref 0–13.4)
NEUTROPHILS # BLD AUTO: 7.69 K/UL (ref 1.82–7.42)
NEUTROPHILS NFR BLD: 74.8 % (ref 44–72)
NRBC # BLD AUTO: 0 K/UL
NRBC BLD-RTO: 0 /100 WBC
PLATELET # BLD AUTO: 634 K/UL (ref 164–446)
PMV BLD AUTO: 9 FL (ref 9–12.9)
POTASSIUM SERPL-SCNC: 4.4 MMOL/L (ref 3.6–5.5)
PROT SERPL-MCNC: 6.2 G/DL (ref 6–8.2)
RBC # BLD AUTO: 3.48 M/UL (ref 4.7–6.1)
SODIUM SERPL-SCNC: 127 MMOL/L (ref 135–145)
WBC # BLD AUTO: 10.3 K/UL (ref 4.8–10.8)

## 2019-11-13 PROCEDURE — 80053 COMPREHEN METABOLIC PANEL: CPT

## 2019-11-13 PROCEDURE — 86140 C-REACTIVE PROTEIN: CPT

## 2019-11-13 PROCEDURE — 85025 COMPLETE CBC W/AUTO DIFF WBC: CPT

## 2019-11-13 NOTE — DOCUMENTATION QUERY
[facility]                                                                       Query Response Note      PATIENT:               JAMEL GUADARRAMA  ACCT #:                  [id number]  MRN:                     [id number]  :                      1945  ADMIT DATE:       2019 11:09 AM  DISCH DATE:        2019 12:03 PM  RESPONDING  PROVIDER #:        [id number]           QUERY TEXT:    Anemia due to unspecified blood loss is documented in the medical record.  Please specify the acuity of the blood loss.     NOTE:  If an appropriate response is not listed below, please respond with a new note.    The patient's Clinical Indicators include:  Hg 11.4, 10.0    Per Hospital Medicine Consultation   -Anemia, blood loss- (present on admission)  -Operative blood loss     Treatment:  Monitor labs      Risk Factors:   S/P left knee arthroplasty on 10/8/2019 & s/p left knee irrigation & debridement with wound vac & Hemovac placement on   Options provided:   -- Acute blood loss anemia   -- Chronic blood loss   -- Unable to determine      Query created by: Felicia Urbina on 2019 4:26 PM    RESPONSE TEXT:    Acute blood loss anemia          Electronically signed by:  DOUG MONCADA 2019 5:14 PM

## 2019-11-14 NOTE — DOCUMENTATION QUERY
[facility]                                                                       Query Response Note      PATIENT:               JAMEL GUADARRAMA  ACCT #:                  [id number]  MRN:                     [id number]  :                      1945  ADMIT DATE:       2019 11:09 AM  DISCH DATE:        2019 12:03 PM  RESPONDING  PROVIDER #:        [id number]           QUERY TEXT:    Debridement has been documented in the Procedure Report of .  Please document the deepest level of debridement treated.      NOTE:  If an appropriate response is not listed below, please respond with a new note.    The patient's Clinical Indicators include:  Per Procedure Report  -Irrigation and debridement, left knee with polyethylene liner exchange and complex   -then a full synovectomy and debridement was performed.    -Wound was then irrigated with 9 liters of normal saline, Betadine and hydrogen peroxide was used to soak the wound as well    Treatment:   Surgical intervention, ID Consultation, Rocephin, Maxipime, Daptomycin, & Invanz     Risk Factors:   Wound dehiscence and deep infection with septic arthritis after total knee arthroplasty  Options provided:   -- Non-excisional debridement   -- Excisional debridement, deepest level treated - skin   -- Excisional debridement, deepest level treated - subcutaneous tissue or fascia   -- Excisional debridement, deepest level treated- muscle   -- Excisional debridement, deepest level treated - bone   -- Unable to determine      Query created by: Felicia Urbina on 2019 3:03 PM    RESPONSE TEXT:    Excisional debridement, deepest level treated - bone          Electronically signed by:  TY OWUSU 2019 8:53 AM

## 2019-11-20 ENCOUNTER — HOSPITAL ENCOUNTER (OUTPATIENT)
Facility: MEDICAL CENTER | Age: 74
End: 2019-11-20
Attending: INTERNAL MEDICINE
Payer: MEDICARE

## 2019-11-20 LAB
ALBUMIN SERPL BCP-MCNC: 3.3 G/DL (ref 3.2–4.9)
ALBUMIN/GLOB SERPL: 1.2 G/DL
ALP SERPL-CCNC: 78 U/L (ref 30–99)
ALT SERPL-CCNC: 12 U/L (ref 2–50)
ANION GAP SERPL CALC-SCNC: 8 MMOL/L (ref 0–11.9)
AST SERPL-CCNC: 22 U/L (ref 12–45)
BASOPHILS # BLD AUTO: 0.7 % (ref 0–1.8)
BASOPHILS # BLD: 0.05 K/UL (ref 0–0.12)
BILIRUB SERPL-MCNC: 0.7 MG/DL (ref 0.1–1.5)
BUN SERPL-MCNC: 7 MG/DL (ref 8–22)
CALCIUM SERPL-MCNC: 8.3 MG/DL (ref 8.5–10.5)
CHLORIDE SERPL-SCNC: 96 MMOL/L (ref 96–112)
CO2 SERPL-SCNC: 26 MMOL/L (ref 20–33)
CREAT SERPL-MCNC: 0.62 MG/DL (ref 0.5–1.4)
CRP SERPL HS-MCNC: 2.46 MG/DL (ref 0–0.75)
EOSINOPHIL # BLD AUTO: 0.1 K/UL (ref 0–0.51)
EOSINOPHIL NFR BLD: 1.5 % (ref 0–6.9)
ERYTHROCYTE [DISTWIDTH] IN BLOOD BY AUTOMATED COUNT: 48.2 FL (ref 35.9–50)
ERYTHROCYTE [SEDIMENTATION RATE] IN BLOOD BY WESTERGREN METHOD: 42 MM/HOUR (ref 0–20)
GLOBULIN SER CALC-MCNC: 2.7 G/DL (ref 1.9–3.5)
GLUCOSE SERPL-MCNC: 75 MG/DL (ref 65–99)
HCT VFR BLD AUTO: 29.8 % (ref 42–52)
HGB BLD-MCNC: 9.5 G/DL (ref 14–18)
IMM GRANULOCYTES # BLD AUTO: 0.03 K/UL (ref 0–0.11)
IMM GRANULOCYTES NFR BLD AUTO: 0.4 % (ref 0–0.9)
LYMPHOCYTES # BLD AUTO: 1.25 K/UL (ref 1–4.8)
LYMPHOCYTES NFR BLD: 18.6 % (ref 22–41)
MCH RBC QN AUTO: 27.3 PG (ref 27–33)
MCHC RBC AUTO-ENTMCNC: 31.9 G/DL (ref 33.7–35.3)
MCV RBC AUTO: 85.6 FL (ref 81.4–97.8)
MONOCYTES # BLD AUTO: 0.74 K/UL (ref 0–0.85)
MONOCYTES NFR BLD AUTO: 11 % (ref 0–13.4)
NEUTROPHILS # BLD AUTO: 4.54 K/UL (ref 1.82–7.42)
NEUTROPHILS NFR BLD: 67.8 % (ref 44–72)
NRBC # BLD AUTO: 0 K/UL
NRBC BLD-RTO: 0 /100 WBC
PLATELET # BLD AUTO: 653 K/UL (ref 164–446)
PMV BLD AUTO: 8.6 FL (ref 9–12.9)
POTASSIUM SERPL-SCNC: 3.9 MMOL/L (ref 3.6–5.5)
PROT SERPL-MCNC: 6 G/DL (ref 6–8.2)
RBC # BLD AUTO: 3.48 M/UL (ref 4.7–6.1)
SODIUM SERPL-SCNC: 130 MMOL/L (ref 135–145)
WBC # BLD AUTO: 6.7 K/UL (ref 4.8–10.8)

## 2019-11-20 PROCEDURE — 85652 RBC SED RATE AUTOMATED: CPT

## 2019-11-20 PROCEDURE — 86140 C-REACTIVE PROTEIN: CPT

## 2019-11-20 PROCEDURE — 80053 COMPREHEN METABOLIC PANEL: CPT

## 2019-11-20 PROCEDURE — 85025 COMPLETE CBC W/AUTO DIFF WBC: CPT

## 2019-11-21 RX ORDER — 0.9 % SODIUM CHLORIDE 0.9 %
5 VIAL (ML) INJECTION PRN
Status: CANCELLED | OUTPATIENT
Start: 2019-11-25

## 2019-11-21 RX ORDER — 0.9 % SODIUM CHLORIDE 0.9 %
10-20 VIAL (ML) INJECTION PRN
Status: CANCELLED | OUTPATIENT
Start: 2019-11-25

## 2019-11-25 ENCOUNTER — OFFICE VISIT (OUTPATIENT)
Dept: INFECTIOUS DISEASES | Facility: MEDICAL CENTER | Age: 74
End: 2019-11-25
Payer: MEDICARE

## 2019-11-25 VITALS
HEART RATE: 73 BPM | SYSTOLIC BLOOD PRESSURE: 118 MMHG | DIASTOLIC BLOOD PRESSURE: 60 MMHG | RESPIRATION RATE: 16 BRPM | WEIGHT: 162 LBS | BODY MASS INDEX: 25.37 KG/M2 | OXYGEN SATURATION: 97 % | TEMPERATURE: 98 F

## 2019-11-25 DIAGNOSIS — Z96.659 INFECTION OF PROSTHETIC KNEE JOINT, SUBSEQUENT ENCOUNTER: ICD-10-CM

## 2019-11-25 DIAGNOSIS — T84.59XD INFECTION OF PROSTHETIC KNEE JOINT, SUBSEQUENT ENCOUNTER: ICD-10-CM

## 2019-11-25 PROCEDURE — 99214 OFFICE O/P EST MOD 30 MIN: CPT | Performed by: INTERNAL MEDICINE

## 2019-11-25 ASSESSMENT — ENCOUNTER SYMPTOMS
SHORTNESS OF BREATH: 0
FEVER: 0
NAUSEA: 0
COUGH: 0
ABDOMINAL PAIN: 0
DIARRHEA: 0
DIZZINESS: 0
HEADACHES: 0
VOMITING: 0
CHILLS: 0

## 2019-11-25 ASSESSMENT — PAIN SCALES - GENERAL: PAINLEVEL: 3=SLIGHT PAIN

## 2019-11-25 NOTE — PROGRESS NOTES
Infectious Disease Follow up Note      Subjective:     Chief Complaint   Patient presents with   • Hospital Follow-up     Wound dehiscence- Infection of prosthetic knee joint, subsequent encounter       Interval History:   Patient is a 74-year-old man with a history of type 2 diabetes mellitus, coronary artery disease and degenerative joint disease status post left knee arthroplasty on 10/8/2019.  He was admitted in early November for drainage at the surgical site.  Patient underwent I&D of his wound on 11/2/2019.  He underwent I&D with left knee polyethylene liner exchange and complex wound closure on 11/5/2019 by Dr. Charles.  Cultures were negative.  He was discharged on ertapenem through 12/15/2019.    Hospital records reviewed    Patient here for hospital follow-up.  Patient is doing well clinically.  He has been followed up with the [facility] as well as doing ongoing physical therapy.  Patient has full range of motion in his left knee.  He is tolerating ertapenem without any issues.  No issues with his PICC line.  No recent fevers, chills, nausea, vomiting or abdominal pain.  He denies any left knee pain.  Recent labs are reviewed and are unremarkable.  Inflammatory markers are decreasing.    Review of Systems   Constitutional: Negative for chills and fever.   Respiratory: Negative for cough and shortness of breath.    Gastrointestinal: Negative for abdominal pain, diarrhea, nausea and vomiting.   Musculoskeletal: Negative for joint pain.   Neurological: Negative for dizziness and headaches.   All other systems reviewed and are negative.      Past Medical History:   Diagnosis Date   • Arthritis     right ankle/left knee   • Asthma     inhaler PRN    • CAD (coronary artery disease)     + CT scoring, negative PET cardiac in '17   • Depression    • Diabetes (HCC)     oral medication    • Glaucoma    • High cholesterol    • Hypertension    • Hypopotassemia    • Hyposmolality and/or hyponatremia    • Jaundice 2014     "\"from drinking after wife \"   • Neuropathy (HCC)     bilat legs/feet   • Pain     right ankle    • Personal history of peptic ulcer disease    • Pneumonia    • Unspecified cataract     removed bilat       Past Surgical History:   Procedure Laterality Date   • PB REVISE KNEE JOINT REPLACE,ALL PARTS Left 2019    Procedure: REVISION, TOTAL ARTHROPLASTY, KNEE, ALL COMPONENTS - POLY EXCHANGE ONLY;  Surgeon: Parmjit Charles M.D.;  Location: [facility];  Service: Orthopedics   • IRRIGATION & DEBRIDEMENT ORTHO  2019    Procedure: IRRIGATION AND DEBRIDEMENT, WOUND - KNEE;  Surgeon: Parmjit Charles M.D.;  Location: SUR[facility]ERY [facility];  Service: Orthopedics   • IRRIGATION & DEBRIDEMENT ORTHO Left 2019    Procedure: IRRIGATION AND DEBRIDEMENT, WOUND;  Surgeon: Jesús Wesley M.D.;  Location: [facility];  Service: Orthopedics   • ANKLE TOTAL ARTHROPLASTY Right 2018    Procedure: ANKLE TOTAL ARTHROPLASTY;  Surgeon: Jose De Jesus Medrano M.D.;  Location: [facility];  Service: Orthopedics   • LIGAMENT REPAIR Right 2018    Procedure: LIGAMENT REPAIR- LATERAL;  Surgeon: Jose De Jesus Medrano M.D.;  Location: [facility];  Service: Orthopedics   • LUMBAR LAMINECTOMY DISKECTOMY Right 2017    Procedure: LUMBAR LAMINECTOMY DISKECTOMY - RE-DO OPEN L4-S1 LAMINOTOMIES;  Surgeon: Clint Garsia M.D.;  Location: [facility];  Service:    • FORAMINOTOMY  2017    Procedure: FORAMINOTOMY;  Surgeon: Clint Garsia M.D.;  Location: [facility];  Service:    • HARDWARE REMOVAL NEURO  2017    Procedure: HARDWARE REMOVAL NEURO ;  Surgeon: Clint Garsia M.D.;  Location: SUR[facility]ERY [facility];  Service:    • LAPAROSCOPY ROBOTIC XI  10/26/2016    Procedure: LAPAROSCOPY FOR LIVER CYST MARSUPIALIZATION AND RESECTION LIVER WALL CYST;  Surgeon: Frank Corona M.D.;  Location: [facility];  Service:    • LUMBAR FUSION " POSTERIOR  9/10/2015    Procedure: LUMBAR FUSION POSTERIOR L5-S1 ;  Surgeon: Clint Garsia M.D.;  Location: SURGERY [facility];  Service:    • LUMBAR LAMINECTOMY DISKECTOMY  9/10/2015    Procedure: LUMBAR LAMINECTOMY ;  Surgeon: Clint Garsia M.D.;  Location: SURGERY [facility];  Service:    • LAMINOTOMY  9/10/2015    Procedure: LAMINOTOMY;  Surgeon: Clint Garsia M.D.;  Location: SURGERY [facility];  Service:    • OTHER  2005    ANTERIOR NECK FUSION C5-7   • OTHER  1978    BLEEDING ULCERS   • CATARACT EXTRACTION WITH IOL         Allergies:   Allergies   Allergen Reactions   • Nkda [No Known Drug Allergy]          Medications:  Current Outpatient Medications on File Prior to Visit   Medication Sig Dispense Refill   • amLODIPine (NORVASC) 5 MG Tab Take 1 Tab by mouth every day. 90 Tab 0   • aspirin EC 81 MG EC tablet Take 1 Tab by mouth every day. 30 Tab 0   • senna-docusate (PERICOLACE OR SENOKOT S) 8.6-50 MG Tab Take 1 Tab by mouth every day. 30 Tab 0   • NS SOLN 100 mL with ertapenem 1 GM SOLR 1,000 mg 1,000 mg by Intravenous route every 24 hours.     • atorvastatin (LIPITOR) 20 MG Tab Take 20 mg by mouth every day.     • carvedilol (COREG) 6.25 MG Tab Take 6.25 mg by mouth 2 times a day.     • gabapentin (NEURONTIN) 600 MG tablet Take 600 mg by mouth 2 times a day.     • lisinopril (PRINIVIL) 20 MG Tab Take 20 mg by mouth every day.     • naproxen (NAPROSYN) 500 MG Tab Take 500 mg by mouth 2 times a day, with meals.     • Multiple Vitamins-Minerals (CENTRUM SILVER 50+MEN) Tab Take 1 Tab by mouth every morning.     • LUMIGAN 0.01 % Solution Place 1 Drop in both eyes every bedtime.  3   • PROAIR  (90 Base) MCG/ACT Aero Soln inhalation aerosol Inhale 2 Puffs by mouth every four hours as needed for Shortness of Breath.     • metFORMIN (GLUCOPHAGE) 500 MG Tab Take 500 mg by mouth every day.     • escitalopram (LEXAPRO) 20 MG tablet Take 20 mg by mouth every morning. Indications: Major  Depressive Disorder       No current facility-administered medications on file prior to visit.          ROS  As documented above in my HPI       Objective:     PE:  /60 (BP Location: Right arm, Patient Position: Sitting, BP Cuff Size: Adult)   Pulse 73   Temp 36.7 °C (98 °F) (Temporal)   Resp 16   Wt 73.5 kg (162 lb)   SpO2 97%   BMI 25.37 kg/m²      Vital signs reviewed  Constitutional: patient is oriented to person, place, and time. Appears well-developed and well-nourished. No distress  Eyes: Conjunctivae normal and EOM are normal. Pupils are equal, round, and reactive to light.   Mouth/Throat: Lips without lesions, good dentition, oropharynx is clear and moist.  Neck: Trachea midline. Normal range of motion. Neck supple. No masses  Cardiovascular: Normal rate, regular rhythm, normal heart sounds and intact distal pulses. No murmur, gallop, or friction rub. No edema.  Pulmonary/Chest: No respiratory distress. Unlabored respiratory effort, lungs clear to auscultation. No wheezes or rales.   Abdominal: Soft, non tender. BS + x 4. No masses or hepatosplenomegaly.   Musculoskeletal: Normal range of motion.  Left knee surgical site well approximated.  No evidence of wound dehiscence or active drainage.  There is some swelling present but no erythema.  Full range of active and passive motion of left knee joint.  Left upper extremity PICC line-nontender, no surrounding erythema.  Dressing is clean, dry and intact.    Neurological: alert and oriented to person, place, and time. No cranial nerve deficit. Coordination normal. Moves all extremities  Skin: Skin is warm and dry. Good turgor. No rashes visable.  Psychiatric: Normal mood and affect. Behavior is normal.  Very pleasant    LABS:  WBC   Date/Time Value Ref Range Status   11/20/2019 12:10 PM 6.7 4.8 - 10.8 K/uL Final     RBC   Date/Time Value Ref Range Status   11/20/2019 12:10 PM 3.48 (L) 4.70 - 6.10 M/uL Final     Hemoglobin   Date/Time Value Ref Range  Status   11/20/2019 12:10 PM 9.5 (L) 14.0 - 18.0 g/dL Final     Hematocrit   Date/Time Value Ref Range Status   11/20/2019 12:10 PM 29.8 (L) 42.0 - 52.0 % Final     MCV   Date/Time Value Ref Range Status   11/20/2019 12:10 PM 85.6 81.4 - 97.8 fL Final     MCH   Date/Time Value Ref Range Status   11/20/2019 12:10 PM 27.3 27.0 - 33.0 pg Final     MCHC   Date/Time Value Ref Range Status   11/20/2019 12:10 PM 31.9 (L) 33.7 - 35.3 g/dL Final     MPV   Date/Time Value Ref Range Status   11/20/2019 12:10 PM 8.6 (L) 9.0 - 12.9 fL Final        Sodium   Date/Time Value Ref Range Status   11/20/2019 12:10  (L) 135 - 145 mmol/L Final     Potassium   Date/Time Value Ref Range Status   11/20/2019 12:10 PM 3.9 3.6 - 5.5 mmol/L Final     Chloride   Date/Time Value Ref Range Status   11/20/2019 12:10 PM 96 96 - 112 mmol/L Final     Co2   Date/Time Value Ref Range Status   11/20/2019 12:10 PM 26 20 - 33 mmol/L Final     Glucose   Date/Time Value Ref Range Status   11/20/2019 12:10 PM 75 65 - 99 mg/dL Final     Bun   Date/Time Value Ref Range Status   11/20/2019 12:10 PM 7 (L) 8 - 22 mg/dL Final     Creatinine   Date/Time Value Ref Range Status   11/20/2019 12:10 PM 0.62 0.50 - 1.40 mg/dL Final   06/18/2012 09:13 AM 0.93 0.76 - 1.27 mg/dL Final     Bun-Creatinine Ratio   Date/Time Value Ref Range Status   06/29/2016 09:59 AM 18 10 - 22 Final   06/18/2012 09:13 AM 14 10 - 22 Final     Alkaline Phosphatase   Date/Time Value Ref Range Status   11/20/2019 12:10 PM 78 30 - 99 U/L Final     AST(SGOT)   Date/Time Value Ref Range Status   11/20/2019 12:10 PM 22 12 - 45 U/L Final     ALT(SGPT)   Date/Time Value Ref Range Status   11/20/2019 12:10 PM 12 2 - 50 U/L Final     Total Bilirubin   Date/Time Value Ref Range Status   11/20/2019 12:10 PM 0.7 0.1 - 1.5 mg/dL Final        CPK Total   Date/Time Value Ref Range Status   11/05/2019 12:54 AM 46 0 - 154 U/L Final        MICRO:  Blood Culture Hold   Date Value Ref Range Status    10/30/2019 Collected  Final          IMAGING STUDIES:  None    Assessment/Plan:     Problem List Items Addressed This Visit     Infection of prosthetic knee joint (HCC), left.  Clinically improving and stable.  He is tolerating ertapenem without any issues. No joint pain. Continue IV antibiotics through planned stop date of 12/15/2019.  Recent labs reviewed and are unremarkable.  Weekly CBC and CMP.           Follow up: Monday, December 16.  Will plan to remove PICC line at next ID clinic visit.  RTC sooner if needed. FU with PCP for ongoing chronic medical conditions.     Lucie Wing M.D.

## 2019-11-27 ENCOUNTER — OUTPATIENT INFUSION SERVICES (OUTPATIENT)
Dept: ONCOLOGY | Facility: MEDICAL CENTER | Age: 74
End: 2019-11-27
Attending: NURSE PRACTITIONER
Payer: MEDICARE

## 2019-11-27 ENCOUNTER — TELEPHONE (OUTPATIENT)
Dept: INFECTIOUS DISEASES | Facility: MEDICAL CENTER | Age: 74
End: 2019-11-27

## 2019-11-27 VITALS
HEIGHT: 66 IN | BODY MASS INDEX: 26.08 KG/M2 | SYSTOLIC BLOOD PRESSURE: 147 MMHG | WEIGHT: 162.26 LBS | DIASTOLIC BLOOD PRESSURE: 69 MMHG | OXYGEN SATURATION: 97 % | RESPIRATION RATE: 18 BRPM | HEART RATE: 98 BPM | TEMPERATURE: 98.1 F

## 2019-11-27 DIAGNOSIS — T81.30XA WOUND DEHISCENCE: ICD-10-CM

## 2019-11-27 DIAGNOSIS — Z96.659 INFECTION OF PROSTHETIC KNEE JOINT, SUBSEQUENT ENCOUNTER: ICD-10-CM

## 2019-11-27 DIAGNOSIS — T84.59XD INFECTION OF PROSTHETIC KNEE JOINT, SUBSEQUENT ENCOUNTER: ICD-10-CM

## 2019-11-27 LAB
ALBUMIN SERPL BCP-MCNC: 3.7 G/DL (ref 3.2–4.9)
ALBUMIN/GLOB SERPL: 1.4 G/DL
ALP SERPL-CCNC: 80 U/L (ref 30–99)
ALT SERPL-CCNC: 9 U/L (ref 2–50)
ANION GAP SERPL CALC-SCNC: 10 MMOL/L (ref 0–11.9)
AST SERPL-CCNC: 19 U/L (ref 12–45)
BASOPHILS # BLD AUTO: 0.7 % (ref 0–1.8)
BASOPHILS # BLD: 0.05 K/UL (ref 0–0.12)
BILIRUB SERPL-MCNC: 0.9 MG/DL (ref 0.1–1.5)
BUN SERPL-MCNC: 9 MG/DL (ref 8–22)
CALCIUM SERPL-MCNC: 8.5 MG/DL (ref 8.5–10.5)
CHLORIDE SERPL-SCNC: 95 MMOL/L (ref 96–112)
CO2 SERPL-SCNC: 22 MMOL/L (ref 20–33)
CREAT SERPL-MCNC: 0.64 MG/DL (ref 0.5–1.4)
EOSINOPHIL # BLD AUTO: 0.11 K/UL (ref 0–0.51)
EOSINOPHIL NFR BLD: 1.5 % (ref 0–6.9)
ERYTHROCYTE [DISTWIDTH] IN BLOOD BY AUTOMATED COUNT: 47.8 FL (ref 35.9–50)
GLOBULIN SER CALC-MCNC: 2.6 G/DL (ref 1.9–3.5)
GLUCOSE SERPL-MCNC: 195 MG/DL (ref 65–99)
HCT VFR BLD AUTO: 30.3 % (ref 42–52)
HGB BLD-MCNC: 9.5 G/DL (ref 14–18)
IMM GRANULOCYTES # BLD AUTO: 0.03 K/UL (ref 0–0.11)
IMM GRANULOCYTES NFR BLD AUTO: 0.4 % (ref 0–0.9)
LYMPHOCYTES # BLD AUTO: 1.33 K/UL (ref 1–4.8)
LYMPHOCYTES NFR BLD: 18.7 % (ref 22–41)
MCH RBC QN AUTO: 26.4 PG (ref 27–33)
MCHC RBC AUTO-ENTMCNC: 31.4 G/DL (ref 33.7–35.3)
MCV RBC AUTO: 84.2 FL (ref 81.4–97.8)
MONOCYTES # BLD AUTO: 0.48 K/UL (ref 0–0.85)
MONOCYTES NFR BLD AUTO: 6.8 % (ref 0–13.4)
NEUTROPHILS # BLD AUTO: 5.11 K/UL (ref 1.82–7.42)
NEUTROPHILS NFR BLD: 71.9 % (ref 44–72)
NRBC # BLD AUTO: 0 K/UL
NRBC BLD-RTO: 0 /100 WBC
PLATELET # BLD AUTO: 545 K/UL (ref 164–446)
PMV BLD AUTO: 8.5 FL (ref 9–12.9)
POTASSIUM SERPL-SCNC: 3.5 MMOL/L (ref 3.6–5.5)
PROT SERPL-MCNC: 6.3 G/DL (ref 6–8.2)
RBC # BLD AUTO: 3.6 M/UL (ref 4.7–6.1)
SODIUM SERPL-SCNC: 127 MMOL/L (ref 135–145)
WBC # BLD AUTO: 7.1 K/UL (ref 4.8–10.8)

## 2019-11-27 PROCEDURE — 36592 COLLECT BLOOD FROM PICC: CPT

## 2019-11-27 PROCEDURE — 85025 COMPLETE CBC W/AUTO DIFF WBC: CPT

## 2019-11-27 PROCEDURE — 80053 COMPREHEN METABOLIC PANEL: CPT

## 2019-11-27 RX ORDER — 0.9 % SODIUM CHLORIDE 0.9 %
10-20 VIAL (ML) INJECTION PRN
Status: CANCELLED | OUTPATIENT
Start: 2019-12-02

## 2019-11-27 RX ORDER — 0.9 % SODIUM CHLORIDE 0.9 %
5 VIAL (ML) INJECTION PRN
Status: CANCELLED | OUTPATIENT
Start: 2019-12-02

## 2019-11-27 NOTE — TELEPHONE ENCOUNTER
"Per LESA Christy, \"Needs to FU with PCP regarding hyponatremia.  Lightheaded or dizzy, confused- if yes then ER. \"  Called and spoke to pt, no lightheadedness, dizziness, or confusion. He says his sodium is always a bit low. Advised to see PCP to management. Pt said he has a wellness check next week at [facility].  -AMP  "

## 2019-11-27 NOTE — PROGRESS NOTES
Patient presents for lab draw and PICC care. PICC flushes well with blood drawn as ordered. PICC dressing changed using sterile technique, new dressing clean dry and intact. Patient scheduled for his next appointment and released in no acute distress.

## 2019-12-04 ENCOUNTER — OUTPATIENT INFUSION SERVICES (OUTPATIENT)
Dept: ONCOLOGY | Facility: MEDICAL CENTER | Age: 74
End: 2019-12-04
Attending: NURSE PRACTITIONER
Payer: MEDICARE

## 2019-12-04 VITALS
BODY MASS INDEX: 26.22 KG/M2 | RESPIRATION RATE: 18 BRPM | OXYGEN SATURATION: 96 % | WEIGHT: 163.14 LBS | SYSTOLIC BLOOD PRESSURE: 150 MMHG | HEIGHT: 66 IN | TEMPERATURE: 98.5 F | DIASTOLIC BLOOD PRESSURE: 70 MMHG | HEART RATE: 88 BPM

## 2019-12-04 DIAGNOSIS — T81.30XA WOUND DEHISCENCE: ICD-10-CM

## 2019-12-04 LAB
ALBUMIN SERPL BCP-MCNC: 3.9 G/DL (ref 3.2–4.9)
ALBUMIN/GLOB SERPL: 1.6 G/DL
ALP SERPL-CCNC: 90 U/L (ref 30–99)
ALT SERPL-CCNC: 15 U/L (ref 2–50)
ANION GAP SERPL CALC-SCNC: 10 MMOL/L (ref 0–11.9)
AST SERPL-CCNC: 25 U/L (ref 12–45)
BASOPHILS # BLD AUTO: 0.8 % (ref 0–1.8)
BASOPHILS # BLD: 0.07 K/UL (ref 0–0.12)
BILIRUB SERPL-MCNC: 0.9 MG/DL (ref 0.1–1.5)
BUN SERPL-MCNC: 10 MG/DL (ref 8–22)
CALCIUM SERPL-MCNC: 8.6 MG/DL (ref 8.5–10.5)
CHLORIDE SERPL-SCNC: 94 MMOL/L (ref 96–112)
CO2 SERPL-SCNC: 23 MMOL/L (ref 20–33)
CREAT SERPL-MCNC: 0.59 MG/DL (ref 0.5–1.4)
CRP SERPL HS-MCNC: 1.67 MG/DL (ref 0–0.75)
EOSINOPHIL # BLD AUTO: 0.14 K/UL (ref 0–0.51)
EOSINOPHIL NFR BLD: 1.7 % (ref 0–6.9)
ERYTHROCYTE [DISTWIDTH] IN BLOOD BY AUTOMATED COUNT: 46.8 FL (ref 35.9–50)
ERYTHROCYTE [SEDIMENTATION RATE] IN BLOOD BY WESTERGREN METHOD: 39 MM/HOUR (ref 0–20)
GLOBULIN SER CALC-MCNC: 2.5 G/DL (ref 1.9–3.5)
GLUCOSE SERPL-MCNC: 128 MG/DL (ref 65–99)
HCT VFR BLD AUTO: 32.2 % (ref 42–52)
HGB BLD-MCNC: 10.1 G/DL (ref 14–18)
IMM GRANULOCYTES # BLD AUTO: 0.03 K/UL (ref 0–0.11)
IMM GRANULOCYTES NFR BLD AUTO: 0.4 % (ref 0–0.9)
LYMPHOCYTES # BLD AUTO: 1.81 K/UL (ref 1–4.8)
LYMPHOCYTES NFR BLD: 21.3 % (ref 22–41)
MCH RBC QN AUTO: 25.8 PG (ref 27–33)
MCHC RBC AUTO-ENTMCNC: 31.4 G/DL (ref 33.7–35.3)
MCV RBC AUTO: 82.1 FL (ref 81.4–97.8)
MONOCYTES # BLD AUTO: 0.86 K/UL (ref 0–0.85)
MONOCYTES NFR BLD AUTO: 10.1 % (ref 0–13.4)
NEUTROPHILS # BLD AUTO: 5.57 K/UL (ref 1.82–7.42)
NEUTROPHILS NFR BLD: 65.7 % (ref 44–72)
NRBC # BLD AUTO: 0 K/UL
NRBC BLD-RTO: 0 /100 WBC
PLATELET # BLD AUTO: 499 K/UL (ref 164–446)
PMV BLD AUTO: 8.7 FL (ref 9–12.9)
POTASSIUM SERPL-SCNC: 3.6 MMOL/L (ref 3.6–5.5)
PROT SERPL-MCNC: 6.4 G/DL (ref 6–8.2)
RBC # BLD AUTO: 3.92 M/UL (ref 4.7–6.1)
SODIUM SERPL-SCNC: 127 MMOL/L (ref 135–145)
WBC # BLD AUTO: 8.5 K/UL (ref 4.8–10.8)

## 2019-12-04 PROCEDURE — 80053 COMPREHEN METABOLIC PANEL: CPT

## 2019-12-04 PROCEDURE — 86140 C-REACTIVE PROTEIN: CPT

## 2019-12-04 PROCEDURE — 85025 COMPLETE CBC W/AUTO DIFF WBC: CPT

## 2019-12-04 PROCEDURE — 36592 COLLECT BLOOD FROM PICC: CPT

## 2019-12-04 PROCEDURE — 85652 RBC SED RATE AUTOMATED: CPT

## 2019-12-04 RX ORDER — 0.9 % SODIUM CHLORIDE 0.9 %
10-20 VIAL (ML) INJECTION PRN
Status: CANCELLED | OUTPATIENT
Start: 2019-12-09

## 2019-12-04 RX ORDER — 0.9 % SODIUM CHLORIDE 0.9 %
5 VIAL (ML) INJECTION PRN
Status: CANCELLED | OUTPATIENT
Start: 2019-12-09

## 2019-12-04 NOTE — PROGRESS NOTES
Pt arrived to [facility], ambulatory, for labs/PICC care. Pt voices no complaints. PICC line flushed per policy, positive blood return noted. Labs drawn per order. PICC dressing changed in sterile manner. Pt left IS with no s/sx of distress. Follow up appointment confirmed.

## 2019-12-09 ENCOUNTER — OFFICE VISIT (OUTPATIENT)
Dept: CARDIOLOGY | Facility: MEDICAL CENTER | Age: 74
End: 2019-12-09
Payer: MEDICARE

## 2019-12-09 VITALS
HEIGHT: 67 IN | HEART RATE: 76 BPM | OXYGEN SATURATION: 97 % | DIASTOLIC BLOOD PRESSURE: 64 MMHG | SYSTOLIC BLOOD PRESSURE: 128 MMHG | WEIGHT: 161.05 LBS | BODY MASS INDEX: 25.28 KG/M2

## 2019-12-09 DIAGNOSIS — I25.119 CORONARY ARTERY DISEASE INVOLVING NATIVE CORONARY ARTERY OF NATIVE HEART WITH ANGINA PECTORIS (HCC): ICD-10-CM

## 2019-12-09 DIAGNOSIS — I10 ESSENTIAL HYPERTENSION: ICD-10-CM

## 2019-12-09 DIAGNOSIS — E78.2 MIXED HYPERLIPIDEMIA: ICD-10-CM

## 2019-12-09 DIAGNOSIS — Z72.0 TOBACCO USE: ICD-10-CM

## 2019-12-09 PROCEDURE — 99213 OFFICE O/P EST LOW 20 MIN: CPT | Performed by: INTERNAL MEDICINE

## 2019-12-09 NOTE — LETTER
[facility]   [address], [address]  [place] [address]  Phone: [phone]  Fax: [phone]              Ronny Gallegos  1945    Encounter Date: 2019    Alberta Foley M.D.          PROGRESS NOTE:  Subjective:   Chief Complaint:   Chief Complaint   Patient presents with   • Coronary Artery Disease       Ronny Gallegos is a 74 y.o. male who returns for follow-up of coronary artery disease, hypertension hyperlipidemia.      He has a previous patient of Dr. Ellsworth for 35 years.  He first came to cardiology after giving blood and found out he had HTN. This had been the focus.    Was also on statin therapy despite low cholesterol.  This led to a calcium score testing. He had a positive calcium score in  with disease predominantly in the RCA and LAD with total score of 820.  He has been on primary prevention therapy.  He had a nuclear stress test in 2017 which is normal.    His EKG is normal.     LDL cholesterol is 49.  Na was 127 after being in the hospital.    His blood pressures been well controlled on multiple medications.  We lowered his CCB last visit.  He is on aspirin and statin therapy in addition to beta-blocker.    He has never had an echocardiogram that I could find even in the archived records.   No heart cath.    He is not limited by chest pain, pressure or tightness.   No significant dyspnea on exertion, orthopnea or lower extremity swelling.   No significant palpitations, dizziness, or presyncope/syncope.  No symptoms of leg claudication.    Has remotely been checked in the hospital for Cps, no issues.  Remote tobacco use, no abd US.    Knee replacement, infected, 2 more surgeries, antibiotics. Has a PICC line.    Wife  about 5 years ago, prior heavy etoh.    DATA REVIEWED by me:  ECG 2018  Sinus, rate 70, first-degree AV delay, left axis deviation    Nuclear PET stress test 5/3/2017  EF 65%, normal  "perfusion    Calcium score 2015  Coronary calcification: Total score 819.9  LMA - 0.0  LCX - 35.4  LAD - 167.9  RCA - 616.6  PDA - 0.0    Treadmill 3-21-12  10:16 sec, good BP, no arrhtymias    Abd US 18   No evidence of aortic aneurysm or stenosis.    No evidence of stenosis of the proximal segment of the major visceral   arteries.    No evidence of iliac artery stenosis or aneurysm      Most recent labs:     2019 hemoglobin 10.1, platelets 499, sed rate 39, sodium 137, potassium 3.6, creatinine 0.59, LFTs normal, C-reactive protein 1.67    2019 LDL 37, HDL 78, triglycerides 110, total cholesterol 137    2018 sodium 132, potassium 4.4, creatinine 0.79, LFTs normal the exception of total bilirubin of 1.6, total cholesterol 126, triglycerides 120, HDL 53, LDL 49    Past Medical History:   Diagnosis Date   • Arthritis     right ankle/left knee   • Asthma     inhaler PRN    • CAD (coronary artery disease)     + CT scoring, negative PET cardiac in    • Depression    • Diabetes (HCC)     oral medication    • Glaucoma    • High cholesterol    • Hypertension    • Hypopotassemia    • Hyposmolality and/or hyponatremia    • Jaundice     \"from drinking after wife \"   • Neuropathy (HCC)     bilat legs/feet   • Pain     right ankle    • Personal history of peptic ulcer disease    • Pneumonia    • Unspecified cataract     removed bilat     Past Surgical History:   Procedure Laterality Date   • PB REVISE KNEE JOINT REPLACE,ALL PARTS Left 2019    Procedure: REVISION, TOTAL ARTHROPLASTY, KNEE, ALL COMPONENTS - POLY EXCHANGE ONLY;  Surgeon: Parmjit Charles M.D.;  Location: [facility];  Service: Orthopedics   • IRRIGATION & DEBRIDEMENT ORTHO  2019    Procedure: IRRIGATION AND DEBRIDEMENT, WOUND - KNEE;  Surgeon: Parmjit Charles M.D.;  Location: [facility];  Service: Orthopedics   • IRRIGATION & DEBRIDEMENT ORTHO Left 2019    Procedure: IRRIGATION AND " DEBRIDEMENT, WOUND;  Surgeon: Jesús Wesley M.D.;  Location: SUR[facility]ERY [facility];  Service: Orthopedics   • ANKLE TOTAL ARTHROPLASTY Right 6/18/2018    Procedure: ANKLE TOTAL ARTHROPLASTY;  Surgeon: Jose De Jesus Medrano M.D.;  Location: SUR[facility]ERY [facility];  Service: Orthopedics   • LIGAMENT REPAIR Right 6/18/2018    Procedure: LIGAMENT REPAIR- LATERAL;  Surgeon: Jose De Jesus Medrano M.D.;  Location: SUR[facility]ERY [facility];  Service: Orthopedics   • LUMBAR LAMINECTOMY DISKECTOMY Right 6/13/2017    Procedure: LUMBAR LAMINECTOMY DISKECTOMY - RE-DO OPEN L4-S1 LAMINOTOMIES;  Surgeon: Clint Garsia M.D.;  Location: SUR[facility]ERY [facility];  Service:    • FORAMINOTOMY  6/13/2017    Procedure: FORAMINOTOMY;  Surgeon: Clint Garsia M.D.;  Location: [facility];  Service:    • HARDWARE REMOVAL NEURO  6/13/2017    Procedure: HARDWARE REMOVAL NEURO ;  Surgeon: Clint Garsia M.D.;  Location: [facility];  Service:    • LAPAROSCOPY ROBOTIC XI  10/26/2016    Procedure: LAPAROSCOPY FOR LIVER CYST MARSUPIALIZATION AND RESECTION LIVER WALL CYST;  Surgeon: Frank Corona M.D.;  Location: [facility];  Service:    • LUMBAR FUSION POSTERIOR  9/10/2015    Procedure: LUMBAR FUSION POSTERIOR L5-S1 ;  Surgeon: Clint Garsia M.D.;  Location: [facility];  Service:    • LUMBAR LAMINECTOMY DISKECTOMY  9/10/2015    Procedure: LUMBAR LAMINECTOMY ;  Surgeon: Clint Garsia M.D.;  Location: [facility];  Service:    • LAMINOTOMY  9/10/2015    Procedure: LAMINOTOMY;  Surgeon: Clint Garsia M.D.;  Location: SUR[facility]ERY [facility];  Service:    • OTHER  2005    ANTERIOR NECK FUSION C5-7   • OTHER  1978    BLEEDING ULCERS   • CATARACT EXTRACTION WITH IOL       Family History   Problem Relation Age of Onset   • Stroke Mother    • Heart Disease Mother    • Stroke Father    • Heart Disease Father      Social History     Socioeconomic History   • Marital status:      Spouse  name: Not on file   • Number of children: Not on file   • Years of education: Not on file   • Highest education level: Not on file   Occupational History   • Not on file   Social Needs   • Financial resource strain: Not on file   • Food insecurity:     Worry: Not on file     Inability: Not on file   • Transportation needs:     Medical: Not on file     Non-medical: Not on file   Tobacco Use   • Smoking status: Former Smoker     Packs/day: 1.50     Years: 13.00     Pack years: 19.50     Types: Cigarettes     Last attempt to quit: 1978     Years since quittin.9   • Smokeless tobacco: Never Used   Substance and Sexual Activity   • Alcohol use: Not Currently     Alcohol/week: 0.0 oz   • Drug use: No   • Sexual activity: Not on file   Lifestyle   • Physical activity:     Days per week: Not on file     Minutes per session: Not on file   • Stress: Not on file   Relationships   • Social connections:     Talks on phone: Not on file     Gets together: Not on file     Attends Sikhism service: Not on file     Active member of club or organization: Not on file     Attends meetings of clubs or organizations: Not on file     Relationship status: Not on file   • Intimate partner violence:     Fear of current or ex partner: Not on file     Emotionally abused: Not on file     Physically abused: Not on file     Forced sexual activity: Not on file   Other Topics Concern   • Not on file   Social History Narrative   • Not on file     Allergies   Allergen Reactions   • Nkda [No Known Drug Allergy]        Current Outpatient Medications   Medication Sig Dispense Refill   • amLODIPine (NORVASC) 5 MG Tab Take 1 Tab by mouth every day. 90 Tab 0   • aspirin EC 81 MG EC tablet Take 1 Tab by mouth every day. 30 Tab 0   • senna-docusate (PERICOLACE OR SENOKOT S) 8.6-50 MG Tab Take 1 Tab by mouth every day. 30 Tab 0   • NS SOLN 100 mL with ertapenem 1 GM SOLR 1,000 mg 1,000 mg by Intravenous route every 24 hours.     • atorvastatin  "(LIPITOR) 20 MG Tab Take 20 mg by mouth every day.     • carvedilol (COREG) 6.25 MG Tab Take 6.25 mg by mouth 2 times a day.     • gabapentin (NEURONTIN) 600 MG tablet Take 600 mg by mouth 2 times a day.     • lisinopril (PRINIVIL) 20 MG Tab Take 20 mg by mouth every day.     • naproxen (NAPROSYN) 500 MG Tab Take 500 mg by mouth 2 times a day, with meals.     • Multiple Vitamins-Minerals (CENTRUM SILVER 50+MEN) Tab Take 1 Tab by mouth every morning.     • LUMIGAN 0.01 % Solution Place 1 Drop in both eyes every bedtime.  3   • PROAIR  (90 Base) MCG/ACT Aero Soln inhalation aerosol Inhale 2 Puffs by mouth every four hours as needed for Shortness of Breath.     • metFORMIN (GLUCOPHAGE) 500 MG Tab Take 500 mg by mouth every day.     • escitalopram (LEXAPRO) 20 MG tablet Take 20 mg by mouth every morning. Indications: Major Depressive Disorder       No current facility-administered medications for this visit.        ROS  All others systems reviewed and negative.     Objective:     /64 (BP Location: Right arm, Patient Position: Sitting, BP Cuff Size: Adult)   Pulse 76   Ht 1.702 m (5' 7\")   Wt 73.1 kg (161 lb 0.7 oz)   SpO2 97%  Body mass index is 25.22 kg/m².    Physical Exam   General: No acute distress. Well nourished.  HEENT: EOM grossly intact, no scleral icterus, no pharyngeal erythema.   Neck:  No JVD, no bruits, trachea midline  CVS: RRR. Normal S1, S2. No M/R/G. Trace right LE edema (after prosthesis).  2+ radial pulses, 2+ DP pulses  Resp: CTAB. No wheezing or crackles/rhonchi. Normal respiratory effort.  Abdomen: Soft, NT, no tayler hepatomegaly.  MSK/Ext: No clubbing or cyanosis.  Skin: Warm and dry, no rashes.  Neurological: CN III-XII grossly intact. No focal deficits.   Psych: A&O x 3, appropriate affect, good judgement    Physical exam performed today and unchanged compared to 12/6/2018, he has a PICC line in [facility].    Assessment:     1. Essential hypertension     2. Mixed hyperlipidemia   "   3. Tobacco use     4. Coronary artery disease involving native coronary artery of native heart with angina pectoris (HCC)         Medical Decision Making:  Today's Assessment / Status / Plan:     -Primary prevention ASA and statin, LDL at goal.  -BP at goal  -AAA us screen was normal  -[facility]ual C[facility]  -His na was 127, I do not see any meds that can cause this, I would just simply FU and monitor.  -PICC line out soon  -RTC yearly    Written instructions given today:    -Naprosyn=Advil=Ibuprofen=Naproxen- hard on kidneys, raise blood pressure, can cause stomach bleeding.  -Tylenol=acetamenophin- is safe    Return in about 1 year (around 12/9/2020).    It is my pleasure to participate in the care of Mr. Gallegos.  Please do not hesitate to contact me with questions or concerns.    Alberta Foley MD, [facility]  Cardiologist [facility]    Please note that this dictation was created using voice recognition software. I have made every reasonable attempt to correct obvious errors, but it is possible there are errors of grammar and possibly content that I did not discover before finalizing the note.      Mckenzie Fernandez, A.P.N.  [address]  [place] [address]  VIA Facsimile: [phone]

## 2019-12-09 NOTE — PROGRESS NOTES
Subjective:   Chief Complaint:   Chief Complaint   Patient presents with   • Coronary Artery Disease       Ronny Gallegos is a 74 y.o. male who returns for follow-up of coronary artery disease, hypertension hyperlipidemia.      He has a previous patient of Dr. Ellsworth for 35 years.  He first came to cardiology after giving blood and found out he had HTN. This had been the focus.    Was also on statin therapy despite low cholesterol.  This led to a calcium score testing. He had a positive calcium score in  with disease predominantly in the RCA and LAD with total score of 820.  He has been on primary prevention therapy.  He had a nuclear stress test in 2017 which is normal.    His EKG is normal.     LDL cholesterol is 49.  Na was 127 after being in the hospital.    His blood pressures been well controlled on multiple medications.  We lowered his CCB last visit.  He is on aspirin and statin therapy in addition to beta-blocker.    He has never had an echocardiogram that I could find even in the archived records.   No heart cath.    He is not limited by chest pain, pressure or tightness.   No significant dyspnea on exertion, orthopnea or lower extremity swelling.   No significant palpitations, dizziness, or presyncope/syncope.  No symptoms of leg claudication.    Has remotely been checked in the hospital for Cps, no issues.  Remote tobacco use, no abd US.    Knee replacement, infected, 2 more surgeries, antibiotics. Has a PICC line.    Wife  about 5 years ago, prior heavy etoh.    DATA REVIEWED by me:  ECG 2018  Sinus, rate 70, first-degree AV delay, left axis deviation    Nuclear PET stress test 5/3/2017  EF 65%, normal perfusion    Calcium score 2015  Coronary calcification: Total score 819.9  LMA - 0.0  LCX - 35.4  LAD - 167.9  RCA - 616.6  PDA - 0.0    Treadmill 3-21-12  10:16 sec, good BP, no arrhtymias    Abd US 18   No evidence of aortic aneurysm or stenosis.    No evidence of  "stenosis of the proximal segment of the major visceral   arteries.    No evidence of iliac artery stenosis or aneurysm      Most recent labs:     2019 hemoglobin 10.1, platelets 499, sed rate 39, sodium 137, potassium 3.6, creatinine 0.59, LFTs normal, C-reactive protein 1.67    2019 LDL 37, HDL 78, triglycerides 110, total cholesterol 137    2018 sodium 132, potassium 4.4, creatinine 0.79, LFTs normal the exception of total bilirubin of 1.6, total cholesterol 126, triglycerides 120, HDL 53, LDL 49    Past Medical History:   Diagnosis Date   • Arthritis     right ankle/left knee   • Asthma     inhaler PRN    • CAD (coronary artery disease)     + CT scoring, negative PET cardiac in    • Depression    • Diabetes (HCC)     oral medication    • Glaucoma    • High cholesterol    • Hypertension    • Hypopotassemia    • Hyposmolality and/or hyponatremia    • Jaundice     \"from drinking after wife \"   • Neuropathy (HCC)     bilat legs/feet   • Pain     right ankle    • Personal history of peptic ulcer disease    • Pneumonia    • Unspecified cataract     removed bilat     Past Surgical History:   Procedure Laterality Date   • PB REVISE KNEE JOINT REPLACE,ALL PARTS Left 2019    Procedure: REVISION, TOTAL ARTHROPLASTY, KNEE, ALL COMPONENTS - POLY EXCHANGE ONLY;  Surgeon: Parmjit Charles M.D.;  Location: [facility];  Service: Orthopedics   • IRRIGATION & DEBRIDEMENT ORTHO  2019    Procedure: IRRIGATION AND DEBRIDEMENT, WOUND - KNEE;  Surgeon: Parmjit Charles M.D.;  Location: [facility];  Service: Orthopedics   • IRRIGATION & DEBRIDEMENT ORTHO Left 2019    Procedure: IRRIGATION AND DEBRIDEMENT, WOUND;  Surgeon: Jesús Wesley M.D.;  Location: [facility];  Service: Orthopedics   • ANKLE TOTAL ARTHROPLASTY Right 2018    Procedure: ANKLE TOTAL ARTHROPLASTY;  Surgeon: Jose De Jesus Medrano M.D.;  Location: [facility];  Service: Orthopedics "   • LIGAMENT REPAIR Right 6/18/2018    Procedure: LIGAMENT REPAIR- LATERAL;  Surgeon: Jose De Jesus Medrano M.D.;  Location: [facility];  Service: Orthopedics   • LUMBAR LAMINECTOMY DISKECTOMY Right 6/13/2017    Procedure: LUMBAR LAMINECTOMY DISKECTOMY - RE-DO OPEN L4-S1 LAMINOTOMIES;  Surgeon: Clint Garsia M.D.;  Location: [facility];  Service:    • FORAMINOTOMY  6/13/2017    Procedure: FORAMINOTOMY;  Surgeon: Clint Garsia M.D.;  Location: [facility];  Service:    • HARDWARE REMOVAL NEURO  6/13/2017    Procedure: HARDWARE REMOVAL NEURO ;  Surgeon: Clint Garsia M.D.;  Location: [facility];  Service:    • LAPAROSCOPY ROBOTIC XI  10/26/2016    Procedure: LAPAROSCOPY FOR LIVER CYST MARSUPIALIZATION AND RESECTION LIVER WALL CYST;  Surgeon: Frank Corona M.D.;  Location: [facility];  Service:    • LUMBAR FUSION POSTERIOR  9/10/2015    Procedure: LUMBAR FUSION POSTERIOR L5-S1 ;  Surgeon: Clint Garsia M.D.;  Location: [facility];  Service:    • LUMBAR LAMINECTOMY DISKECTOMY  9/10/2015    Procedure: LUMBAR LAMINECTOMY ;  Surgeon: Clint Garsia M.D.;  Location: [facility];  Service:    • LAMINOTOMY  9/10/2015    Procedure: LAMINOTOMY;  Surgeon: Clint Garsia M.D.;  Location: [facility];  Service:    • OTHER  2005    ANTERIOR NECK FUSION C5-7   • OTHER  1978    BLEEDING ULCERS   • CATARACT EXTRACTION WITH IOL       Family History   Problem Relation Age of Onset   • Stroke Mother    • Heart Disease Mother    • Stroke Father    • Heart Disease Father      Social History     Socioeconomic History   • Marital status:      Spouse name: Not on file   • Number of children: Not on file   • Years of education: Not on file   • Highest education level: Not on file   Occupational History   • Not on file   Social Needs   • Financial resource strain: Not on file   • Food insecurity:     Worry: Not on file      Inability: Not on file   • Transportation needs:     Medical: Not on file     Non-medical: Not on file   Tobacco Use   • Smoking status: Former Smoker     Packs/day: 1.50     Years: 13.00     Pack years: 19.50     Types: Cigarettes     Last attempt to quit: 1978     Years since quittin.9   • Smokeless tobacco: Never Used   Substance and Sexual Activity   • Alcohol use: Not Currently     Alcohol/week: 0.0 oz   • Drug use: No   • Sexual activity: Not on file   Lifestyle   • Physical activity:     Days per week: Not on file     Minutes per session: Not on file   • Stress: Not on file   Relationships   • Social connections:     Talks on phone: Not on file     Gets together: Not on file     Attends [employer] service: Not on file     Active member of club or organization: Not on file     Attends meetings of clubs or organizations: Not on file     Relationship status: Not on file   • Intimate partner violence:     Fear of current or ex partner: Not on file     Emotionally abused: Not on file     Physically abused: Not on file     Forced sexual activity: Not on file   Other Topics Concern   • Not on file   Social History Narrative   • Not on file     Allergies   Allergen Reactions   • Nkda [No Known Drug Allergy]        Current Outpatient Medications   Medication Sig Dispense Refill   • amLODIPine (NORVASC) 5 MG Tab Take 1 Tab by mouth every day. 90 Tab 0   • aspirin EC 81 MG EC tablet Take 1 Tab by mouth every day. 30 Tab 0   • senna-docusate (PERICOLACE OR SENOKOT S) 8.6-50 MG Tab Take 1 Tab by mouth every day. 30 Tab 0   • NS SOLN 100 mL with ertapenem 1 GM SOLR 1,000 mg 1,000 mg by Intravenous route every 24 hours.     • atorvastatin (LIPITOR) 20 MG Tab Take 20 mg by mouth every day.     • carvedilol (COREG) 6.25 MG Tab Take 6.25 mg by mouth 2 times a day.     • gabapentin (NEURONTIN) 600 MG tablet Take 600 mg by mouth 2 times a day.     • lisinopril (PRINIVIL) 20 MG Tab Take 20 mg by mouth every day.     •  "naproxen (NAPROSYN) 500 MG Tab Take 500 mg by mouth 2 times a day, with meals.     • Multiple Vitamins-Minerals (CENTRUM SILVER 50+MEN) Tab Take 1 Tab by mouth every morning.     • LUMIGAN 0.01 % Solution Place 1 Drop in both eyes every bedtime.  3   • PROAIR  (90 Base) MCG/ACT Aero Soln inhalation aerosol Inhale 2 Puffs by mouth every four hours as needed for Shortness of Breath.     • metFORMIN (GLUCOPHAGE) 500 MG Tab Take 500 mg by mouth every day.     • escitalopram (LEXAPRO) 20 MG tablet Take 20 mg by mouth every morning. Indications: Major Depressive Disorder       No current facility-administered medications for this visit.        ROS  All others systems reviewed and negative.     Objective:     /64 (BP Location: Right arm, Patient Position: Sitting, BP Cuff Size: Adult)   Pulse 76   Ht 1.702 m (5' 7\")   Wt 73.1 kg (161 lb 0.7 oz)   SpO2 97%  Body mass index is 25.22 kg/m².    Physical Exam   General: No acute distress. Well nourished.  HEENT: EOM grossly intact, no scleral icterus, no pharyngeal erythema.   Neck:  No JVD, no bruits, trachea midline  CVS: RRR. Normal S1, S2. No M/R/G. Trace right LE edema (after prosthesis).  2+ radial pulses, 2+ DP pulses  Resp: CTAB. No wheezing or crackles/rhonchi. Normal respiratory effort.  Abdomen: Soft, NT, no tayler hepatomegaly.  MSK/Ext: No clubbing or cyanosis.  Skin: Warm and dry, no rashes.  Neurological: CN III-XII grossly intact. No focal deficits.   Psych: A&O x 3, appropriate affect, good judgement    Physical exam performed today and unchanged compared to 12/6/2018, he has a PICC line in [facility].    Assessment:     1. Essential hypertension     2. Mixed hyperlipidemia     3. Tobacco use     4. Coronary artery disease involving native coronary artery of native heart with angina pectoris (HCC)         Medical Decision Making:  Today's Assessment / Status / Plan:     -Primary prevention ASA and statin, LDL at goal.  -BP at goal  -AAA us screen was " normal  -[facility] C[facility]  -His na was 127, I do not see any meds that can cause this, I would just simply FU and monitor.  -PICC line out soon  -RTC yearly    Written instructions given today:    -Naprosyn=Advil=Ibuprofen=Naproxen- hard on kidneys, raise blood pressure, can cause stomach bleeding.  -Tylenol=acetamenophin- is safe    Return in about 1 year (around 12/9/2020).    It is my pleasure to participate in the care of Mr. Gallegos.  Please do not hesitate to contact me with questions or concerns.    Alberta Foley MD, [facility]  Cardiologist [facility]    Please note that this dictation was created using voice recognition software. I have made every reasonable attempt to correct obvious errors, but it is possible there are errors of grammar and possibly content that I did not discover before finalizing the note.

## 2019-12-09 NOTE — PATIENT INSTRUCTIONS
-Naprosyn=Advil=Ibuprofen=Naproxen- hard on kidneys, raise blood pressure, can cause stomach bleeding.  -Tylenol=acetamenophin- is safe

## 2019-12-11 ENCOUNTER — OUTPATIENT INFUSION SERVICES (OUTPATIENT)
Dept: ONCOLOGY | Facility: MEDICAL CENTER | Age: 74
End: 2019-12-11
Attending: NURSE PRACTITIONER
Payer: MEDICARE

## 2019-12-11 VITALS
WEIGHT: 161.6 LBS | HEIGHT: 66 IN | OXYGEN SATURATION: 99 % | TEMPERATURE: 98.7 F | DIASTOLIC BLOOD PRESSURE: 72 MMHG | RESPIRATION RATE: 18 BRPM | BODY MASS INDEX: 25.97 KG/M2 | HEART RATE: 95 BPM | SYSTOLIC BLOOD PRESSURE: 141 MMHG

## 2019-12-11 DIAGNOSIS — Z96.659 INFECTION OF PROSTHETIC KNEE JOINT, SUBSEQUENT ENCOUNTER: ICD-10-CM

## 2019-12-11 DIAGNOSIS — T84.59XD INFECTION OF PROSTHETIC KNEE JOINT, SUBSEQUENT ENCOUNTER: ICD-10-CM

## 2019-12-11 DIAGNOSIS — T81.30XA WOUND DEHISCENCE: ICD-10-CM

## 2019-12-11 LAB
ALBUMIN SERPL BCP-MCNC: 3.6 G/DL (ref 3.2–4.9)
ALBUMIN/GLOB SERPL: 1.5 G/DL
ALP SERPL-CCNC: 82 U/L (ref 30–99)
ALT SERPL-CCNC: 10 U/L (ref 2–50)
ANION GAP SERPL CALC-SCNC: 10 MMOL/L (ref 0–11.9)
AST SERPL-CCNC: 17 U/L (ref 12–45)
BASOPHILS # BLD AUTO: 1 % (ref 0–1.8)
BASOPHILS # BLD: 0.06 K/UL (ref 0–0.12)
BILIRUB SERPL-MCNC: 0.6 MG/DL (ref 0.1–1.5)
BUN SERPL-MCNC: 8 MG/DL (ref 8–22)
CALCIUM SERPL-MCNC: 8.4 MG/DL (ref 8.5–10.5)
CHLORIDE SERPL-SCNC: 97 MMOL/L (ref 96–112)
CO2 SERPL-SCNC: 23 MMOL/L (ref 20–33)
CREAT SERPL-MCNC: 0.58 MG/DL (ref 0.5–1.4)
EOSINOPHIL # BLD AUTO: 0.09 K/UL (ref 0–0.51)
EOSINOPHIL NFR BLD: 1.4 % (ref 0–6.9)
ERYTHROCYTE [DISTWIDTH] IN BLOOD BY AUTOMATED COUNT: 46 FL (ref 35.9–50)
GLOBULIN SER CALC-MCNC: 2.4 G/DL (ref 1.9–3.5)
GLUCOSE SERPL-MCNC: 105 MG/DL (ref 65–99)
HCT VFR BLD AUTO: 31.4 % (ref 42–52)
HGB BLD-MCNC: 9.7 G/DL (ref 14–18)
IMM GRANULOCYTES # BLD AUTO: 0.02 K/UL (ref 0–0.11)
IMM GRANULOCYTES NFR BLD AUTO: 0.3 % (ref 0–0.9)
LYMPHOCYTES # BLD AUTO: 1.42 K/UL (ref 1–4.8)
LYMPHOCYTES NFR BLD: 22.6 % (ref 22–41)
MCH RBC QN AUTO: 25.2 PG (ref 27–33)
MCHC RBC AUTO-ENTMCNC: 30.9 G/DL (ref 33.7–35.3)
MCV RBC AUTO: 81.6 FL (ref 81.4–97.8)
MONOCYTES # BLD AUTO: 0.81 K/UL (ref 0–0.85)
MONOCYTES NFR BLD AUTO: 12.9 % (ref 0–13.4)
NEUTROPHILS # BLD AUTO: 3.89 K/UL (ref 1.82–7.42)
NEUTROPHILS NFR BLD: 61.8 % (ref 44–72)
NRBC # BLD AUTO: 0 K/UL
NRBC BLD-RTO: 0 /100 WBC
PLATELET # BLD AUTO: 483 K/UL (ref 164–446)
PMV BLD AUTO: 9 FL (ref 9–12.9)
POTASSIUM SERPL-SCNC: 3.5 MMOL/L (ref 3.6–5.5)
PROT SERPL-MCNC: 6 G/DL (ref 6–8.2)
RBC # BLD AUTO: 3.85 M/UL (ref 4.7–6.1)
SODIUM SERPL-SCNC: 130 MMOL/L (ref 135–145)
WBC # BLD AUTO: 6.3 K/UL (ref 4.8–10.8)

## 2019-12-11 PROCEDURE — 80053 COMPREHEN METABOLIC PANEL: CPT

## 2019-12-11 PROCEDURE — 36592 COLLECT BLOOD FROM PICC: CPT | Performed by: CLINICAL NURSE SPECIALIST

## 2019-12-11 PROCEDURE — 85025 COMPLETE CBC W/AUTO DIFF WBC: CPT

## 2019-12-11 RX ORDER — CYCLOBENZAPRINE HCL 10 MG
10 TABLET ORAL 3 TIMES DAILY PRN
COMMUNITY
End: 2022-02-18

## 2019-12-11 RX ORDER — 0.9 % SODIUM CHLORIDE 0.9 %
10-20 VIAL (ML) INJECTION PRN
Status: CANCELLED | OUTPATIENT
Start: 2019-12-16

## 2019-12-11 RX ORDER — 0.9 % SODIUM CHLORIDE 0.9 %
5 VIAL (ML) INJECTION PRN
Status: CANCELLED | OUTPATIENT
Start: 2019-12-16

## 2019-12-11 NOTE — PROGRESS NOTES
Patient to infusion for labs and PICC dressing change.  No new concerns today.  Patient has 5 more abx treatments at home.  Labs drawn.  PICC dressing changed, caps changed.  Patient discharged ambulatory in stable condition.

## 2019-12-16 ENCOUNTER — OFFICE VISIT (OUTPATIENT)
Dept: INFECTIOUS DISEASES | Facility: MEDICAL CENTER | Age: 74
End: 2019-12-16
Payer: MEDICARE

## 2019-12-16 ENCOUNTER — HOSPITAL ENCOUNTER (OUTPATIENT)
Dept: LAB | Facility: MEDICAL CENTER | Age: 74
End: 2019-12-16
Attending: INTERNAL MEDICINE
Payer: MEDICARE

## 2019-12-16 VITALS
TEMPERATURE: 98 F | DIASTOLIC BLOOD PRESSURE: 60 MMHG | OXYGEN SATURATION: 98 % | BODY MASS INDEX: 25.88 KG/M2 | HEIGHT: 66 IN | HEART RATE: 108 BPM | WEIGHT: 161 LBS | SYSTOLIC BLOOD PRESSURE: 120 MMHG

## 2019-12-16 DIAGNOSIS — Z96.659 INFECTION OF PROSTHETIC KNEE JOINT, SEQUELA: ICD-10-CM

## 2019-12-16 DIAGNOSIS — T81.30XA WOUND DEHISCENCE: ICD-10-CM

## 2019-12-16 DIAGNOSIS — T84.59XS INFECTION OF PROSTHETIC KNEE JOINT, SEQUELA: ICD-10-CM

## 2019-12-16 DIAGNOSIS — E11.42 TYPE 2 DIABETES MELLITUS WITH DIABETIC POLYNEUROPATHY, WITHOUT LONG-TERM CURRENT USE OF INSULIN (HCC): ICD-10-CM

## 2019-12-16 LAB
CRP SERPL HS-MCNC: 1.35 MG/DL (ref 0–0.75)
ERYTHROCYTE [SEDIMENTATION RATE] IN BLOOD BY WESTERGREN METHOD: 33 MM/HOUR (ref 0–20)

## 2019-12-16 PROCEDURE — 85652 RBC SED RATE AUTOMATED: CPT

## 2019-12-16 PROCEDURE — 86140 C-REACTIVE PROTEIN: CPT

## 2019-12-16 PROCEDURE — 36415 COLL VENOUS BLD VENIPUNCTURE: CPT

## 2019-12-16 PROCEDURE — 99213 OFFICE O/P EST LOW 20 MIN: CPT | Performed by: INTERNAL MEDICINE

## 2019-12-16 RX ORDER — DOXYCYCLINE 100 MG/1
100 CAPSULE ORAL 2 TIMES DAILY
Qty: 60 CAP | Refills: 2 | Status: SHIPPED | OUTPATIENT
Start: 2019-12-16 | End: 2020-01-16

## 2019-12-16 ASSESSMENT — ENCOUNTER SYMPTOMS
ABDOMINAL PAIN: 0
COUGH: 0
HEADACHES: 0
DIZZINESS: 0
FEVER: 0
NAUSEA: 0
VOMITING: 0
SHORTNESS OF BREATH: 0
CHILLS: 0
DIARRHEA: 0

## 2019-12-16 NOTE — PROGRESS NOTES
Infectious Disease Follow up Note      Subjective:     Chief Complaint   Patient presents with   • Follow-Up     Infection of prosthetic knee joint, subsequent encounter       Interval History:   Patient is a 74-year-old man with a history of type 2 diabetes mellitus, coronary artery disease and degenerative joint disease status post left knee arthroplasty on 10/8/2019.  He was admitted in early November for drainage at the surgical site.  Patient underwent I&D of his wound on 11/2/2019.  He underwent I&D with left knee polyethylene liner exchange and complex wound closure on 11/5/2019 by Dr. Charles.  Cultures were negative.  He was discharged on ertapenem through 12/15/2019.    Hospital records reviewed     11/25/2019- Seen by Dr Littlejohn-Patient here for hospital follow-up.  Patient is doing well clinically.  He has been followed up with the [facility] as well as doing ongoing physical therapy.  Patient has full range of motion in his left knee.  He is tolerating ertapenem without any issues.  No issues with his PICC line.  No recent fevers, chills, nausea, vomiting or abdominal pain.  He denies any left knee pain.  Recent labs are reviewed and are unremarkable.  Inflammatory markers are decreasing.   12/16/2019 patient has come back for follow-up.  His last dose of ertapenem was yesterday.  He still has his PICC line in place.  He saw Dr. Charles yesterday and he discharged him from the [place] [facility] for 1 year.  His next appointment is in 1 year.  Denies any problems with the knee.  Denies any new issues.  Review of Systems   Constitutional: Negative for chills and fever.   Respiratory: Negative for cough and shortness of breath.    Gastrointestinal: Negative for abdominal pain, diarrhea, nausea and vomiting.   Musculoskeletal: Negative for joint pain.   Neurological: Negative for dizziness and headaches.   All other systems reviewed and are negative.      Past Medical History:   Diagnosis Date   • Arthritis      "right ankle/left knee   • Asthma     inhaler PRN    • CAD (coronary artery disease)     + CT scoring, negative PET cardiac in    • Depression    • Diabetes (HCC)     oral medication    • Glaucoma    • High cholesterol    • Hypertension    • Hypopotassemia    • Hyposmolality and/or hyponatremia    • Jaundice     \"from drinking after wife \"   • Neuropathy (HCC)     bilat legs/feet   • Pain     right ankle    • Personal history of peptic ulcer disease    • Pneumonia    • Unspecified cataract     removed bilat       Past Surgical History:   Procedure Laterality Date   • PB REVISE KNEE JOINT REPLACE,ALL PARTS Left 2019    Procedure: REVISION, TOTAL ARTHROPLASTY, KNEE, ALL COMPONENTS - POLY EXCHANGE ONLY;  Surgeon: Parmjit Charles M.D.;  Location: [facility];  Service: Orthopedics   • IRRIGATION & DEBRIDEMENT ORTHO  2019    Procedure: IRRIGATION AND DEBRIDEMENT, WOUND - KNEE;  Surgeon: Parmjit Charles M.D.;  Location: [facility];  Service: Orthopedics   • IRRIGATION & DEBRIDEMENT ORTHO Left 2019    Procedure: IRRIGATION AND DEBRIDEMENT, WOUND;  Surgeon: Jesús Wesley M.D.;  Location: [facility];  Service: Orthopedics   • ANKLE TOTAL ARTHROPLASTY Right 2018    Procedure: ANKLE TOTAL ARTHROPLASTY;  Surgeon: Jose De Jesus Medrano M.D.;  Location: [facility];  Service: Orthopedics   • LIGAMENT REPAIR Right 2018    Procedure: LIGAMENT REPAIR- LATERAL;  Surgeon: Jose De Jesus Medrano M.D.;  Location: [facility];  Service: Orthopedics   • LUMBAR LAMINECTOMY DISKECTOMY Right 2017    Procedure: LUMBAR LAMINECTOMY DISKECTOMY - RE-DO OPEN L4-S1 LAMINOTOMIES;  Surgeon: Clint Garsia M.D.;  Location: [facility];  Service:    • FORAMINOTOMY  2017    Procedure: FORAMINOTOMY;  Surgeon: Clint Garsia M.D.;  Location: [facility];  Service:    • HARDWARE REMOVAL NEURO  2017    Procedure: HARDWARE REMOVAL " NEURO ;  Surgeon: Clint Garsia M.D.;  Location: SUR[facility];  Service:    • LAPAROSCOPY ROBOTIC XI  10/26/2016    Procedure: LAPAROSCOPY FOR LIVER CYST MARSUPIALIZATION AND RESECTION LIVER WALL CYST;  Surgeon: Frank Corona M.D.;  Location: SUR[facility]ERY [facility];  Service:    • LUMBAR FUSION POSTERIOR  9/10/2015    Procedure: LUMBAR FUSION POSTERIOR L5-S1 ;  Surgeon: Clint Garsia M.D.;  Location: [facility];  Service:    • LUMBAR LAMINECTOMY DISKECTOMY  9/10/2015    Procedure: LUMBAR LAMINECTOMY ;  Surgeon: Clint Garsia M.D.;  Location: [facility];  Service:    • LAMINOTOMY  9/10/2015    Procedure: LAMINOTOMY;  Surgeon: Clint Garsia M.D.;  Location: [facility];  Service:    • OTHER  2005    ANTERIOR NECK FUSION C5-7   • OTHER  1978    BLEEDING ULCERS   • CATARACT EXTRACTION WITH IOL         Allergies:   Allergies   Allergen Reactions   • Nkda [No Known Drug Allergy]          Medications:  Current Outpatient Medications on File Prior to Visit   Medication Sig Dispense Refill   • cyclobenzaprine (FLEXERIL) 10 MG Tab Take 10 mg by mouth 3 times a day as needed.     • amLODIPine (NORVASC) 5 MG Tab Take 1 Tab by mouth every day. 90 Tab 0   • aspirin EC 81 MG EC tablet Take 1 Tab by mouth every day. 30 Tab 0   • senna-docusate (PERICOLACE OR SENOKOT S) 8.6-50 MG Tab Take 1 Tab by mouth every day. 30 Tab 0   • NS SOLN 100 mL with ertapenem 1 GM SOLR 1,000 mg 1,000 mg by Intravenous route every 24 hours.     • atorvastatin (LIPITOR) 20 MG Tab Take 20 mg by mouth every day.     • carvedilol (COREG) 6.25 MG Tab Take 6.25 mg by mouth 2 times a day.     • gabapentin (NEURONTIN) 600 MG tablet Take 600 mg by mouth 2 times a day.     • lisinopril (PRINIVIL) 20 MG Tab Take 20 mg by mouth every day.     • naproxen (NAPROSYN) 500 MG Tab Take 500 mg by mouth 2 times a day, with meals.     • Multiple Vitamins-Minerals (CENTRUM SILVER 50+MEN) Tab Take 1 Tab by mouth  "every morning.     • LUMIGAN 0.01 % Solution Place 1 Drop in both eyes every bedtime.  3   • PROAIR  (90 Base) MCG/ACT Aero Soln inhalation aerosol Inhale 2 Puffs by mouth every four hours as needed for Shortness of Breath.     • metFORMIN (GLUCOPHAGE) 500 MG Tab Take 500 mg by mouth every day.     • escitalopram (LEXAPRO) 20 MG tablet Take 20 mg by mouth every morning. Indications: Major Depressive Disorder       No current facility-administered medications on file prior to visit.          ROS  As documented above in my HPI       Objective:     PE:  Ht 1.665 m (5' 5.55\")   Wt 73 kg (161 lb)   BMI 26.34 kg/m²      Vital signs reviewed  Constitutional: patient is oriented to person, place, and time. Appears well-developed and well-nourished. No distress  Eyes: Conjunctivae normal and EOM are normal. Pupils are equal, round, and reactive to light.   Mouth/Throat: Lips without lesions, good dentition, oropharynx is clear and moist.  Neck: Trachea midline. Normal range of motion. Neck supple. No masses  Cardiovascular: Normal rate, regular rhythm, normal heart sounds and intact distal pulses. No murmur, gallop, or friction rub. No edema.  Pulmonary/Chest: No respiratory distress. Unlabored respiratory effort, lungs clear to auscultation. No wheezes or rales.   Abdominal: Soft, non tender. BS + x 4. No masses or hepatosplenomegaly.   Musculoskeletal: Normal range of motion.  Left knee surgical site well approximated.  No evidence of wound dehiscence or active drainage.  There is some swelling present but no erythema.  Full range of active and passive motion of left knee joint.  Left upper extremity PICC line-nontender, no surrounding erythema.  Dressing is clean, dry and intact.    Neurological: alert and oriented to person, place, and time. No cranial nerve deficit. Coordination normal. Moves all extremities  Skin: Skin is warm and dry. Good turgor. No rashes visable.  Psychiatric: Normal mood and affect. " Behavior is normal.  Very pleasant    LABS:  WBC   Date/Time Value Ref Range Status   12/11/2019 12:50 PM 6.3 4.8 - 10.8 K/uL Final     RBC   Date/Time Value Ref Range Status   12/11/2019 12:50 PM 3.85 (L) 4.70 - 6.10 M/uL Final     Hemoglobin   Date/Time Value Ref Range Status   12/11/2019 12:50 PM 9.7 (L) 14.0 - 18.0 g/dL Final     Hematocrit   Date/Time Value Ref Range Status   12/11/2019 12:50 PM 31.4 (L) 42.0 - 52.0 % Final     MCV   Date/Time Value Ref Range Status   12/11/2019 12:50 PM 81.6 81.4 - 97.8 fL Final     MCH   Date/Time Value Ref Range Status   12/11/2019 12:50 PM 25.2 (L) 27.0 - 33.0 pg Final     MCHC   Date/Time Value Ref Range Status   12/11/2019 12:50 PM 30.9 (L) 33.7 - 35.3 g/dL Final     MPV   Date/Time Value Ref Range Status   12/11/2019 12:50 PM 9.0 9.0 - 12.9 fL Final        Sodium   Date/Time Value Ref Range Status   12/11/2019 12:50  (L) 135 - 145 mmol/L Final     Potassium   Date/Time Value Ref Range Status   12/11/2019 12:50 PM 3.5 (L) 3.6 - 5.5 mmol/L Final     Chloride   Date/Time Value Ref Range Status   12/11/2019 12:50 PM 97 96 - 112 mmol/L Final     Co2   Date/Time Value Ref Range Status   12/11/2019 12:50 PM 23 20 - 33 mmol/L Final     Glucose   Date/Time Value Ref Range Status   12/11/2019 12:50  (H) 65 - 99 mg/dL Final     Bun   Date/Time Value Ref Range Status   12/11/2019 12:50 PM 8 8 - 22 mg/dL Final     Creatinine   Date/Time Value Ref Range Status   12/11/2019 12:50 PM 0.58 0.50 - 1.40 mg/dL Final   06/18/2012 09:13 AM 0.93 0.76 - 1.27 mg/dL Final     Bun-Creatinine Ratio   Date/Time Value Ref Range Status   06/29/2016 09:59 AM 18 10 - 22 Final   06/18/2012 09:13 AM 14 10 - 22 Final     Alkaline Phosphatase   Date/Time Value Ref Range Status   12/11/2019 12:50 PM 82 30 - 99 U/L Final     AST(SGOT)   Date/Time Value Ref Range Status   12/11/2019 12:50 PM 17 12 - 45 U/L Final     ALT(SGPT)   Date/Time Value Ref Range Status   12/11/2019 12:50 PM 10 2 - 50 U/L  Final     Total Bilirubin   Date/Time Value Ref Range Status   12/11/2019 12:50 PM 0.6 0.1 - 1.5 mg/dL Final        CPK Total   Date/Time Value Ref Range Status   11/05/2019 12:54 AM 46 0 - 154 U/L Final        MICRO:  Blood Culture Hold   Date Value Ref Range Status   10/30/2019 Collected  Final          IMAGING STUDIES:  None    Assessment/Plan:     1. Wound dehiscence     2. Type 2 diabetes mellitus with diabetic polyneuropathy, without long-term current use of insulin (HCC)     3. Infection of prosthetic knee joint, sequela  WESTERGREN SED RATE    CRP QUANTITIVE (NON-CARDIAC)             Patient has finished his IV antibiotics on 12/15/2019.  I pulled the PICC without any issues in the office.  We will check a sed rate and CRP.  We will give him suppressive p.o. doxycycline.  Literature for the side effects of doxycycline given.  Follow up: 3 months   FU with PCP for ongoing chronic medical conditions.     Priya Caba M.D.

## 2019-12-18 ENCOUNTER — APPOINTMENT (OUTPATIENT)
Dept: ONCOLOGY | Facility: MEDICAL CENTER | Age: 74
End: 2019-12-18
Attending: NURSE PRACTITIONER
Payer: MEDICARE

## 2019-12-29 LAB
MYCOBACTERIUM SPEC CULT: NORMAL
MYCOBACTERIUM SPEC CULT: NORMAL
RHODAMINE-AURAMINE STN SPEC: NORMAL
RHODAMINE-AURAMINE STN SPEC: NORMAL
SIGNIFICANT IND 70042: NORMAL
SIGNIFICANT IND 70042: NORMAL
SITE SITE: NORMAL
SITE SITE: NORMAL
SOURCE SOURCE: NORMAL
SOURCE SOURCE: NORMAL

## 2020-01-03 DIAGNOSIS — E78.2 MIXED HYPERLIPIDEMIA: ICD-10-CM

## 2020-01-03 DIAGNOSIS — I10 ESSENTIAL HYPERTENSION: Primary | ICD-10-CM

## 2020-01-03 RX ORDER — CARVEDILOL 6.25 MG/1
6.25 TABLET ORAL 2 TIMES DAILY
Qty: 180 TAB | Refills: 3 | Status: SHIPPED | OUTPATIENT
Start: 2020-01-03 | End: 2020-12-15 | Stop reason: SDUPTHER

## 2020-01-03 RX ORDER — AMLODIPINE BESYLATE 5 MG/1
5 TABLET ORAL DAILY
Qty: 90 TAB | Refills: 3 | Status: SHIPPED | OUTPATIENT
Start: 2020-01-03 | End: 2020-12-15 | Stop reason: SDUPTHER

## 2020-01-03 RX ORDER — ATORVASTATIN CALCIUM 20 MG/1
20 TABLET, FILM COATED ORAL DAILY
Qty: 90 TAB | Refills: 3 | Status: SHIPPED | OUTPATIENT
Start: 2020-01-03 | End: 2020-12-15 | Stop reason: SDUPTHER

## 2020-01-07 ENCOUNTER — TELEPHONE (OUTPATIENT)
Dept: INFECTIOUS DISEASES | Facility: MEDICAL CENTER | Age: 75
End: 2020-01-07

## 2020-01-07 NOTE — TELEPHONE ENCOUNTER
"Pt called saying his PCP is concerned about pt's knee swelling and warmth. Pt states that when he wakes up in the morning his knee is not swollen or warm, but as he moves around throughout the day the swelling and warmth increase. Resting, elevation, and ice do help.   Pt was told by orthopedist and physical therapy that he no longer needs to see them. Pt has been going to a gym to stay active.   No issues with abx.  Per Dr. Wing \"Continue to monitor. If worsens or he has fevers, needs evaluation in the clinic.\"  Called pt and LM saying above.  -AMP  "

## 2020-03-02 ENCOUNTER — OFFICE VISIT (OUTPATIENT)
Dept: INFECTIOUS DISEASES | Facility: MEDICAL CENTER | Age: 75
End: 2020-03-02
Payer: MEDICARE

## 2020-03-02 VITALS
DIASTOLIC BLOOD PRESSURE: 70 MMHG | BODY MASS INDEX: 25.07 KG/M2 | OXYGEN SATURATION: 96 % | HEART RATE: 58 BPM | TEMPERATURE: 96.5 F | WEIGHT: 156 LBS | HEIGHT: 66 IN | SYSTOLIC BLOOD PRESSURE: 130 MMHG

## 2020-03-02 DIAGNOSIS — Z96.659 INFECTION OF PROSTHETIC KNEE JOINT, SEQUELA: Primary | ICD-10-CM

## 2020-03-02 DIAGNOSIS — T84.59XS INFECTION OF PROSTHETIC KNEE JOINT, SEQUELA: Primary | ICD-10-CM

## 2020-03-02 PROCEDURE — 99213 OFFICE O/P EST LOW 20 MIN: CPT | Performed by: INTERNAL MEDICINE

## 2020-03-02 RX ORDER — DOXYCYCLINE 100 MG/1
CAPSULE ORAL
COMMUNITY
Start: 2020-02-06 | End: 2020-12-15

## 2020-03-02 ASSESSMENT — FIBROSIS 4 INDEX: FIB4 SCORE: 0.8

## 2020-03-02 ASSESSMENT — ENCOUNTER SYMPTOMS
NAUSEA: 0
HEADACHES: 0
DIZZINESS: 0
DIARRHEA: 0
FEVER: 0
CHILLS: 0
SHORTNESS OF BREATH: 0
VOMITING: 0
ABDOMINAL PAIN: 0
COUGH: 0

## 2020-03-02 NOTE — PROGRESS NOTES
Infectious Disease Follow up Note      Subjective:     Chief Complaint   Patient presents with   • Follow-Up     Infection of prosthetic knee joint       Interval History:   Patient is a 74-year-old man with a history of type 2 diabetes mellitus, coronary artery disease and degenerative joint disease status post left knee arthroplasty on 10/8/2019.  He was admitted in early November for drainage at the surgical site.  Patient underwent I&D of his wound on 11/2/2019.  He underwent I&D with left knee polyethylene liner exchange and complex wound closure on 11/5/2019 by Dr. Charles.  Cultures were negative.  He was discharged on ertapenem through 12/15/2019.    Hospital records reviewed    Patient here for hospital follow-up.  Patient was last seen in ID clinic on 12/16/2019 by Dr. Caba.  At that time, patient was transitioned to doxycycline for chronic suppression.  Patient is doing well.  He has minimal left knee pain.  Patient works out daily.  He continues to have chronic swelling but it is not gotten worse.  He denies any erythema or wound dehiscence.  No fevers, chills or diarrhea.  He has been tolerating doxycycline and has been taking it appropriately.  He will follow-up with Dr. Charles in 2 months.    ESR and CRP on 12/16/2019 were 33 and 1.35, respectively.  Overall decreasing    Review of Systems   Constitutional: Negative for chills and fever.   Respiratory: Negative for cough and shortness of breath.    Cardiovascular: Positive for leg swelling.        Left knee-chronic   Gastrointestinal: Negative for abdominal pain, diarrhea, nausea and vomiting.   Musculoskeletal: Positive for joint pain.        Left knee-minimal   Neurological: Negative for dizziness and headaches.   All other systems reviewed and are negative.      Past Medical History:   Diagnosis Date   • Arthritis     right ankle/left knee   • Asthma     inhaler PRN    • CAD (coronary artery disease)     + CT scoring, negative PET cardiac in '17  "  • Depression    • Diabetes (HCC)     oral medication    • Glaucoma    • High cholesterol    • Hypertension    • Hypopotassemia    • Hyposmolality and/or hyponatremia    • Jaundice     \"from drinking after wife \"   • Neuropathy (HCC)     bilat legs/feet   • Pain     right ankle    • Personal history of peptic ulcer disease    • Pneumonia    • Unspecified cataract     removed bilat       Past Surgical History:   Procedure Laterality Date   • PB REVISE KNEE JOINT REPLACE,ALL PARTS Left 2019    Procedure: REVISION, TOTAL ARTHROPLASTY, KNEE, ALL COMPONENTS - POLY EXCHANGE ONLY;  Surgeon: Parmjit Charles M.D.;  Location: [facility];  Service: Orthopedics   • IRRIGATION & DEBRIDEMENT ORTHO  2019    Procedure: IRRIGATION AND DEBRIDEMENT, WOUND - KNEE;  Surgeon: Parmjit Charles M.D.;  Location: [facility];  Service: Orthopedics   • IRRIGATION & DEBRIDEMENT ORTHO Left 2019    Procedure: IRRIGATION AND DEBRIDEMENT, WOUND;  Surgeon: Jesús Wesley M.D.;  Location: [facility];  Service: Orthopedics   • ANKLE TOTAL ARTHROPLASTY Right 2018    Procedure: ANKLE TOTAL ARTHROPLASTY;  Surgeon: Jose De Jesus Medrano M.D.;  Location: [facility];  Service: Orthopedics   • LIGAMENT REPAIR Right 2018    Procedure: LIGAMENT REPAIR- LATERAL;  Surgeon: Jose De Jesus Medrano M.D.;  Location: [facility];  Service: Orthopedics   • LUMBAR LAMINECTOMY DISKECTOMY Right 2017    Procedure: LUMBAR LAMINECTOMY DISKECTOMY - RE-DO OPEN L4-S1 LAMINOTOMIES;  Surgeon: Clint Garsia M.D.;  Location: [facility];  Service:    • FORAMINOTOMY  2017    Procedure: FORAMINOTOMY;  Surgeon: Clint Garsia M.D.;  Location: [facility];  Service:    • HARDWARE REMOVAL NEURO  2017    Procedure: HARDWARE REMOVAL NEURO ;  Surgeon: Clint Garsia M.D.;  Location: [facility];  Service:    • LAPAROSCOPY ROBOTIC XI  10/26/2016    " Procedure: LAPAROSCOPY FOR LIVER CYST MARSUPIALIZATION AND RESECTION LIVER WALL CYST;  Surgeon: Frank Corona M.D.;  Location: SUR[facility]ERY [facility];  Service:    • LUMBAR FUSION POSTERIOR  9/10/2015    Procedure: LUMBAR FUSION POSTERIOR L5-S1 ;  Surgeon: Clint Garsia M.D.;  Location: SUR[facility]ERY [facility];  Service:    • LUMBAR LAMINECTOMY DISKECTOMY  9/10/2015    Procedure: LUMBAR LAMINECTOMY ;  Surgeon: Clint Garsia M.D.;  Location: SUR[facility]ERY [facility];  Service:    • LAMINOTOMY  9/10/2015    Procedure: LAMINOTOMY;  Surgeon: Clint Garsia M.D.;  Location: SURGERY [facility];  Service:    • OTHER  2005    ANTERIOR NECK FUSION C5-7   • OTHER  1978    BLEEDING ULCERS   • CATARACT EXTRACTION WITH IOL         Allergies:   Allergies   Allergen Reactions   • Nkda [No Known Drug Allergy]          Medications:  Current Outpatient Medications on File Prior to Visit   Medication Sig Dispense Refill   • carvedilol (COREG) 6.25 MG Tab Take 1 Tab by mouth 2 times a day. 180 Tab 3   • amLODIPine (NORVASC) 5 MG Tab Take 1 Tab by mouth every day. 90 Tab 3   • atorvastatin (LIPITOR) 20 MG Tab Take 1 Tab by mouth every day. 90 Tab 3   • cyclobenzaprine (FLEXERIL) 10 MG Tab Take 10 mg by mouth 3 times a day as needed.     • aspirin EC 81 MG EC tablet Take 1 Tab by mouth every day. 30 Tab 0   • senna-docusate (PERICOLACE OR SENOKOT S) 8.6-50 MG Tab Take 1 Tab by mouth every day. 30 Tab 0   • NS SOLN 100 mL with ertapenem 1 GM SOLR 1,000 mg 1,000 mg by Intravenous route every 24 hours.     • gabapentin (NEURONTIN) 600 MG tablet Take 600 mg by mouth 2 times a day.     • lisinopril (PRINIVIL) 20 MG Tab Take 20 mg by mouth every day.     • naproxen (NAPROSYN) 500 MG Tab Take 500 mg by mouth 2 times a day, with meals.     • Multiple Vitamins-Minerals (CENTRUM SILVER 50+MEN) Tab Take 1 Tab by mouth every morning.     • LUMIGAN 0.01 % Solution Place 1 Drop in both eyes every bedtime.  3   • PROAIR  (90  "Base) MCG/ACT Aero Soln inhalation aerosol Inhale 2 Puffs by mouth every four hours as needed for Shortness of Breath.     • metFORMIN (GLUCOPHAGE) 500 MG Tab Take 500 mg by mouth every day.     • escitalopram (LEXAPRO) 20 MG tablet Take 20 mg by mouth every morning. Indications: Major Depressive Disorder       No current facility-administered medications on file prior to visit.          ROS  As documented above in my HPI       Objective:     PE:  /70 (BP Location: Right arm, Patient Position: Sitting, BP Cuff Size: Adult)   Pulse (!) 58   Temp 35.8 °C (96.5 °F) (Temporal)   Ht 1.665 m (5' 5.55\")   Wt 70.8 kg (156 lb)   SpO2 96%   BMI 25.53 kg/m²      Vital signs reviewed  Constitutional: patient is oriented to person, place, and time. Appears well-developed and well-nourished. No distress  Eyes: Conjunctivae normal and EOM are normal. Pupils are equal, round, and reactive to light.   Mouth/Throat: Lips without lesions, good dentition, oropharynx is clear and moist.  Neck: Trachea midline. Normal range of motion. Neck supple. No masses  Cardiovascular: Normal rate, regular rhythm, normal heart sounds and intact distal pulses. No murmur, gallop, or friction rub. No edema.  Pulmonary/Chest: No respiratory distress. Unlabored respiratory effort, lungs clear to auscultation. No wheezes or rales.   Abdominal: Soft, non tender. BS + x 4. No masses or hepatosplenomegaly.   Musculoskeletal: Normal range of motion.  Left knee surgical site well-healed.   There is chronic swelling present but no erythema.  Full range of active and passive motion of left knee joint.   Neurological: alert and oriented to person, place, and time. No cranial nerve deficit. Coordination normal. Moves all extremities  Skin: Skin is warm and dry. Good turgor. No rashes visable.  Psychiatric: Normal mood and affect. Behavior is normal.  Very pleasant    LABS:  WBC   Date/Time Value Ref Range Status   12/11/2019 12:50 PM 6.3 4.8 - 10.8 " K/uL Final     RBC   Date/Time Value Ref Range Status   12/11/2019 12:50 PM 3.85 (L) 4.70 - 6.10 M/uL Final     Hemoglobin   Date/Time Value Ref Range Status   12/11/2019 12:50 PM 9.7 (L) 14.0 - 18.0 g/dL Final     Hematocrit   Date/Time Value Ref Range Status   12/11/2019 12:50 PM 31.4 (L) 42.0 - 52.0 % Final     MCV   Date/Time Value Ref Range Status   12/11/2019 12:50 PM 81.6 81.4 - 97.8 fL Final     MCH   Date/Time Value Ref Range Status   12/11/2019 12:50 PM 25.2 (L) 27.0 - 33.0 pg Final     MCHC   Date/Time Value Ref Range Status   12/11/2019 12:50 PM 30.9 (L) 33.7 - 35.3 g/dL Final     MPV   Date/Time Value Ref Range Status   12/11/2019 12:50 PM 9.0 9.0 - 12.9 fL Final        Sodium   Date/Time Value Ref Range Status   12/11/2019 12:50  (L) 135 - 145 mmol/L Final     Potassium   Date/Time Value Ref Range Status   12/11/2019 12:50 PM 3.5 (L) 3.6 - 5.5 mmol/L Final     Chloride   Date/Time Value Ref Range Status   12/11/2019 12:50 PM 97 96 - 112 mmol/L Final     Co2   Date/Time Value Ref Range Status   12/11/2019 12:50 PM 23 20 - 33 mmol/L Final     Glucose   Date/Time Value Ref Range Status   12/11/2019 12:50  (H) 65 - 99 mg/dL Final     Bun   Date/Time Value Ref Range Status   12/11/2019 12:50 PM 8 8 - 22 mg/dL Final     Creatinine   Date/Time Value Ref Range Status   12/11/2019 12:50 PM 0.58 0.50 - 1.40 mg/dL Final   06/18/2012 09:13 AM 0.93 0.76 - 1.27 mg/dL Final     Bun-Creatinine Ratio   Date/Time Value Ref Range Status   06/29/2016 09:59 AM 18 10 - 22 Final   06/18/2012 09:13 AM 14 10 - 22 Final     Alkaline Phosphatase   Date/Time Value Ref Range Status   12/11/2019 12:50 PM 82 30 - 99 U/L Final     AST(SGOT)   Date/Time Value Ref Range Status   12/11/2019 12:50 PM 17 12 - 45 U/L Final     ALT(SGPT)   Date/Time Value Ref Range Status   12/11/2019 12:50 PM 10 2 - 50 U/L Final     Total Bilirubin   Date/Time Value Ref Range Status   12/11/2019 12:50 PM 0.6 0.1 - 1.5 mg/dL Final        CPK  Total   Date/Time Value Ref Range Status   11/05/2019 12:54 AM 46 0 - 154 U/L Final        MICRO:  Blood Culture Hold   Date Value Ref Range Status   10/30/2019 Collected  Final          IMAGING STUDIES:  None    Assessment/Plan:     Problem List Items Addressed This Visit     Infection of prosthetic knee joint (HCC), left.   Resolved.  Clinically stable.  Doing well.  Patient will complete his 3-month course of doxycycline in 2 weeks.  No evidence of soft tissue infection on exam today.  Patient will follow-up with Dr. Charles in 2 months.  Patient instructed to contact our office if he develops any worsening left knee pain, erythema or swelling once he completes his antibiotic course.          Follow up: Follow-up as needed. FU with PCP for ongoing chronic medical conditions.     Lucie Wing M.D.

## 2020-03-16 ENCOUNTER — FOLLOW UP ESTABLISHED (OUTPATIENT)
Dept: URBAN - METROPOLITAN AREA CLINIC 44 | Facility: CLINIC | Age: 75
End: 2020-03-16
Payer: MEDICARE

## 2020-03-16 PROCEDURE — 92012 INTRM OPH EXAM EST PATIENT: CPT | Performed by: OPHTHALMOLOGY

## 2020-03-16 RX ORDER — BIMATOPROST 0.1 MG/ML
0.01 % SOLUTION/ DROPS OPHTHALMIC
Qty: 3 | Refills: 3 | Status: INACTIVE
Start: 2020-03-16 | End: 2020-11-06

## 2020-03-16 ASSESSMENT — INTRAOCULAR PRESSURE
OD: 15
OS: 14

## 2020-03-18 NOTE — ANESTHESIA TIME REPORT
Anesthesia Start and Stop Event Times     Date Time Event    11/2/2019 1256 Anesthesia Start     1303 Ready for Procedure     1402 Anesthesia Stop        Responsible Staff  11/02/19    Name Role Begin End    Roni Nye M.D. Anesth 1256 1402        Preop Diagnosis (Free Text):  Pre-op Diagnosis     Left knee infection        Preop Diagnosis (Codes):    Post op Diagnosis  History of removal of joint prosthesis of left knee due to infection      Premium Reason  E. Weekend    Comments: emergency                                                                        no

## 2020-04-18 DIAGNOSIS — I10 ESSENTIAL HYPERTENSION: ICD-10-CM

## 2020-04-21 RX ORDER — LISINOPRIL 20 MG/1
20 TABLET ORAL DAILY
Qty: 90 TAB | Refills: 3 | Status: SHIPPED | OUTPATIENT
Start: 2020-04-21 | End: 2020-12-15 | Stop reason: SDUPTHER

## 2020-08-04 ENCOUNTER — FOLLOW UP ESTABLISHED (OUTPATIENT)
Dept: URBAN - METROPOLITAN AREA CLINIC 24 | Facility: CLINIC | Age: 75
End: 2020-08-04
Payer: MEDICARE

## 2020-08-04 PROCEDURE — 92012 INTRM OPH EXAM EST PATIENT: CPT | Performed by: OPHTHALMOLOGY

## 2020-08-04 PROCEDURE — 92083 EXTENDED VISUAL FIELD XM: CPT | Performed by: OPHTHALMOLOGY

## 2020-08-04 ASSESSMENT — INTRAOCULAR PRESSURE
OS: 20
OD: 34

## 2020-08-07 ENCOUNTER — SURGERY (OUTPATIENT)
Dept: URBAN - METROPOLITAN AREA SURGERY 19 | Facility: SURGERY | Age: 75
End: 2020-08-07
Payer: MEDICARE

## 2020-08-07 PROCEDURE — 65855 TRABECULOPLASTY LASER SURG: CPT | Performed by: OPHTHALMOLOGY

## 2020-10-02 ENCOUNTER — FOLLOW UP ESTABLISHED (OUTPATIENT)
Dept: URBAN - METROPOLITAN AREA CLINIC 44 | Facility: CLINIC | Age: 75
End: 2020-10-02
Payer: MEDICARE

## 2020-10-02 PROCEDURE — 92012 INTRM OPH EXAM EST PATIENT: CPT | Performed by: OPHTHALMOLOGY

## 2020-10-02 ASSESSMENT — INTRAOCULAR PRESSURE
OS: 15
OD: 20

## 2020-12-15 ENCOUNTER — OFFICE VISIT (OUTPATIENT)
Dept: CARDIOLOGY | Facility: MEDICAL CENTER | Age: 75
End: 2020-12-15
Payer: MEDICARE

## 2020-12-15 VITALS
WEIGHT: 158.2 LBS | HEART RATE: 80 BPM | HEIGHT: 67 IN | SYSTOLIC BLOOD PRESSURE: 128 MMHG | DIASTOLIC BLOOD PRESSURE: 70 MMHG | OXYGEN SATURATION: 94 % | BODY MASS INDEX: 24.83 KG/M2

## 2020-12-15 DIAGNOSIS — I10 ESSENTIAL HYPERTENSION: ICD-10-CM

## 2020-12-15 DIAGNOSIS — I25.10 CORONARY ARTERY DISEASE INVOLVING NATIVE CORONARY ARTERY OF NATIVE HEART WITHOUT ANGINA PECTORIS: ICD-10-CM

## 2020-12-15 DIAGNOSIS — E78.2 MIXED HYPERLIPIDEMIA: ICD-10-CM

## 2020-12-15 DIAGNOSIS — E11.42 TYPE 2 DIABETES MELLITUS WITH DIABETIC POLYNEUROPATHY, WITHOUT LONG-TERM CURRENT USE OF INSULIN (HCC): ICD-10-CM

## 2020-12-15 PROBLEM — T81.30XA WOUND DEHISCENCE: Status: RESOLVED | Noted: 2019-11-04 | Resolved: 2020-12-15

## 2020-12-15 PROBLEM — E80.6 HYPERBILIRUBINEMIA: Status: RESOLVED | Noted: 2019-08-05 | Resolved: 2020-12-15

## 2020-12-15 PROBLEM — E87.1 HYPONATREMIA: Status: RESOLVED | Noted: 2019-08-05 | Resolved: 2020-12-15

## 2020-12-15 PROBLEM — Z96.659 INFECTION OF PROSTHETIC KNEE JOINT (HCC): Status: RESOLVED | Noted: 2019-11-07 | Resolved: 2020-12-15

## 2020-12-15 PROBLEM — R10.13 EPIGASTRIC PAIN: Status: RESOLVED | Noted: 2019-08-05 | Resolved: 2020-12-15

## 2020-12-15 PROBLEM — T84.59XA INFECTION OF PROSTHETIC KNEE JOINT (HCC): Status: RESOLVED | Noted: 2019-11-07 | Resolved: 2020-12-15

## 2020-12-15 PROCEDURE — 99214 OFFICE O/P EST MOD 30 MIN: CPT | Performed by: NURSE PRACTITIONER

## 2020-12-15 RX ORDER — GABAPENTIN 800 MG/1
800 TABLET ORAL 2 TIMES DAILY
COMMUNITY
Start: 2020-12-09

## 2020-12-15 RX ORDER — PREDNISONE 20 MG/1
TABLET ORAL
COMMUNITY
Start: 2020-12-04 | End: 2021-12-15

## 2020-12-15 RX ORDER — AMLODIPINE BESYLATE 5 MG/1
5 TABLET ORAL DAILY
Qty: 90 TAB | Refills: 3 | Status: SHIPPED | OUTPATIENT
Start: 2020-12-15 | End: 2021-12-15 | Stop reason: SDUPTHER

## 2020-12-15 RX ORDER — AZITHROMYCIN 250 MG/1
TABLET, FILM COATED ORAL
COMMUNITY
Start: 2020-12-04 | End: 2021-12-15

## 2020-12-15 RX ORDER — LISINOPRIL 20 MG/1
20 TABLET ORAL DAILY
Qty: 90 TAB | Refills: 3 | Status: SHIPPED | OUTPATIENT
Start: 2020-12-15 | End: 2021-07-28

## 2020-12-15 RX ORDER — CARVEDILOL 6.25 MG/1
6.25 TABLET ORAL 2 TIMES DAILY
Qty: 180 TAB | Refills: 3 | Status: SHIPPED | OUTPATIENT
Start: 2020-12-15 | End: 2021-12-15 | Stop reason: SDUPTHER

## 2020-12-15 RX ORDER — ATORVASTATIN CALCIUM 20 MG/1
20 TABLET, FILM COATED ORAL DAILY
Qty: 90 TAB | Refills: 3 | Status: SHIPPED | OUTPATIENT
Start: 2020-12-15 | End: 2021-12-15 | Stop reason: SDUPTHER

## 2020-12-15 ASSESSMENT — ENCOUNTER SYMPTOMS
ORTHOPNEA: 0
ABDOMINAL PAIN: 0
PALPITATIONS: 0
MYALGIAS: 0
BACK PAIN: 0
COUGH: 0
PND: 0
SHORTNESS OF BREATH: 0
SENSORY CHANGE: 0
DIZZINESS: 0
FEVER: 0
CLAUDICATION: 0

## 2020-12-15 ASSESSMENT — FIBROSIS 4 INDEX: FIB4 SCORE: 0.81

## 2020-12-15 NOTE — PROGRESS NOTES
"Subjective:   Ronny Gallegos is a 71 y.o. male who presents today for 612 month follow up for CAD, HTN, and HLD.    Hx of HLD, HTN, and CAD (+ CT scoring in '15).    He is overall doing well from a cardiac standpoint.    He works out 6 times a week, showed me his workout summaries with HR targets which are very optimal.    He has no exertional symptoms, does not a non-exertional left sided pinpoint pain under his left breast. It is worse with palpation and movement but doesn't occur with exercise.    His knee is finally recovered and he is doing well with mobility now.    He has no other complaints.    Past Medical History:   Diagnosis Date   • Arthritis     right ankle/left knee   • Asthma     inhaler PRN    • CAD (coronary artery disease)     + CT scoring, negative PET cardiac in    • Depression    • Diabetes (HCC)     oral medication    • Glaucoma    • High cholesterol    • Hypertension    • Hypopotassemia    • Hyposmolality and/or hyponatremia    • Jaundice     \"from drinking after wife \"   • Neuropathy     bilat legs/feet   • Pain     right ankle    • Personal history of peptic ulcer disease    • Pneumonia    • Unspecified cataract     removed bilat     Past Surgical History:   Procedure Laterality Date   • PB REVISE KNEE JOINT REPLACE,ALL PARTS Left 2019    Procedure: REVISION, TOTAL ARTHROPLASTY, KNEE, ALL COMPONENTS - POLY EXCHANGE ONLY;  Surgeon: Parmjit Charles M.D.;  Location: [facility];  Service: Orthopedics   • IRRIGATION & DEBRIDEMENT ORTHO  2019    Procedure: IRRIGATION AND DEBRIDEMENT, WOUND - KNEE;  Surgeon: Parmjit Charles M.D.;  Location: [facility];  Service: Orthopedics   • IRRIGATION & DEBRIDEMENT ORTHO Left 2019    Procedure: IRRIGATION AND DEBRIDEMENT, WOUND;  Surgeon: Jesús Wesley M.D.;  Location: [facility];  Service: Orthopedics   • ANKLE TOTAL ARTHROPLASTY Right 2018    Procedure: ANKLE TOTAL ARTHROPLASTY; "  Surgeon: Jose De Jesus Medrano M.D.;  Location: SUR[facility]ERY [facility];  Service: Orthopedics   • LIGAMENT REPAIR Right 2018    Procedure: LIGAMENT REPAIR- LATERAL;  Surgeon: Jose De Jesus Medrano M.D.;  Location: SUR[facility]ERY [facility];  Service: Orthopedics   • LUMBAR LAMINECTOMY DISKECTOMY Right 2017    Procedure: LUMBAR LAMINECTOMY DISKECTOMY - RE-DO OPEN L4-S1 LAMINOTOMIES;  Surgeon: Clint Garsia M.D.;  Location: SUR[facility]ERY [facility];  Service:    • FORAMINOTOMY  2017    Procedure: FORAMINOTOMY;  Surgeon: Clint Garsia M.D.;  Location: [facility]ERY [facility];  Service:    • HARDWARE REMOVAL NEURO  2017    Procedure: HARDWARE REMOVAL NEURO ;  Surgeon: Clint Garsia M.D.;  Location: [facility]ERY [facility];  Service:    • LAPAROSCOPY ROBOTIC XI  10/26/2016    Procedure: LAPAROSCOPY FOR LIVER CYST MARSUPIALIZATION AND RESECTION LIVER WALL CYST;  Surgeon: Frank Corona M.D.;  Location: SUR[facility]ERY [facility];  Service:    • LUMBAR FUSION POSTERIOR  9/10/2015    Procedure: LUMBAR FUSION POSTERIOR L5-S1 ;  Surgeon: Clint Garsia M.D.;  Location: [facility];  Service:    • LUMBAR LAMINECTOMY DISKECTOMY  9/10/2015    Procedure: LUMBAR LAMINECTOMY ;  Surgeon: Clint Garsia M.D.;  Location: [facility];  Service:    • LAMINOTOMY  9/10/2015    Procedure: LAMINOTOMY;  Surgeon: Clint Garsia M.D.;  Location: [facility];  Service:    • OTHER      ANTERIOR NECK FUSION C5-7   • OTHER      BLEEDING ULCERS   • CATARACT EXTRACTION WITH IOL       Family History   Problem Relation Age of Onset   • Stroke Mother    • Heart Disease Mother    • Stroke Father    • Heart Disease Father      Social History     Tobacco Use   Smoking Status Former Smoker   • Packs/day: 1.50   • Years: 13.00   • Pack years: 19.50   • Types: Cigarettes   • Quit date: 1978   • Years since quittin.0   Smokeless Tobacco Never Used     No Known Allergies  Outpatient  Encounter Medications as of 12/15/2020   Medication Sig Dispense Refill   • azithromycin (ZITHROMAX) 250 MG Tab      • gabapentin (NEURONTIN) 800 MG tablet Take 800 mg by mouth.     • predniSONE (DELTASONE) 20 MG Tab      • carvedilol (COREG) 6.25 MG Tab Take 1 Tab by mouth 2 times a day. 180 Tab 3   • amLODIPine (NORVASC) 5 MG Tab Take 1 Tab by mouth every day. 90 Tab 3   • lisinopril (PRINIVIL) 20 MG Tab Take 1 Tab by mouth every day. 90 Tab 3   • atorvastatin (LIPITOR) 20 MG Tab Take 1 Tab by mouth every day. 90 Tab 3   • cyclobenzaprine (FLEXERIL) 10 MG Tab Take 10 mg by mouth 3 times a day as needed.     • aspirin EC 81 MG EC tablet Take 1 Tab by mouth every day. 30 Tab 0   • gabapentin (NEURONTIN) 600 MG tablet Take 600 mg by mouth 2 times a day.     • naproxen (NAPROSYN) 500 MG Tab Take 500 mg by mouth 2 times a day, with meals.     • Multiple Vitamins-Minerals (CENTRUM SILVER 50+MEN) Tab Take 1 Tab by mouth every morning.     • LUMIGAN 0.01 % Solution Place 1 Drop in both eyes every bedtime.  3   • PROAIR  (90 Base) MCG/ACT Aero Soln inhalation aerosol Inhale 2 Puffs by mouth every four hours as needed for Shortness of Breath.     • metFORMIN (GLUCOPHAGE) 500 MG Tab Take 500 mg by mouth every day.     • escitalopram (LEXAPRO) 20 MG tablet Take 20 mg by mouth every morning. Indications: Major Depressive Disorder     • [DISCONTINUED] lisinopril (PRINIVIL) 20 MG Tab Take 1 Tab by mouth every day. 90 Tab 3   • [DISCONTINUED] doxycycline (MONODOX) 100 MG capsule TK 1 C PO BID FOR 31 DAYS     • [DISCONTINUED] Fluticasone Propionate (FLONASE NA) Spray  in nose.     • [DISCONTINUED] carvedilol (COREG) 6.25 MG Tab Take 1 Tab by mouth 2 times a day. 180 Tab 3   • [DISCONTINUED] amLODIPine (NORVASC) 5 MG Tab Take 1 Tab by mouth every day. 90 Tab 3   • [DISCONTINUED] atorvastatin (LIPITOR) 20 MG Tab Take 1 Tab by mouth every day. 90 Tab 3   • [DISCONTINUED] senna-docusate (PERICOLACE OR SENOKOT S) 8.6-50 MG Tab  "Take 1 Tab by mouth every day. (Patient not taking: Reported on 3/2/2020) 30 Tab 0   • [DISCONTINUED] NS SOLN 100 mL with ertapenem 1 GM SOLR 1,000 mg 1,000 mg by Intravenous route every 24 hours. (Patient not taking: Reported on 3/2/2020)       No facility-administered encounter medications on file as of 12/15/2020.      Review of Systems   Constitutional: Negative for fever and malaise/fatigue.   Respiratory: Negative for cough and shortness of breath.    Cardiovascular: Negative for chest pain, palpitations, orthopnea, claudication, leg swelling and PND.   Gastrointestinal: Negative for abdominal pain.   Musculoskeletal: Negative for back pain, joint pain and myalgias.   Neurological: Negative for dizziness and sensory change.             All other systems reviewed and are negative.       Objective:   /70 (BP Location: Left arm, Patient Position: Sitting, BP Cuff Size: Adult)   Pulse 80   Ht 1.702 m (5' 7\")   Wt 71.8 kg (158 lb 3.2 oz)   SpO2 94%   BMI 24.78 kg/m²     Physical Exam   Constitutional: He is oriented to person, place, and time. He appears well-developed and well-nourished. No distress.   HENT:   Head: Normocephalic and atraumatic.   Eyes: EOM are normal.   Neck: Normal range of motion. No JVD present.   Cardiovascular: Normal rate, regular rhythm, normal heart sounds and intact distal pulses.   No murmur heard.  Pulmonary/Chest: Effort normal and breath sounds normal. No respiratory distress. He has no wheezes. He has no rales.   Abdominal: Soft. Bowel sounds are normal.   Musculoskeletal: Normal range of motion.         General: No edema.   Neurological: He is alert and oriented to person, place, and time.   Skin: Skin is warm and dry.   Psychiatric: He has a normal mood and affect.   Nursing note and vitals reviewed.    Lab Results   Component Value Date/Time    CHOLSTRLTOT 137 08/05/2019 04:18 PM    LDL 37 08/05/2019 04:18 PM    HDL 78 08/05/2019 04:18 PM    TRIGLYCERIDE 110 08/05/2019 " 04:18 PM       Lab Results   Component Value Date/Time    SODIUM 130 (L) 12/11/2019 12:50 PM    POTASSIUM 3.5 (L) 12/11/2019 12:50 PM    CHLORIDE 97 12/11/2019 12:50 PM    CO2 23 12/11/2019 12:50 PM    GLUCOSE 105 (H) 12/11/2019 12:50 PM    BUN 8 12/11/2019 12:50 PM    CREATININE 0.58 12/11/2019 12:50 PM    CREATININE 0.93 06/18/2012 09:13 AM    BUNCREATRAT 18 06/29/2016 09:59 AM    BUNCREATRAT 14 06/18/2012 09:13 AM     Lab Results   Component Value Date/Time    ALKPHOSPHAT 82 12/11/2019 12:50 PM    ASTSGOT 17 12/11/2019 12:50 PM    ALTSGPT 10 12/11/2019 12:50 PM    TBILIRUBIN 0.6 12/11/2019 12:50 PM      2015 CT SCORING   FINDINGS:  Coronary calcification:  LMA - 0.0  LCX - 35.4  LAD - 167.9  RCA - 616.6  PDA - 0.0  Calcium score:  819.9  The visualized portions of the lungs and mediastinum are unremarkable. There is some scarring in the lingula.  Limited evaluation of the upper abdomen demonstrates interval enlargement of cyst in the left hepatic lobe currently measuring 11 cm and previously measuring 6 cm. Surgical clips identified about the GE junction.           1.  There is extensive atherosclerotic plaque burden.  2.  There is a high likelihood (greater than 90%) of at least one significant coronary stenosis.  3.  The patient's cardiovascular risk is high.  4.  Aggressive risk factor reduction is strongly suggested.  5.  Exercise or pharmacologic nuclear medicine stress testing is strongly suggested to evaluate for indicible ischemia.  6.  Global cardiac assessment is suggested.  7.  Interval enlargement of left hepatic lobe cyst.  Calcium score is above the 75th percentile for the patient's age.    Assessment:     1. Type 2 diabetes mellitus with diabetic polyneuropathy, without long-term current use of insulin (HCC)  CBC WITHOUT DIFFERENTIAL   2. Mixed hyperlipidemia  atorvastatin (LIPITOR) 20 MG Tab    Lipid Profile    Comp Metabolic Panel   3. Essential hypertension  carvedilol (COREG) 6.25 MG Tab     amLODIPine (NORVASC) 5 MG Tab    lisinopril (PRINIVIL) 20 MG Tab    CBC WITHOUT DIFFERENTIAL   4. Coronary artery disease involving native coronary artery of native heart without angina pectoris  CBC WITHOUT DIFFERENTIAL     Medical Decision Making:  Today's Assessment / Status / Plan:     1. HLD  -good LDL control <70 with CAD  -cont statin  -follow yearly CMP and lipid, ordered for next year    2. HTN  -great control on lisinopril, coreg, and amlodipine  -refilled all medications  -consider up titrate lisinopril/coreg and dc amlodipine at next apt for medication reconcilation    3. CAD with + CT scoring in '15  -stress imaging in '17 with no ischemia  -follow clinically, currently no angina or DAWSON  -cont ASA 81 mg and statin  -consider echo at next apt    4. DM with neuropathy  -managed well per patient    Follow up in clinic with Isabela MCFADDEN in 1 year with annual labs    Patient verbalizes understanding and agrees with the plan of care.       Physical Exam   Constitutional: He is oriented to person, place, and time. He appears well-developed and well-nourished. No distress.   HENT:   Head: Normocephalic and atraumatic.   Eyes: EOM are normal.   Neck: Normal range of motion. No JVD present.   Cardiovascular: Normal rate, regular rhythm, normal heart sounds and intact distal pulses.   No murmur heard.  Pulmonary/Chest: Effort normal and breath sounds normal. No respiratory distress. He has no wheezes. He has no rales.   Abdominal: Soft. Bowel sounds are normal.   Musculoskeletal: Normal range of motion.         General: No edema.   Neurological: He is alert and oriented to person, place, and time.   Skin: Skin is warm and dry.   Psychiatric: He has a normal mood and affect.   Nursing note and vitals reviewed.

## 2021-01-15 DIAGNOSIS — Z23 NEED FOR VACCINATION: ICD-10-CM

## 2021-03-15 ENCOUNTER — HOSPITAL ENCOUNTER (OUTPATIENT)
Dept: LAB | Facility: MEDICAL CENTER | Age: 76
End: 2021-03-15
Attending: NURSE PRACTITIONER
Payer: MEDICARE

## 2021-03-15 LAB
ALBUMIN SERPL BCP-MCNC: 4.1 G/DL (ref 3.2–4.9)
ALBUMIN/GLOB SERPL: 1.6 G/DL
ALP SERPL-CCNC: 64 U/L (ref 30–99)
ALT SERPL-CCNC: 18 U/L (ref 2–50)
ANION GAP SERPL CALC-SCNC: 10 MMOL/L (ref 7–16)
AST SERPL-CCNC: 23 U/L (ref 12–45)
BASOPHILS # BLD AUTO: 0.5 % (ref 0–1.8)
BASOPHILS # BLD: 0.05 K/UL (ref 0–0.12)
BILIRUB SERPL-MCNC: 1.3 MG/DL (ref 0.1–1.5)
BUN SERPL-MCNC: 10 MG/DL (ref 8–22)
CALCIUM SERPL-MCNC: 8.8 MG/DL (ref 8.5–10.5)
CHLORIDE SERPL-SCNC: 91 MMOL/L (ref 96–112)
CHOLEST SERPL-MCNC: 138 MG/DL (ref 100–199)
CO2 SERPL-SCNC: 26 MMOL/L (ref 20–33)
CREAT SERPL-MCNC: 0.61 MG/DL (ref 0.5–1.4)
CREAT UR-MCNC: 45.7 MG/DL
EOSINOPHIL # BLD AUTO: 0.17 K/UL (ref 0–0.51)
EOSINOPHIL NFR BLD: 1.6 % (ref 0–6.9)
ERYTHROCYTE [DISTWIDTH] IN BLOOD BY AUTOMATED COUNT: 49.4 FL (ref 35.9–50)
EST. AVERAGE GLUCOSE BLD GHB EST-MCNC: 137 MG/DL
FASTING STATUS PATIENT QL REPORTED: NORMAL
FOLATE SERPL-MCNC: 22.9 NG/ML
GLOBULIN SER CALC-MCNC: 2.6 G/DL (ref 1.9–3.5)
GLUCOSE SERPL-MCNC: 104 MG/DL (ref 65–99)
HBA1C MFR BLD: 6.4 % (ref 4–5.6)
HCT VFR BLD AUTO: 42.3 % (ref 42–52)
HDLC SERPL-MCNC: 56 MG/DL
HGB BLD-MCNC: 13.8 G/DL (ref 14–18)
IMM GRANULOCYTES # BLD AUTO: 0.04 K/UL (ref 0–0.11)
IMM GRANULOCYTES NFR BLD AUTO: 0.4 % (ref 0–0.9)
IRON SATN MFR SERPL: 20 % (ref 15–55)
IRON SERPL-MCNC: 56 UG/DL (ref 50–180)
LDLC SERPL CALC-MCNC: 64 MG/DL
LYMPHOCYTES # BLD AUTO: 1.74 K/UL (ref 1–4.8)
LYMPHOCYTES NFR BLD: 16.1 % (ref 22–41)
MCH RBC QN AUTO: 29.4 PG (ref 27–33)
MCHC RBC AUTO-ENTMCNC: 32.6 G/DL (ref 33.7–35.3)
MCV RBC AUTO: 90 FL (ref 81.4–97.8)
MICROALBUMIN UR-MCNC: <1.2 MG/DL
MICROALBUMIN/CREAT UR: NORMAL MG/G (ref 0–30)
MONOCYTES # BLD AUTO: 1.04 K/UL (ref 0–0.85)
MONOCYTES NFR BLD AUTO: 9.6 % (ref 0–13.4)
NEUTROPHILS # BLD AUTO: 7.8 K/UL (ref 1.82–7.42)
NEUTROPHILS NFR BLD: 71.8 % (ref 44–72)
NRBC # BLD AUTO: 0 K/UL
NRBC BLD-RTO: 0 /100 WBC
PLATELET # BLD AUTO: 284 K/UL (ref 164–446)
PMV BLD AUTO: 10.3 FL (ref 9–12.9)
POTASSIUM SERPL-SCNC: 4.2 MMOL/L (ref 3.6–5.5)
PROT SERPL-MCNC: 6.7 G/DL (ref 6–8.2)
RBC # BLD AUTO: 4.7 M/UL (ref 4.7–6.1)
SODIUM SERPL-SCNC: 127 MMOL/L (ref 135–145)
TIBC SERPL-MCNC: 283 UG/DL (ref 250–450)
TRIGL SERPL-MCNC: 88 MG/DL (ref 0–149)
TSH SERPL DL<=0.005 MIU/L-ACNC: 2.77 UIU/ML (ref 0.38–5.33)
UIBC SERPL-MCNC: 227 UG/DL (ref 110–370)
VIT B12 SERPL-MCNC: 1045 PG/ML (ref 211–911)
WBC # BLD AUTO: 10.8 K/UL (ref 4.8–10.8)

## 2021-03-15 PROCEDURE — 82746 ASSAY OF FOLIC ACID SERUM: CPT

## 2021-03-15 PROCEDURE — 82570 ASSAY OF URINE CREATININE: CPT

## 2021-03-15 PROCEDURE — 83550 IRON BINDING TEST: CPT

## 2021-03-15 PROCEDURE — 36415 COLL VENOUS BLD VENIPUNCTURE: CPT

## 2021-03-15 PROCEDURE — 80053 COMPREHEN METABOLIC PANEL: CPT

## 2021-03-15 PROCEDURE — 82043 UR ALBUMIN QUANTITATIVE: CPT

## 2021-03-15 PROCEDURE — 85025 COMPLETE CBC W/AUTO DIFF WBC: CPT

## 2021-03-15 PROCEDURE — 83540 ASSAY OF IRON: CPT

## 2021-03-15 PROCEDURE — 80061 LIPID PANEL: CPT

## 2021-03-15 PROCEDURE — 84443 ASSAY THYROID STIM HORMONE: CPT

## 2021-03-15 PROCEDURE — 82607 VITAMIN B-12: CPT

## 2021-03-15 PROCEDURE — 83036 HEMOGLOBIN GLYCOSYLATED A1C: CPT | Mod: GA

## 2021-05-03 ENCOUNTER — OFFICE VISIT (OUTPATIENT)
Dept: URBAN - METROPOLITAN AREA CLINIC 44 | Facility: CLINIC | Age: 76
End: 2021-05-03
Payer: COMMERCIAL

## 2021-05-03 DIAGNOSIS — Z96.1 PRESENCE OF PSEUDOPHAKIA: ICD-10-CM

## 2021-05-03 DIAGNOSIS — H43.811 VITREOUS DEGENERATION, RIGHT EYE: ICD-10-CM

## 2021-05-03 DIAGNOSIS — H52.4 PRESBYOPIA: Primary | ICD-10-CM

## 2021-05-03 DIAGNOSIS — H26.492 OTHER SECONDARY CATARACT, LEFT EYE: ICD-10-CM

## 2021-05-03 PROCEDURE — 92004 COMPRE OPH EXAM NEW PT 1/>: CPT | Performed by: OPTOMETRIST

## 2021-05-03 ASSESSMENT — INTRAOCULAR PRESSURE
OS: 12
OD: 45
OS: 24
OD: 34

## 2021-05-03 ASSESSMENT — KERATOMETRY
OS: 42.75
OD: 43.13

## 2021-05-03 ASSESSMENT — VISUAL ACUITY
OS: 20/20
OD: 20/30

## 2021-05-03 NOTE — IMPRESSION/PLAN
Impression: Presbyopia: H52.4. Plan: Release new glasses prescription for optimal update. Patient understands that vision will still be limited due to glaucoma. Only a small change in the prescription, do not expect significant improvement in vision with new glasses.

## 2021-05-03 NOTE — IMPRESSION/PLAN
Impression: Other secondary cataract, left eye: H26.492. Plan: Mild and not visually significant. Will re-evaluate on return visit next year. RTC if notice changes in vision.

## 2021-05-03 NOTE — IMPRESSION/PLAN
Impression: Capslr glaucoma w/pseudoef lens, bilateral, mild stage Plan: IOP elevated today OD>OS already on max meds, due to intolerance. Recommend continue current meds. Follow up with Dr. Megan Snyder as scheduled this week.

## 2021-05-07 ENCOUNTER — OFFICE VISIT (OUTPATIENT)
Dept: URBAN - METROPOLITAN AREA CLINIC 44 | Facility: CLINIC | Age: 76
End: 2021-05-07
Payer: MEDICARE

## 2021-05-07 PROCEDURE — 99214 OFFICE O/P EST MOD 30 MIN: CPT | Performed by: OPHTHALMOLOGY

## 2021-05-07 PROCEDURE — 92083 EXTENDED VISUAL FIELD XM: CPT | Performed by: OPHTHALMOLOGY

## 2021-05-07 ASSESSMENT — INTRAOCULAR PRESSURE
OD: 30
OS: 14

## 2021-05-07 NOTE — IMPRESSION/PLAN
Impression: Capslr glaucoma w/pseudoef lens, bilateral, mild stage Plan: PT HAS PXG OD WORSE THAN OS       GONIO: 3 OU (8/10/17)    PACHS: 537 //529 (11/20/14) THE RIGHT EYE IS THE POORER SEEING EYE WITH ELEVATED IOP OD AND VF PROGRESSION NOTED 5/7/21 PT LIVES IN [place]
S/P CATARACT/MIGS OU 2015 S/P SLT OD 08/07/20 PT DENIES SULFA ALLERGY
PT REPORTS ASTHMA - AVOID B-BLOCKER
TARGET IOP LOW TEENS OR LESS OU
RECOMMEND 1. CONTINUE LUMIGAN OU QPM 
2. PT IS INTOLERANT TO SIMBRINZA (STOPPED  01/30/17 DUE TO INTOLERANCE TO BRIMONIDINE AND BRINZOLAMIDE) 3. AVOID B-BLOCKERS DUE TO ASTHMA 4. REFER TO DR Neel Smith IN [place] FOR SURGICAL INTERVENTION OD

## 2021-05-17 ENCOUNTER — OFFICE VISIT (OUTPATIENT)
Dept: URBAN - METROPOLITAN AREA CLINIC 10 | Facility: CLINIC | Age: 76
End: 2021-05-17
Payer: MEDICARE

## 2021-05-17 PROCEDURE — 99214 OFFICE O/P EST MOD 30 MIN: CPT | Performed by: OPHTHALMOLOGY

## 2021-05-17 PROCEDURE — 92020 GONIOSCOPY: CPT | Performed by: OPHTHALMOLOGY

## 2021-05-17 RX ORDER — PREDNISOLONE ACETATE 10 MG/ML
1 % SUSPENSION/ DROPS OPHTHALMIC
Qty: 10 | Refills: 1 | Status: INACTIVE
Start: 2021-05-17 | End: 2021-09-08

## 2021-05-17 RX ORDER — OFLOXACIN 3 MG/ML
0.3 % SOLUTION/ DROPS OPHTHALMIC
Qty: 5 | Refills: 1 | Status: INACTIVE
Start: 2021-05-17 | End: 2021-09-08

## 2021-05-17 ASSESSMENT — INTRAOCULAR PRESSURE
OD: 33
OS: 13

## 2021-05-17 NOTE — IMPRESSION/PLAN
Impression: Capslr glaucoma w/pseudoef lens, bilateral, mild stage
(+)Asthma (+) SLT 2016,2019,2020 Intolerant to Simbrinza, Brimonidine, Brinzolomide Plan: Pt has Glaucoma    Gonio : 2-3+ CBB Istent OU       Pachs: 537/529     Today's IOP :33/ 13    Tmax  :  45/24 Target IOP low to mid teens
(+)Fhx of Glaucoma: Mother Vision equal OU Last vf OD: Dense nasal loss encroaching temporal  OS: Full 5/17/21 C/D:  0.75x0.8 loss of temporal /0.4x0.4
OCT: 66/91 5/17/21 Pt denies Sulfa Allergy   // Pt denies Heart dx Plan :
1. Continue Lumigan QHS OU 2. Discussed with patient due to findings on Visual Field, OCT, and posterior examination, Additional  treatment is recommended for Glaucoma. IOP is too high for the level of glaucomatous damage at the present time. Recommend lowering IOP with surgical intervention. 3. Gave patient options of Canaloplasty vs.Tube shunt. Patient opts for Canaloplasty. 4.  The Patient is aware that the risks of Canaloplasty OD include but are not limited to complete blindness, loss of vision and loss of the eye, bleeding, infection, inflammation, Hypotony, Persistent IOP elevation,  and orbital or ocular inflammation. The patient is aware that failure to Lower pressure will result in progressive sight loss and possibly blindness . Pt understands and wishes to proceed.

## 2021-06-16 ENCOUNTER — ADULT PHYSICAL (OUTPATIENT)
Dept: URBAN - METROPOLITAN AREA CLINIC 10 | Facility: CLINIC | Age: 76
End: 2021-06-16
Payer: MEDICARE

## 2021-06-16 DIAGNOSIS — Z01.818 ENCOUNTER FOR OTHER PREPROCEDURAL EXAMINATION: Primary | ICD-10-CM

## 2021-06-16 PROCEDURE — 99202 OFFICE O/P NEW SF 15 MIN: CPT | Performed by: PHYSICIAN ASSISTANT

## 2021-06-21 ENCOUNTER — SURGERY (OUTPATIENT)
Dept: URBAN - METROPOLITAN AREA SURGERY 5 | Facility: SURGERY | Age: 76
End: 2021-06-21
Payer: MEDICARE

## 2021-06-21 PROCEDURE — 66174 TRLUML DIL AQ O/F CAN W/O ST: CPT | Performed by: OPHTHALMOLOGY

## 2021-06-30 ENCOUNTER — OFFICE VISIT (OUTPATIENT)
Dept: URBAN - METROPOLITAN AREA CLINIC 24 | Facility: CLINIC | Age: 76
End: 2021-06-30
Payer: MEDICARE

## 2021-06-30 PROCEDURE — 99024 POSTOP FOLLOW-UP VISIT: CPT | Performed by: OPHTHALMOLOGY

## 2021-06-30 ASSESSMENT — INTRAOCULAR PRESSURE
OD: 30
OS: 14

## 2021-06-30 NOTE — IMPRESSION/PLAN
Impression: Capslr glaucoma w/pseudoef lens, bilateral, mild stage
(+)Asthma (+) SLT 2016,2019,2020 Intolerant to Simbrinza, Brimonidine, Brinzolomide Plan: Pt has Glaucoma    Gonio : 2-3+ CBB Istent OU       Pachs: 537/529     Today's IOP :30/ 14    Tmax  :  45/24 Target IOP low to mid teens
(+)Fhx of Glaucoma: Mother Vision equal OU Last vf OD: Dense nasal loss encroaching temporal  OS: Full 5/17/21 C/D:  0.75x0.8 loss of temporal /0.4x0.4
OCT: 66/91 5/17/21 Pt denies Sulfa Allergy   // Pt denies Heart dx Plan :
1. Continue Lumigan QHS OU - back to OD 6/29/21 Stop Prednisolone TID taper weekly 2. Discussed with patient IOP is elevated will begin to taper steroid 3.  Return in 3 weeks for IOP check w/ MRx check

## 2021-07-15 ENCOUNTER — HOSPITAL ENCOUNTER (OUTPATIENT)
Dept: LAB | Facility: MEDICAL CENTER | Age: 76
End: 2021-07-15
Attending: NURSE PRACTITIONER
Payer: MEDICARE

## 2021-07-15 LAB
ALBUMIN SERPL BCP-MCNC: 4.4 G/DL (ref 3.2–4.9)
ALBUMIN/GLOB SERPL: 2 G/DL
ALP SERPL-CCNC: 58 U/L (ref 30–99)
ALT SERPL-CCNC: 17 U/L (ref 2–50)
ANION GAP SERPL CALC-SCNC: 12 MMOL/L (ref 7–16)
AST SERPL-CCNC: 33 U/L (ref 12–45)
BASOPHILS # BLD AUTO: 0.5 % (ref 0–1.8)
BASOPHILS # BLD: 0.04 K/UL (ref 0–0.12)
BILIRUB SERPL-MCNC: 1.4 MG/DL (ref 0.1–1.5)
BUN SERPL-MCNC: 7 MG/DL (ref 8–22)
CALCIUM SERPL-MCNC: 9 MG/DL (ref 8.5–10.5)
CHLORIDE SERPL-SCNC: 91 MMOL/L (ref 96–112)
CO2 SERPL-SCNC: 23 MMOL/L (ref 20–33)
CREAT SERPL-MCNC: 0.68 MG/DL (ref 0.5–1.4)
EOSINOPHIL # BLD AUTO: 0.09 K/UL (ref 0–0.51)
EOSINOPHIL NFR BLD: 1.2 % (ref 0–6.9)
ERYTHROCYTE [DISTWIDTH] IN BLOOD BY AUTOMATED COUNT: 48.9 FL (ref 35.9–50)
EST. AVERAGE GLUCOSE BLD GHB EST-MCNC: 143 MG/DL
FASTING STATUS PATIENT QL REPORTED: NORMAL
GLOBULIN SER CALC-MCNC: 2.2 G/DL (ref 1.9–3.5)
GLUCOSE SERPL-MCNC: 118 MG/DL (ref 65–99)
HBA1C MFR BLD: 6.6 % (ref 4–5.6)
HCT VFR BLD AUTO: 43.6 % (ref 42–52)
HGB BLD-MCNC: 14.3 G/DL (ref 14–18)
IMM GRANULOCYTES # BLD AUTO: 0.02 K/UL (ref 0–0.11)
IMM GRANULOCYTES NFR BLD AUTO: 0.3 % (ref 0–0.9)
IRON SATN MFR SERPL: 30 % (ref 15–55)
IRON SERPL-MCNC: 95 UG/DL (ref 50–180)
LYMPHOCYTES # BLD AUTO: 1.49 K/UL (ref 1–4.8)
LYMPHOCYTES NFR BLD: 19.6 % (ref 22–41)
MCH RBC QN AUTO: 29.9 PG (ref 27–33)
MCHC RBC AUTO-ENTMCNC: 32.8 G/DL (ref 33.7–35.3)
MCV RBC AUTO: 91 FL (ref 81.4–97.8)
MONOCYTES # BLD AUTO: 0.47 K/UL (ref 0–0.85)
MONOCYTES NFR BLD AUTO: 6.2 % (ref 0–13.4)
NEUTROPHILS # BLD AUTO: 5.51 K/UL (ref 1.82–7.42)
NEUTROPHILS NFR BLD: 72.2 % (ref 44–72)
NRBC # BLD AUTO: 0 K/UL
NRBC BLD-RTO: 0 /100 WBC
PLATELET # BLD AUTO: 287 K/UL (ref 164–446)
PMV BLD AUTO: 10.6 FL (ref 9–12.9)
POTASSIUM SERPL-SCNC: 4.1 MMOL/L (ref 3.6–5.5)
PROT SERPL-MCNC: 6.6 G/DL (ref 6–8.2)
RBC # BLD AUTO: 4.79 M/UL (ref 4.7–6.1)
SODIUM SERPL-SCNC: 126 MMOL/L (ref 135–145)
TIBC SERPL-MCNC: 317 UG/DL (ref 250–450)
UIBC SERPL-MCNC: 222 UG/DL (ref 110–370)
WBC # BLD AUTO: 7.6 K/UL (ref 4.8–10.8)

## 2021-07-15 PROCEDURE — 83540 ASSAY OF IRON: CPT

## 2021-07-15 PROCEDURE — 36415 COLL VENOUS BLD VENIPUNCTURE: CPT

## 2021-07-15 PROCEDURE — 85025 COMPLETE CBC W/AUTO DIFF WBC: CPT

## 2021-07-15 PROCEDURE — 83036 HEMOGLOBIN GLYCOSYLATED A1C: CPT | Mod: GA

## 2021-07-15 PROCEDURE — 83550 IRON BINDING TEST: CPT

## 2021-07-15 PROCEDURE — 80053 COMPREHEN METABOLIC PANEL: CPT

## 2021-07-26 ENCOUNTER — OFFICE VISIT (OUTPATIENT)
Dept: URBAN - METROPOLITAN AREA CLINIC 24 | Facility: CLINIC | Age: 76
End: 2021-07-26
Payer: MEDICARE

## 2021-07-26 PROCEDURE — 99214 OFFICE O/P EST MOD 30 MIN: CPT | Performed by: OPHTHALMOLOGY

## 2021-07-26 PROCEDURE — 92134 CPTRZ OPH DX IMG PST SGM RTA: CPT | Performed by: OPHTHALMOLOGY

## 2021-07-26 ASSESSMENT — INTRAOCULAR PRESSURE
OS: 12
OD: 36

## 2021-07-26 NOTE — IMPRESSION/PLAN
Impression: Capslr glaucoma w/pseudoef lens, bilateral, mild stage
(+)Asthma (+) SLT 2016,2019,2020 Intolerant to Simbrinza, Brimonidine, Brinzolomide Plan: Pt has Glaucoma    Gonio : 2-3+ CBB Istent OU       Pachs: 537/529     Today's IOP :36/ 12    Tmax  :  45/24 Target IOP low to mid teens
(+)Fhx of Glaucoma: Mother Vision equal OU Last vf OD: Dense nasal loss encroaching temporal  OS: Full 5/17/21 C/D:  0.75x0.8 loss of temporal /0.4x0.4
OCT: 66/91 5/17/21 Pt denies Sulfa Allergy   // Pt denies Heart dx Plan :
1. Continue Lumigan QHS OU - back to OD 6/29/21 Start Rhopressa QHS OD only 2. Discussed with patient IOP is elevated will start Rhopressa. Discussed details about Glaucoma and that without proper control of pressures irreversible blindness can occur. Patient understands risks. Emphasize compliance with drop and without compliance vision loss progression can occur. 
3. Return in 2-3 weeks for IOP check w/ MRx check

## 2021-07-28 ENCOUNTER — TELEPHONE (OUTPATIENT)
Dept: CARDIOLOGY | Facility: MEDICAL CENTER | Age: 76
End: 2021-07-28

## 2021-07-28 RX ORDER — LOSARTAN POTASSIUM 25 MG/1
25 TABLET ORAL DAILY
Status: SHIPPED | DISCHARGE
Start: 2021-07-28 | End: 2021-12-15 | Stop reason: SDUPTHER

## 2021-07-28 RX ORDER — AZITHROMYCIN 250 MG/1
TABLET, FILM COATED ORAL
Status: CANCELLED | OUTPATIENT
Start: 2021-07-28

## 2021-07-28 NOTE — TELEPHONE ENCOUNTER
That's fine. I'm not sure that will help. I would recommend a second opinion on management of his chronic hyponatremia and to find the cause. SC

## 2021-07-28 NOTE — TELEPHONE ENCOUNTER
Patient called and stated that his PCP has changed him from Lisinopril to Losartan as he has low sodium. If you have any questions please maribel his cell [phone].    ------------------------------------------------------------  Called pt [phone] to ask strength/dose of Losartan. Pt will check and call back.

## 2021-07-28 NOTE — TELEPHONE ENCOUNTER
SC    Patient called and stated that his PCP has changed him from Lisinopril to Losartan as he has low sodium. If you have any questions please maribel his cell [phone]      Thank you,    Trish FREDERICK

## 2021-08-12 ENCOUNTER — OFFICE VISIT (OUTPATIENT)
Dept: URBAN - METROPOLITAN AREA CLINIC 24 | Facility: CLINIC | Age: 76
End: 2021-08-12
Payer: MEDICARE

## 2021-08-12 DIAGNOSIS — H40.1431 CAPSULAR GLAUCOMA WITH PSEUDOEXFOLIATION OF LENS, BILATERAL, MILD STAGE: Primary | ICD-10-CM

## 2021-08-12 PROCEDURE — 99024 POSTOP FOLLOW-UP VISIT: CPT | Performed by: OPHTHALMOLOGY

## 2021-08-12 ASSESSMENT — VISUAL ACUITY
OS: 20/20
OD: 20/25

## 2021-08-12 ASSESSMENT — INTRAOCULAR PRESSURE
OS: 16
OD: 26

## 2021-08-12 NOTE — IMPRESSION/PLAN
Impression: Capslr glaucoma w/pseudoef lens, bilateral, mild stage
(+)Asthma (+) SLT 2016,2019,2020 Intolerant to Simbrinza, Brimonidine, Brinzolomide Plan: Pt has Glaucoma    Gonio : 2-3+ CBB Istent OU       Pachs: 537/529 Today's IOP :36/ 12    Tmax  :  45/24 Target IOP low to mid teens
(+)Fhx of Glaucoma: Mother Vision equal OU Last vf OD: Dense nasal loss encroaching temporal  OS: Full 5/17/21 C/D:  0.75x0.8 loss of temporal /0.4x0.4
OCT: 66/91 5/17/21 Pt denies Sulfa Allergy   // Pt denies Heart dx Plan :
1. Continue Lumigan QHS OU Rhopressa QHS OD only 2. IOP continues to slowly improve. Will give patient more time to get off steroid.

## 2021-08-24 ENCOUNTER — HOSPITAL ENCOUNTER (OUTPATIENT)
Dept: HOSPITAL 8 - CFH | Age: 76
Discharge: HOME | End: 2021-08-24
Attending: NURSE PRACTITIONER
Payer: MEDICARE

## 2021-08-24 DIAGNOSIS — K76.89: ICD-10-CM

## 2021-08-24 DIAGNOSIS — M51.37: ICD-10-CM

## 2021-08-24 DIAGNOSIS — R19.04: ICD-10-CM

## 2021-08-24 DIAGNOSIS — K57.30: Primary | ICD-10-CM

## 2021-08-24 DIAGNOSIS — Q89.09: ICD-10-CM

## 2021-08-24 DIAGNOSIS — N28.1: ICD-10-CM

## 2021-08-24 DIAGNOSIS — J98.4: ICD-10-CM

## 2021-08-24 PROCEDURE — 82565 ASSAY OF CREATININE: CPT

## 2021-08-24 PROCEDURE — 74177 CT ABD & PELVIS W/CONTRAST: CPT

## 2021-08-26 ENCOUNTER — HOSPITAL ENCOUNTER (OUTPATIENT)
Dept: LAB | Facility: MEDICAL CENTER | Age: 76
End: 2021-08-26
Attending: NURSE PRACTITIONER
Payer: MEDICARE

## 2021-08-26 LAB
ANION GAP SERPL CALC-SCNC: 12 MMOL/L (ref 7–16)
BUN SERPL-MCNC: 9 MG/DL (ref 8–22)
CALCIUM SERPL-MCNC: 9 MG/DL (ref 8.5–10.5)
CHLORIDE SERPL-SCNC: 90 MMOL/L (ref 96–112)
CO2 SERPL-SCNC: 25 MMOL/L (ref 20–33)
CREAT SERPL-MCNC: 0.62 MG/DL (ref 0.5–1.4)
GLUCOSE SERPL-MCNC: 77 MG/DL (ref 65–99)
POTASSIUM SERPL-SCNC: 4 MMOL/L (ref 3.6–5.5)
SODIUM SERPL-SCNC: 127 MMOL/L (ref 135–145)

## 2021-08-26 PROCEDURE — 82043 UR ALBUMIN QUANTITATIVE: CPT

## 2021-08-26 PROCEDURE — 36415 COLL VENOUS BLD VENIPUNCTURE: CPT

## 2021-08-26 PROCEDURE — 82570 ASSAY OF URINE CREATININE: CPT

## 2021-08-26 PROCEDURE — 80048 BASIC METABOLIC PNL TOTAL CA: CPT

## 2021-08-27 LAB
CREAT UR-MCNC: 11.03 MG/DL
MICROALBUMIN UR-MCNC: <1.2 MG/DL
MICROALBUMIN/CREAT UR: NORMAL MG/G (ref 0–30)

## 2021-09-03 ENCOUNTER — HOSPITAL ENCOUNTER (OUTPATIENT)
Dept: LAB | Facility: MEDICAL CENTER | Age: 76
End: 2021-09-03
Attending: NURSE PRACTITIONER
Payer: MEDICARE

## 2021-09-04 ENCOUNTER — HOSPITAL ENCOUNTER (OUTPATIENT)
Dept: LAB | Facility: MEDICAL CENTER | Age: 76
End: 2021-09-04
Attending: NURSE PRACTITIONER
Payer: MEDICARE

## 2021-09-04 LAB
CORTIS SERPL-MCNC: 10.2 UG/DL (ref 0–23)
OSMOLALITY UR: 198 MOSM/KG H2O (ref 300–900)
T4 FREE SERPL-MCNC: 0.98 NG/DL (ref 0.93–1.7)
TSH SERPL DL<=0.005 MIU/L-ACNC: 4.13 UIU/ML (ref 0.38–5.33)

## 2021-09-04 PROCEDURE — 84588 ASSAY OF VASOPRESSIN: CPT

## 2021-09-04 PROCEDURE — 84439 ASSAY OF FREE THYROXINE: CPT

## 2021-09-04 PROCEDURE — 84443 ASSAY THYROID STIM HORMONE: CPT

## 2021-09-04 PROCEDURE — 36415 COLL VENOUS BLD VENIPUNCTURE: CPT

## 2021-09-04 PROCEDURE — 82533 TOTAL CORTISOL: CPT

## 2021-09-04 PROCEDURE — 83935 ASSAY OF URINE OSMOLALITY: CPT

## 2021-09-08 ENCOUNTER — OFFICE VISIT (OUTPATIENT)
Dept: URBAN - METROPOLITAN AREA CLINIC 24 | Facility: CLINIC | Age: 76
End: 2021-09-08
Payer: MEDICARE

## 2021-09-08 PROCEDURE — 99024 POSTOP FOLLOW-UP VISIT: CPT | Performed by: OPHTHALMOLOGY

## 2021-09-08 RX ORDER — OFLOXACIN 3 MG/ML
0.3 % SOLUTION/ DROPS OPHTHALMIC
Qty: 5 | Refills: 1 | Status: ACTIVE
Start: 2021-09-08

## 2021-09-08 RX ORDER — PREDNISOLONE ACETATE 10 MG/ML
1 % SUSPENSION/ DROPS OPHTHALMIC
Qty: 5 | Refills: 1 | Status: ACTIVE
Start: 2021-09-08

## 2021-09-08 ASSESSMENT — INTRAOCULAR PRESSURE
OS: 13
OD: 32

## 2021-09-08 NOTE — IMPRESSION/PLAN
Impression: Capslr glaucoma w/pseudoef lens, bilateral, mild stage
(+)Asthma (+) SLT 2016,2019,2020; OMNI OD 6/21/21 Devon Blas) Intolerant to Simbrinza, Brimonidine, Brinzolomide Plan: Pt has Glaucoma    Gonio : 2-3+ CBB Istent OU       Pachs: 537/529 Today's IOP :       Tmax  :  45/24 Target IOP low to mid teens
(+)Fhx of Glaucoma: Mother Vision equal OU Last vf OD: Dense nasal loss encroaching temporal  OS: Full 5/17/21 C/D:  0.75x0.8 loss of temporal /0.4x0.4
OCT: 66/91 5/17/21 Pt denies Sulfa Allergy   // Pt denies Heart dx Plan :
1. Continue Lumigan QHS OU Rhopressa QHS OD only 2. IOP remains elevated OD s/p canaloplasty 3. The Patient is aware that the risks of Baerveldt tube shunt include but are not limited to complete blindness , loss of vision and loss of the eye, bleeding, infection, inflammation, Hypotony, Persistent IOP elevation, intractable diplopia , and orbital or ocular inflammation. The Patient is aware that there is a 10 % chance of permanent double vision which cannot be remedied by any means including glasses or strabismus surgery. The patient is aware that failure to lower IOP will likely lead to progressive sight loss and possibly blindness . Pt understands. 4. see Stella Teixeira and schedule Baervedlt tube shunt OD in 2-4 weeks(1 day with optometry, 6 weeks with Dr. Kortney Mane; will see outside optometrist for 1 week and 1 month PO visits) **PCC's Please notify OR to order device

## 2021-09-13 LAB — MISCELLANEOUS LAB RESULT MISCLAB: NORMAL

## 2021-09-27 ENCOUNTER — SURGERY (OUTPATIENT)
Dept: URBAN - METROPOLITAN AREA SURGERY 5 | Facility: SURGERY | Age: 76
End: 2021-09-27
Payer: MEDICARE

## 2021-09-27 PROCEDURE — 66180 AQUEOUS SHUNT EYE W/GRAFT: CPT | Performed by: OPHTHALMOLOGY

## 2021-09-28 ENCOUNTER — POST-OPERATIVE VISIT (OUTPATIENT)
Dept: URBAN - METROPOLITAN AREA CLINIC 24 | Facility: CLINIC | Age: 76
End: 2021-09-28
Payer: MEDICARE

## 2021-09-28 PROCEDURE — 99024 POSTOP FOLLOW-UP VISIT: CPT | Performed by: OPTOMETRIST

## 2021-09-28 ASSESSMENT — INTRAOCULAR PRESSURE: OD: 18

## 2021-10-04 ENCOUNTER — POST-OPERATIVE VISIT (OUTPATIENT)
Dept: URBAN - METROPOLITAN AREA CLINIC 24 | Facility: CLINIC | Age: 76
End: 2021-10-04
Payer: MEDICARE

## 2021-10-04 PROCEDURE — 99024 POSTOP FOLLOW-UP VISIT: CPT | Performed by: OPTOMETRIST

## 2021-10-04 ASSESSMENT — INTRAOCULAR PRESSURE
OD: 34
OD: 25
OS: 10

## 2021-10-04 NOTE — IMPRESSION/PLAN
Impression:  Encounter for surgical aftercare following surgery on a sense organ  Z48.810. Plan: IOP elevated OD. Compliant with Lumigan QPM OU. Continue Lumigan QPM OU, RESUME Rhopressa QPM OD. Pt cannot return to clinic until 2 weeks as he travels from [place], [place]. Pt to call immediately if any changes in vision or new onset symptoms.

## 2021-10-18 ENCOUNTER — OFFICE VISIT (OUTPATIENT)
Dept: URBAN - METROPOLITAN AREA CLINIC 24 | Facility: CLINIC | Age: 76
End: 2021-10-18
Payer: MEDICARE

## 2021-10-18 DIAGNOSIS — Z48.810 ENCOUNTER FOR SURGICAL AFTERCARE FOLLOWING SURGERY ON A SENSE ORGAN: Primary | ICD-10-CM

## 2021-10-18 PROCEDURE — 99024 POSTOP FOLLOW-UP VISIT: CPT | Performed by: OPTOMETRIST

## 2021-10-18 ASSESSMENT — INTRAOCULAR PRESSURE
OD: 26
OS: 9
OD: 44

## 2021-10-18 NOTE — IMPRESSION/PLAN
Impression: Encounter for surgical aftercare following surgery on a sense organ  Z48.810. Plan: IOP remains elevated OD; compliant with gtts. Continue Lumigan QPM OU and Rhopressa QPM OD. Per Dr. Tiffanie Morgan: Begin Diamox 250 mg po QPM.
Pt to call immediately if any changes in vision or new onset symptoms.

## 2021-11-01 ENCOUNTER — POST-OPERATIVE VISIT (OUTPATIENT)
Dept: URBAN - METROPOLITAN AREA CLINIC 26 | Facility: CLINIC | Age: 76
End: 2021-11-01
Payer: MEDICARE

## 2021-11-01 PROCEDURE — 99024 POSTOP FOLLOW-UP VISIT: CPT | Performed by: OPTOMETRIST

## 2021-11-01 ASSESSMENT — VISUAL ACUITY
OD: 20/30+2
OS: 20/20

## 2021-11-01 ASSESSMENT — INTRAOCULAR PRESSURE
OD: 30
OS: 14
OD: 32

## 2021-11-01 NOTE — IMPRESSION/PLAN
Impression: S/P Glaucoma surgery with Aqueous shunt placement (Valve/Implant) OD - 35 Days. Encounter for surgical aftercare following surgery on a sense organ  Z48.810.  Excellent post op course   Condition is improving - Plan: IOP improved from previous exam, but still suboptimal. continue Lumigan qhs OU, continue Rhopressa qhs OU. continue Diamox 250 qhs PO. rtc as scheduled with Dr. Darlene Montes or [place]

## 2021-11-17 ENCOUNTER — POST-OPERATIVE VISIT (OUTPATIENT)
Dept: URBAN - METROPOLITAN AREA CLINIC 24 | Facility: CLINIC | Age: 76
End: 2021-11-17
Payer: MEDICARE

## 2021-11-17 PROCEDURE — 99024 POSTOP FOLLOW-UP VISIT: CPT | Performed by: OPHTHALMOLOGY

## 2021-11-17 ASSESSMENT — INTRAOCULAR PRESSURE
OS: 17
OD: 5

## 2021-12-08 ENCOUNTER — OFFICE VISIT (OUTPATIENT)
Dept: URBAN - METROPOLITAN AREA CLINIC 30 | Facility: CLINIC | Age: 76
End: 2021-12-08
Payer: MEDICARE

## 2021-12-08 PROCEDURE — 99024 POSTOP FOLLOW-UP VISIT: CPT | Performed by: OPHTHALMOLOGY

## 2021-12-08 ASSESSMENT — INTRAOCULAR PRESSURE
OD: 7
OS: 12

## 2021-12-08 NOTE — IMPRESSION/PLAN
Impression: Capslr glaucoma w/pseudoef lens, bilateral, mild stage
(+)Asthma (+) SLT 2016,2019,2020; OMNI OD 6/21/21 Josseline Chung) S/P Baerveldt shunt OD 9/27/21 Intolerant to Simbrinza, Brimonidine, Brinzolomide Plan: Pt has Glaucoma    Gonio : 2-3+ CBB Istent OU       Pachs: 537/529 Today's IOP : 7/12   Tmax  :  45/24 Target IOP low to mid teens
(+)Fhx of Glaucoma: Mother Vision equal OU Last vf OD: Dense nasal loss encroaching temporal  OS: Full 5/17/21 C/D:  0.75x0.8 loss of temporal /0.4x0.4
OCT: 66/91 5/17/21 Pt denies Sulfa Allergy   // Pt denies Heart dx Plan :
1. Stop all glc drops OD Cont Lumigan qPM OS Pred BID OD
(Stopped Lumigan & Rhopressa) 2. IOP low today with mildly shallow chamber 3. Patient healing well. Continue post-operative medications as instructed. Will continue to monitor. 4.  Patient released to get a manifest
5.  RTC 1 IOP, VF, RNFL
Other:/psych

## 2021-12-15 ENCOUNTER — OFFICE VISIT (OUTPATIENT)
Dept: CARDIOLOGY | Facility: MEDICAL CENTER | Age: 76
End: 2021-12-15
Payer: MEDICARE

## 2021-12-15 VITALS
HEIGHT: 67 IN | DIASTOLIC BLOOD PRESSURE: 78 MMHG | BODY MASS INDEX: 26.53 KG/M2 | RESPIRATION RATE: 18 BRPM | HEART RATE: 64 BPM | OXYGEN SATURATION: 95 % | SYSTOLIC BLOOD PRESSURE: 142 MMHG | WEIGHT: 169 LBS

## 2021-12-15 DIAGNOSIS — E78.2 MIXED HYPERLIPIDEMIA: ICD-10-CM

## 2021-12-15 DIAGNOSIS — E11.42 TYPE 2 DIABETES MELLITUS WITH DIABETIC POLYNEUROPATHY, WITHOUT LONG-TERM CURRENT USE OF INSULIN (HCC): ICD-10-CM

## 2021-12-15 DIAGNOSIS — I10 ESSENTIAL HYPERTENSION: ICD-10-CM

## 2021-12-15 DIAGNOSIS — I25.10 CORONARY ARTERY DISEASE INVOLVING NATIVE CORONARY ARTERY OF NATIVE HEART WITHOUT ANGINA PECTORIS: ICD-10-CM

## 2021-12-15 PROBLEM — Z96.652 PRESENCE OF LEFT ARTIFICIAL KNEE JOINT: Status: ACTIVE | Noted: 2021-12-15

## 2021-12-15 PROCEDURE — 99214 OFFICE O/P EST MOD 30 MIN: CPT | Performed by: NURSE PRACTITIONER

## 2021-12-15 RX ORDER — CARVEDILOL 6.25 MG/1
6.25 TABLET ORAL 2 TIMES DAILY
Qty: 180 TABLET | Refills: 3 | Status: ON HOLD | OUTPATIENT
Start: 2021-12-15 | End: 2022-02-19 | Stop reason: SDUPTHER

## 2021-12-15 RX ORDER — PREDNISOLONE ACETATE 10 MG/ML
1 SUSPENSION/ DROPS OPHTHALMIC 4 TIMES DAILY
COMMUNITY
End: 2022-02-18

## 2021-12-15 RX ORDER — AMLODIPINE BESYLATE 5 MG/1
5 TABLET ORAL EVERY EVENING
Qty: 90 TABLET | Refills: 3 | Status: ON HOLD | OUTPATIENT
Start: 2021-12-15 | End: 2022-02-19 | Stop reason: SDUPTHER

## 2021-12-15 RX ORDER — ATORVASTATIN CALCIUM 20 MG/1
20 TABLET, FILM COATED ORAL EVERY EVENING
Qty: 90 TABLET | Refills: 3 | Status: SHIPPED | OUTPATIENT
Start: 2021-12-15 | End: 2022-07-07 | Stop reason: SDUPTHER

## 2021-12-15 RX ORDER — LOSARTAN POTASSIUM 25 MG/1
25 TABLET ORAL DAILY
Qty: 90 TABLET | Refills: 3 | Status: ON HOLD | OUTPATIENT
Start: 2021-12-15 | End: 2022-02-19 | Stop reason: SDUPTHER

## 2021-12-15 RX ORDER — BLOOD-GLUCOSE METER
KIT MISCELLANEOUS
COMMUNITY
Start: 2021-12-14 | End: 2022-02-18

## 2021-12-15 ASSESSMENT — ENCOUNTER SYMPTOMS
ABDOMINAL PAIN: 0
FEVER: 0
BACK PAIN: 0
MYALGIAS: 0
CLAUDICATION: 0
SENSORY CHANGE: 0
PND: 0
DIZZINESS: 0
SHORTNESS OF BREATH: 0
COUGH: 0
PALPITATIONS: 0
ORTHOPNEA: 0

## 2021-12-15 ASSESSMENT — FIBROSIS 4 INDEX: FIB4 SCORE: 2.06

## 2021-12-15 NOTE — PROGRESS NOTES
"Subjective:   Ronny Gallegos is a 71 y.o. male who presents today for 12 month follow up for CAD, HTN, and HLD.    Hx of HLD, HTN, and CAD (+ CT scoring in '15).    He is overall doing well from a cardiac standpoint.    He works out 5-6 times a week, showed me his workout summaries with HR targets which are very optimal.    He has no exertional symptoms. He has been traveling down to [place] to visit his family and get a glaucoma stent in his right eye.    He continues with knee pain, is seeing orthopedic physician today to review his symptoms.    He has no other complaints. He has no chest pain, shortness of breath, edema, dizziness/lightheadedness, or palpitations.    Past Medical History:   Diagnosis Date   • Arthritis     right ankle/left knee   • Asthma     inhaler PRN    • CAD (coronary artery disease)     + CT scoring, negative PET cardiac in    • Depression    • Diabetes (HCC)     oral medication    • Glaucoma    • High cholesterol    • Hypertension    • Hypopotassemia    • Hyposmolality and/or hyponatremia    • Jaundice     \"from drinking after wife \"   • Neuropathy     bilat legs/feet   • Pain     right ankle    • Personal history of peptic ulcer disease    • Pneumonia    • Unspecified cataract     removed bilat     Past Surgical History:   Procedure Laterality Date   • PB REVISE KNEE JOINT REPLACE,ALL PARTS Left 2019    Procedure: REVISION, TOTAL ARTHROPLASTY, KNEE, ALL COMPONENTS - POLY EXCHANGE ONLY;  Surgeon: Parmjit Charles M.D.;  Location: [facility];  Service: Orthopedics   • IRRIGATION & DEBRIDEMENT ORTHO  2019    Procedure: IRRIGATION AND DEBRIDEMENT, WOUND - KNEE;  Surgeon: Parmjit Charles M.D.;  Location: [facility];  Service: Orthopedics   • IRRIGATION & DEBRIDEMENT ORTHO Left 2019    Procedure: IRRIGATION AND DEBRIDEMENT, WOUND;  Surgeon: Jesús Wesley M.D.;  Location: [facility];  Service: Orthopedics   • ANKLE TOTAL " ARTHROPLASTY Right 2018    Procedure: ANKLE TOTAL ARTHROPLASTY;  Surgeon: Jose De Jesus Medrano M.D.;  Location: [facility];  Service: Orthopedics   • LIGAMENT REPAIR Right 2018    Procedure: LIGAMENT REPAIR- LATERAL;  Surgeon: Jose De Jesus Medrano M.D.;  Location: SUR[facility];  Service: Orthopedics   • LUMBAR LAMINECTOMY DISKECTOMY Right 2017    Procedure: LUMBAR LAMINECTOMY DISKECTOMY - RE-DO OPEN L4-S1 LAMINOTOMIES;  Surgeon: Clint Garsia M.D.;  Location: SUR[facility]ERY [facility];  Service:    • FORAMINOTOMY  2017    Procedure: FORAMINOTOMY;  Surgeon: Clint Garsia M.D.;  Location: SUR[facility]ERY [facility];  Service:    • HARDWARE REMOVAL NEURO  2017    Procedure: HARDWARE REMOVAL NEURO ;  Surgeon: Clint Garsia M.D.;  Location: [facility];  Service:    • LAPAROSCOPY ROBOTIC XI  10/26/2016    Procedure: LAPAROSCOPY FOR LIVER CYST MARSUPIALIZATION AND RESECTION LIVER WALL CYST;  Surgeon: Frank Corona M.D.;  Location: [facility];  Service:    • LUMBAR FUSION POSTERIOR  9/10/2015    Procedure: LUMBAR FUSION POSTERIOR L5-S1 ;  Surgeon: Clint Garsia M.D.;  Location: [facility];  Service:    • LUMBAR LAMINECTOMY DISKECTOMY  9/10/2015    Procedure: LUMBAR LAMINECTOMY ;  Surgeon: Clint Garsia M.D.;  Location: [facility];  Service:    • LAMINOTOMY  9/10/2015    Procedure: LAMINOTOMY;  Surgeon: Clint Garsia M.D.;  Location: [facility];  Service:    • OTHER      ANTERIOR NECK FUSION C5-7   • OTHER      BLEEDING ULCERS   • CATARACT EXTRACTION WITH IOL       Family History   Problem Relation Age of Onset   • Stroke Mother    • Heart Disease Mother    • Stroke Father    • Heart Disease Father      Social History     Tobacco Use   Smoking Status Former Smoker   • Packs/day: 1.50   • Years: 13.00   • Pack years: 19.50   • Types: Cigarettes   • Quit date: 1978   • Years since quittin.0    Smokeless Tobacco Never Used     No Known Allergies  Outpatient Encounter Medications as of 12/15/2021   Medication Sig Dispense Refill   • BREO ELLIPTA 100-25 MCG/INH AEROSOL POWDER, BREATH ACTIVATED Inhale 1 Puff every day.     • FREESTYLE LITE strip      • prednisoLONE acetate (PRED FORTE) 1 % Suspension Administer 1 Drop into both eyes 4 times a day.     • amLODIPine (NORVASC) 5 MG Tab Take 1 Tablet by mouth every evening. 90 Tablet 3   • carvedilol (COREG) 6.25 MG Tab Take 1 Tablet by mouth 2 times a day. 180 Tablet 3   • atorvastatin (LIPITOR) 20 MG Tab Take 1 Tablet by mouth every evening. 90 Tablet 3   • losartan (COZAAR) 25 MG Tab Take 1 Tablet by mouth every day. 90 Tablet 3   • gabapentin (NEURONTIN) 800 MG tablet Take 800 mg by mouth.     • cyclobenzaprine (FLEXERIL) 10 MG Tab Take 10 mg by mouth 3 times a day as needed.     • naproxen (NAPROSYN) 500 MG Tab Take 500 mg by mouth 2 times a day, with meals.     • Multiple Vitamins-Minerals (CENTRUM SILVER 50+MEN) Tab Take 1 Tab by mouth every morning.     • LUMIGAN 0.01 % Solution Place 1 Drop in both eyes every bedtime.  3   • PROAIR  (90 Base) MCG/ACT Aero Soln inhalation aerosol Inhale 2 Puffs by mouth every four hours as needed for Shortness of Breath.     • metFORMIN (GLUCOPHAGE) 500 MG Tab Take 500 mg by mouth every day.     • escitalopram (LEXAPRO) 20 MG tablet Take 20 mg by mouth every morning. Indications: Major Depressive Disorder     • [DISCONTINUED] losartan (COZAAR) 25 MG Tab Take 1 tablet by mouth every day.     • [DISCONTINUED] azithromycin (ZITHROMAX) 250 MG Tab  (Patient not taking: Reported on 12/15/2021)     • [DISCONTINUED] predniSONE (DELTASONE) 20 MG Tab  (Patient not taking: Reported on 12/15/2021)     • [DISCONTINUED] carvedilol (COREG) 6.25 MG Tab Take 1 Tab by mouth 2 times a day. 180 Tab 3   • [DISCONTINUED] amLODIPine (NORVASC) 5 MG Tab Take 1 Tab by mouth every day. 90 Tab 3   • [DISCONTINUED] atorvastatin (LIPITOR) 20  "MG Tab Take 1 Tab by mouth every day. 90 Tab 3   • aspirin EC 81 MG EC tablet Take 1 Tab by mouth every day. (Patient not taking: Reported on 12/15/2021) 30 Tab 0   • [DISCONTINUED] gabapentin (NEURONTIN) 600 MG tablet Take 600 mg by mouth 2 times a day. (Patient not taking: Reported on 12/15/2021)       No facility-administered encounter medications on file as of 12/15/2021.     Review of Systems   Constitutional: Negative for fever and malaise/fatigue.   Respiratory: Negative for cough and shortness of breath.    Cardiovascular: Negative for chest pain, palpitations, orthopnea, claudication, leg swelling and PND.   Gastrointestinal: Negative for abdominal pain.   Musculoskeletal: Negative for back pain, joint pain and myalgias.   Neurological: Negative for dizziness and sensory change.   All other systems reviewed and are negative.       Objective:   /78 (BP Location: Left arm, Patient Position: Sitting, BP Cuff Size: Adult)   Pulse 64   Resp 18   Ht 1.702 m (5' 7\")   Wt 76.7 kg (169 lb)   SpO2 95%   BMI 26.47 kg/m²     Physical Exam  Vitals and nursing note reviewed.   Constitutional:       General: He is not in acute distress.     Appearance: Normal appearance. He is well-developed and normal weight.   HENT:      Head: Normocephalic and atraumatic.   Eyes:      Pupils: Pupils are equal, round, and reactive to light.   Neck:      Vascular: No JVD.   Cardiovascular:      Rate and Rhythm: Normal rate and regular rhythm.      Pulses: Normal pulses.      Heart sounds: Normal heart sounds. No murmur heard.      Pulmonary:      Effort: Pulmonary effort is normal. No respiratory distress.      Breath sounds: Normal breath sounds. No wheezing or rales.   Abdominal:      General: Bowel sounds are normal.      Palpations: Abdomen is soft.   Musculoskeletal:         General: Normal range of motion.      Cervical back: Normal range of motion.   Skin:     General: Skin is warm and dry.      Capillary Refill: " Capillary refill takes less than 2 seconds.   Neurological:      General: No focal deficit present.      Mental Status: He is alert and oriented to person, place, and time. Mental status is at baseline.   Psychiatric:         Mood and Affect: Mood normal.         Behavior: Behavior normal.         Thought Content: Thought content normal.         Judgment: Judgment normal.       Lab Results   Component Value Date/Time    CHOLSTRLTOT 138 03/15/2021 10:00 AM    LDL 64 03/15/2021 10:00 AM    HDL 56 03/15/2021 10:00 AM    TRIGLYCERIDE 88 03/15/2021 10:00 AM       Lab Results   Component Value Date/Time    SODIUM 127 (L) 08/26/2021 01:00 PM    POTASSIUM 4.0 08/26/2021 01:00 PM    CHLORIDE 90 (L) 08/26/2021 01:00 PM    CO2 25 08/26/2021 01:00 PM    GLUCOSE 77 08/26/2021 01:00 PM    BUN 9 08/26/2021 01:00 PM    CREATININE 0.62 08/26/2021 01:00 PM    CREATININE 0.93 06/18/2012 09:13 AM    BUNCREATRAT 18 06/29/2016 09:59 AM    BUNCREATRAT 14 06/18/2012 09:13 AM     Lab Results   Component Value Date/Time    ALKPHOSPHAT 58 07/15/2021 11:37 AM    ASTSGOT 33 07/15/2021 11:37 AM    ALTSGPT 17 07/15/2021 11:37 AM    TBILIRUBIN 1.4 07/15/2021 11:37 AM      2015 CT SCORING   FINDINGS:  Coronary calcification:  LMA - 0.0  LCX - 35.4  LAD - 167.9  RCA - 616.6  PDA - 0.0  Calcium score:  819.9  The visualized portions of the lungs and mediastinum are unremarkable. There is some scarring in the lingula.  Limited evaluation of the upper abdomen demonstrates interval enlargement of cyst in the left hepatic lobe currently measuring 11 cm and previously measuring 6 cm. Surgical clips identified about the GE junction.           1.  There is extensive atherosclerotic plaque burden.  2.  There is a high likelihood (greater than 90%) of at least one significant coronary stenosis.  3.  The patient's cardiovascular risk is high.  4.  Aggressive risk factor reduction is strongly suggested.  5.  Exercise or pharmacologic nuclear medicine stress  testing is strongly suggested to evaluate for indicible ischemia.  6.  Global cardiac assessment is suggested.  7.  Interval enlargement of left hepatic lobe cyst.  Calcium score is above the 75th percentile for the patient's age.    Assessment:     1. Coronary artery disease involving native coronary artery of native heart without angina pectoris  EC-ECHOCARDIOGRAM COMPLETE W/O CONT   2. Type 2 diabetes mellitus with diabetic polyneuropathy, without long-term current use of insulin (HCC)     3. Mixed hyperlipidemia  atorvastatin (LIPITOR) 20 MG Tab   4. Essential hypertension  amLODIPine (NORVASC) 5 MG Tab    carvedilol (COREG) 6.25 MG Tab    EC-ECHOCARDIOGRAM COMPLETE W/O CONT     Medical Decision Making:  Today's Assessment / Status / Plan:     1. HLD  -good LDL control <70 with CAD  -cont statin  -follow yearly CMP and lipid, ordered for next year    2. HTN  -great control at home on lisinopril, coreg, and amlodipine  -refilled all medications for 1 year  -consider up titrate meds if SBP remains elevated >130 consistently  -he will follow BP at home    3. CAD with + CT scoring in '15  -stress imaging in '17 with no ischemia  -follow clinically, currently no angina or DAWSON  -cont ASA 81 mg and statin  -echo ordered for 2022    4. DM with neuropathy  -managed well per patient    Patient is to follow up with Isabela MCFADDEN in 1 year with annual labs and echo for review, monitor BP at home-call for concerns.

## 2022-01-17 ENCOUNTER — OFFICE VISIT (OUTPATIENT)
Dept: URBAN - METROPOLITAN AREA CLINIC 10 | Facility: CLINIC | Age: 77
End: 2022-01-17
Payer: COMMERCIAL

## 2022-01-17 PROCEDURE — 92083 EXTENDED VISUAL FIELD XM: CPT | Performed by: OPHTHALMOLOGY

## 2022-01-17 PROCEDURE — 92133 CPTRZD OPH DX IMG PST SGM ON: CPT | Performed by: OPHTHALMOLOGY

## 2022-01-17 PROCEDURE — 99213 OFFICE O/P EST LOW 20 MIN: CPT | Performed by: OPHTHALMOLOGY

## 2022-01-17 ASSESSMENT — INTRAOCULAR PRESSURE
OD: 23
OS: 19

## 2022-01-17 NOTE — IMPRESSION/PLAN
Impression: Capslr glaucoma w/pseudoef lens, bilateral, mild stage
(+)Asthma (+) SLT 2016,2019,2020; OMNI OD 6/21/21 Yvette Shows) S/P Baerveldt shunt OD 9/27/21 Intolerant to Simbrinza, Brimonidine, Brinzolomide Plan: Pt has Glaucoma    Gonio : 2-3+ CBB Istent OU       Pachs: 537/529 Today's IOP :23/19 Tmax  :  45/24 Target IOP low to mid teens
(+)Fhx of Glaucoma: Mother Vision equal OU Last vf OD: [place] remaining  OS: Full 1/17/22 *New Baseline*
C/D:  0.75x0.8 loss of temporal /0.4x0.4
OCT: 67/81 1/17/22 Pt denies Sulfa Allergy   // Pt denies Heart dx Plan : 1. Continue Lumigan qPM OU (Added to OD 1/17/21) Decrease Pred QD OD x 5 days then STOP
(Stopped Lumigan & Rhopressa) 2. IOP high today OD. Will adjust medications. 
3.   RTC 1 month IOP

## 2022-02-14 ENCOUNTER — HOSPITAL ENCOUNTER (OUTPATIENT)
Dept: CARDIOLOGY | Facility: MEDICAL CENTER | Age: 77
End: 2022-02-14
Attending: NURSE PRACTITIONER
Payer: COMMERCIAL

## 2022-02-14 DIAGNOSIS — I25.10 CORONARY ARTERY DISEASE INVOLVING NATIVE CORONARY ARTERY OF NATIVE HEART WITHOUT ANGINA PECTORIS: ICD-10-CM

## 2022-02-14 DIAGNOSIS — I10 ESSENTIAL HYPERTENSION: ICD-10-CM

## 2022-02-14 LAB
LV EJECT FRACT  99904: 52
LV EJECT FRACT MOD 2C 99903: 58.68
LV EJECT FRACT MOD 4C 99902: 49.99
LV EJECT FRACT MOD BP 99901: 50.85

## 2022-02-14 PROCEDURE — 93306 TTE W/DOPPLER COMPLETE: CPT | Mod: 26 | Performed by: INTERNAL MEDICINE

## 2022-02-14 PROCEDURE — 93306 TTE W/DOPPLER COMPLETE: CPT

## 2022-02-16 ENCOUNTER — TELEPHONE (OUTPATIENT)
Dept: CARDIOLOGY | Facility: MEDICAL CENTER | Age: 77
End: 2022-02-16
Payer: COMMERCIAL

## 2022-02-16 NOTE — TELEPHONE ENCOUNTER
Message  Received: Today  FLAVIA Kirk R.N.  Schedule BP check in office in 1 week please. SC   _________________________________________________________________    Spoke to patient over phone. BP check scheduled for 02/25/22.

## 2022-02-16 NOTE — TELEPHONE ENCOUNTER
Called patient to review results of echocardiogram. Left VM to call us back regarding results review. SC

## 2022-02-16 NOTE — TELEPHONE ENCOUNTER
Patient returned call, spoke over phone. Results reviewed. Patient verbalizes understanding.     Patient states he does not check his BP on a regular basis. Recommended patient monitor BP several times per week. Per patient, he has been very stressed and had checked BP twice yesterday with readings of 155/80 and 160/80.     Per patient, stress has been due to cyst. His PCP has been aware since October 2022. Patient has scan next week to evaluate.        To SC: SANDY

## 2022-02-18 ENCOUNTER — HOSPITAL ENCOUNTER (OUTPATIENT)
Facility: MEDICAL CENTER | Age: 77
End: 2022-02-19
Attending: EMERGENCY MEDICINE | Admitting: FAMILY MEDICINE
Payer: COMMERCIAL

## 2022-02-18 ENCOUNTER — APPOINTMENT (OUTPATIENT)
Dept: RADIOLOGY | Facility: MEDICAL CENTER | Age: 77
End: 2022-02-18
Attending: EMERGENCY MEDICINE
Payer: COMMERCIAL

## 2022-02-18 DIAGNOSIS — I10 ESSENTIAL HYPERTENSION: ICD-10-CM

## 2022-02-18 DIAGNOSIS — R07.2 PRECORDIAL CHEST PAIN: ICD-10-CM

## 2022-02-18 DIAGNOSIS — E87.1 HYPONATREMIA: ICD-10-CM

## 2022-02-18 PROBLEM — R07.9 CHEST PAIN: Status: ACTIVE | Noted: 2022-02-18

## 2022-02-18 LAB
ALBUMIN SERPL BCP-MCNC: 4.5 G/DL (ref 3.2–4.9)
ALBUMIN/GLOB SERPL: 2 G/DL
ALP SERPL-CCNC: 62 U/L (ref 30–99)
ALT SERPL-CCNC: 20 U/L (ref 2–50)
ANION GAP SERPL CALC-SCNC: 15 MMOL/L (ref 7–16)
AST SERPL-CCNC: 37 U/L (ref 12–45)
BASOPHILS # BLD AUTO: 0.4 % (ref 0–1.8)
BASOPHILS # BLD: 0.03 K/UL (ref 0–0.12)
BILIRUB SERPL-MCNC: 2.2 MG/DL (ref 0.1–1.5)
BUN SERPL-MCNC: 7 MG/DL (ref 8–22)
BUN SERPL-MCNC: 7 MG/DL (ref 8–22)
BUN SERPL-MCNC: 8 MG/DL (ref 8–22)
CALCIUM SERPL-MCNC: 8.5 MG/DL (ref 8.5–10.5)
CALCIUM SERPL-MCNC: 9 MG/DL (ref 8.5–10.5)
CALCIUM SERPL-MCNC: 9.1 MG/DL (ref 8.5–10.5)
CHLORIDE SERPL-SCNC: 86 MMOL/L (ref 96–112)
CHLORIDE SERPL-SCNC: 88 MMOL/L (ref 96–112)
CHLORIDE SERPL-SCNC: 90 MMOL/L (ref 96–112)
CO2 SERPL-SCNC: 18 MMOL/L (ref 20–33)
CO2 SERPL-SCNC: 20 MMOL/L (ref 20–33)
CO2 SERPL-SCNC: 21 MMOL/L (ref 20–33)
CREAT SERPL-MCNC: 0.52 MG/DL (ref 0.5–1.4)
CREAT SERPL-MCNC: 0.61 MG/DL (ref 0.5–1.4)
CREAT SERPL-MCNC: 0.65 MG/DL (ref 0.5–1.4)
D DIMER PPP IA.FEU-MCNC: 0.75 UG/ML (FEU) (ref 0–0.5)
EKG IMPRESSION: NORMAL
EOSINOPHIL # BLD AUTO: 0.03 K/UL (ref 0–0.51)
EOSINOPHIL NFR BLD: 0.4 % (ref 0–6.9)
ERYTHROCYTE [DISTWIDTH] IN BLOOD BY AUTOMATED COUNT: 40.9 FL (ref 35.9–50)
GLOBULIN SER CALC-MCNC: 2.3 G/DL (ref 1.9–3.5)
GLUCOSE BLD-MCNC: 108 MG/DL (ref 65–99)
GLUCOSE SERPL-MCNC: 102 MG/DL (ref 65–99)
GLUCOSE SERPL-MCNC: 122 MG/DL (ref 65–99)
GLUCOSE SERPL-MCNC: 165 MG/DL (ref 65–99)
HCT VFR BLD AUTO: 40 % (ref 42–52)
HGB BLD-MCNC: 14.3 G/DL (ref 14–18)
IMM GRANULOCYTES # BLD AUTO: 0.05 K/UL (ref 0–0.11)
IMM GRANULOCYTES NFR BLD AUTO: 0.7 % (ref 0–0.9)
LYMPHOCYTES # BLD AUTO: 1.63 K/UL (ref 1–4.8)
LYMPHOCYTES NFR BLD: 21.4 % (ref 22–41)
MCH RBC QN AUTO: 30 PG (ref 27–33)
MCHC RBC AUTO-ENTMCNC: 35.8 G/DL (ref 33.7–35.3)
MCV RBC AUTO: 84 FL (ref 81.4–97.8)
MONOCYTES # BLD AUTO: 0.77 K/UL (ref 0–0.85)
MONOCYTES NFR BLD AUTO: 10.1 % (ref 0–13.4)
NEUTROPHILS # BLD AUTO: 5.09 K/UL (ref 1.82–7.42)
NEUTROPHILS NFR BLD: 67 % (ref 44–72)
NRBC # BLD AUTO: 0 K/UL
NRBC BLD-RTO: 0 /100 WBC
PLATELET # BLD AUTO: 336 K/UL (ref 164–446)
PMV BLD AUTO: 10 FL (ref 9–12.9)
POTASSIUM SERPL-SCNC: 3.4 MMOL/L (ref 3.6–5.5)
POTASSIUM SERPL-SCNC: 3.4 MMOL/L (ref 3.6–5.5)
POTASSIUM SERPL-SCNC: 3.5 MMOL/L (ref 3.6–5.5)
PROT SERPL-MCNC: 6.8 G/DL (ref 6–8.2)
RBC # BLD AUTO: 4.76 M/UL (ref 4.7–6.1)
SODIUM SERPL-SCNC: 121 MMOL/L (ref 135–145)
SODIUM SERPL-SCNC: 123 MMOL/L (ref 135–145)
SODIUM SERPL-SCNC: 124 MMOL/L (ref 135–145)
TROPONIN T SERPL-MCNC: 11 NG/L (ref 6–19)
TROPONIN T SERPL-MCNC: 13 NG/L (ref 6–19)
TROPONIN T SERPL-MCNC: 16 NG/L (ref 6–19)
WBC # BLD AUTO: 7.6 K/UL (ref 4.8–10.8)

## 2022-02-18 PROCEDURE — 93005 ELECTROCARDIOGRAM TRACING: CPT | Performed by: FAMILY MEDICINE

## 2022-02-18 PROCEDURE — 82962 GLUCOSE BLOOD TEST: CPT

## 2022-02-18 PROCEDURE — 93010 ELECTROCARDIOGRAM REPORT: CPT | Performed by: INTERNAL MEDICINE

## 2022-02-18 PROCEDURE — 99285 EMERGENCY DEPT VISIT HI MDM: CPT

## 2022-02-18 PROCEDURE — 85379 FIBRIN DEGRADATION QUANT: CPT

## 2022-02-18 PROCEDURE — A9270 NON-COVERED ITEM OR SERVICE: HCPCS | Performed by: FAMILY MEDICINE

## 2022-02-18 PROCEDURE — 85025 COMPLETE CBC W/AUTO DIFF WBC: CPT

## 2022-02-18 PROCEDURE — 80048 BASIC METABOLIC PNL TOTAL CA: CPT

## 2022-02-18 PROCEDURE — 700111 HCHG RX REV CODE 636 W/ 250 OVERRIDE (IP): Performed by: FAMILY MEDICINE

## 2022-02-18 PROCEDURE — 700102 HCHG RX REV CODE 250 W/ 637 OVERRIDE(OP): Performed by: FAMILY MEDICINE

## 2022-02-18 PROCEDURE — 700105 HCHG RX REV CODE 258: Performed by: FAMILY MEDICINE

## 2022-02-18 PROCEDURE — 96372 THER/PROPH/DIAG INJ SC/IM: CPT

## 2022-02-18 PROCEDURE — 84484 ASSAY OF TROPONIN QUANT: CPT

## 2022-02-18 PROCEDURE — 99220 PR INITIAL OBSERVATION CARE,LEVL III: CPT | Performed by: FAMILY MEDICINE

## 2022-02-18 PROCEDURE — G0378 HOSPITAL OBSERVATION PER HR: HCPCS

## 2022-02-18 PROCEDURE — 71045 X-RAY EXAM CHEST 1 VIEW: CPT

## 2022-02-18 PROCEDURE — 80053 COMPREHEN METABOLIC PANEL: CPT

## 2022-02-18 RX ORDER — ESCITALOPRAM OXALATE 10 MG/1
20 TABLET ORAL EVERY MORNING
Status: DISCONTINUED | OUTPATIENT
Start: 2022-02-19 | End: 2022-02-19 | Stop reason: HOSPADM

## 2022-02-18 RX ORDER — SODIUM CHLORIDE 9 MG/ML
INJECTION, SOLUTION INTRAVENOUS CONTINUOUS
Status: ACTIVE | OUTPATIENT
Start: 2022-02-18 | End: 2022-02-19

## 2022-02-18 RX ORDER — ASPIRIN 325 MG
325 TABLET ORAL DAILY
Status: DISCONTINUED | OUTPATIENT
Start: 2022-02-18 | End: 2022-02-18

## 2022-02-18 RX ORDER — ALBUTEROL SULFATE 90 UG/1
2 AEROSOL, METERED RESPIRATORY (INHALATION) EVERY 4 HOURS PRN
Status: DISCONTINUED | OUTPATIENT
Start: 2022-02-18 | End: 2022-02-19 | Stop reason: HOSPADM

## 2022-02-18 RX ORDER — LATANOPROST 50 UG/ML
1 SOLUTION/ DROPS OPHTHALMIC
Status: DISCONTINUED | OUTPATIENT
Start: 2022-02-18 | End: 2022-02-19 | Stop reason: HOSPADM

## 2022-02-18 RX ORDER — POLYETHYLENE GLYCOL 3350 17 G/17G
1 POWDER, FOR SOLUTION ORAL
Status: DISCONTINUED | OUTPATIENT
Start: 2022-02-18 | End: 2022-02-19 | Stop reason: HOSPADM

## 2022-02-18 RX ORDER — GABAPENTIN 100 MG/1
100 CAPSULE ORAL 3 TIMES DAILY
Status: DISCONTINUED | OUTPATIENT
Start: 2022-02-18 | End: 2022-02-19 | Stop reason: HOSPADM

## 2022-02-18 RX ORDER — DEXTROSE MONOHYDRATE 25 G/50ML
50 INJECTION, SOLUTION INTRAVENOUS
Status: DISCONTINUED | OUTPATIENT
Start: 2022-02-18 | End: 2022-02-19 | Stop reason: HOSPADM

## 2022-02-18 RX ORDER — BISACODYL 10 MG
10 SUPPOSITORY, RECTAL RECTAL
Status: DISCONTINUED | OUTPATIENT
Start: 2022-02-18 | End: 2022-02-19 | Stop reason: HOSPADM

## 2022-02-18 RX ORDER — BUDESONIDE AND FORMOTEROL FUMARATE DIHYDRATE 160; 4.5 UG/1; UG/1
2 AEROSOL RESPIRATORY (INHALATION) 2 TIMES DAILY
Status: DISCONTINUED | OUTPATIENT
Start: 2022-02-18 | End: 2022-02-19 | Stop reason: HOSPADM

## 2022-02-18 RX ORDER — ATORVASTATIN CALCIUM 20 MG/1
20 TABLET, FILM COATED ORAL EVERY EVENING
Status: DISCONTINUED | OUTPATIENT
Start: 2022-02-18 | End: 2022-02-19 | Stop reason: HOSPADM

## 2022-02-18 RX ORDER — REGADENOSON 0.08 MG/ML
0.4 INJECTION, SOLUTION INTRAVENOUS
Status: COMPLETED | OUTPATIENT
Start: 2022-02-18 | End: 2022-02-19

## 2022-02-18 RX ORDER — AMINOPHYLLINE 25 MG/ML
100 INJECTION, SOLUTION INTRAVENOUS
Status: DISCONTINUED | OUTPATIENT
Start: 2022-02-18 | End: 2022-02-19 | Stop reason: HOSPADM

## 2022-02-18 RX ORDER — ONDANSETRON 4 MG/1
4 TABLET, ORALLY DISINTEGRATING ORAL EVERY 4 HOURS PRN
Status: DISCONTINUED | OUTPATIENT
Start: 2022-02-18 | End: 2022-02-19 | Stop reason: HOSPADM

## 2022-02-18 RX ORDER — AMOXICILLIN 250 MG
2 CAPSULE ORAL 2 TIMES DAILY
Status: DISCONTINUED | OUTPATIENT
Start: 2022-02-18 | End: 2022-02-19 | Stop reason: HOSPADM

## 2022-02-18 RX ORDER — ASPIRIN 300 MG/1
300 SUPPOSITORY RECTAL DAILY
Status: DISCONTINUED | OUTPATIENT
Start: 2022-02-18 | End: 2022-02-18

## 2022-02-18 RX ORDER — ASPIRIN 81 MG/1
324 TABLET, CHEWABLE ORAL DAILY
Status: DISCONTINUED | OUTPATIENT
Start: 2022-02-18 | End: 2022-02-18

## 2022-02-18 RX ORDER — LOSARTAN POTASSIUM 25 MG/1
25 TABLET ORAL DAILY
Status: DISCONTINUED | OUTPATIENT
Start: 2022-02-19 | End: 2022-02-19

## 2022-02-18 RX ORDER — CARVEDILOL 6.25 MG/1
6.25 TABLET ORAL 2 TIMES DAILY
Status: DISCONTINUED | OUTPATIENT
Start: 2022-02-18 | End: 2022-02-19 | Stop reason: HOSPADM

## 2022-02-18 RX ORDER — CYCLOBENZAPRINE HCL 10 MG
10 TABLET ORAL 3 TIMES DAILY PRN
Status: DISCONTINUED | OUTPATIENT
Start: 2022-02-18 | End: 2022-02-18

## 2022-02-18 RX ORDER — ONDANSETRON 2 MG/ML
4 INJECTION INTRAMUSCULAR; INTRAVENOUS EVERY 4 HOURS PRN
Status: DISCONTINUED | OUTPATIENT
Start: 2022-02-18 | End: 2022-02-19 | Stop reason: HOSPADM

## 2022-02-18 RX ORDER — ACETAMINOPHEN 325 MG/1
650 TABLET ORAL EVERY 6 HOURS PRN
Status: DISCONTINUED | OUTPATIENT
Start: 2022-02-18 | End: 2022-02-19 | Stop reason: HOSPADM

## 2022-02-18 RX ORDER — AMLODIPINE BESYLATE 5 MG/1
5 TABLET ORAL EVERY EVENING
Status: DISCONTINUED | OUTPATIENT
Start: 2022-02-18 | End: 2022-02-19 | Stop reason: HOSPADM

## 2022-02-18 RX ORDER — LABETALOL HYDROCHLORIDE 5 MG/ML
10 INJECTION, SOLUTION INTRAVENOUS EVERY 4 HOURS PRN
Status: DISCONTINUED | OUTPATIENT
Start: 2022-02-18 | End: 2022-02-19 | Stop reason: HOSPADM

## 2022-02-18 RX ADMIN — LATANOPROST 1 DROP: 50 SOLUTION OPHTHALMIC at 20:48

## 2022-02-18 RX ADMIN — GABAPENTIN 100 MG: 100 CAPSULE ORAL at 19:06

## 2022-02-18 RX ADMIN — CARVEDILOL 6.25 MG: 6.25 TABLET, FILM COATED ORAL at 19:06

## 2022-02-18 RX ADMIN — ENOXAPARIN SODIUM 40 MG: 40 INJECTION SUBCUTANEOUS at 19:04

## 2022-02-18 RX ADMIN — ACETAMINOPHEN 650 MG: 325 TABLET, FILM COATED ORAL at 19:39

## 2022-02-18 RX ADMIN — ATORVASTATIN CALCIUM 20 MG: 20 TABLET, FILM COATED ORAL at 19:06

## 2022-02-18 RX ADMIN — SODIUM CHLORIDE: 9 INJECTION, SOLUTION INTRAVENOUS at 16:48

## 2022-02-18 RX ADMIN — AMLODIPINE BESYLATE 5 MG: 5 TABLET ORAL at 19:06

## 2022-02-18 ASSESSMENT — PATIENT HEALTH QUESTIONNAIRE - PHQ9
SUM OF ALL RESPONSES TO PHQ9 QUESTIONS 1 AND 2: 0
2. FEELING DOWN, DEPRESSED, IRRITABLE, OR HOPELESS: NOT AT ALL
1. LITTLE INTEREST OR PLEASURE IN DOING THINGS: NOT AT ALL
2. FEELING DOWN, DEPRESSED, IRRITABLE, OR HOPELESS: NOT AT ALL
1. LITTLE INTEREST OR PLEASURE IN DOING THINGS: NOT AT ALL
SUM OF ALL RESPONSES TO PHQ9 QUESTIONS 1 AND 2: 0

## 2022-02-18 ASSESSMENT — ENCOUNTER SYMPTOMS
FEVER: 0
MYALGIAS: 0
VOMITING: 0
PALPITATIONS: 0
HEADACHES: 0
DOUBLE VISION: 0
HEMOPTYSIS: 0
HEARTBURN: 0
NAUSEA: 0
BLURRED VISION: 0
CHILLS: 0
DIZZINESS: 0
COUGH: 0

## 2022-02-18 ASSESSMENT — FIBROSIS 4 INDEX
FIB4 SCORE: 1.82
FIB4 SCORE: 2.06

## 2022-02-18 ASSESSMENT — LIFESTYLE VARIABLES
AVERAGE NUMBER OF DAYS PER WEEK YOU HAVE A DRINK CONTAINING ALCOHOL: 0
TOTAL SCORE: 0
EVER HAD A DRINK FIRST THING IN THE MORNING TO STEADY YOUR NERVES TO GET RID OF A HANGOVER: NO
ALCOHOL_USE: NO
HOW MANY TIMES IN THE PAST YEAR HAVE YOU HAD 5 OR MORE DRINKS IN A DAY: 0
ON A TYPICAL DAY WHEN YOU DRINK ALCOHOL HOW MANY DRINKS DO YOU HAVE: 0
HAVE YOU EVER FELT YOU SHOULD CUT DOWN ON YOUR DRINKING: NO
EVER FELT BAD OR GUILTY ABOUT YOUR DRINKING: NO
TOTAL SCORE: 0
TOTAL SCORE: 0
HAVE PEOPLE ANNOYED YOU BY CRITICIZING YOUR DRINKING: NO
CONSUMPTION TOTAL: NEGATIVE

## 2022-02-18 ASSESSMENT — PAIN DESCRIPTION - PAIN TYPE
TYPE: ACUTE PAIN

## 2022-02-18 NOTE — ASSESSMENT & PLAN NOTE
Troponin negative  I ordered EKG.  Had echocardiogram done 3 days ago which showed mild concentric left ventricular hypertrophy, moderate aortic insufficiency, ejection fraction 50 to 55% with normal regional wall motion.  No need to repeat echocardiogram.  Trend troponin   Nuclear stress test ordered.

## 2022-02-18 NOTE — ED TRIAGE NOTES
Pt arrives via EMS from urgent care for chest discomfort. Pt had labs drawn and had some abnormal results a couple days ago. Not chest pain just nagging feeling

## 2022-02-18 NOTE — ASSESSMENT & PLAN NOTE
Coronary calcium score of 819 in 2015.  Patient did not have left heart catheter  Continue aspirin and statin.

## 2022-02-18 NOTE — H&P
Hospital Medicine History & Physical Note    Date of Service  2/18/2022    Primary Care Physician  KENDELL Banegas    Consultants  none    Specialist Names: none    Code Status  Full Code    Chief Complaint  Chief Complaint   Patient presents with   • Chest Pain       History of Presenting Illness  Ronny Gallegos is a 76 y.o. male who presented 2/18/2022 with chest pain.  Patient has past medical history of coronary artery disease, moderate aortic insufficiency, type 2 diabetes mellitus, hypertension, hyperlipidemia, COPD, history of alcoholism, and chronic hyponatremia comes in due to intermittent chest pain.  Has been seen by cardiology multiple times in the past for same complaint was treated medically.  Recently had echocardiogram which showed ejection fraction 50 to 55% with normal left ventricular regional wall motion.  Incidentally was found to have right upper quadrant complicated cyst which is followed by his primary care physician who schedule CT scan of the abdomen.  In ER noted to be afebrile.  Hypertensive.  Saturating well on room air.  Lab work was significant for sodium 121 and potassium 3.4.  Chest x-ray was negative for any acute cardiopulmonary process.  EKG ordered by me.  Patient denies any chest pain at the moment.  Trending troponin and nuclear stress test ordered.    I discussed the plan of care with patient, family and ERP.    Review of Systems  Review of Systems   Constitutional: Negative for chills and fever.   HENT: Negative for hearing loss and tinnitus.    Eyes: Negative for blurred vision and double vision.   Respiratory: Negative for cough and hemoptysis.    Cardiovascular: Positive for chest pain. Negative for palpitations.   Gastrointestinal: Negative for heartburn, nausea and vomiting.   Genitourinary: Negative for dysuria and urgency.   Musculoskeletal: Negative for myalgias.   Skin: Negative for rash.   Neurological: Negative for dizziness and headaches.        Past Medical History   has a past medical history of Arthritis, Asthma, CAD (coronary artery disease), Depression, Diabetes (HCC), Glaucoma, High cholesterol, Hypertension, Hypopotassemia, Hyposmolality and/or hyponatremia, Jaundice (2014), Neuropathy, Pain, Personal history of peptic ulcer disease, Pneumonia (1978), and Unspecified cataract.    Surgical History   has a past surgical history that includes other (2005); other (1978); fusion, spine, lumbar, plif (9/10/2015); lumbar laminectomy diskectomy (9/10/2015); laminotomy (9/10/2015); laparoscopy robotic xi (10/26/2016); lumbar laminectomy diskectomy (Right, 6/13/2017); foraminotomy (6/13/2017); hardware removal neuro (6/13/2017); cataract extraction with iol; ankle total arthroplasty (Right, 6/18/2018); ligament repair (Right, 6/18/2018); irrigation & debridement ortho (Left, 11/2/2019); pr revise knee joint replace,all parts (Left, 11/5/2019); and irrigation & debridement ortho (11/5/2019).     Family History  family history includes Heart Disease in his father and mother; Stroke in his father and mother.   Family history reviewed with patient. There is no family history that is pertinent to the chief complaint.     Social History   reports that he quit smoking about 43 years ago. His smoking use included cigarettes. He has a 19.50 pack-year smoking history. He has never used smokeless tobacco. He reports previous alcohol use. He reports that he does not use drugs.    Allergies  No Known Allergies    Medications  Prior to Admission Medications   Prescriptions Last Dose Informant Patient Reported? Taking?   BREO ELLIPTA 100-25 MCG/INH AEROSOL POWDER, BREATH ACTIVATED   Yes No   Sig: Inhale 1 Puff every day.   FREESTYLE LITE strip   Yes No   LUMIGAN 0.01 % Solution  Patient Yes No   Sig: Place 1 Drop in both eyes every bedtime.   Multiple Vitamins-Minerals (CENTRUM SILVER 50+MEN) Tab  Patient Yes No   Sig: Take 1 Tab by mouth every morning.   PROAIR HFA  108 (90 Base) MCG/ACT Aero Soln inhalation aerosol  Patient Yes No   Sig: Inhale 2 Puffs by mouth every four hours as needed for Shortness of Breath.   amLODIPine (NORVASC) 5 MG Tab   No No   Sig: Take 1 Tablet by mouth every evening.   aspirin EC 81 MG EC tablet   No No   Sig: Take 1 Tab by mouth every day.   atorvastatin (LIPITOR) 20 MG Tab   No No   Sig: Take 1 Tablet by mouth every evening.   carvedilol (COREG) 6.25 MG Tab   No No   Sig: Take 1 Tablet by mouth 2 times a day.   cyclobenzaprine (FLEXERIL) 10 MG Tab  Patient Yes No   Sig: Take 10 mg by mouth 3 times a day as needed.   escitalopram (LEXAPRO) 20 MG tablet  Patient Yes No   Sig: Take 20 mg by mouth every morning. Indications: Major Depressive Disorder   gabapentin (NEURONTIN) 800 MG tablet   Yes No   Sig: Take 800 mg by mouth.   losartan (COZAAR) 25 MG Tab   No No   Sig: Take 1 Tablet by mouth every day.   metFORMIN (GLUCOPHAGE) 500 MG Tab  Patient Yes No   Sig: Take 500 mg by mouth every day.   naproxen (NAPROSYN) 500 MG Tab  Patient Yes No   Sig: Take 500 mg by mouth 2 times a day, with meals.   prednisoLONE acetate (PRED FORTE) 1 % Suspension   Yes No   Sig: Administer 1 Drop into both eyes 4 times a day.      Facility-Administered Medications: None       Physical Exam  Temp:  [36.6 °C (97.8 °F)] 36.6 °C (97.8 °F)  Pulse:  [84] 84  Resp:  [16] 16  BP: (167)/(86) 167/86  SpO2:  [97 %] 97 %  Blood Pressure : (!) 167/86   Temperature: 36.6 °C (97.8 °F)   Pulse: 84   Respiration: 16   Pulse Oximetry: 97 %       Physical Exam  Constitutional:       General: He is not in acute distress.  HENT:      Head: Normocephalic and atraumatic.      Mouth/Throat:      Mouth: Mucous membranes are dry.   Eyes:      General:         Right eye: No discharge.         Left eye: No discharge.      Extraocular Movements: Extraocular movements intact.   Cardiovascular:      Rate and Rhythm: Regular rhythm.      Heart sounds:     No friction rub. No gallop.   Pulmonary:       Effort: Pulmonary effort is normal. No respiratory distress.   Abdominal:      General: There is no distension.      Palpations: Abdomen is soft.   Musculoskeletal:      Right lower leg: No edema.      Left lower leg: No edema.   Neurological:      General: No focal deficit present.      Mental Status: He is alert and oriented to person, place, and time.   Psychiatric:         Mood and Affect: Mood normal.         Laboratory:  Recent Labs     02/18/22  1355   WBC 7.6   RBC 4.76   HEMOGLOBIN 14.3   HEMATOCRIT 40.0*   MCV 84.0   MCH 30.0   MCHC 35.8*   RDW 40.9   PLATELETCT 336   MPV 10.0     Recent Labs     02/18/22  1355   SODIUM 121*   POTASSIUM 3.4*   CHLORIDE 86*   CO2 20   GLUCOSE 122*   BUN 8   CREATININE 0.65   CALCIUM 9.1     Recent Labs     02/18/22  1355   ALTSGPT 20   ASTSGOT 37   ALKPHOSPHAT 62   TBILIRUBIN 2.2*   GLUCOSE 122*         No results for input(s): NTPROBNP in the last 72 hours.      Recent Labs     02/18/22  1355   TROPONINT 13       Imaging:  DX-CHEST-PORTABLE (1 VIEW)   Final Result         1. No acute cardiopulmonary abnormalities are identified.      NM-CARDIAC STRESS TEST    (Results Pending)       X-Ray:  I have personally reviewed the images and compared with prior images.    Assessment/Plan:  I anticipate this patient is appropriate for observation status at this time.    * Chest pain- (present on admission)  Assessment & Plan  Troponin negative  I ordered EKG.  Had echocardiogram done 3 days ago which showed mild concentric left ventricular hypertrophy, moderate aortic insufficiency, ejection fraction 50 to 55% with normal regional wall motion.  No need to repeat echocardiogram.  Trend troponin   Nuclear stress test ordered.    Type 2 diabetes mellitus with diabetic polyneuropathy, without long-term current use of insulin (HCC)- (present on admission)  Assessment & Plan  Started on sliding scale insulin  Hypoglycemia protocol in place.    Hyponatremia  Assessment & Plan  Chronic but  slightly worsened.  Free water intake restriction.  Gentle hydration.  Monitor.    Coronary artery disease  Assessment & Plan  Coronary calcium score of 819 in 2015.  Patient did not have left heart catheter  Continue aspirin and statin.    Essential hypertension- (present on admission)  Assessment & Plan  Continue home medications.      VTE prophylaxis: enoxaparin ppx

## 2022-02-18 NOTE — ED PROVIDER NOTES
"ED Provider Note    CHIEF COMPLAINT  Chief Complaint   Patient presents with   • Chest Pain       HPI  Ronny Gallegos is a 76 y.o. male who presents complaining of chest pain.  He generalized feeling poorly for the last few days.  Cannot quite put a finger on it.  However this morning he had a \"funny feeling\" in his chest which felt like a \"precramp.\"  He puts hand of the center of his chest and then wraps around over to his left axilla.  Denies any chest pain at the moment.  There is no shortness of breath.  No leg pain or swelling.  He recently had a cardiac echo which showed moderate aortic insufficiency, ejection fraction 52%.  He also has a cyst in his right upper quadrant which is being followed by his primary, with a CT scan plan.  It sounds like he has struggled with low sodium in the past.  Screening laboratories other day showed abnormally low sodium and abnormally high potassium.  There is no cough or cold symptoms.  No fever chills.  No change in bowel bladder.  There is no other complaint.    PAST MEDICAL HISTORY  Past Medical History:   Diagnosis Date   • Arthritis     right ankle/left knee   • Asthma     inhaler PRN    • CAD (coronary artery disease)     + CT scoring, negative PET cardiac in    • Depression    • Diabetes (HCC)     oral medication    • Glaucoma    • High cholesterol    • Hypertension    • Hypopotassemia    • Hyposmolality and/or hyponatremia    • Jaundice     \"from drinking after wife \"   • Neuropathy     bilat legs/feet   • Pain     right ankle    • Personal history of peptic ulcer disease    • Pneumonia    • Unspecified cataract     removed bilat       FAMILY HISTORY  Family History   Problem Relation Age of Onset   • Stroke Mother    • Heart Disease Mother    • Stroke Father    • Heart Disease Father        SOCIAL HISTORY  Social History     Tobacco Use   • Smoking status: Former Smoker     Packs/day: 1.50     Years: 13.00     Pack years: 19.50     Types: " Cigarettes     Quit date: 1978     Years since quittin.1   • Smokeless tobacco: Never Used   Vaping Use   • Vaping Use: Never used   Substance Use Topics   • Alcohol use: Not Currently     Alcohol/week: 0.0 oz   • Drug use: No         SURGICAL HISTORY  Past Surgical History:   Procedure Laterality Date   • PB REVISE KNEE JOINT REPLACE,ALL PARTS Left 2019    Procedure: REVISION, TOTAL ARTHROPLASTY, KNEE, ALL COMPONENTS - POLY EXCHANGE ONLY;  Surgeon: Parmjit Charles M.D.;  Location: [facility];  Service: Orthopedics   • IRRIGATION & DEBRIDEMENT ORTHO  2019    Procedure: IRRIGATION AND DEBRIDEMENT, WOUND - KNEE;  Surgeon: Parmjit Charles M.D.;  Location: SURGERY [facility];  Service: Orthopedics   • IRRIGATION & DEBRIDEMENT ORTHO Left 2019    Procedure: IRRIGATION AND DEBRIDEMENT, WOUND;  Surgeon: Jesús Wesley M.D.;  Location: [facility];  Service: Orthopedics   • ANKLE TOTAL ARTHROPLASTY Right 2018    Procedure: ANKLE TOTAL ARTHROPLASTY;  Surgeon: Jose De Jesus Medrano M.D.;  Location: [facility];  Service: Orthopedics   • LIGAMENT REPAIR Right 2018    Procedure: LIGAMENT REPAIR- LATERAL;  Surgeon: Jose De Jesus Medrano M.D.;  Location: [facility];  Service: Orthopedics   • LUMBAR LAMINECTOMY DISKECTOMY Right 2017    Procedure: LUMBAR LAMINECTOMY DISKECTOMY - RE-DO OPEN L4-S1 LAMINOTOMIES;  Surgeon: Clint Garsia M.D.;  Location: [facility];  Service:    • FORAMINOTOMY  2017    Procedure: FORAMINOTOMY;  Surgeon: Clint Garsia M.D.;  Location: [facility];  Service:    • HARDWARE REMOVAL NEURO  2017    Procedure: HARDWARE REMOVAL NEURO ;  Surgeon: Clint Garsia M.D.;  Location: [facility];  Service:    • LAPAROSCOPY ROBOTIC XI  10/26/2016    Procedure: LAPAROSCOPY FOR LIVER CYST MARSUPIALIZATION AND RESECTION LIVER WALL CYST;  Surgeon: Frank Corona M.D.;  Location:  "SURGERY [facility];  Service:    • FUSION, SPINE, LUMBAR, PLIF  9/10/2015    Procedure: LUMBAR FUSION POSTERIOR L5-S1 ;  Surgeon: Clint Garsia M.D.;  Location: SURGERY [facility];  Service:    • LUMBAR LAMINECTOMY DISKECTOMY  9/10/2015    Procedure: LUMBAR LAMINECTOMY ;  Surgeon: Clint Garsia M.D.;  Location: SURGERY [facility];  Service:    • LAMINOTOMY  9/10/2015    Procedure: LAMINOTOMY;  Surgeon: Clint Garsia M.D.;  Location: SURGERY [facility];  Service:    • OTHER  2005    ANTERIOR NECK FUSION C5-7   • OTHER  1978    BLEEDING ULCERS   • CATARACT EXTRACTION WITH IOL         CURRENT MEDICATIONS    I have reviewed the nurses notes and/or the list brought with the patient.    ALLERGIES  No Known Allergies    REVIEW OF SYSTEMS  See HPI for further details. Review of systems as above, otherwise all other systems are negative.     PHYSICAL EXAM  VITAL SIGNS: BP (!) 167/86   Pulse 84   Temp 36.6 °C (97.8 °F) (Temporal)   Resp 16   Ht 1.702 m (5' 7\")   Wt 77.1 kg (170 lb)   SpO2 97%   BMI 26.63 kg/m²     Constitutional: Well appearing patient in no acute distress.  Not toxic, nor ill in appearance.  HENT: Mucus membranes moist.  Oropharynx is clear.  Eyes: Pupils equally round.  No scleral icterus.   Neck: Full nontender range of motion.  Lymphatic: No cervical lymphadenopathy noted.   Cardiovascular: Regular heart rate and rhythm.  No murmurs, rubs, nor gallop appreciated.   Thorax & Lungs: Chest is nontender.  Lungs are clear to auscultation with good air movement bilaterally.  No wheeze, rhonchi, nor rales.   Abdomen: Soft, with no tenderness, rebound nor guarding.  No mass, pulsatile mass, nor hepatosplenomegaly appreciated.  Skin: No purpura nor petechia noted.  Extremities/Musculoskeletal: No sign of trauma.  Calves are nontender with no cords nor edema.  No Lara's sign.  Pulses are intact all around.   Neurologic: Alert & oriented.  Strength and sensation is intact all around.  " Gait is normal.  Psychiatric: Normal affect appropriate for the clinical situation.    EKG  I interpreted this EKG myself.  This is a 12-lead study.  The rhythm is sinus with a rate of 76.  There is a PVC.  There are no ST segment nor T wave abnormalities.  Interpretation: No ST segment elevation myocardial infarction..    LABS  Labs Reviewed   CBC WITH DIFFERENTIAL - Abnormal; Notable for the following components:       Result Value    Hematocrit 40.0 (*)     MCHC 35.8 (*)     Lymphocytes 21.40 (*)     All other components within normal limits   COMP METABOLIC PANEL - Abnormal; Notable for the following components:    Sodium 121 (*)     Potassium 3.4 (*)     Chloride 86 (*)     Glucose 122 (*)     Total Bilirubin 2.2 (*)     All other components within normal limits   TROPONIN   ESTIMATED GFR         RADIOLOGY/PROCEDURES  I have reviewed the patient's film interpretations myself, and they are read out by the radiologist as:   DX-CHEST-PORTABLE (1 VIEW)   Final Result         1. No acute cardiopulmonary abnormalities are identified.        .    MEDICAL RECORD  I have reviewed patient's medical record and pertinent results are listed above.    COURSE & MEDICAL DECISION MAKING  I have reviewed any medical record information, laboratory studies and radiographic results as noted above.  Patient presents with a feeling of general poorly.  Specifically today has had some chest pain.  He has multiple cardiovascular risk factors and with his age he gets 4 points on the heart score putting him in the moderate risk category.  He received aspirin prior to arrival.  This was not repeated.  EKG is nondiagnostic, for troponin is negative.  However I would like to put him hospital for further work-up.  He does have a markedly low sodium level.  He has had this in the past but never this low.  I am not sure whether his general malaise could be related to this or not.  I will defer to the hospitalist about correction.  The case  discussed with [facility] hospitalist, he is admitted.      FINAL IMPRESSION  1. Precordial chest pain    2. Hyponatremia           This dictation was created using voice recognition software.    Electronically signed by: Jose De Jesus Rush M.D., 2/18/2022 3:37 PM

## 2022-02-19 ENCOUNTER — APPOINTMENT (OUTPATIENT)
Dept: RADIOLOGY | Facility: MEDICAL CENTER | Age: 77
End: 2022-02-19
Attending: FAMILY MEDICINE
Payer: COMMERCIAL

## 2022-02-19 VITALS
DIASTOLIC BLOOD PRESSURE: 85 MMHG | WEIGHT: 167.99 LBS | TEMPERATURE: 97.8 F | HEART RATE: 70 BPM | RESPIRATION RATE: 18 BRPM | SYSTOLIC BLOOD PRESSURE: 161 MMHG | HEIGHT: 67 IN | OXYGEN SATURATION: 95 % | BODY MASS INDEX: 26.37 KG/M2

## 2022-02-19 PROBLEM — R07.9 CHEST PAIN: Status: RESOLVED | Noted: 2022-02-18 | Resolved: 2022-02-19

## 2022-02-19 LAB
ALBUMIN SERPL BCP-MCNC: 3.9 G/DL (ref 3.2–4.9)
ALBUMIN/GLOB SERPL: 1.9 G/DL
ALP SERPL-CCNC: 57 U/L (ref 30–99)
ALT SERPL-CCNC: 19 U/L (ref 2–50)
ANION GAP SERPL CALC-SCNC: 11 MMOL/L (ref 7–16)
ANION GAP SERPL CALC-SCNC: 12 MMOL/L (ref 7–16)
AST SERPL-CCNC: 37 U/L (ref 12–45)
BASOPHILS # BLD AUTO: 0.6 % (ref 0–1.8)
BASOPHILS # BLD: 0.05 K/UL (ref 0–0.12)
BILIRUB SERPL-MCNC: 1.5 MG/DL (ref 0.1–1.5)
BUN SERPL-MCNC: 12 MG/DL (ref 8–22)
BUN SERPL-MCNC: 8 MG/DL (ref 8–22)
CALCIUM SERPL-MCNC: 8.8 MG/DL (ref 8.5–10.5)
CALCIUM SERPL-MCNC: 9 MG/DL (ref 8.5–10.5)
CHLORIDE SERPL-SCNC: 92 MMOL/L (ref 96–112)
CHLORIDE SERPL-SCNC: 95 MMOL/L (ref 96–112)
CO2 SERPL-SCNC: 22 MMOL/L (ref 20–33)
CO2 SERPL-SCNC: 22 MMOL/L (ref 20–33)
CREAT SERPL-MCNC: 0.72 MG/DL (ref 0.5–1.4)
CREAT SERPL-MCNC: 0.72 MG/DL (ref 0.5–1.4)
EOSINOPHIL # BLD AUTO: 0.13 K/UL (ref 0–0.51)
EOSINOPHIL NFR BLD: 1.6 % (ref 0–6.9)
ERYTHROCYTE [DISTWIDTH] IN BLOOD BY AUTOMATED COUNT: 41.6 FL (ref 35.9–50)
GLOBULIN SER CALC-MCNC: 2.1 G/DL (ref 1.9–3.5)
GLUCOSE BLD-MCNC: 110 MG/DL (ref 65–99)
GLUCOSE BLD-MCNC: 113 MG/DL (ref 65–99)
GLUCOSE BLD-MCNC: 135 MG/DL (ref 65–99)
GLUCOSE SERPL-MCNC: 102 MG/DL (ref 65–99)
GLUCOSE SERPL-MCNC: 120 MG/DL (ref 65–99)
HCT VFR BLD AUTO: 39.4 % (ref 42–52)
HGB BLD-MCNC: 14 G/DL (ref 14–18)
IMM GRANULOCYTES # BLD AUTO: 0.04 K/UL (ref 0–0.11)
IMM GRANULOCYTES NFR BLD AUTO: 0.5 % (ref 0–0.9)
LYMPHOCYTES # BLD AUTO: 2.24 K/UL (ref 1–4.8)
LYMPHOCYTES NFR BLD: 28.3 % (ref 22–41)
MCH RBC QN AUTO: 30.4 PG (ref 27–33)
MCHC RBC AUTO-ENTMCNC: 35.5 G/DL (ref 33.7–35.3)
MCV RBC AUTO: 85.5 FL (ref 81.4–97.8)
MONOCYTES # BLD AUTO: 1.04 K/UL (ref 0–0.85)
MONOCYTES NFR BLD AUTO: 13.1 % (ref 0–13.4)
NEUTROPHILS # BLD AUTO: 4.42 K/UL (ref 1.82–7.42)
NEUTROPHILS NFR BLD: 55.9 % (ref 44–72)
NRBC # BLD AUTO: 0 K/UL
NRBC BLD-RTO: 0 /100 WBC
PLATELET # BLD AUTO: 281 K/UL (ref 164–446)
PMV BLD AUTO: 10.3 FL (ref 9–12.9)
POTASSIUM SERPL-SCNC: 3.9 MMOL/L (ref 3.6–5.5)
POTASSIUM SERPL-SCNC: 4 MMOL/L (ref 3.6–5.5)
PROT SERPL-MCNC: 6 G/DL (ref 6–8.2)
RBC # BLD AUTO: 4.61 M/UL (ref 4.7–6.1)
SODIUM SERPL-SCNC: 125 MMOL/L (ref 135–145)
SODIUM SERPL-SCNC: 129 MMOL/L (ref 135–145)
WBC # BLD AUTO: 7.9 K/UL (ref 4.8–10.8)

## 2022-02-19 PROCEDURE — 700111 HCHG RX REV CODE 636 W/ 250 OVERRIDE (IP): Performed by: FAMILY MEDICINE

## 2022-02-19 PROCEDURE — 700111 HCHG RX REV CODE 636 W/ 250 OVERRIDE (IP)

## 2022-02-19 PROCEDURE — 99217 PR OBSERVATION CARE DISCHARGE: CPT | Performed by: INTERNAL MEDICINE

## 2022-02-19 PROCEDURE — 700102 HCHG RX REV CODE 250 W/ 637 OVERRIDE(OP): Performed by: INTERNAL MEDICINE

## 2022-02-19 PROCEDURE — 700102 HCHG RX REV CODE 250 W/ 637 OVERRIDE(OP): Performed by: FAMILY MEDICINE

## 2022-02-19 PROCEDURE — G0378 HOSPITAL OBSERVATION PER HR: HCPCS

## 2022-02-19 PROCEDURE — A9270 NON-COVERED ITEM OR SERVICE: HCPCS | Performed by: HOSPITALIST

## 2022-02-19 PROCEDURE — 80048 BASIC METABOLIC PNL TOTAL CA: CPT

## 2022-02-19 PROCEDURE — 80053 COMPREHEN METABOLIC PANEL: CPT

## 2022-02-19 PROCEDURE — 85025 COMPLETE CBC W/AUTO DIFF WBC: CPT

## 2022-02-19 PROCEDURE — 96372 THER/PROPH/DIAG INJ SC/IM: CPT | Mod: XU

## 2022-02-19 PROCEDURE — 82962 GLUCOSE BLOOD TEST: CPT | Mod: 91

## 2022-02-19 PROCEDURE — 700102 HCHG RX REV CODE 250 W/ 637 OVERRIDE(OP): Performed by: HOSPITALIST

## 2022-02-19 PROCEDURE — A9270 NON-COVERED ITEM OR SERVICE: HCPCS | Performed by: INTERNAL MEDICINE

## 2022-02-19 PROCEDURE — A9270 NON-COVERED ITEM OR SERVICE: HCPCS | Performed by: FAMILY MEDICINE

## 2022-02-19 PROCEDURE — A9502 TC99M TETROFOSMIN: HCPCS

## 2022-02-19 RX ORDER — AMLODIPINE BESYLATE 5 MG/1
10 TABLET ORAL EVERY EVENING
Qty: 90 TABLET | Refills: 3 | Status: SHIPPED | OUTPATIENT
Start: 2022-02-19 | End: 2022-07-07 | Stop reason: SDUPTHER

## 2022-02-19 RX ORDER — LOSARTAN POTASSIUM 50 MG/1
50 TABLET ORAL DAILY
Status: DISCONTINUED | OUTPATIENT
Start: 2022-02-20 | End: 2022-02-19 | Stop reason: HOSPADM

## 2022-02-19 RX ORDER — REGADENOSON 0.08 MG/ML
INJECTION, SOLUTION INTRAVENOUS
Status: COMPLETED
Start: 2022-02-19 | End: 2022-02-19

## 2022-02-19 RX ORDER — LOSARTAN POTASSIUM 25 MG/1
50 TABLET ORAL DAILY
Qty: 90 TABLET | Refills: 3 | Status: SHIPPED | OUTPATIENT
Start: 2022-02-19 | End: 2022-03-08 | Stop reason: SDUPTHER

## 2022-02-19 RX ORDER — SODIUM CHLORIDE 1 G/1
1 TABLET ORAL
Status: DISCONTINUED | OUTPATIENT
Start: 2022-02-19 | End: 2022-02-19 | Stop reason: HOSPADM

## 2022-02-19 RX ORDER — CARVEDILOL 6.25 MG/1
12.5 TABLET ORAL 2 TIMES DAILY
Qty: 180 TABLET | Refills: 3 | Status: SHIPPED | OUTPATIENT
Start: 2022-02-19 | End: 2022-02-25

## 2022-02-19 RX ORDER — NITROGLYCERIN 0.4 MG/1
0.4 TABLET SUBLINGUAL PRN
Status: DISCONTINUED | OUTPATIENT
Start: 2022-02-19 | End: 2022-02-19 | Stop reason: HOSPADM

## 2022-02-19 RX ADMIN — SODIUM CHLORIDE 1 G: 1 TABLET ORAL at 14:30

## 2022-02-19 RX ADMIN — ESCITALOPRAM OXALATE 20 MG: 10 TABLET ORAL at 05:53

## 2022-02-19 RX ADMIN — CARVEDILOL 6.25 MG: 6.25 TABLET, FILM COATED ORAL at 16:35

## 2022-02-19 RX ADMIN — NITROGLYCERIN 0.4 MG: 0.4 TABLET, ORALLY DISINTEGRATING SUBLINGUAL at 19:06

## 2022-02-19 RX ADMIN — ATORVASTATIN CALCIUM 20 MG: 20 TABLET, FILM COATED ORAL at 19:06

## 2022-02-19 RX ADMIN — REGADENOSON 0.4 MG: 0.08 INJECTION, SOLUTION INTRAVENOUS at 08:59

## 2022-02-19 RX ADMIN — LOSARTAN POTASSIUM 25 MG: 25 TABLET, FILM COATED ORAL at 05:53

## 2022-02-19 RX ADMIN — SENNOSIDES AND DOCUSATE SODIUM 2 TABLET: 50; 8.6 TABLET ORAL at 05:53

## 2022-02-19 RX ADMIN — GABAPENTIN 100 MG: 100 CAPSULE ORAL at 11:51

## 2022-02-19 RX ADMIN — BUDESONIDE AND FORMOTEROL FUMARATE DIHYDRATE 2 PUFF: 160; 4.5 AEROSOL RESPIRATORY (INHALATION) at 19:08

## 2022-02-19 RX ADMIN — CARVEDILOL 6.25 MG: 6.25 TABLET, FILM COATED ORAL at 05:53

## 2022-02-19 RX ADMIN — ENOXAPARIN SODIUM 40 MG: 40 INJECTION SUBCUTANEOUS at 05:53

## 2022-02-19 RX ADMIN — GABAPENTIN 100 MG: 100 CAPSULE ORAL at 05:53

## 2022-02-19 RX ADMIN — BUDESONIDE AND FORMOTEROL FUMARATE DIHYDRATE 2 PUFF: 160; 4.5 AEROSOL RESPIRATORY (INHALATION) at 05:54

## 2022-02-19 RX ADMIN — AMLODIPINE BESYLATE 5 MG: 5 TABLET ORAL at 17:13

## 2022-02-19 RX ADMIN — GABAPENTIN 100 MG: 100 CAPSULE ORAL at 19:06

## 2022-02-19 RX ADMIN — ASPIRIN 81 MG: 81 TABLET, COATED ORAL at 05:53

## 2022-02-19 ASSESSMENT — PAIN DESCRIPTION - PAIN TYPE
TYPE: ACUTE PAIN
TYPE: ACUTE PAIN

## 2022-02-19 ASSESSMENT — PATIENT HEALTH QUESTIONNAIRE - PHQ9
SUM OF ALL RESPONSES TO PHQ9 QUESTIONS 1 AND 2: 0
1. LITTLE INTEREST OR PLEASURE IN DOING THINGS: NOT AT ALL

## 2022-02-19 NOTE — PROGRESS NOTES
Pt in bed awake,a&ox4,no c/o chest pain now,for stress test,kept npo,poc explained,tele with SR,pt taken down for stress test.

## 2022-02-19 NOTE — CARE PLAN
The patient is Watcher - Medium risk of patient condition declining or worsening    Shift Goals  Clinical Goals: stress test AM, cardiac monitoring  Patient Goals: safety  Family Goals: safety    Progress made toward(s) clinical / shift goals:    Problem: Knowledge Deficit - Standard  Goal: Patient and family/care givers will demonstrate understanding of plan of care, disease process/condition, diagnostic tests and medications  Outcome: Progressing     Problem: Pain - Standard  Goal: Alleviation of pain or a reduction in pain to the patient’s comfort goal  Outcome: Progressing     Problem: Fall Risk  Goal: Patient will remain free from falls  Outcome: Progressing     Problem: Hemodynamics  Goal: Patient's hemodynamics, fluid balance and neurologic status will be stable or improve  Outcome: Progressing     Problem: Mobility  Goal: Patient's capacity to carry out activities will improve  Outcome: Progressing     Problem: Self Care  Goal: Patient will have the ability to perform ADLs independently or with assistance (bathe, groom, dress, toilet and feed)  Outcome: Progressing       Patient is not progressing towards the following goals:

## 2022-02-19 NOTE — DISCHARGE SUMMARY
Discharge Summary    CHIEF COMPLAINT ON ADMISSION  Chief Complaint   Patient presents with   • Chest Pain       Reason for Admission  EMS     Admission Date  2/18/2022    CODE STATUS  Full Code    HPI & HOSPITAL COURSE  Ronny Gallegos is a 76 y.o. male with a past medical history of past medical history of coronary artery disease, moderate aortic insufficiency, type 2 diabetes mellitus, hypertension, hyperlipidemia, COPD, history of alcoholism, and chronic hyponatremia who presented 2/18/2022 with chest pain.   Patient reported intermittent left-sided chest pain and has been seen by cardiology multiple times in the past for same complaint and was treated medically.  He Recently had echocardiogram on 2/14 which showed ejection fraction 50 to 55% with normal left ventricular regional wall motion.  Incidentally was found to have right upper quadrant complicated cyst which is followed by his primary care physician who schedule CT scan of the abdomen.  In ER noted to be afebrile.  Hypertensive with bp of 184/89.  Saturating well on room air without tachycardia.  Lab work was significant for sodium 121(baseline 127) and potassium 3.4.  Chest x-ray was negative for any acute cardiopulmonary process.  EKG revealed sinus rhythm with Q waves in inferior leads.  No ST elevation or ST depression.  Troponin was negative.  The patient underwent a nuclear medicine cardiac stress test that revealed no evidence of significant jeopardized viable myocardium or prior myocardial infarction.  It also revealed a normal left ventricular size, ejection fraction and wall motion.  Patient's chest pain had resolved.  His symptoms were likely secondary to uncontrolled hypertension.  I have increased his losartan to 50 mg daily.  On medication review it was noted that the patient takes Lexapro which is likely contributing to his chronic hyponatremia.  I instructed the patient to discuss with his PCP to taper off of Lexapro and consider a  substitute that doesn't cause SIADH.      Therefore, he is discharged in good and stable condition to home with close outpatient follow-up.        Discharge Date  2/19/2022    FOLLOW UP ITEMS POST DISCHARGE  Follow-up with PCP    DISCHARGE DIAGNOSES  Principal Problem:    Chest pain POA: Yes  Active Problems:    Essential hypertension POA: Yes      Overview: ICD-10 transition    Coronary artery disease POA: Unknown      Overview: Positive coronary calcium score of 819 done in 2015.    Hyponatremia POA: Unknown    Type 2 diabetes mellitus with diabetic polyneuropathy, without long-term current use of insulin (HCC) POA: Yes  Resolved Problems:    * No resolved hospital problems. *      FOLLOW UP  Future Appointments   Date Time Provider Department Center   2/25/2022 10:00 AM NON PROVIDER-[facility] None   12/15/2022  7:45 AM [facility] None     No follow-up provider specified.    MEDICATIONS ON DISCHARGE     Medication List      ASK your doctor about these medications      Instructions   amLODIPine 5 MG Tabs  Commonly known as: NORVASC   Take 1 Tablet by mouth every evening.  Dose: 5 mg     aspirin EC 81 MG Tbec  Commonly known as: ECOTRIN  Ask about: Which instructions should I use?   Take 81 mg by mouth every 48 hours.  Dose: 81 mg     atorvastatin 20 MG Tabs  Commonly known as: LIPITOR   Take 1 Tablet by mouth every evening.  Dose: 20 mg     Breo Ellipta 100-25 MCG/INH Aepb  Generic drug: Fluticasone Furoate-Vilanterol   Inhale 1 Puff every day.  Dose: 1 Puff     carvedilol 6.25 MG Tabs  Commonly known as: COREG   Take 1 Tablet by mouth 2 times a day.  Dose: 6.25 mg     Centrum Silver 50+Men Tabs   Take 1 Tab by mouth every morning.  Dose: 1 Tablet     escitalopram 20 MG tablet  Commonly known as: LEXAPRO   Take 20 mg by mouth every morning. Indications: Major Depressive Disorder  Dose: 20 mg     gabapentin 800 MG tablet  Commonly known as: NEURONTIN   Take 800 mg by mouth 2 times a  day.  Dose: 800 mg     losartan 25 MG Tabs  Commonly known as: COZAAR   Take 1 Tablet by mouth every day.  Dose: 25 mg     Lumigan 0.01 % Soln  Generic drug: bimatoprost   Administer 1 Drop into the left eye at bedtime.  Dose: 1 Drop     metFORMIN 500 MG Tabs  Commonly known as: GLUCOPHAGE   Take 500 mg by mouth 2 times a day.  Dose: 500 mg     ProAir  (90 Base) MCG/ACT Aers inhalation aerosol  Generic drug: albuterol   Inhale 2 Puffs by mouth every four hours as needed for Shortness of Breath.  Dose: 2 Puff            Allergies  No Known Allergies    DIET  Orders Placed This Encounter   Procedures   • Diet Order Diet: Cardiac; Miscellaneous modifications: (optional): No Decaf, No Caffeine(for test)     Standing Status:   Standing     Number of Occurrences:   1     Order Specific Question:   Diet:     Answer:   Cardiac [6]     Order Specific Question:   Miscellaneous modifications: (optional)     Answer:   No Decaf, No Caffeine(for test) [11]       ACTIVITY  As tolerated.  Weight bearing as tolerated    CONSULTATIONS  None    PROCEDURES  None    LABORATORY  Lab Results   Component Value Date    SODIUM 125 (L) 02/19/2022    POTASSIUM 3.9 02/19/2022    CHLORIDE 92 (L) 02/19/2022    CO2 22 02/19/2022    GLUCOSE 102 (H) 02/19/2022    BUN 12 02/19/2022    CREATININE 0.72 02/19/2022    CREATININE 0.93 06/18/2012        Lab Results   Component Value Date    WBC 7.9 02/19/2022    HEMOGLOBIN 14.0 02/19/2022    HEMATOCRIT 39.4 (L) 02/19/2022    PLATELETCT 281 02/19/2022        Total time of the discharge process exceeds 32 minutes.

## 2022-02-19 NOTE — ED NOTES
Pt updated on plan of care no needs at this time understands admission call light within reach bed low and locked

## 2022-02-19 NOTE — ED NOTES
Pharmacy Medication Reconciliation      ~Medication reconciliation updated and complete per patient at bedside with medication list. Reviewed list with patient and returned at bedside  ~Allergies have been verified  ~No oral ABX within the last 30 days  ~Patient home pharmacy:[facility]

## 2022-02-19 NOTE — CARE PLAN
The patient is Stable - Low risk of patient condition declining or worsening    Shift Goals  Clinical Goals: Stress Test  Patient Goals: Rest  Family Goals: Rest    Progress made toward(s) clinical / shift goals:  Updated patient on POC    Problem: Knowledge Deficit - Standard  Goal: Patient and family/care givers will demonstrate understanding of plan of care, disease process/condition, diagnostic tests and medications  Outcome: Progressing     Problem: Pain - Standard  Goal: Alleviation of pain or a reduction in pain to the patient’s comfort goal  Outcome: Progressing

## 2022-02-20 NOTE — DISCHARGE INSTRUCTIONS
Increase Losartan to 50 mg daily  Monitor your BP closely. Your goal BP should be less than 130/80  Discuss tapering off the lexapro with yur PCP which may be contributing to the low sodium levels.     Discharge Instructions    Discharged to home by car with relative. Discharged via wheelchair, hospital escort: Yes.  Special equipment needed: Not Applicable    Be sure to schedule a follow-up appointment with your primary care doctor or any specialists as instructed.     Discharge Plan:   Diet Plan: Discussed  Activity Level: Discussed  Confirmed Follow up Appointment: Patient to Call and Schedule Appointment  Confirmed Symptoms Management: Discussed  Medication Reconciliation Updated: Yes  Influenza Vaccine Indication: Not indicated: Previously immunized this influenza season and > 8 years of age    I understand that a diet low in cholesterol, fat, and sodium is recommended for good health. Unless I have been given specific instructions below for another diet, I accept this instruction as my diet prescription.   Other diet: CARDIAC     Special Instructions: None    · Is patient discharged on Warfarin / Coumadin?   No     Depression / Suicide Risk    As you are discharged from this [facility] facility, it is important to learn how to keep safe from harming yourself.    Recognize the warning signs:  · Abrupt changes in personality, positive or negative- including increase in energy   · Giving away possessions  · Change in eating patterns- significant weight changes-  positive or negative  · Change in sleeping patterns- unable to sleep or sleeping all the time   · Unwillingness or inability to communicate  · Depression  · Unusual sadness, discouragement and loneliness  · Talk of wanting to die  · Neglect of personal appearance   · Rebelliousness- reckless behavior  · Withdrawal from people/activities they love  · Confusion- inability to concentrate     If you or a loved one observes any of these behaviors or has  concerns about self-harm, here's what you can do:  · Talk about it- your feelings and reasons for harming yourself  · Remove any means that you might use to hurt yourself (examples: pills, rope, extension cords, firearm)  · Get professional help from the community (Mental Health, Substance Abuse, psychological counseling)  · Do not be alone:Call your Safe Contact- someone whom you trust who will be there for you.  · Call your local CRISIS HOTLINE 464-2244 or 083-641-6289  · Call your local Children's Mobile Crisis Response Team [place] (399) 990-4518 or www.Takkle  · Call the toll free National Suicide Prevention Hotlines   · National Suicide Prevention Lifeline 811-023-UONV (9525)  · [employer] [facility] Network 800-SUICIDE (490-1037)            Hypertension, Adult  Hypertension is another name for high blood pressure. High blood pressure forces your heart to work harder to pump blood. This can cause problems over time.  There are two numbers in a blood pressure reading. There is a top number (systolic) over a bottom number (diastolic). It is best to have a blood pressure that is below 120/80. Healthy choices can help lower your blood pressure, or you may need medicine to help lower it.  What are the causes?  The cause of this condition is not known. Some conditions may be related to high blood pressure.  What increases the risk?  · Smoking.  · Having type 2 diabetes mellitus, high cholesterol, or both.  · Not getting enough exercise or physical activity.  · Being overweight.  · Having too much fat, sugar, calories, or salt (sodium) in your diet.  · Drinking too much alcohol.  · Having long-term (chronic) kidney disease.  · Having a family history of high blood pressure.  · Age. Risk increases with age.  · Race. You may be at higher risk if you are .  · Gender. Men are at higher risk than women before age 45. After age 65, women are at higher risk than men.  · Having obstructive sleep  apnea.  · Stress.  What are the signs or symptoms?  · High blood pressure may not cause symptoms. Very high blood pressure (hypertensive crisis) may cause:  ? Headache.  ? Feelings of worry or nervousness (anxiety).  ? Shortness of breath.  ? Nosebleed.  ? A feeling of being sick to your stomach (nausea).  ? Throwing up (vomiting).  ? Changes in how you see.  ? Very bad chest pain.  ? Seizures.  How is this treated?  · This condition is treated by making healthy lifestyle changes, such as:  ? Eating healthy foods.  ? Exercising more.  ? Drinking less alcohol.  · Your health care provider may prescribe medicine if lifestyle changes are not enough to get your blood pressure under control, and if:  ? Your top number is above 130.  ? Your bottom number is above 80.  · Your personal target blood pressure may vary.  Follow these instructions at home:  Eating and drinking    · If told, follow the DASH eating plan. To follow this plan:  ? Fill one half of your plate at each meal with fruits and vegetables.  ? Fill one fourth of your plate at each meal with whole grains. Whole grains include whole-wheat pasta, brown rice, and whole-grain bread.  ? Eat or drink low-fat dairy products, such as skim milk or low-fat yogurt.  ? Fill one fourth of your plate at each meal with low-fat (lean) proteins. Low-fat proteins include fish, chicken without skin, eggs, beans, and tofu.  ? Avoid fatty meat, cured and processed meat, or chicken with skin.  ? Avoid pre-made or processed food.  · Eat less than 1,500 mg of salt each day.  · Do not drink alcohol if:  ? Your doctor tells you not to drink.  ? You are pregnant, may be pregnant, or are planning to become pregnant.  · If you drink alcohol:  ? Limit how much you use to:  § 0-1 drink a day for women.  § 0-2 drinks a day for men.  ? Be aware of how much alcohol is in your drink. In the [place] one drink equals one 12 oz bottle of beer (355 mL), one 5 oz glass of wine (148 mL), or one 1½ oz  glass of hard liquor (44 mL).  Lifestyle    · Work with your doctor to stay at a healthy weight or to lose weight. Ask your doctor what the best weight is for you.  · Get at least 30 minutes of exercise most days of the week. This may include walking, swimming, or biking.  · Get at least 30 minutes of exercise that strengthens your muscles (resistance exercise) at least 3 days a week. This may include lifting weights or doing Pilates.  · Do not use any products that contain nicotine or tobacco, such as cigarettes, e-cigarettes, and chewing tobacco. If you need help quitting, ask your doctor.  · Check your blood pressure at home as told by your doctor.  · Keep all follow-up visits as told by your doctor. This is important.  Medicines  · Take over-the-counter and prescription medicines only as told by your doctor. Follow directions carefully.  · Do not skip doses of blood pressure medicine. The medicine does not work as well if you skip doses. Skipping doses also puts you at risk for problems.  · Ask your doctor about side effects or reactions to medicines that you should watch for.  Contact a doctor if you:  · Think you are having a reaction to the medicine you are taking.  · Have headaches that keep coming back (recurring).  · Feel dizzy.  · Have swelling in your ankles.  · Have trouble with your vision.  Get help right away if you:  · Get a very bad headache.  · Start to feel mixed up (confused).  · Feel weak or numb.  · Feel faint.  · Have very bad pain in your:  ? Chest.  ? Belly (abdomen).  · Throw up more than once.  · Have trouble breathing.  Summary  · Hypertension is another name for high blood pressure.  · High blood pressure forces your heart to work harder to pump blood.  · For most people, a normal blood pressure is less than 120/80.  · Making healthy choices can help lower blood pressure. If your blood pressure does not get lower with healthy choices, you may need to take medicine.  This information is  not intended to replace advice given to you by your health care provider. Make sure you discuss any questions you have with your health care provider.  Document Released: 06/05/2009 Document Revised: 08/28/2019 Document Reviewed: 08/28/2019  Elsevier Patient Education © 2020 [employer]

## 2022-02-20 NOTE — PROGRESS NOTES
Pt's blood pressure still remaining high,MD called and informed with orders in changing,explained to family.

## 2022-02-20 NOTE — PROGRESS NOTES
Discharge paperwork has been provided to and discussed with patient and IV has been removed. Patient verbalizes understanding of discharge education provided. Patient and daughter were escorted off the unit with all belongings. This patient has been discharged.

## 2022-02-21 ENCOUNTER — OFFICE VISIT (OUTPATIENT)
Dept: URBAN - METROPOLITAN AREA CLINIC 10 | Facility: CLINIC | Age: 77
End: 2022-02-21
Payer: COMMERCIAL

## 2022-02-21 PROCEDURE — 99213 OFFICE O/P EST LOW 20 MIN: CPT | Performed by: OPHTHALMOLOGY

## 2022-02-21 ASSESSMENT — INTRAOCULAR PRESSURE
OS: 13
OD: 12

## 2022-02-21 NOTE — IMPRESSION/PLAN
Impression: Capslr glaucoma w/pseudoef lens, bilateral, mild stage
(+)Asthma (+) SLT 2016,2019,2020; [facility]NI OD 6/21/21 [place] Chasten) S/P Baerveldt shunt OD 9/27/21 Intolerant to Simbrinza, Brimonidine, Brinzolomide Plan: Pt has Glaucoma    Gonio : 2-3+ CBB Istent OU       Pachs: 537/529 Today's IOP :12/13  Tmax  :  45/24 Target IOP low to mid teens
(+)Fhx of Glaucoma: Mother Vision equal OU Last vf OD: [place] remaining  OS: Full 1/17/22 *New Baseline*
C/D:  0.75x0.8 loss of temporal /0.4x0.4
OCT: 67/81 1/17/22 Pt denies Sulfa Allergy // Pt denies Heart dx Plan : 1. Continue Lumigan QPM OU 
2. IOP is  Excellent. Patient to continue medications as above.  
3. RTC 3 in month for IOP / CE with [facility] and 6 month With Dr. Sharyle Nones

## 2022-02-25 ENCOUNTER — NON-PROVIDER VISIT (OUTPATIENT)
Dept: CARDIOLOGY | Facility: MEDICAL CENTER | Age: 77
End: 2022-02-25
Payer: COMMERCIAL

## 2022-02-25 VITALS — DIASTOLIC BLOOD PRESSURE: 91 MMHG | OXYGEN SATURATION: 98 % | SYSTOLIC BLOOD PRESSURE: 172 MMHG | HEART RATE: 65 BPM

## 2022-02-25 VITALS — SYSTOLIC BLOOD PRESSURE: 160 MMHG | DIASTOLIC BLOOD PRESSURE: 64 MMHG | HEART RATE: 81 BPM

## 2022-02-25 RX ORDER — CARVEDILOL 25 MG/1
25 TABLET ORAL 2 TIMES DAILY WITH MEALS
Qty: 180 TABLET | Refills: 3 | Status: SHIPPED | OUTPATIENT
Start: 2022-02-25 | End: 2022-07-07 | Stop reason: SDUPTHER

## 2022-02-25 NOTE — TELEPHONE ENCOUNTER
Patient in office for BP check. BP readings in office elevated. Patient extremely stressed, but denies all symptoms. Seen in ER 02/18/22 for chest pain. Per patient, BP medications adjusted. Patient is concerned BP remains elevated.     Readings for 02/23/22 are as follows:    0630    148/68 (61)  0730    158/68 (60)  0800    145/68 (58)  1200    131/62 (64)  2000    160/64 (81)        To SC: Please advise

## 2022-02-25 NOTE — TELEPHONE ENCOUNTER
Called patient to have him increase his carvedilol to 25 mg BID. Medication was sent in to his pharmacy. He will call back next week with his BP readings or if he has any side effects or concerns with the medications. Sc

## 2022-03-08 DIAGNOSIS — I10 ESSENTIAL HYPERTENSION: ICD-10-CM

## 2022-03-09 RX ORDER — LOSARTAN POTASSIUM 25 MG/1
50 TABLET ORAL DAILY
Qty: 90 TABLET | Refills: 3 | Status: SHIPPED | OUTPATIENT
Start: 2022-03-09 | End: 2022-07-07 | Stop reason: SDUPTHER

## 2022-03-17 ENCOUNTER — TELEPHONE (OUTPATIENT)
Dept: CARDIOLOGY | Facility: MEDICAL CENTER | Age: 77
End: 2022-03-17
Payer: COMMERCIAL

## 2022-03-17 NOTE — TELEPHONE ENCOUNTER
Spoke to patient over phone. Clarification on RX given. Patient verbalizes understanding and will reach out with any additional questions.

## 2022-03-17 NOTE — TELEPHONE ENCOUNTER
MINI Castillo,    This patient called and wanted to speak to a nurse about a change in his dosage for the script amLODIPine (NORVASC) 5 MG Tab. He states during his last appt for a bp check that it was increased from 5mg to 10mg. Can you please call him back about this at [phone].      Thank you,    NORMA

## 2022-05-12 NOTE — PROGRESS NOTES
Subjective:      Primary care physician: KENDELL Banegas  Referring Provider: FLAVIA Hinton    Chief Complaint: No chief complaint on file.    Diagnosis:   1. Liver cyst     2. Abnormal CT of the abdomen     3. Type 2 diabetes mellitus with diabetic polyneuropathy, without long-term current use of insulin (HCC)     4. Alcohol use disorder, mild, abuse         History of presenting illness:    Ronny Gallegos  is a pleasant 76 y.o. male with a past medical history of coronary artery disease, moderate aortic insufficiency, type 2 diabetes mellitus, hypertension, hyperlipidemia, COPD, history of alcoholism, and chronic hyponatremia who presented in ED on 2/18/2022 with chest pain.  Incidentally was found to have right upper quadrant complicated cyst (approx 9 cm).       He is here today for evaluation of what was found to be a recurrence of a large hepatic cyst in the left lobe.  The patient has been treated back in 2016 for a painful cyst which she underwent marsupialization.  The pathology on that revealed no sign malignancy or biliary cystadenoma.  I have not seen the patient since 2016.  He denies any fever chills nausea or vomiting.  He denies any weight loss.  The story goes that he was complaining of multiple areas of pain in his abdomen and that because of the concern for missing something his primary care physician sent him for a CT scan.  CT scan showed a 9 cm cyst which is recurrent in the left lateral segment.  The patient also complains of right upper quadrant pain near the gallbladder fossa.  The rest of his liver is normal.  Because of this, the daughter-in-law and the patient who I have seen in the past refer the toes and for assessment.  He denies being jaundiced.  He denies any upper or lower GI bleeds.  He has no pain outside of the right upper quadrant and may be some questionable discomfort in the left upper quadrant patient does have a large mesh repair from when he was in  "his 30s almost 40 years ago with a very unusual S shaped incision on his abdomen.  The patient denies any weight loss.  He denies being jaundiced.  He does not have hepatitis and he is not a drinker.  He is here with his daughter-in-law to discuss neck steps.  He has not had any other diagnostic studies such as a HIDA scan.  I have also reviewed the patient's old imaging as well as his pathology report.  He was previously undergone a marsupialization and it was negative for any malignancy or cystadenoma.    Past Medical History:   Diagnosis Date   • Arthritis     right ankle/left knee   • Asthma     inhaler PRN    • CAD (coronary artery disease)     + CT scoring, negative PET cardiac in    • Depression    • Diabetes (HCC)     oral medication    • Glaucoma    • High cholesterol    • Hypertension    • Hypopotassemia    • Hyposmolality and/or hyponatremia    • Jaundice     \"from drinking after wife \"   • Neuropathy     bilat legs/feet   • Pain     right ankle    • Personal history of peptic ulcer disease    • Pneumonia    • Unspecified cataract     removed bilat     Past Surgical History:   Procedure Laterality Date   • PB REVISE KNEE JOINT REPLACE,ALL PARTS Left 2019    Procedure: REVISION, TOTAL ARTHROPLASTY, KNEE, ALL COMPONENTS - POLY EXCHANGE ONLY;  Surgeon: Parmjit Charles M.D.;  Location: [facility];  Service: Orthopedics   • IRRIGATION & DEBRIDEMENT ORTHO  2019    Procedure: IRRIGATION AND DEBRIDEMENT, WOUND - KNEE;  Surgeon: Parmjit Charles M.D.;  Location: [facility];  Service: Orthopedics   • IRRIGATION & DEBRIDEMENT ORTHO Left 2019    Procedure: IRRIGATION AND DEBRIDEMENT, WOUND;  Surgeon: Jesús Wesley M.D.;  Location: [facility];  Service: Orthopedics   • ANKLE TOTAL ARTHROPLASTY Right 2018    Procedure: ANKLE TOTAL ARTHROPLASTY;  Surgeon: Jose De Jesus Medrano M.D.;  Location: SUR[facility]ERY [facility];  Service: Orthopedics   • LIGAMENT " REPAIR Right 6/18/2018    Procedure: LIGAMENT REPAIR- LATERAL;  Surgeon: Jose De Jesus Medrano M.D.;  Location: SURGERY [facility];  Service: Orthopedics   • LUMBAR LAMINECTOMY DISKECTOMY Right 6/13/2017    Procedure: LUMBAR LAMINECTOMY DISKECTOMY - RE-DO OPEN L4-S1 LAMINOTOMIES;  Surgeon: Clint Garsia M.D.;  Location: SURGERY [facility];  Service:    • FORAMINOTOMY  6/13/2017    Procedure: FORAMINOTOMY;  Surgeon: Clint Garsia M.D.;  Location: SURGERY [facility];  Service:    • HARDWARE REMOVAL NEURO  6/13/2017    Procedure: HARDWARE REMOVAL NEURO ;  Surgeon: Clint Garsia M.D.;  Location: SURGERY [facility];  Service:    • LAPAROSCOPY ROBOTIC XI  10/26/2016    Procedure: LAPAROSCOPY FOR LIVER CYST MARSUPIALIZATION AND RESECTION LIVER WALL CYST;  Surgeon: Frank Corona M.D.;  Location: SURGERY [facility];  Service:    • FUSION, SPINE, LUMBAR, PLIF  9/10/2015    Procedure: LUMBAR FUSION POSTERIOR L5-S1 ;  Surgeon: Clint Garsia M.D.;  Location: SURGERY [facility];  Service:    • LUMBAR LAMINECTOMY DISKECTOMY  9/10/2015    Procedure: LUMBAR LAMINECTOMY ;  Surgeon: Clint Garsia M.D.;  Location: SURGERY [facility];  Service:    • LAMINOTOMY  9/10/2015    Procedure: LAMINOTOMY;  Surgeon: Clint Garsia M.D.;  Location: SURGERY [facility];  Service:    • OTHER  2005    ANTERIOR NECK FUSION C5-7   • OTHER  1978    BLEEDING ULCERS   • CATARACT EXTRACTION WITH IOL       No Known Allergies  Outpatient Encounter Medications as of 5/17/2022   Medication Sig Dispense Refill   • losartan (COZAAR) 25 MG Tab Take 2 Tablets by mouth every day. 90 Tablet 3   • carvedilol (COREG) 25 MG Tab Take 1 Tablet by mouth 2 times a day with meals. 180 Tablet 3   • amLODIPine (NORVASC) 5 MG Tab Take 2 Tablets by mouth every evening. 90 Tablet 3   • aspirin EC (ECOTRIN) 81 MG Tablet Delayed Response Take 81 mg by mouth every 48 hours.     • BREO ELLIPTA 100-25 MCG/INH AEROSOL POWDER, BREATH  ACTIVATED Inhale 1 Puff every day.     • atorvastatin (LIPITOR) 20 MG Tab Take 1 Tablet by mouth every evening. 90 Tablet 3   • gabapentin (NEURONTIN) 800 MG tablet Take 800 mg by mouth 2 times a day.     • Multiple Vitamins-Minerals (CENTRUM SILVER 50+MEN) Tab Take 1 Tab by mouth every morning.     • LUMIGAN 0.01 % Solution Administer 1 Drop into the left eye at bedtime.  3   • PROAIR  (90 Base) MCG/ACT Aero Soln inhalation aerosol Inhale 2 Puffs by mouth every four hours as needed for Shortness of Breath.     • metFORMIN (GLUCOPHAGE) 500 MG Tab Take 500 mg by mouth 2 times a day.     • escitalopram (LEXAPRO) 20 MG tablet Take 20 mg by mouth every morning. Indications: Major Depressive Disorder       No facility-administered encounter medications on file as of 2022.     Social History     Socioeconomic History   • Marital status:      Spouse name: Not on file   • Number of children: Not on file   • Years of education: Not on file   • Highest education level: Not on file   Occupational History   • Not on file   Tobacco Use   • Smoking status: Former Smoker     Packs/day: 1.50     Years: 13.00     Pack years: 19.50     Types: Cigarettes     Quit date: 1978     Years since quittin.4   • Smokeless tobacco: Never Used   Vaping Use   • Vaping Use: Never used   Substance and Sexual Activity   • Alcohol use: Not Currently     Alcohol/week: 0.0 oz   • Drug use: No   • Sexual activity: Not on file   Other Topics Concern   • Not on file   Social History Narrative   • Not on file     Social Determinants of Health     Financial Resource Strain: Not on file   Food Insecurity: Not on file   Transportation Needs: Not on file   Physical Activity: Not on file   Stress: Not on file   Social Connections: Not on file   Intimate Partner Violence: Not on file   Housing Stability: Not on file      Social History     Tobacco Use   Smoking Status Former Smoker   • Packs/day: 1.50   • Years: 13.00   • Pack  years: 19.50   • Types: Cigarettes   • Quit date: 1978   • Years since quittin.4   Smokeless Tobacco Never Used     Social History     Substance and Sexual Activity   Alcohol Use Not Currently   • Alcohol/week: 0.0 oz     Social History     Substance and Sexual Activity   Drug Use No      Family History   Problem Relation Age of Onset   • Stroke Mother    • Heart Disease Mother    • Stroke Father    • Heart Disease Father        Review of Systems   Constitutional: Positive for malaise/fatigue and weight loss.   Gastrointestinal: Positive for abdominal pain.        Change in appetite   Musculoskeletal: Positive for joint pain.   Endo/Heme/Allergies: Bruises/bleeds easily.   Psychiatric/Behavioral: Positive for depression. The patient has insomnia.    All other systems reviewed and are negative.       Objective:   There were no vitals taken for this visit.    Physical Exam  Vitals and nursing note reviewed.   Constitutional:       Appearance: Normal appearance.   HENT:      Head: Normocephalic.      Nose: Nose normal.      Mouth/Throat:      Mouth: Mucous membranes are moist.      Pharynx: Oropharynx is clear.   Eyes:      Extraocular Movements: Extraocular movements intact.      Conjunctiva/sclera: Conjunctivae normal.      Pupils: Pupils are equal, round, and reactive to light.   Cardiovascular:      Rate and Rhythm: Normal rate and regular rhythm.      Pulses: Normal pulses.      Heart sounds: Normal heart sounds.   Pulmonary:      Effort: Pulmonary effort is normal.      Breath sounds: Normal breath sounds.   Abdominal:      General: Abdomen is flat. Bowel sounds are normal.      Palpations: Abdomen is soft.      Tenderness: There is abdominal tenderness.      Comments: Subjective right lower quadrant and right upper quadrant pain near the gallbladder.   Musculoskeletal:         General: Normal range of motion.      Cervical back: Normal range of motion.   Skin:     General: Skin is warm and dry.    Neurological:      General: No focal deficit present.      Mental Status: He is alert and oriented to person, place, and time.   Psychiatric:         Mood and Affect: Mood normal.         Behavior: Behavior normal.         Thought Content: Thought content normal.         Judgment: Judgment normal.         Labs       Latest Reference Range & Units 02/19/22 00:32   WBC 4.8 - 10.8 K/uL 7.9   RBC 4.70 - 6.10 M/uL 4.61 (L)   Hemoglobin 14.0 - 18.0 g/dL 14.0   Hematocrit 42.0 - 52.0 % 39.4 (L)   MCV 81.4 - 97.8 fL 85.5   MCH 27.0 - 33.0 pg 30.4   MCHC 33.7 - 35.3 g/dL 35.5 (H)   RDW 35.9 - 50.0 fL 41.6   Platelet Count 164 - 446 K/uL 281   MPV 9.0 - 12.9 fL 10.3   Neutrophils-Polys 44.00 - 72.00 % 55.90   Neutrophils (Absolute) 1.82 - 7.42 K/uL 4.42 [1]   Lymphocytes 22.00 - 41.00 % 28.30   Lymphs (Absolute) 1.00 - 4.80 K/uL 2.24   Monocytes 0.00 - 13.40 % 13.10   Monos (Absolute) 0.00 - 0.85 K/uL 1.04 (H)   Eosinophils 0.00 - 6.90 % 1.60   Eos (Absolute) 0.00 - 0.51 K/uL 0.13   Basophils 0.00 - 1.80 % 0.60   Baso (Absolute) 0.00 - 0.12 K/uL 0.05   Immature Granulocytes 0.00 - 0.90 % 0.50   Immature Granulocytes (abs) 0.00 - 0.11 K/uL 0.04   Nucleated RBC /100 WBC 0.00   NRBC (Absolute) K/uL 0.00   Sodium 135 - 145 mmol/L 125 (L)   Potassium 3.6 - 5.5 mmol/L 3.9   Chloride 96 - 112 mmol/L 92 (L)   Co2 20 - 33 mmol/L 22   Anion Gap 7.0 - 16.0  11.0   Glucose 65 - 99 mg/dL 102 (H)   Bun 8 - 22 mg/dL 12   Creatinine 0.50 - 1.40 mg/dL 0.72   GFR If African American >60 mL/min/1.73 m 2 >60   GFR If Non African American >60 mL/min/1.73 m 2 >60   Calcium 8.5 - 10.5 mg/dL 8.8   AST(SGOT) 12 - 45 U/L 37   ALT(SGPT) 2 - 50 U/L 19   Alkaline Phosphatase 30 - 99 U/L 57   Total Bilirubin 0.1 - 1.5 mg/dL 1.5   Albumin 3.2 - 4.9 g/dL 3.9   Total Protein 6.0 - 8.2 g/dL 6.0   Globulin 1.9 - 3.5 g/dL 2.1   A-G Ratio g/dL 1.9   (L): Data is abnormally low  (H): Data is abnormally high  [1] Includes immature neutrophils, if present.      Imaging  CT ABDOMEN (4/25/2022)  Impression  Stable complex left hepatic lobe cyst.  No acute animality identified.    US ABDOMEN (2/28/2022)  Impression  LIVER EXHIBITS A STABLE SOLITARY 9 CM DIAMETER COMPLEX LEFT LOBE CYST WHEN COMPARED WITH THE PRIOR CT SCAN OF AUGUST 2021.     OTHERWISE UNREMARKABLE ABDOMINAL ORGANS.    Pathology  N/A    Procedures  N/A      Diagnosis:     1. Liver cyst     2. Abnormal CT of the abdomen     3. Type 2 diabetes mellitus with diabetic polyneuropathy, without long-term current use of insulin (HCC)     4. Alcohol use disorder, mild, abuse           Medical Decision Making:  Today's Assessment / Status / Plan:     In light of the present findings, the patient has pain in the right upper quadrant that is subjective and intermittent.  He has minimal pain on the left side in the left upper quadrant where the cyst is.  My recommendation is to get a HIDA scan to see whether he has gallbladder dyskinesia as well as to see if this liver cyst does connect with the biliary system.  Once we have that data we can make neck steps.  My recommendation to him is to continue with observation but if his gallbladder does show dysfunction then we would set him up for lap devendra and possible marsupialization of the large cyst.

## 2022-05-17 ENCOUNTER — OFFICE VISIT (OUTPATIENT)
Dept: SURGICAL ONCOLOGY | Facility: MEDICAL CENTER | Age: 77
End: 2022-05-17
Payer: COMMERCIAL

## 2022-05-17 VITALS
TEMPERATURE: 97.8 F | WEIGHT: 152.2 LBS | DIASTOLIC BLOOD PRESSURE: 62 MMHG | OXYGEN SATURATION: 97 % | SYSTOLIC BLOOD PRESSURE: 132 MMHG | HEIGHT: 67 IN | HEART RATE: 69 BPM | BODY MASS INDEX: 23.89 KG/M2

## 2022-05-17 DIAGNOSIS — F10.10 ALCOHOL USE DISORDER, MILD, ABUSE: ICD-10-CM

## 2022-05-17 DIAGNOSIS — R10.11 RIGHT UPPER QUADRANT PAIN: ICD-10-CM

## 2022-05-17 DIAGNOSIS — E11.42 TYPE 2 DIABETES MELLITUS WITH DIABETIC POLYNEUROPATHY, WITHOUT LONG-TERM CURRENT USE OF INSULIN (HCC): ICD-10-CM

## 2022-05-17 DIAGNOSIS — R93.5 ABNORMAL CT OF THE ABDOMEN: ICD-10-CM

## 2022-05-17 DIAGNOSIS — K76.89 LIVER CYST: ICD-10-CM

## 2022-05-17 PROCEDURE — 99205 OFFICE O/P NEW HI 60 MIN: CPT | Performed by: SURGERY

## 2022-05-17 RX ORDER — TRAZODONE HYDROCHLORIDE 50 MG/1
50 TABLET ORAL NIGHTLY
COMMUNITY
Start: 2022-05-11

## 2022-05-17 ASSESSMENT — ENCOUNTER SYMPTOMS
ROS GI COMMENTS: CHANGE IN APPETITE
BRUISES/BLEEDS EASILY: 1
INSOMNIA: 1
WEIGHT LOSS: 1
ABDOMINAL PAIN: 1
DEPRESSION: 1

## 2022-05-17 ASSESSMENT — FIBROSIS 4 INDEX: FIB4 SCORE: 2.24

## 2022-05-23 ENCOUNTER — OFFICE VISIT (OUTPATIENT)
Dept: URBAN - METROPOLITAN AREA CLINIC 10 | Facility: CLINIC | Age: 77
End: 2022-05-23
Payer: COMMERCIAL

## 2022-05-23 DIAGNOSIS — H43.811 VITREOUS DEGENERATION, RIGHT EYE: ICD-10-CM

## 2022-05-23 DIAGNOSIS — H40.1431 CAPSULAR GLAUCOMA WITH PSEUDOEXFOLIATION OF LENS, BILATERAL, MILD STAGE: Primary | ICD-10-CM

## 2022-05-23 DIAGNOSIS — H26.492 OTHER SECONDARY CATARACT, LEFT EYE: ICD-10-CM

## 2022-05-23 PROCEDURE — 92133 CPTRZD OPH DX IMG PST SGM ON: CPT | Performed by: STUDENT IN AN ORGANIZED HEALTH CARE EDUCATION/TRAINING PROGRAM

## 2022-05-23 PROCEDURE — 99214 OFFICE O/P EST MOD 30 MIN: CPT | Performed by: STUDENT IN AN ORGANIZED HEALTH CARE EDUCATION/TRAINING PROGRAM

## 2022-05-23 ASSESSMENT — VISUAL ACUITY
OS: 20/20
OD: 20/70

## 2022-05-23 ASSESSMENT — KERATOMETRY
OD: 40.75
OS: 44.00

## 2022-05-23 ASSESSMENT — INTRAOCULAR PRESSURE
OS: 14
OS: 16
OD: 3

## 2022-05-23 NOTE — IMPRESSION/PLAN
Impression: Capslr glaucoma w/pseudoef lens, bilateral, mild stage
(+)Asthma (+) SLT 2016,2019,2020; [facility]NI OD 6/21/21 Nancylealiyah Turk) S/P Baerveldt shunt OD 9/27/21 Intolerant to Simbrinza, Brimonidine, Brinzolomide Plan: Pt has Glaucoma    Gonio : 2-3+ CBB Istent OU       Pachs: 537/529 Today's IOP : 03/13  Tmax  :  45/24 Target IOP low to mid teens
(+)Fhx of Glaucoma: Mother Vision equal OU Last vf OD: Small island remaining  OS: Full 1/17/22 *New Baseline*
C/D:  0.75x0.8 loss of temporal /0.4x0.4
OCT: 67/81 1/17/22 Pt denies Sulfa Allergy // Pt denies Heart dx Plan :
*Pressure OD low, minimal retinal changes noted today. D/c LUMIGAN OD. 1.Continue Discontinue LUMIGAN OD due to low pressures, RTC 2-6 weeks for IOP, 24-2HVF, Mac OCT and refraction Lumigan QPM OS ONLY

RTC 2-6 weeks for IOP, refraction, 24-2HVF OU or sooner if any vision changes

## 2022-06-09 ENCOUNTER — HOSPITAL ENCOUNTER (OUTPATIENT)
Dept: RADIOLOGY | Facility: MEDICAL CENTER | Age: 77
End: 2022-06-09
Attending: SURGERY
Payer: COMMERCIAL

## 2022-06-09 DIAGNOSIS — E11.42 TYPE 2 DIABETES MELLITUS WITH DIABETIC POLYNEUROPATHY, WITHOUT LONG-TERM CURRENT USE OF INSULIN (HCC): ICD-10-CM

## 2022-06-09 DIAGNOSIS — R93.5 ABNORMAL CT OF THE ABDOMEN: ICD-10-CM

## 2022-06-09 DIAGNOSIS — F10.10 ALCOHOL USE DISORDER, MILD, ABUSE: ICD-10-CM

## 2022-06-09 DIAGNOSIS — R10.11 RIGHT UPPER QUADRANT PAIN: ICD-10-CM

## 2022-06-09 DIAGNOSIS — K76.89 LIVER CYST: ICD-10-CM

## 2022-06-09 PROCEDURE — A9537 TC99M MEBROFENIN: HCPCS

## 2022-06-09 NOTE — PROGRESS NOTES
Subjective:      Primary care physician: KENDELL Banegas  Referring Provider: FLAVIA Hinton    Chief Complaint: No chief complaint on file.    Diagnosis:   1. Liver cyst     2. Abnormal CT of the abdomen     3. Alcohol use disorder, mild, abuse     4. Right upper quadrant pain     5. Type 2 diabetes mellitus with diabetic polyneuropathy, without long-term current use of insulin (HCC)       History of presenting illness:    Ronny Gallegos  is a pleasant 76 y.o. male with a past medical history of coronary artery disease, moderate aortic insufficiency, type 2 diabetes mellitus, hypertension, hyperlipidemia, COPD, history of alcoholism, and chronic hyponatremia who presented in ED on 2/18/2022 with chest pain.  Incidentally was found to have right upper quadrant complicated liver cyst (approx 9 cm).       He is here today for evaluation of what was found to be a recurrence of a large hepatic cyst in the left lobe.  The patient has been treated back in 2016 for a painful cyst which she underwent marsupialization.  The pathology on that revealed no sign malignancy or biliary cystadenoma.  I have not seen the patient since 2016.  He denies any fever chills nausea or vomiting.  He denies any weight loss.  The story goes that he was complaining of multiple areas of pain in his abdomen and that because of the concern for missing something his primary care physician sent him for a CT scan.  CT scan showed a 9 cm cyst which is recurrent in the left lateral segment.  The patient also complains of right upper quadrant pain near the gallbladder fossa.  The rest of his liver is normal. Because of this, the daughter-in-law and the patient who I have seen in the past, came in for assessment.  He denies being jaundiced.  He denies any upper or lower GI bleeds.  He has no pain outside of the right upper quadrant and may be some questionable discomfort in the left upper quadrant patient does have a large mesh  repair from when he was in his 30s almost 40 years ago with a very unusual S shaped incision on his abdomen.  The patient denies any weight loss.  He denies being jaundiced.  He does not have hepatitis and he is not a drinker.  He is here with his daughter-in-law to discuss next steps.  He has not had any other diagnostic studies such as a HIDA scan. I have also reviewed the patient's old imaging as well as his pathology report.  He has previously undergone a marsupialization and it was negative for any malignancy or cystadenoma.    Update 6/14/22    CT ABDOMEN on 4/25/22 noted stable complex left hepatic lobe cyst.  No acute animality identified.    HIDA SCAN on 6/9/22 noted normal hepatobiliary scan. No evidence of acute cholecystitis.  Normal gallbladder ejection fraction. Normal gallbladder ejection fraction is 38% or greater.    He is here today for follow-up status post CT scan that was done at [facility] April 2022.  This was then followed by HIDA scan here at [facility] on June 9, 2022.  The patient's history is that I had done a robotic marsupialization on a large symptomatic liver cyst in the left lateral segment.  Patient has been seen since 2016 and returns now with these findings.  In talking to the patient, he does not describe any pain, nausea or vomiting, or early satiety.  He says he is a little achy on the right side of his abdomen into the back but not a classic Velez sign and has no pain on the left side.  He is eating well.  He has an ECOG performance status of 0.  He is very active.  He has had no change in appetite nor is he jaundice.  HIDA scan done in June revealed that this cyst does not communicate with the biliary system and his ejection fraction was measured as 38% or greater.  He was not symptomatic during the HIDA scan.  The patient CT scan in April revealed what was described as about a 7 soft recurrent cyst in the area of the previous marsupialization.  He is completely  "asymptomatic  I have personally reviewed the patient's recent imaging and his old imaging from 2016 as well as my op report.  Past Medical History:   Diagnosis Date   • Arthritis     right ankle/left knee   • Asthma     inhaler PRN    • CAD (coronary artery disease)     + CT scoring, negative PET cardiac in    • Depression    • Diabetes (HCC)     oral medication    • Glaucoma    • High cholesterol    • Hypertension    • Hypopotassemia    • Hyposmolality and/or hyponatremia    • Jaundice     \"from drinking after wife \"   • Neuropathy     bilat legs/feet   • Pain     right ankle    • Personal history of peptic ulcer disease    • Pneumonia    • Unspecified cataract     removed bilat     Past Surgical History:   Procedure Laterality Date   • PB REVISE KNEE JOINT REPLACE,ALL PARTS Left 2019    Procedure: REVISION, TOTAL ARTHROPLASTY, KNEE, ALL COMPONENTS - POLY EXCHANGE ONLY;  Surgeon: Parmjit Chrales M.D.;  Location: [facility];  Service: Orthopedics   • IRRIGATION & DEBRIDEMENT ORTHO  2019    Procedure: IRRIGATION AND DEBRIDEMENT, WOUND - KNEE;  Surgeon: Parmjit Charles M.D.;  Location: [facility];  Service: Orthopedics   • IRRIGATION & DEBRIDEMENT ORTHO Left 2019    Procedure: IRRIGATION AND DEBRIDEMENT, WOUND;  Surgeon: Jesús Wesley M.D.;  Location: [facility];  Service: Orthopedics   • ANKLE TOTAL ARTHROPLASTY Right 2018    Procedure: ANKLE TOTAL ARTHROPLASTY;  Surgeon: Jose De Jesus Medrano M.D.;  Location: [facility];  Service: Orthopedics   • LIGAMENT REPAIR Right 2018    Procedure: LIGAMENT REPAIR- LATERAL;  Surgeon: Jose De Jesus Medrano M.D.;  Location: [facility];  Service: Orthopedics   • LUMBAR LAMINECTOMY DISKECTOMY Right 2017    Procedure: LUMBAR LAMINECTOMY DISKECTOMY - RE-DO OPEN L4-S1 LAMINOTOMIES;  Surgeon: Clint Garsia M.D.;  Location: [facility];  Service:    • FORAMINOTOMY  2017 "    Procedure: FORAMINOTOMY;  Surgeon: Clint Garsia M.D.;  Location: SURGERY [facility];  Service:    • HARDWARE REMOVAL NEURO  6/13/2017    Procedure: HARDWARE REMOVAL NEURO ;  Surgeon: Clint Garsia M.D.;  Location: SURGERY [facility];  Service:    • LAPAROSCOPY ROBOTIC XI  10/26/2016    Procedure: LAPAROSCOPY FOR LIVER CYST MARSUPIALIZATION AND RESECTION LIVER WALL CYST;  Surgeon: Frank Corona M.D.;  Location: SURGERY [facility];  Service:    • FUSION, SPINE, LUMBAR, PLIF  9/10/2015    Procedure: LUMBAR FUSION POSTERIOR L5-S1 ;  Surgeon: Clint Garsia M.D.;  Location: SURGERY [facility];  Service:    • LUMBAR LAMINECTOMY DISKECTOMY  9/10/2015    Procedure: LUMBAR LAMINECTOMY ;  Surgeon: Clint Garsia M.D.;  Location: SURGERY [facility];  Service:    • LAMINOTOMY  9/10/2015    Procedure: LAMINOTOMY;  Surgeon: Clint Garsia M.D.;  Location: SURGERY [facility];  Service:    • OTHER  2005    ANTERIOR NECK FUSION C5-7   • OTHER  1978    BLEEDING ULCERS   • CATARACT EXTRACTION WITH IOL       No Known Allergies  Outpatient Encounter Medications as of 6/14/2022   Medication Sig Dispense Refill   • traZODone (DESYREL) 50 MG Tab TAKE 1/2 TABLET BY MOUTH AT BEDTIME AS NEEDED FOR SLEEP     • losartan (COZAAR) 25 MG Tab Take 2 Tablets by mouth every day. 90 Tablet 3   • carvedilol (COREG) 25 MG Tab Take 1 Tablet by mouth 2 times a day with meals. 180 Tablet 3   • amLODIPine (NORVASC) 5 MG Tab Take 2 Tablets by mouth every evening. 90 Tablet 3   • aspirin EC (ECOTRIN) 81 MG Tablet Delayed Response Take 81 mg by mouth every 48 hours.     • BREO ELLIPTA 100-25 MCG/INH AEROSOL POWDER, BREATH ACTIVATED Inhale 1 Puff every day.     • atorvastatin (LIPITOR) 20 MG Tab Take 1 Tablet by mouth every evening. 90 Tablet 3   • gabapentin (NEURONTIN) 800 MG tablet Take 800 mg by mouth 2 times a day.     • Multiple Vitamins-Minerals (CENTRUM SILVER 50+MEN) Tab Take 1 Tab by mouth every morning.      • LUMIGAN 0.01 % Solution Administer 1 Drop into the left eye at bedtime.  3   • PROAIR  (90 Base) MCG/ACT Aero Soln inhalation aerosol Inhale 2 Puffs by mouth every four hours as needed for Shortness of Breath.     • metFORMIN (GLUCOPHAGE) 500 MG Tab Take 500 mg by mouth 2 times a day.     • escitalopram (LEXAPRO) 20 MG tablet Take 20 mg by mouth every morning. Indications: Major Depressive Disorder       No facility-administered encounter medications on file as of 2022.     Social History     Socioeconomic History   • Marital status:      Spouse name: Not on file   • Number of children: Not on file   • Years of education: Not on file   • Highest education level: Not on file   Occupational History   • Not on file   Tobacco Use   • Smoking status: Former Smoker     Packs/day: 1.50     Years: 13.00     Pack years: 19.50     Types: Cigarettes     Quit date: 1978     Years since quittin.4   • Smokeless tobacco: Never Used   Vaping Use   • Vaping Use: Never used   Substance and Sexual Activity   • Alcohol use: Not Currently     Alcohol/week: 0.0 oz   • Drug use: No   • Sexual activity: Not on file   Other Topics Concern   • Not on file   Social History Narrative   • Not on file     Social Determinants of Health     Financial Resource Strain: Not on file   Food Insecurity: Not on file   Transportation Needs: Not on file   Physical Activity: Not on file   Stress: Not on file   Social Connections: Not on file   Intimate Partner Violence: Not on file   Housing Stability: Not on file      Social History     Tobacco Use   Smoking Status Former Smoker   • Packs/day: 1.50   • Years: 13.00   • Pack years: 19.50   • Types: Cigarettes   • Quit date: 1978   • Years since quittin.4   Smokeless Tobacco Never Used     Social History     Substance and Sexual Activity   Alcohol Use Not Currently   • Alcohol/week: 0.0 oz     Social History     Substance and Sexual Activity   Drug Use No       Family History   Problem Relation Age of Onset   • Stroke Mother    • Heart Disease Mother    • Stroke Father    • Heart Disease Father        Review of Systems   Constitutional: Positive for weight loss.   Respiratory: Positive for wheezing.    Gastrointestinal: Positive for abdominal pain.   Genitourinary: Positive for flank pain.   Musculoskeletal: Positive for joint pain.   Endo/Heme/Allergies: Bruises/bleeds easily.   Psychiatric/Behavioral: Positive for depression.   All other systems reviewed and are negative.       Objective:   There were no vitals taken for this visit.    Physical Exam  Vitals and nursing note reviewed.   Constitutional:       Appearance: Normal appearance.   HENT:      Head: Normocephalic.      Mouth/Throat:      Mouth: Mucous membranes are moist.      Pharynx: Oropharynx is clear.   Eyes:      Extraocular Movements: Extraocular movements intact.      Conjunctiva/sclera: Conjunctivae normal.      Pupils: Pupils are equal, round, and reactive to light.   Cardiovascular:      Rate and Rhythm: Normal rate and regular rhythm.      Pulses: Normal pulses.      Heart sounds: Normal heart sounds.   Pulmonary:      Effort: Pulmonary effort is normal.      Breath sounds: Normal breath sounds.   Abdominal:      General: Abdomen is flat. Bowel sounds are normal.      Palpations: Abdomen is soft.   Musculoskeletal:         General: Normal range of motion.      Cervical back: Normal range of motion.   Skin:     General: Skin is warm.   Neurological:      General: No focal deficit present.      Mental Status: He is alert and oriented to person, place, and time. Mental status is at baseline.   Psychiatric:         Mood and Affect: Mood normal.         Behavior: Behavior normal.         Thought Content: Thought content normal.         Judgment: Judgment normal.         Labs         Latest Reference Range & Units 02/19/22 00:32   WBC 4.8 - 10.8 K/uL 7.9   RBC 4.70 - 6.10 M/uL 4.61 (L)   Hemoglobin 14.0 -  18.0 g/dL 14.0   Hematocrit 42.0 - 52.0 % 39.4 (L)   MCV 81.4 - 97.8 fL 85.5   MCH 27.0 - 33.0 pg 30.4   MCHC 33.7 - 35.3 g/dL 35.5 (H)   RDW 35.9 - 50.0 fL 41.6   Platelet Count 164 - 446 K/uL 281   MPV 9.0 - 12.9 fL 10.3   Neutrophils-Polys 44.00 - 72.00 % 55.90   Neutrophils (Absolute) 1.82 - 7.42 K/uL 4.42 [1]   Lymphocytes 22.00 - 41.00 % 28.30   Lymphs (Absolute) 1.00 - 4.80 K/uL 2.24   Monocytes 0.00 - 13.40 % 13.10   Monos (Absolute) 0.00 - 0.85 K/uL 1.04 (H)   Eosinophils 0.00 - 6.90 % 1.60   Eos (Absolute) 0.00 - 0.51 K/uL 0.13   Basophils 0.00 - 1.80 % 0.60   Baso (Absolute) 0.00 - 0.12 K/uL 0.05   Immature Granulocytes 0.00 - 0.90 % 0.50   Immature Granulocytes (abs) 0.00 - 0.11 K/uL 0.04   Nucleated RBC /100 WBC 0.00   NRBC (Absolute) K/uL 0.00   Sodium 135 - 145 mmol/L 125 (L)   Potassium 3.6 - 5.5 mmol/L 3.9   Chloride 96 - 112 mmol/L 92 (L)   Co2 20 - 33 mmol/L 22   Anion Gap 7.0 - 16.0  11.0   Glucose 65 - 99 mg/dL 102 (H)   Bun 8 - 22 mg/dL 12   Creatinine 0.50 - 1.40 mg/dL 0.72   GFR If African American >60 mL/min/1.73 m 2 >60   GFR If Non African American >60 mL/min/1.73 m 2 >60   Calcium 8.5 - 10.5 mg/dL 8.8   AST(SGOT) 12 - 45 U/L 37   ALT(SGPT) 2 - 50 U/L 19   Alkaline Phosphatase 30 - 99 U/L 57   Total Bilirubin 0.1 - 1.5 mg/dL 1.5   Albumin 3.2 - 4.9 g/dL 3.9   Total Protein 6.0 - 8.2 g/dL 6.0   Globulin 1.9 - 3.5 g/dL 2.1   A-G Ratio g/dL 1.9   (L): Data is abnormally low  (H): Data is abnormally high  [1] Includes immature neutrophils, if present.      Imaging  HIDA SCAN (6/9/22)  IMPRESSION:  Normal hepatobiliary scan. No evidence of acute cholecystitis.  Normal gallbladder ejection fraction.  Normal gallbladder ejection fraction is 38% or greater.    CT ABDOMEN (4/25/2022)  Impression  Stable complex left hepatic lobe cyst.  No acute animality identified.     US ABDOMEN (2/28/2022)  Impression  LIVER EXHIBITS A STABLE SOLITARY 9 CM DIAMETER COMPLEX LEFT LOBE CYST WHEN COMPARED WITH THE  PRIOR CT SCAN OF AUGUST 2021.     OTHERWISE UNREMARKABLE ABDOMINAL ORGANS.     Pathology  SPECIMEN (10/26/16)  FINAL DIAGNOSIS:   A. Liver wall cyst:          Benign liver tissue and simple cyst lined by cuboidal biliary-type epithelium.      Procedures  SURGERY (10/26/16) by Frank Corona MD  PROCEDURES PERFORMED:  1.  Exploratory laparoscopy.  2.  Laparoscopic enterolysis.  3.  Liver cyst marsupialization.  4.  Wedge liver biopsy.      Diagnosis:     1. Liver cyst     2. Abnormal CT of the abdomen     3. Alcohol use disorder, mild, abuse     4. Right upper quadrant pain     5. Type 2 diabetes mellitus with diabetic polyneuropathy, without long-term current use of insulin (HCC)           Medical Decision Making:  Today's Assessment / Status / Plan:     In light of the present findings, the patient is completely asymptomatic.  Previously when we operated on in 2016 he was having significant pain in the left side and early satiety.  Presently he is having no symptoms thus I do not recommend any surgical intervention until he is symptomatic.  He clearly understands what the symptoms are and if it changes he will let me know.    I, Dr. Corona have entered, reviewed and confirmed the above diagnoses related to this patient on this date of service, 6/14/2022  8:54 AM.    He agreed and verbalized his agreement and understanding with the current plan. I answered all questions and concerns he has at this time and advised him to call at any time in the interim with questions or concerns in regards to his care.    Thank you for allowing me to participate in his care, I will continue to follow closely.       Please note that this dictation was created using voice recognition software. I have made every reasonable attempt to correct obvious errors, but I expect that there are errors of grammar and possibly content that I did not discover before finalizing the note.     Thank you for this consultation and allowing  me to participate in your patient's care. If I can be of further service please contact my office.

## 2022-06-14 ENCOUNTER — OFFICE VISIT (OUTPATIENT)
Dept: SURGICAL ONCOLOGY | Facility: MEDICAL CENTER | Age: 77
End: 2022-06-14
Payer: COMMERCIAL

## 2022-06-14 VITALS
TEMPERATURE: 97.1 F | BODY MASS INDEX: 24.12 KG/M2 | HEIGHT: 67 IN | WEIGHT: 153.7 LBS | DIASTOLIC BLOOD PRESSURE: 70 MMHG | HEART RATE: 60 BPM | OXYGEN SATURATION: 94 % | SYSTOLIC BLOOD PRESSURE: 112 MMHG

## 2022-06-14 DIAGNOSIS — R10.11 RIGHT UPPER QUADRANT PAIN: ICD-10-CM

## 2022-06-14 DIAGNOSIS — E11.42 TYPE 2 DIABETES MELLITUS WITH DIABETIC POLYNEUROPATHY, WITHOUT LONG-TERM CURRENT USE OF INSULIN (HCC): ICD-10-CM

## 2022-06-14 DIAGNOSIS — R93.5 ABNORMAL CT OF THE ABDOMEN: ICD-10-CM

## 2022-06-14 DIAGNOSIS — F10.10 ALCOHOL USE DISORDER, MILD, ABUSE: ICD-10-CM

## 2022-06-14 DIAGNOSIS — K76.89 LIVER CYST: ICD-10-CM

## 2022-06-14 PROCEDURE — 99215 OFFICE O/P EST HI 40 MIN: CPT | Performed by: SURGERY

## 2022-06-14 ASSESSMENT — FIBROSIS 4 INDEX: FIB4 SCORE: 2.24

## 2022-06-14 ASSESSMENT — ENCOUNTER SYMPTOMS
BRUISES/BLEEDS EASILY: 1
DEPRESSION: 1
FLANK PAIN: 1
ABDOMINAL PAIN: 1
WHEEZING: 1
WEIGHT LOSS: 1

## 2022-06-14 NOTE — PROGRESS NOTES
Primary care physician: KENDELL Banegas  Referring Provider: FLAVIA Hinton    Chief Complaint: No chief complaint on file.    Diagnosis:   1. Liver cyst     2. Abnormal CT of the abdomen     3. Alcohol use disorder, mild, abuse     4. Right upper quadrant pain     5. Type 2 diabetes mellitus with diabetic polyneuropathy, without long-term current use of insulin (HCC)       History of presenting illness:    Ronny Gallegos  is a pleasant 76 y.o. male with a past medical history of coronary artery disease, moderate aortic insufficiency, type 2 diabetes mellitus, hypertension, hyperlipidemia, COPD, history of alcoholism, and chronic hyponatremia who presented in ED on 2/18/2022 with chest pain.  Incidentally was found to have right upper quadrant complicated liver cyst (approx 9 cm).       He is here today for evaluation of what was found to be a recurrence of a large hepatic cyst in the left lobe.  The patient has been treated back in 2016 for a painful cyst which she underwent marsupialization.  The pathology on that revealed no sign malignancy or biliary cystadenoma.  I have not seen the patient since 2016.  He denies any fever chills nausea or vomiting.  He denies any weight loss.  The story goes that he was complaining of multiple areas of pain in his abdomen and that because of the concern for missing something his primary care physician sent him for a CT scan.  CT scan showed a 9 cm cyst which is recurrent in the left lateral segment.  The patient also complains of right upper quadrant pain near the gallbladder fossa.  The rest of his liver is normal. Because of this, the daughter-in-law and the patient who I have seen in the past, came in for assessment.  He denies being jaundiced.  He denies any upper or lower GI bleeds.  He has no pain outside of the right upper quadrant and may be some questionable discomfort in the left upper quadrant patient does have a large mesh repair from  when he was in his 30s almost 40 years ago with a very unusual S shaped incision on his abdomen.  The patient denies any weight loss.  He denies being jaundiced.  He does not have hepatitis and he is not a drinker.  He is here with his daughter-in-law to discuss next steps.  He has not had any other diagnostic studies such as a HIDA scan. I have also reviewed the patient's old imaging as well as his pathology report.  He has previously undergone a marsupialization and it was negative for any malignancy or cystadenoma.    Update 6/14/22    CT ABDOMEN on 4/25/22 noted stable complex left hepatic lobe cyst.  No acute animality identified.    HIDA SCAN on 6/9/22 noted normal hepatobiliary scan. No evidence of acute cholecystitis.  Normal gallbladder ejection fraction. Normal gallbladder ejection fraction is 38% or greater.    He is here today for follow-up status post CT scan that was done at [facility] April 2022.  This was then followed by HIDA scan here at [facility] on June 9, 2022.  The patient's history is that I had done a robotic marsupialization on a large symptomatic liver cyst in the left lateral segment.  Patient has been seen since 2016 and returns now with these findings.  In talking to the patient, he does not describe any pain, nausea or vomiting, or early satiety.  He says he is a little achy on the right side of his abdomen into the back but not a classic Velez sign and has no pain on the left side.  He is eating well.  He has an ECOG performance status of 0.  He is very active.  He has had no change in appetite nor is he jaundice.  HIDA scan done in June revealed that this cyst does not communicate with the biliary system and his ejection fraction was measured as 38% or greater.  He was not symptomatic during the HIDA scan.  The patient CT scan in April revealed what was described as about a 7 soft recurrent cyst in the area of the previous marsupialization.  He is completely  "asymptomatic  I have personally reviewed the patient's recent imaging and his old imaging from 2016 as well as my op report.  Past Medical History:   Diagnosis Date   • Arthritis     right ankle/left knee   • Asthma     inhaler PRN    • CAD (coronary artery disease)     + CT scoring, negative PET cardiac in    • Depression    • Diabetes (HCC)     oral medication    • Glaucoma    • High cholesterol    • Hypertension    • Hypopotassemia    • Hyposmolality and/or hyponatremia    • Jaundice     \"from drinking after wife \"   • Neuropathy     bilat legs/feet   • Pain     right ankle    • Personal history of peptic ulcer disease    • Pneumonia    • Unspecified cataract     removed bilat     Past Surgical History:   Procedure Laterality Date   • PB REVISE KNEE JOINT REPLACE,ALL PARTS Left 2019    Procedure: REVISION, TOTAL ARTHROPLASTY, KNEE, ALL COMPONENTS - POLY EXCHANGE ONLY;  Surgeon: Parmjit Charles M.D.;  Location: [facility];  Service: Orthopedics   • IRRIGATION & DEBRIDEMENT ORTHO  2019    Procedure: IRRIGATION AND DEBRIDEMENT, WOUND - KNEE;  Surgeon: Parmjit Charles M.D.;  Location: [facility];  Service: Orthopedics   • IRRIGATION & DEBRIDEMENT ORTHO Left 2019    Procedure: IRRIGATION AND DEBRIDEMENT, WOUND;  Surgeon: Jesús Wesley M.D.;  Location: [facility];  Service: Orthopedics   • ANKLE TOTAL ARTHROPLASTY Right 2018    Procedure: ANKLE TOTAL ARTHROPLASTY;  Surgeon: Jose De Jesus Medrano M.D.;  Location: [facility];  Service: Orthopedics   • LIGAMENT REPAIR Right 2018    Procedure: LIGAMENT REPAIR- LATERAL;  Surgeon: Jose De Jesus Medrano M.D.;  Location: [facility];  Service: Orthopedics   • LUMBAR LAMINECTOMY DISKECTOMY Right 2017    Procedure: LUMBAR LAMINECTOMY DISKECTOMY - RE-DO OPEN L4-S1 LAMINOTOMIES;  Surgeon: Clint Garsia M.D.;  Location: [facility];  Service:    • FORAMINOTOMY  2017 "    Procedure: FORAMINOTOMY;  Surgeon: Clint Garsia M.D.;  Location: [facility];  Service:    • HARDWARE REMOVAL NEURO  6/13/2017    Procedure: HARDWARE REMOVAL NEURO ;  Surgeon: Clint Garsia M.D.;  Location: SUR[facility]ERY [facility];  Service:    • LAPAROSCOPY ROBOTIC XI  10/26/2016    Procedure: LAPAROSCOPY FOR LIVER CYST MARSUPIALIZATION AND RESECTION LIVER WALL CYST;  Surgeon: Frank Corona M.D.;  Location: SURGERY [facility];  Service:    • FUSION, SPINE, LUMBAR, PLIF  9/10/2015    Procedure: LUMBAR FUSION POSTERIOR L5-S1 ;  Surgeon: Clint Garsia M.D.;  Location: SUR[facility]ERY [facility];  Service:    • LUMBAR LAMINECTOMY DISKECTOMY  9/10/2015    Procedure: LUMBAR LAMINECTOMY ;  Surgeon: Clint Garsia M.D.;  Location: SUR[facility]ERY [facility];  Service:    • LAMINOTOMY  9/10/2015    Procedure: LAMINOTOMY;  Surgeon: Clint Garsia M.D.;  Location: SURGERY [facility];  Service:    • OTHER  2005    ANTERIOR NECK FUSION C5-7   • OTHER  1978    BLEEDING ULCERS   • CATARACT EXTRACTION WITH IOL       No Known Allergies  Outpatient Encounter Medications as of 6/14/2022   Medication Sig Dispense Refill   • traZODone (DESYREL) 50 MG Tab TAKE 1/2 TABLET BY MOUTH AT BEDTIME AS NEEDED FOR SLEEP     • losartan (COZAAR) 25 MG Tab Take 2 Tablets by mouth every day. 90 Tablet 3   • carvedilol (COREG) 25 MG Tab Take 1 Tablet by mouth 2 times a day with meals. 180 Tablet 3   • amLODIPine (NORVASC) 5 MG Tab Take 2 Tablets by mouth every evening. 90 Tablet 3   • aspirin EC (ECOTRIN) 81 MG Tablet Delayed Response Take 81 mg by mouth every 48 hours.     • BREO ELLIPTA 100-25 MCG/INH AEROSOL POWDER, BREATH ACTIVATED Inhale 1 Puff every day.     • atorvastatin (LIPITOR) 20 MG Tab Take 1 Tablet by mouth every evening. 90 Tablet 3   • gabapentin (NEURONTIN) 800 MG tablet Take 800 mg by mouth 2 times a day.     • Multiple Vitamins-Minerals (CENTRUM SILVER 50+MEN) Tab Take 1 Tab by mouth every morning.      • LUMIGAN 0.01 % Solution Administer 1 Drop into the left eye at bedtime.  3   • PROAIR  (90 Base) MCG/ACT Aero Soln inhalation aerosol Inhale 2 Puffs by mouth every four hours as needed for Shortness of Breath.     • metFORMIN (GLUCOPHAGE) 500 MG Tab Take 500 mg by mouth 2 times a day.     • escitalopram (LEXAPRO) 20 MG tablet Take 20 mg by mouth every morning. Indications: Major Depressive Disorder       No facility-administered encounter medications on file as of 2022.     Social History     Socioeconomic History   • Marital status:      Spouse name: Not on file   • Number of children: Not on file   • Years of education: Not on file   • Highest education level: Not on file   Occupational History   • Not on file   Tobacco Use   • Smoking status: Former Smoker     Packs/day: 1.50     Years: 13.00     Pack years: 19.50     Types: Cigarettes     Quit date: 1978     Years since quittin.4   • Smokeless tobacco: Never Used   Vaping Use   • Vaping Use: Never used   Substance and Sexual Activity   • Alcohol use: Not Currently     Alcohol/week: 0.0 oz   • Drug use: No   • Sexual activity: Not on file   Other Topics Concern   • Not on file   Social History Narrative   • Not on file     Social Determinants of Health     Financial Resource Strain: Not on file   Food Insecurity: Not on file   Transportation Needs: Not on file   Physical Activity: Not on file   Stress: Not on file   Social Connections: Not on file   Intimate Partner Violence: Not on file   Housing Stability: Not on file      Social History     Tobacco Use   Smoking Status Former Smoker   • Packs/day: 1.50   • Years: 13.00   • Pack years: 19.50   • Types: Cigarettes   • Quit date: 1978   • Years since quittin.4   Smokeless Tobacco Never Used     Social History     Substance and Sexual Activity   Alcohol Use Not Currently   • Alcohol/week: 0.0 oz     Social History     Substance and Sexual Activity   Drug Use No       Family History   Problem Relation Age of Onset   • Stroke Mother    • Heart Disease Mother    • Stroke Father    • Heart Disease Father        Review of Systems   Constitutional: Positive for weight loss.   Respiratory: Positive for wheezing.    Gastrointestinal: Positive for abdominal pain.   Genitourinary: Positive for flank pain.   Musculoskeletal: Positive for joint pain.   Endo/Heme/Allergies: Bruises/bleeds easily.   Psychiatric/Behavioral: Positive for depression.   All other systems reviewed and are negative.       Objective:   There were no vitals taken for this visit.    Physical Exam  Vitals and nursing note reviewed.   Constitutional:       Appearance: Normal appearance.   HENT:      Head: Normocephalic.      Mouth/Throat:      Mouth: Mucous membranes are moist.      Pharynx: Oropharynx is clear.   Eyes:      Extraocular Movements: Extraocular movements intact.      Conjunctiva/sclera: Conjunctivae normal.      Pupils: Pupils are equal, round, and reactive to light.   Cardiovascular:      Rate and Rhythm: Normal rate and regular rhythm.      Pulses: Normal pulses.      Heart sounds: Normal heart sounds.   Pulmonary:      Effort: Pulmonary effort is normal.      Breath sounds: Normal breath sounds.   Abdominal:      General: Abdomen is flat. Bowel sounds are normal.      Palpations: Abdomen is soft.   Musculoskeletal:         General: Normal range of motion.      Cervical back: Normal range of motion.   Skin:     General: Skin is warm.   Neurological:      General: No focal deficit present.      Mental Status: He is alert and oriented to person, place, and time. Mental status is at baseline.   Psychiatric:         Mood and Affect: Mood normal.         Behavior: Behavior normal.         Thought Content: Thought content normal.         Judgment: Judgment normal.         Labs         Latest Reference Range & Units 02/19/22 00:32   WBC 4.8 - 10.8 K/uL 7.9   RBC 4.70 - 6.10 M/uL 4.61 (L)   Hemoglobin 14.0 -  18.0 g/dL 14.0   Hematocrit 42.0 - 52.0 % 39.4 (L)   MCV 81.4 - 97.8 fL 85.5   MCH 27.0 - 33.0 pg 30.4   MCHC 33.7 - 35.3 g/dL 35.5 (H)   RDW 35.9 - 50.0 fL 41.6   Platelet Count 164 - 446 K/uL 281   MPV 9.0 - 12.9 fL 10.3   Neutrophils-Polys 44.00 - 72.00 % 55.90   Neutrophils (Absolute) 1.82 - 7.42 K/uL 4.42 [1]   Lymphocytes 22.00 - 41.00 % 28.30   Lymphs (Absolute) 1.00 - 4.80 K/uL 2.24   Monocytes 0.00 - 13.40 % 13.10   Monos (Absolute) 0.00 - 0.85 K/uL 1.04 (H)   Eosinophils 0.00 - 6.90 % 1.60   Eos (Absolute) 0.00 - 0.51 K/uL 0.13   Basophils 0.00 - 1.80 % 0.60   Baso (Absolute) 0.00 - 0.12 K/uL 0.05   Immature Granulocytes 0.00 - 0.90 % 0.50   Immature Granulocytes (abs) 0.00 - 0.11 K/uL 0.04   Nucleated RBC /100 WBC 0.00   NRBC (Absolute) K/uL 0.00   Sodium 135 - 145 mmol/L 125 (L)   Potassium 3.6 - 5.5 mmol/L 3.9   Chloride 96 - 112 mmol/L 92 (L)   Co2 20 - 33 mmol/L 22   Anion Gap 7.0 - 16.0  11.0   Glucose 65 - 99 mg/dL 102 (H)   Bun 8 - 22 mg/dL 12   Creatinine 0.50 - 1.40 mg/dL 0.72   GFR If African American >60 mL/min/1.73 m 2 >60   GFR If Non African American >60 mL/min/1.73 m 2 >60   Calcium 8.5 - 10.5 mg/dL 8.8   AST(SGOT) 12 - 45 U/L 37   ALT(SGPT) 2 - 50 U/L 19   Alkaline Phosphatase 30 - 99 U/L 57   Total Bilirubin 0.1 - 1.5 mg/dL 1.5   Albumin 3.2 - 4.9 g/dL 3.9   Total Protein 6.0 - 8.2 g/dL 6.0   Globulin 1.9 - 3.5 g/dL 2.1   A-G Ratio g/dL 1.9   (L): Data is abnormally low  (H): Data is abnormally high  [1] Includes immature neutrophils, if present.      Imaging  HIDA SCAN (6/9/22)  IMPRESSION:  Normal hepatobiliary scan. No evidence of acute cholecystitis.  Normal gallbladder ejection fraction.  Normal gallbladder ejection fraction is 38% or greater.    CT ABDOMEN (4/25/2022)  Impression  Stable complex left hepatic lobe cyst.  No acute animality identified.     US ABDOMEN (2/28/2022)  Impression  LIVER EXHIBITS A STABLE SOLITARY 9 CM DIAMETER COMPLEX LEFT LOBE CYST WHEN COMPARED WITH THE  PRIOR CT SCAN OF AUGUST 2021.     OTHERWISE UNREMARKABLE ABDOMINAL ORGANS.     Pathology  SPECIMEN (10/26/16)  FINAL DIAGNOSIS:   A. Liver wall cyst:          Benign liver tissue and simple cyst lined by cuboidal biliary-type epithelium.      Procedures  SURGERY (10/26/16) by Frank Corona MD  PROCEDURES PERFORMED:  1.  Exploratory laparoscopy.  2.  Laparoscopic enterolysis.  3.  Liver cyst marsupialization.  4.  Wedge liver biopsy.      Diagnosis:     1. Liver cyst     2. Abnormal CT of the abdomen     3. Alcohol use disorder, mild, abuse     4. Right upper quadrant pain     5. Type 2 diabetes mellitus with diabetic polyneuropathy, without long-term current use of insulin (HCC)           Medical Decision Making:  Today's Assessment / Status / Plan:     In light of the present findings, the patient is completely asymptomatic.  Previously when we operated on in 2016 he was having significant pain in the left side and early satiety.  Presently he is having no symptoms thus I do not recommend any surgical intervention until he is symptomatic.  He clearly understands what the symptoms are and if it changes he will let me know.    I, Dr. Corona have entered, reviewed and confirmed the above diagnoses related to this patient on this date of service, 6/14/2022  8:54 AM.    He agreed and verbalized his agreement and understanding with the current plan. I answered all questions and concerns he has at this time and advised him to call at any time in the interim with questions or concerns in regards to his care.    Thank you for allowing me to participate in his care, I will continue to follow closely.       Please note that this dictation was created using voice recognition software. I have made every reasonable attempt to correct obvious errors, but I expect that there are errors of grammar and possibly content that I did not discover before finalizing the note.     Thank you for this consultation and allowing  me to participate in your patient's care. If I can be of further service please contact my office.

## 2022-07-05 ENCOUNTER — OFFICE VISIT (OUTPATIENT)
Dept: URBAN - METROPOLITAN AREA CLINIC 10 | Facility: CLINIC | Age: 77
End: 2022-07-05
Payer: COMMERCIAL

## 2022-07-05 DIAGNOSIS — H44.431 HYPOTONY OF EYE DUE TO OTHER OCULAR DISORDERS, RIGHT EYE: ICD-10-CM

## 2022-07-05 DIAGNOSIS — H40.1431 CAPSULAR GLAUCOMA WITH PSEUDOEXFOLIATION OF LENS, BILATERAL, MILD STAGE: Primary | ICD-10-CM

## 2022-07-05 PROCEDURE — 99214 OFFICE O/P EST MOD 30 MIN: CPT | Performed by: STUDENT IN AN ORGANIZED HEALTH CARE EDUCATION/TRAINING PROGRAM

## 2022-07-05 ASSESSMENT — INTRAOCULAR PRESSURE
OS: 10
OD: 6
OS: 7
OD: 4

## 2022-07-05 ASSESSMENT — VISUAL ACUITY
OS: 20/20
OD: 20/200

## 2022-07-05 NOTE — IMPRESSION/PLAN
Impression: Capslr glaucoma w/pseudoef lens, bilateral, mild stage
(+)Asthma (+) SLT 2016,2019,2020; OMNI OD 6/21/21 Josseline Chung) S/P Baerveldt shunt OD 9/27/21 Intolerant to Simbrinza, Brimonidine, Brinzolomide Plan: Pt has Glaucoma    Gonio : 2-3+ CBB Istent OU       Pachs: 537/529 Today's IOP : 07 OD  Tmax  :  45/24 Target IOP low to mid teens
(+)Fhx of Glaucoma: Mother Vision equal OU Last vf OD: Small island remaining  OS: Full 1/17/22 *New Baseline*
C/D:  0.75x0.8 loss of temporal /0.4x0.4
OCT: 67/81 1/17/22 Pt denies Sulfa Allergy // Pt denies Heart dx Plan :
1. LOW IOP OD 7 today. OFF DROPS. OCT macular edema OD today. Lumigan QPM OS ONLY 2. Significant progression OD since last HVF, unreliable HVF today. Will reach out to Dr. Emma Desais to schedule next appt.

## 2022-07-05 NOTE — IMPRESSION/PLAN
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 Plan: IOP 6,7 OD today, off all glaucoma drops OD.  Macular edema present on examination and OCT today, referred to retina for eval.

## 2022-07-07 ENCOUNTER — TELEPHONE (OUTPATIENT)
Dept: CARDIOLOGY | Facility: MEDICAL CENTER | Age: 77
End: 2022-07-07
Payer: COMMERCIAL

## 2022-07-07 ENCOUNTER — OFFICE VISIT (OUTPATIENT)
Dept: CARDIOLOGY | Facility: MEDICAL CENTER | Age: 77
End: 2022-07-07
Payer: COMMERCIAL

## 2022-07-07 VITALS
SYSTOLIC BLOOD PRESSURE: 130 MMHG | WEIGHT: 161.1 LBS | OXYGEN SATURATION: 98 % | HEIGHT: 67 IN | HEART RATE: 62 BPM | DIASTOLIC BLOOD PRESSURE: 80 MMHG | RESPIRATION RATE: 14 BRPM | BODY MASS INDEX: 25.28 KG/M2

## 2022-07-07 DIAGNOSIS — E11.42 TYPE 2 DIABETES MELLITUS WITH DIABETIC POLYNEUROPATHY, WITHOUT LONG-TERM CURRENT USE OF INSULIN (HCC): ICD-10-CM

## 2022-07-07 DIAGNOSIS — I25.10 CORONARY ARTERY DISEASE DUE TO LIPID RICH PLAQUE: ICD-10-CM

## 2022-07-07 DIAGNOSIS — E78.2 MIXED HYPERLIPIDEMIA: ICD-10-CM

## 2022-07-07 DIAGNOSIS — I35.1 AORTIC VALVE INSUFFICIENCY, ETIOLOGY OF CARDIAC VALVE DISEASE UNSPECIFIED: ICD-10-CM

## 2022-07-07 DIAGNOSIS — I25.83 CORONARY ARTERY DISEASE DUE TO LIPID RICH PLAQUE: ICD-10-CM

## 2022-07-07 DIAGNOSIS — I10 ESSENTIAL HYPERTENSION: ICD-10-CM

## 2022-07-07 PROCEDURE — 99214 OFFICE O/P EST MOD 30 MIN: CPT | Performed by: NURSE PRACTITIONER

## 2022-07-07 RX ORDER — SERTRALINE HYDROCHLORIDE 100 MG/1
100 TABLET, FILM COATED ORAL DAILY
COMMUNITY
Start: 2022-06-28 | End: 2023-07-14

## 2022-07-07 RX ORDER — AMLODIPINE BESYLATE 10 MG/1
10 TABLET ORAL EVERY EVENING
Qty: 90 TABLET | Refills: 3 | Status: SHIPPED | OUTPATIENT
Start: 2022-07-07 | End: 2023-05-19 | Stop reason: SDUPTHER

## 2022-07-07 RX ORDER — CARVEDILOL 25 MG/1
25 TABLET ORAL 2 TIMES DAILY WITH MEALS
Qty: 180 TABLET | Refills: 3 | Status: SHIPPED | OUTPATIENT
Start: 2022-07-07 | End: 2023-02-28 | Stop reason: SDUPTHER

## 2022-07-07 RX ORDER — ATORVASTATIN CALCIUM 20 MG/1
20 TABLET, FILM COATED ORAL EVERY EVENING
Qty: 90 TABLET | Refills: 3 | Status: SHIPPED | OUTPATIENT
Start: 2022-07-07 | End: 2023-05-19 | Stop reason: SDUPTHER

## 2022-07-07 RX ORDER — LOSARTAN POTASSIUM 50 MG/1
50 TABLET ORAL DAILY
Qty: 90 TABLET | Refills: 3 | Status: SHIPPED | OUTPATIENT
Start: 2022-07-07 | End: 2023-05-19 | Stop reason: SDUPTHER

## 2022-07-07 ASSESSMENT — ENCOUNTER SYMPTOMS
MYALGIAS: 0
FEVER: 0
CLAUDICATION: 0
COUGH: 0
SHORTNESS OF BREATH: 0
BACK PAIN: 0
ABDOMINAL PAIN: 0
DIZZINESS: 0
PALPITATIONS: 0
SENSORY CHANGE: 0
ORTHOPNEA: 0
PND: 0

## 2022-07-07 ASSESSMENT — FIBROSIS 4 INDEX: FIB4 SCORE: 2.24

## 2022-07-07 NOTE — TELEPHONE ENCOUNTER
Per [place] request lab results from [facility]. P#[phone]. Records have been received and scanned into patient chart.

## 2022-07-08 NOTE — PROGRESS NOTES
"Subjective:   Ronny Gallegos is a 71 y.o. male who presents today for annual follow up for his cardiac conditions.    Hx of HLD, HTN, and CAD (+ CT scoring in '15).    He is overall doing well from a cardiac standpoint.    He has no exertional symptoms. He feels good..    He has no chest pain, shortness of breath, edema, dizziness/lightheadedness, or palpitations.    Past Medical History:   Diagnosis Date   • Arthritis     right ankle/left knee   • Asthma     inhaler PRN    • CAD (coronary artery disease)     + CT scoring, negative PET cardiac in    • Depression    • Diabetes (HCC)     oral medication    • Glaucoma    • High cholesterol    • Hypertension    • Hypopotassemia    • Hyposmolality and/or hyponatremia    • Jaundice     \"from drinking after wife \"   • Neuropathy     bilat legs/feet   • Pain     right ankle    • Personal history of peptic ulcer disease    • Pneumonia    • Unspecified cataract     removed bilat     Past Surgical History:   Procedure Laterality Date   • PB REVISE KNEE JOINT REPLACE,ALL PARTS Left 2019    Procedure: REVISION, TOTAL ARTHROPLASTY, KNEE, ALL COMPONENTS - POLY EXCHANGE ONLY;  Surgeon: Parmjit Charles M.D.;  Location: [facility];  Service: Orthopedics   • IRRIGATION & DEBRIDEMENT ORTHO  2019    Procedure: IRRIGATION AND DEBRIDEMENT, WOUND - KNEE;  Surgeon: Parmjit Charles M.D.;  Location: [facility];  Service: Orthopedics   • IRRIGATION & DEBRIDEMENT ORTHO Left 2019    Procedure: IRRIGATION AND DEBRIDEMENT, WOUND;  Surgeon: Jesús Wesley M.D.;  Location: [facility];  Service: Orthopedics   • ANKLE TOTAL ARTHROPLASTY Right 2018    Procedure: ANKLE TOTAL ARTHROPLASTY;  Surgeon: Jose De Jesus Medrano M.D.;  Location: [facility];  Service: Orthopedics   • LIGAMENT REPAIR Right 2018    Procedure: LIGAMENT REPAIR- LATERAL;  Surgeon: Jose De Jesus Medrano M.D.;  Location: [facility];  " Service: Orthopedics   • LUMBAR LAMINECTOMY DISKECTOMY Right 2017    Procedure: LUMBAR LAMINECTOMY DISKECTOMY - RE-DO OPEN L4-S1 LAMINOTOMIES;  Surgeon: Clint Garsia M.D.;  Location: [facility];  Service:    • FORAMINOTOMY  2017    Procedure: FORAMINOTOMY;  Surgeon: Clint Garsia M.D.;  Location: SUR[facility];  Service:    • HARDWARE REMOVAL NEURO  2017    Procedure: HARDWARE REMOVAL NEURO ;  Surgeon: Clint Garsia M.D.;  Location: SUR[facility]ERY [facility];  Service:    • LAPAROSCOPY ROBOTIC XI  10/26/2016    Procedure: LAPAROSCOPY FOR LIVER CYST MARSUPIALIZATION AND RESECTION LIVER WALL CYST;  Surgeon: Frank Corona M.D.;  Location: SURGERY [facility];  Service:    • FUSION, SPINE, LUMBAR, PLIF  9/10/2015    Procedure: LUMBAR FUSION POSTERIOR L5-S1 ;  Surgeon: Clint Garsia M.D.;  Location: SUR[facility];  Service:    • LUMBAR LAMINECTOMY DISKECTOMY  9/10/2015    Procedure: LUMBAR LAMINECTOMY ;  Surgeon: Clint Garsia M.D.;  Location: SUR[facility];  Service:    • LAMINOTOMY  9/10/2015    Procedure: LAMINOTOMY;  Surgeon: Clint Garsia M.D.;  Location: [facility];  Service:    • OTHER      ANTERIOR NECK FUSION C5-7   • OTHER      BLEEDING ULCERS   • CATARACT EXTRACTION WITH IOL       Family History   Problem Relation Age of Onset   • Stroke Mother    • Heart Disease Mother    • Stroke Father    • Heart Disease Father      Social History     Tobacco Use   Smoking Status Former Smoker   • Packs/day: 1.50   • Years: 13.00   • Pack years: 19.50   • Types: Cigarettes   • Quit date: 1978   • Years since quittin.5   Smokeless Tobacco Never Used     No Known Allergies  Outpatient Encounter Medications as of 2022   Medication Sig Dispense Refill   • amLODIPine (NORVASC) 10 MG Tab Take 1 Tablet by mouth every evening. 90 Tablet 3   • aspirin EC (ECOTRIN) 81 MG Tablet Delayed Response Take 1 Tablet by mouth  every day. 90 Tablet 3   • atorvastatin (LIPITOR) 20 MG Tab Take 1 Tablet by mouth every evening. 90 Tablet 3   • carvedilol (COREG) 25 MG Tab Take 1 Tablet by mouth 2 times a day with meals. 180 Tablet 3   • losartan (COZAAR) 50 MG Tab Take 1 Tablet by mouth every day. 90 Tablet 3   • traZODone (DESYREL) 50 MG Tab Take 50 mg by mouth every evening. Only taking 1/2 tab of 25mg     • BREO ELLIPTA 100-25 MCG/INH AEROSOL POWDER, BREATH ACTIVATED Inhale 1 Puff every day.     • gabapentin (NEURONTIN) 800 MG tablet Take 800 mg by mouth 2 times a day.     • Multiple Vitamins-Minerals (CENTRUM SILVER 50+MEN) Tab Take 1 Tab by mouth every morning.     • LUMIGAN 0.01 % Solution Administer 1 Drop into the left eye at bedtime.  3   • PROAIR  (90 Base) MCG/ACT Aero Soln inhalation aerosol Inhale 2 Puffs by mouth every four hours as needed for Shortness of Breath.     • metFORMIN (GLUCOPHAGE) 500 MG Tab Take 500 mg by mouth 2 times a day.     • sertraline (ZOLOFT) 100 MG Tab Take 100 mg by mouth every day.     • sertraline (ZOLOFT) 50 MG Tab      • [DISCONTINUED] losartan (COZAAR) 25 MG Tab Take 2 Tablets by mouth every day. 90 Tablet 3   • [DISCONTINUED] carvedilol (COREG) 25 MG Tab Take 1 Tablet by mouth 2 times a day with meals. 180 Tablet 3   • [DISCONTINUED] amLODIPine (NORVASC) 5 MG Tab Take 2 Tablets by mouth every evening. 90 Tablet 3   • [DISCONTINUED] aspirin EC (ECOTRIN) 81 MG Tablet Delayed Response Take 81 mg by mouth every 48 hours.     • [DISCONTINUED] atorvastatin (LIPITOR) 20 MG Tab Take 1 Tablet by mouth every evening. 90 Tablet 3   • [DISCONTINUED] escitalopram (LEXAPRO) 20 MG tablet Take 20 mg by mouth every morning. Indications: Major Depressive Disorder (Patient not taking: Reported on 7/7/2022)       No facility-administered encounter medications on file as of 7/7/2022.     Review of Systems   Constitutional: Negative for fever and malaise/fatigue.   Respiratory: Negative for cough and shortness of  "breath.    Cardiovascular: Negative for chest pain, palpitations, orthopnea, claudication, leg swelling and PND.   Gastrointestinal: Negative for abdominal pain.   Musculoskeletal: Negative for back pain, joint pain and myalgias.   Neurological: Negative for dizziness and sensory change.   All other systems reviewed and are negative.       Objective:   /80 (BP Location: Left arm, Patient Position: Sitting, BP Cuff Size: Adult)   Pulse 62   Resp 14   Ht 1.702 m (5' 7\")   Wt 73.1 kg (161 lb 1.6 oz)   SpO2 98%   BMI 25.23 kg/m²     Physical Exam  Vitals and nursing note reviewed.   Constitutional:       General: He is not in acute distress.     Appearance: Normal appearance. He is well-developed and normal weight.   HENT:      Head: Normocephalic and atraumatic.   Eyes:      Pupils: Pupils are equal, round, and reactive to light.   Neck:      Vascular: No JVD.   Cardiovascular:      Rate and Rhythm: Normal rate and regular rhythm.      Pulses: Normal pulses.      Heart sounds: Normal heart sounds. No murmur heard.  Pulmonary:      Effort: Pulmonary effort is normal. No respiratory distress.      Breath sounds: Normal breath sounds. No wheezing or rales.   Abdominal:      General: Bowel sounds are normal.      Palpations: Abdomen is soft.   Musculoskeletal:         General: Normal range of motion.      Cervical back: Normal range of motion.   Skin:     General: Skin is warm and dry.      Capillary Refill: Capillary refill takes less than 2 seconds.   Neurological:      General: No focal deficit present.      Mental Status: He is alert and oriented to person, place, and time. Mental status is at baseline.   Psychiatric:         Mood and Affect: Mood normal.         Behavior: Behavior normal.         Thought Content: Thought content normal.         Judgment: Judgment normal.       Assessment:     1. Essential hypertension  amLODIPine (NORVASC) 10 MG Tab    losartan (COZAAR) 50 MG Tab   2. Mixed hyperlipidemia  " atorvastatin (LIPITOR) 20 MG Tab   3. Coronary artery disease due to lipid rich plaque     4. Type 2 diabetes mellitus with diabetic polyneuropathy, without long-term current use of insulin (HCC)     5. Aortic valve insufficiency, etiology of cardiac valve disease unspecified  EC-ECHOCARDIOGRAM COMPLETE W/O CONT     Medical Decision Making:  Today's Assessment / Status / Plan:     1. HLD  -good LDL control <70 with CAD  -cont statin  -labs reviewed for this year, good control (MEDIA tab-lab Josie)    2. HTN  -great control at home on losartan, coreg, and amlodipine  -refilled all medications for 1 year   -consider up titrate meds if SBP remains elevated >130 consistently  -he will follow BP at home    3. CAD with + CT scoring in '15  -stress imaging in '17 with no ischemia  -follow clinically, currently no angina or DAWSON  -cont ASA 81 mg and statin  -echo unremarkable except mod AI, check annually, ordered for next year    4. DM with neuropathy  -managed well per patient    Patient is to follow up with Isabela MCFADDEN in 1 year with annual labs and echo.

## 2022-07-26 ENCOUNTER — OFFICE VISIT (OUTPATIENT)
Dept: URBAN - METROPOLITAN AREA CLINIC 10 | Facility: CLINIC | Age: 77
End: 2022-07-26
Payer: COMMERCIAL

## 2022-07-26 DIAGNOSIS — H44.431 HYPOTONY OF EYE DUE TO OTHER OCULAR DISORDERS, RIGHT EYE: Primary | ICD-10-CM

## 2022-07-26 DIAGNOSIS — H40.1431 CAPSULAR GLAUCOMA WITH PSEUDOEXFOLIATION OF LENS, BILATERAL, MILD STAGE: ICD-10-CM

## 2022-07-26 PROCEDURE — 99214 OFFICE O/P EST MOD 30 MIN: CPT | Performed by: OPHTHALMOLOGY

## 2022-07-26 PROCEDURE — 92134 CPTRZ OPH DX IMG PST SGM RTA: CPT | Performed by: OPHTHALMOLOGY

## 2022-07-26 RX ORDER — PREDNISOLONE ACETATE 10 MG/ML
1 % SUSPENSION/ DROPS OPHTHALMIC
Qty: 30 | Refills: 1 | Status: ACTIVE
Start: 2022-07-26

## 2022-07-26 ASSESSMENT — INTRAOCULAR PRESSURE
OD: 11
OS: 10

## 2022-07-26 NOTE — IMPRESSION/PLAN
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 Plan: IOP 6,7 OD today, off all glaucoma drops OD. 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 Risk of steroid injx is not low and could raise IOP too high and cause [facility] loss from [facility]. LONG d/w pt r/b/a and options -- 
   Conservative mngmt is appropriate -- Stay off [address] Gtts. Add PF QID OD
   RECHECK w Dr. Cardona Shed 3-4w  --Pt knows may take some time to resolve. KEEP f/u Dr. Juan Negron Aug.    RETINA PRN.

## 2022-07-26 NOTE — IMPRESSION/PLAN
Impression: Capslr glaucoma w/pseudoef Plan: Pt has Glaucoma --  Baervedlt tube shunt OD  -- KEEP GlaucMD plan as outlined.

## 2022-09-01 ENCOUNTER — OFFICE VISIT (OUTPATIENT)
Dept: URBAN - METROPOLITAN AREA CLINIC 10 | Facility: CLINIC | Age: 77
End: 2022-09-01
Payer: COMMERCIAL

## 2022-09-01 DIAGNOSIS — H40.1431 CAPSULAR GLAUCOMA WITH PSEUDOEXFOLIATION OF LENS, BILATERAL, MILD STAGE: ICD-10-CM

## 2022-09-01 PROCEDURE — 99214 OFFICE O/P EST MOD 30 MIN: CPT | Performed by: STUDENT IN AN ORGANIZED HEALTH CARE EDUCATION/TRAINING PROGRAM

## 2022-09-01 PROCEDURE — 92134 CPTRZ OPH DX IMG PST SGM RTA: CPT | Performed by: STUDENT IN AN ORGANIZED HEALTH CARE EDUCATION/TRAINING PROGRAM

## 2022-09-01 ASSESSMENT — INTRAOCULAR PRESSURE
OS: 10
OD: 8

## 2022-09-01 NOTE — IMPRESSION/PLAN
Impression: Capslr glaucoma w/pseudoef lens, bilateral, mild stage
(+)Asthma (+) SLT 2016,2019,2020; OMNI OD 6/21/21 [place] Blas) S/P Baerveldt shunt OD 9/27/21 Intolerant to Simbrinza, Brimonidine, Brinzolomide Plan: Plan :
1. IOP's increased to 8/10 today on Pred Forte. OFF all glaucoma drops OD. OCT macular edema OD today (improved) Lumigan QPM OS ONLY 2. Scheduled with Dr. Kortney Mane tomorrow, will continue to monitor Mac edema in 1mo in general

## 2022-09-01 NOTE — IMPRESSION/PLAN
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 Plan: IOP's 8/10 today Mac edema improved but persisting Continue Pred Forte QID OD

RTC 1mo for IOP/refraction and Mac OCT

## 2022-09-02 ENCOUNTER — OFFICE VISIT (OUTPATIENT)
Dept: URBAN - METROPOLITAN AREA CLINIC 30 | Facility: CLINIC | Age: 77
End: 2022-09-02
Payer: COMMERCIAL

## 2022-09-02 DIAGNOSIS — H44.431 HYPOTONY OF EYE DUE TO OTHER OCULAR DISORDERS, RIGHT EYE: ICD-10-CM

## 2022-09-02 PROCEDURE — 99214 OFFICE O/P EST MOD 30 MIN: CPT | Performed by: OPHTHALMOLOGY

## 2022-09-02 ASSESSMENT — INTRAOCULAR PRESSURE
OS: 14
OD: 7

## 2022-09-02 NOTE — IMPRESSION/PLAN
Impression: Capslr glaucoma w/pseudoef lens, bilateral, mild stage
(+)Asthma (+) SLT 2016,2019,2020; OMNI OD 6/21/21 [place] Amaro) S/P Baerveldt shunt OD 9/27/21 Intolerant to Simbrinza, Brimonidine, Brinzolomide Plan: Pt has Glaucoma    Gonio : 2-3+ CBB Istent OU       Pachs: 537/529 Today's IOP : 7/14  Tmax  :  45/24 Target IOP low to mid teens
(+)Fhx of Glaucoma: Mother Vision equal OU Last vf OD: [place] remaining  OS: Full 1/17/22 *New Baseline*
C/D:  0.75x0.8 loss of temporal /0.4x0.4
OCT: 67/81 1/17/22 Pt denies Sulfa Allergy // Pt denies Heart dx Plan : 1. Continue Lumigan QPM OS Pred Forte QID OD (per Dr. Julian Fuller) 2. IOP is  Excellent. Patient to continue medications as above.  
3. RTC 4 month With Dr. El Carney for IOP

## 2022-09-02 NOTE — IMPRESSION/PLAN
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 Plan: Mac edema improved but persisting Continue Pred Forte QID OD Return to Dr. Branden Nolasco as scheduled

## 2022-10-17 ENCOUNTER — OFFICE VISIT (OUTPATIENT)
Dept: URBAN - METROPOLITAN AREA CLINIC 10 | Facility: CLINIC | Age: 77
End: 2022-10-17
Payer: COMMERCIAL

## 2022-10-17 DIAGNOSIS — H40.1431 CAPSULAR GLAUCOMA WITH PSEUDOEXFOLIATION OF LENS, BILATERAL, MILD STAGE: ICD-10-CM

## 2022-10-17 DIAGNOSIS — H44.431 HYPOTONY OF EYE DUE TO OTHER OCULAR DISORDERS, RIGHT EYE: Primary | ICD-10-CM

## 2022-10-17 PROCEDURE — 99214 OFFICE O/P EST MOD 30 MIN: CPT | Performed by: STUDENT IN AN ORGANIZED HEALTH CARE EDUCATION/TRAINING PROGRAM

## 2022-10-17 PROCEDURE — 92134 CPTRZ OPH DX IMG PST SGM RTA: CPT | Performed by: STUDENT IN AN ORGANIZED HEALTH CARE EDUCATION/TRAINING PROGRAM

## 2022-10-17 ASSESSMENT — INTRAOCULAR PRESSURE
OD: 10
OS: 16

## 2022-10-17 ASSESSMENT — VISUAL ACUITY
OD: 20/100
OS: 20/20

## 2022-10-17 NOTE — IMPRESSION/PLAN
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 Plan: Mac edema persisting today, stable to last visit Continue Pred Forte QID OD Has seen retina, at time steroid Inj was not recommended, with continue to monitor but if persisting consider retina CO

RTC 1mo for DFE OD and Mac OCT

## 2022-10-17 NOTE — IMPRESSION/PLAN
Impression: Capslr glaucoma w/pseudoef lens, bilateral, mild stage Plan: PT HAS PXG OU       GONIO: 3 OU (8/10/17)    PACHS: 537 //529 (11/20/14) S/P CATARACT/MIGS OU 2015 S/P SLT OD 11/18/16 PT DENIES SULFA ALLERGY
PT REPORTS ASTHMA - AVOID B-BLOCKER
TARGET IOP MID TEENS OR LESS OU
RECOMMEND 1. CONTINUE LUMIGAN OU QPM 
2. PT IS INTOLERANT TO SIMBRINZA (STOPPED  01/30/17) 3. AVOID B-BLOCKERS DUE TO ASTHMA 4. OFFERED PT OPTION OF  SHUNT OD. THE PT DEFERS 04/13/17-03/06/18
5.  RTC as scheduled

## 2022-11-02 ENCOUNTER — PATIENT MESSAGE (OUTPATIENT)
Dept: HEALTH INFORMATION MANAGEMENT | Facility: OTHER | Age: 77
End: 2022-11-02

## 2022-11-07 ENCOUNTER — OFFICE VISIT (OUTPATIENT)
Dept: URBAN - METROPOLITAN AREA CLINIC 10 | Facility: CLINIC | Age: 77
End: 2022-11-07
Payer: COMMERCIAL

## 2022-11-07 DIAGNOSIS — H40.1431 CAPSULAR GLAUCOMA WITH PSEUDOEXFOLIATION OF LENS, BILATERAL, MILD STAGE: ICD-10-CM

## 2022-11-07 DIAGNOSIS — H44.431 HYPOTONY OF EYE DUE TO OTHER OCULAR DISORDERS, RIGHT EYE: Primary | ICD-10-CM

## 2022-11-07 PROCEDURE — 99214 OFFICE O/P EST MOD 30 MIN: CPT | Performed by: STUDENT IN AN ORGANIZED HEALTH CARE EDUCATION/TRAINING PROGRAM

## 2022-11-07 ASSESSMENT — INTRAOCULAR PRESSURE
OS: 12
OD: 12

## 2022-11-07 NOTE — IMPRESSION/PLAN
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 Plan: Mac edema persisting today, stable to last visit Continue Pred Forte QID OD Has seen retina, at time steroid Inj was not recommended, but persisting. 

Due to no improvement referred to retina for CO

## 2022-12-05 ENCOUNTER — OFFICE VISIT (OUTPATIENT)
Dept: URBAN - METROPOLITAN AREA CLINIC 10 | Facility: CLINIC | Age: 77
End: 2022-12-05
Payer: COMMERCIAL

## 2022-12-05 DIAGNOSIS — H44.431 HYPOTONY OF EYE DUE TO OTHER OCULAR DISORDERS, RIGHT EYE: Primary | ICD-10-CM

## 2022-12-05 DIAGNOSIS — H40.1431 CAPSULAR GLAUCOMA WITH PSEUDOEXFOLIATION OF LENS, BILATERAL, MILD STAGE: ICD-10-CM

## 2022-12-05 PROCEDURE — 92235 FLUORESCEIN ANGRPH MLTIFRAME: CPT | Performed by: OPHTHALMOLOGY

## 2022-12-05 PROCEDURE — 92134 CPTRZ OPH DX IMG PST SGM RTA: CPT | Performed by: OPHTHALMOLOGY

## 2022-12-05 PROCEDURE — 99212 OFFICE O/P EST SF 10 MIN: CPT | Performed by: OPHTHALMOLOGY

## 2022-12-05 PROCEDURE — 68200 TREAT EYELID BY INJECTION: CPT | Performed by: OPHTHALMOLOGY

## 2022-12-05 ASSESSMENT — INTRAOCULAR PRESSURE
OS: 13
OD: 6

## 2022-12-05 NOTE — IMPRESSION/PLAN
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  Plan: CME post tube shunt, STTA 1 cc Triamcinolone today

## 2023-01-04 ENCOUNTER — OFFICE VISIT (OUTPATIENT)
Dept: URBAN - METROPOLITAN AREA CLINIC 30 | Facility: CLINIC | Age: 78
End: 2023-01-04
Payer: COMMERCIAL

## 2023-01-04 DIAGNOSIS — H44.431 HYPOTONY OF EYE DUE TO OTHER OCULAR DISORDERS, RIGHT EYE: ICD-10-CM

## 2023-01-04 DIAGNOSIS — H18.231 SECONDARY CORNEAL EDEMA, RIGHT EYE: ICD-10-CM

## 2023-01-04 DIAGNOSIS — H40.1431 CAPSULAR GLAUCOMA WITH PSEUDOEXFOLIATION OF LENS, BILATERAL, MILD STAGE: Primary | ICD-10-CM

## 2023-01-04 PROCEDURE — 92083 EXTENDED VISUAL FIELD XM: CPT | Performed by: OPHTHALMOLOGY

## 2023-01-04 PROCEDURE — 99214 OFFICE O/P EST MOD 30 MIN: CPT | Performed by: OPHTHALMOLOGY

## 2023-01-04 RX ORDER — BIMATOPROST 0.1 MG/ML
0.01 % SOLUTION/ DROPS OPHTHALMIC
Qty: 7.5 | Refills: 3 | Status: ACTIVE
Start: 2023-01-04

## 2023-01-04 ASSESSMENT — INTRAOCULAR PRESSURE
OD: 2
OS: 9

## 2023-01-04 NOTE — IMPRESSION/PLAN
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  Plan: CME post tube shunt, continue care with retina with injections of steroid

## 2023-01-04 NOTE — IMPRESSION/PLAN
Impression: Capslr glaucoma w/pseudoef lens, bilateral, mild stage
(+)Asthma (+) SLT 2016,2019,2020; OMNI OD 6/21/21 [place]) S/P Baerveldt shunt OD 9/27/21 Intolerant to Simbrinza, Brimonidine, Brinzolomide Plan: Pt has Glaucoma    Gonio : 2-3+ CBB Istent OU       Pachs: 537/529 Today's IOP : 2/9  Tmax  :  45/24 Target IOP low to mid teens
(+)Fhx of Glaucoma: Mother Vision equal OU Last vf OD: IN scotoma  OS: Full 1/4/23 *New Baseline*
C/D:  0.75x0.8 loss of temporal /0.4x0.4
OCT: 67/81 1/17/22 Pt denies Sulfa Allergy // Pt denies Heart dx Plan : 1. Continue Lumigan QPM OS - use punctal occlusion Pred Forte BID OD - restart 2. Pt had recent injection OS with DR. Casillas - f/u tomorrow as scheduled 3. IOP still low OD today - consider additional injection of steroid 4. 6 month f/u with RNFL and IOP check with optometry and Dr. Gisela Abreu for HVF and IOP check

## 2023-01-04 NOTE — IMPRESSION/PLAN
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  Plan: Start Jasmyn 128 2-4x a day to assist with K edema

## 2023-01-05 ENCOUNTER — OFFICE VISIT (OUTPATIENT)
Dept: URBAN - METROPOLITAN AREA CLINIC 30 | Facility: CLINIC | Age: 78
End: 2023-01-05
Payer: COMMERCIAL

## 2023-01-05 DIAGNOSIS — H35.81 RETINAL EDEMA: Primary | ICD-10-CM

## 2023-01-05 PROCEDURE — 92134 CPTRZ OPH DX IMG PST SGM RTA: CPT | Performed by: OPHTHALMOLOGY

## 2023-01-05 PROCEDURE — 99214 OFFICE O/P EST MOD 30 MIN: CPT | Performed by: OPHTHALMOLOGY

## 2023-01-05 ASSESSMENT — INTRAOCULAR PRESSURE: OS: 12

## 2023-01-05 NOTE — IMPRESSION/PLAN
Impression: Retinal edema: H35.81. Plan: CME s/p tube OD - s/p GHISLAINE Lewis Dec 2022. CME resolving. Continue PF BID OD.  Follow-up in 4-6 weeks for exam, sooner PRN

## 2023-02-06 PROBLEM — T84.018A FAILED TOTAL KNEE ARTHROPLASTY (HCC): Status: ACTIVE | Noted: 2023-02-06

## 2023-02-06 PROBLEM — Z96.659 FAILED TOTAL KNEE ARTHROPLASTY (HCC): Status: ACTIVE | Noted: 2023-02-06

## 2023-02-10 NOTE — CARE PLAN
Problem: Communication  Goal: The ability to communicate needs accurately and effectively will improve  Outcome: PROGRESSING AS EXPECTED     Problem: Safety  Goal: Will remain free from injury  Outcome: PROGRESSING AS EXPECTED  Goal: Will remain free from falls  Outcome: PROGRESSING AS EXPECTED     Problem: Infection  Goal: Will remain free from infection  Outcome: PROGRESSING AS EXPECTED     Problem: Knowledge Deficit  Goal: Knowledge of disease process/condition, treatment plan, diagnostic tests, and medications will improve  Outcome: PROGRESSING AS EXPECTED  Goal: Knowledge of the prescribed therapeutic regimen will improve  Outcome: PROGRESSING AS EXPECTED      Azithromycin Pregnancy And Lactation Text: This medication is considered safe during pregnancy and is also secreted in breast milk.

## 2023-02-16 ENCOUNTER — OFFICE VISIT (OUTPATIENT)
Dept: URBAN - METROPOLITAN AREA CLINIC 30 | Facility: CLINIC | Age: 78
End: 2023-02-16
Payer: MEDICARE

## 2023-02-16 DIAGNOSIS — H35.81 RETINAL EDEMA: Primary | ICD-10-CM

## 2023-02-16 PROCEDURE — 99214 OFFICE O/P EST MOD 30 MIN: CPT | Performed by: OPHTHALMOLOGY

## 2023-02-16 PROCEDURE — 92134 CPTRZ OPH DX IMG PST SGM RTA: CPT | Performed by: OPHTHALMOLOGY

## 2023-02-16 ASSESSMENT — INTRAOCULAR PRESSURE
OD: 8
OS: 10

## 2023-02-16 NOTE — IMPRESSION/PLAN
Impression: Retinal edema: H35.81. Plan: CME s/p tube OD - s/p GHISLAINE Lewis Dec 2022. CME resolving. Continue PF BID OD.  Follow-up in 2 months with retina specialist in [place] as scheduled

## 2023-02-28 ENCOUNTER — TELEPHONE (OUTPATIENT)
Dept: CARDIOLOGY | Facility: MEDICAL CENTER | Age: 78
End: 2023-02-28
Payer: COMMERCIAL

## 2023-02-28 DIAGNOSIS — I10 ESSENTIAL HYPERTENSION: ICD-10-CM

## 2023-02-28 RX ORDER — CARVEDILOL 25 MG/1
25 TABLET ORAL 2 TIMES DAILY WITH MEALS
Qty: 180 TABLET | Refills: 1 | Status: SHIPPED | OUTPATIENT
Start: 2023-02-28 | End: 2023-07-14 | Stop reason: SDUPTHER

## 2023-02-28 NOTE — TELEPHONE ENCOUNTER
SC    Caller: Ronny Gallegos     Medication Name and Dosage:  carvedilol (COREG) 25 MG Tab [[id number]]    Medication amount left: 2 weeks    [facility]. Pt now has [employer] and needs to have the Pharmacy changed on his chart for all prescriptions    Other questions (Topic): N/A    Callback Number (Will only call for issues): [phone] (home) [phone] (work)     Thank You,  Kanika FAITH

## 2023-02-28 NOTE — TELEPHONE ENCOUNTER
Is the patient due for a refill? Yes, pharmacy change     Was the patient seen the past year? Yes    Date of last office visit: 7/7/22    Does the patient have an upcoming appointment?  No       Provider to refill:SC    Does the patients insurance require a 100 day supply?  No

## 2023-05-19 DIAGNOSIS — E78.2 MIXED HYPERLIPIDEMIA: ICD-10-CM

## 2023-05-19 DIAGNOSIS — I10 ESSENTIAL HYPERTENSION: ICD-10-CM

## 2023-05-19 NOTE — TELEPHONE ENCOUNTER
Is the patient due for a refill? Yes    Was the patient seen the past year? Yes    Date of last office visit: 7/7/2022    Does the patient have an upcoming appointment?  Yes   If yes, When? 7/14/2023    Provider to refill:SC    Does the patients insurance require a 100 day supply?  No

## 2023-05-23 RX ORDER — AMLODIPINE BESYLATE 10 MG/1
10 TABLET ORAL EVERY EVENING
Qty: 90 TABLET | Refills: 0 | Status: SHIPPED | OUTPATIENT
Start: 2023-05-23 | End: 2023-05-26 | Stop reason: SDUPTHER

## 2023-05-23 RX ORDER — LOSARTAN POTASSIUM 50 MG/1
50 TABLET ORAL DAILY
Qty: 90 TABLET | Refills: 0 | Status: SHIPPED | OUTPATIENT
Start: 2023-05-23 | End: 2023-05-26 | Stop reason: SDUPTHER

## 2023-05-23 RX ORDER — ATORVASTATIN CALCIUM 20 MG/1
20 TABLET, FILM COATED ORAL EVERY EVENING
Qty: 90 TABLET | Refills: 0 | Status: SHIPPED | OUTPATIENT
Start: 2023-05-23 | End: 2023-05-26 | Stop reason: SDUPTHER

## 2023-05-26 DIAGNOSIS — E78.2 MIXED HYPERLIPIDEMIA: ICD-10-CM

## 2023-05-26 DIAGNOSIS — I10 ESSENTIAL HYPERTENSION: ICD-10-CM

## 2023-05-26 NOTE — TELEPHONE ENCOUNTER
Is the patient due for a refill? No- pharmacy change to [facility]    Was the patient seen the past year? Yes    Date of last office visit: 7/7/2022    Does the patient have an upcoming appointment?  Yes   If yes, When? 7/14/2023    Provider to refill:SC    Does the patients insurance require a 100 day supply?  No

## 2023-05-31 RX ORDER — ATORVASTATIN CALCIUM 20 MG/1
20 TABLET, FILM COATED ORAL EVERY EVENING
Qty: 90 TABLET | Refills: 0 | Status: SHIPPED | OUTPATIENT
Start: 2023-05-31 | End: 2023-07-14 | Stop reason: SDUPTHER

## 2023-05-31 RX ORDER — AMLODIPINE BESYLATE 10 MG/1
10 TABLET ORAL EVERY EVENING
Qty: 90 TABLET | Refills: 0 | Status: SHIPPED | OUTPATIENT
Start: 2023-05-31 | End: 2023-07-14 | Stop reason: SDUPTHER

## 2023-05-31 RX ORDER — LOSARTAN POTASSIUM 50 MG/1
50 TABLET ORAL DAILY
Qty: 90 TABLET | Refills: 0 | Status: SHIPPED | OUTPATIENT
Start: 2023-05-31 | End: 2023-07-14 | Stop reason: SDUPTHER

## 2023-07-07 ENCOUNTER — HOSPITAL ENCOUNTER (OUTPATIENT)
Dept: CARDIOLOGY | Facility: MEDICAL CENTER | Age: 78
End: 2023-07-07
Attending: NURSE PRACTITIONER
Payer: COMMERCIAL

## 2023-07-07 DIAGNOSIS — I35.1 AORTIC VALVE INSUFFICIENCY, ETIOLOGY OF CARDIAC VALVE DISEASE UNSPECIFIED: ICD-10-CM

## 2023-07-07 LAB — LV EJECT FRACT  99904: 55

## 2023-07-07 PROCEDURE — 93306 TTE W/DOPPLER COMPLETE: CPT

## 2023-07-07 PROCEDURE — 93306 TTE W/DOPPLER COMPLETE: CPT | Mod: 26 | Performed by: INTERNAL MEDICINE

## 2023-07-14 ENCOUNTER — OFFICE VISIT (OUTPATIENT)
Dept: CARDIOLOGY | Facility: MEDICAL CENTER | Age: 78
End: 2023-07-14
Attending: NURSE PRACTITIONER
Payer: COMMERCIAL

## 2023-07-14 VITALS
RESPIRATION RATE: 16 BRPM | WEIGHT: 151 LBS | HEART RATE: 53 BPM | HEIGHT: 67 IN | DIASTOLIC BLOOD PRESSURE: 66 MMHG | BODY MASS INDEX: 23.7 KG/M2 | OXYGEN SATURATION: 97 % | SYSTOLIC BLOOD PRESSURE: 126 MMHG

## 2023-07-14 DIAGNOSIS — I25.10 CORONARY ARTERY DISEASE DUE TO LIPID RICH PLAQUE: ICD-10-CM

## 2023-07-14 DIAGNOSIS — E11.42 TYPE 2 DIABETES MELLITUS WITH DIABETIC POLYNEUROPATHY, WITHOUT LONG-TERM CURRENT USE OF INSULIN (HCC): ICD-10-CM

## 2023-07-14 DIAGNOSIS — I35.1 AORTIC VALVE INSUFFICIENCY, ETIOLOGY OF CARDIAC VALVE DISEASE UNSPECIFIED: ICD-10-CM

## 2023-07-14 DIAGNOSIS — I10 ESSENTIAL HYPERTENSION: ICD-10-CM

## 2023-07-14 DIAGNOSIS — I25.83 CORONARY ARTERY DISEASE DUE TO LIPID RICH PLAQUE: ICD-10-CM

## 2023-07-14 DIAGNOSIS — E78.2 MIXED HYPERLIPIDEMIA: ICD-10-CM

## 2023-07-14 PROCEDURE — 99214 OFFICE O/P EST MOD 30 MIN: CPT | Performed by: NURSE PRACTITIONER

## 2023-07-14 PROCEDURE — 99212 OFFICE O/P EST SF 10 MIN: CPT | Performed by: NURSE PRACTITIONER

## 2023-07-14 PROCEDURE — 99213 OFFICE O/P EST LOW 20 MIN: CPT | Performed by: NURSE PRACTITIONER

## 2023-07-14 PROCEDURE — 3078F DIAST BP <80 MM HG: CPT | Performed by: NURSE PRACTITIONER

## 2023-07-14 PROCEDURE — 3074F SYST BP LT 130 MM HG: CPT | Performed by: NURSE PRACTITIONER

## 2023-07-14 RX ORDER — LOSARTAN POTASSIUM 50 MG/1
50 TABLET ORAL DAILY
Qty: 100 TABLET | Refills: 3 | Status: SHIPPED | OUTPATIENT
Start: 2023-07-14

## 2023-07-14 RX ORDER — ATORVASTATIN CALCIUM 20 MG/1
20 TABLET, FILM COATED ORAL EVERY EVENING
Qty: 100 TABLET | Refills: 3 | Status: SHIPPED | OUTPATIENT
Start: 2023-07-14

## 2023-07-14 RX ORDER — PREDNISOLONE ACETATE 10 MG/ML
1 SUSPENSION/ DROPS OPHTHALMIC
COMMUNITY
Start: 2023-03-01

## 2023-07-14 RX ORDER — AMLODIPINE BESYLATE 10 MG/1
10 TABLET ORAL EVERY EVENING
Qty: 100 TABLET | Refills: 3 | Status: SHIPPED | OUTPATIENT
Start: 2023-07-14

## 2023-07-14 RX ORDER — CARVEDILOL 25 MG/1
25 TABLET ORAL 2 TIMES DAILY WITH MEALS
Qty: 200 TABLET | Refills: 3 | Status: SHIPPED | OUTPATIENT
Start: 2023-07-14

## 2023-07-14 ASSESSMENT — ENCOUNTER SYMPTOMS
MYALGIAS: 0
ORTHOPNEA: 0
ABDOMINAL PAIN: 0
SENSORY CHANGE: 0
PALPITATIONS: 0
CLAUDICATION: 0
PND: 0
COUGH: 0
SHORTNESS OF BREATH: 0
BACK PAIN: 0
DIZZINESS: 0
FEVER: 0

## 2023-07-14 ASSESSMENT — FIBROSIS 4 INDEX: FIB4 SCORE: 2.27

## 2023-07-14 NOTE — PROGRESS NOTES
"Subjective:   Ronny Gallegos is a 77 y.o. male who presents today for annual follow up for his cardiac conditions.    Hx of HTN, moderate AI, and HLD with CAD (+ CT scoring in '15).    He is overall doing well from a cardiac standpoint.    He has no exertional symptoms. He feels good..    He has no chest pain, shortness of breath, edema, dizziness/lightheadedness, or palpitations.    Past Medical History:   Diagnosis Date    Arthritis     right ankle/left knee    Asthma     inhaler PRN     CAD (coronary artery disease)     + CT scoring, negative PET cardiac in     Depression     Diabetes (HCC)     oral medication     Glaucoma     High cholesterol     Hypertension     Hypopotassemia     Hyposmolality and/or hyponatremia     Jaundice     \"from drinking after wife \"    Neuropathy     bilat legs/feet    Pain     right ankle     Personal history of peptic ulcer disease     Pneumonia     Unspecified cataract     removed bilat     Past Surgical History:   Procedure Laterality Date    PB REVISE KNEE JOINT REPLACE,ALL PARTS Left 3/7/2023    Procedure: LEFT TOTAL KNEE ARTHROPLASTY REVISION;  Surgeon: Parmjit Charles M.D.;  Location: [facility];  Service: Orthopedics    PB REVISE KNEE JOINT REPLACE,ALL PARTS Left 2019    Procedure: REVISION, TOTAL ARTHROPLASTY, KNEE, ALL COMPONENTS - POLY EXCHANGE ONLY;  Surgeon: Parmjit Charles M.D.;  Location: [facility];  Service: Orthopedics    IRRIGATION & DEBRIDEMENT ORTHO  2019    Procedure: IRRIGATION AND DEBRIDEMENT, WOUND - KNEE;  Surgeon: Parmjit Charles M.D.;  Location: [facility];  Service: Orthopedics    IRRIGATION & DEBRIDEMENT ORTHO Left 2019    Procedure: IRRIGATION AND DEBRIDEMENT, WOUND;  Surgeon: Jesús Wesley M.D.;  Location: [facility];  Service: Orthopedics    ANKLE TOTAL ARTHROPLASTY Right 2018    Procedure: ANKLE TOTAL ARTHROPLASTY;  Surgeon: Jose De Jesus Medrano, " M.D.;  Location: [facility];  Service: Orthopedics    LIGAMENT REPAIR Right 2018    Procedure: LIGAMENT REPAIR- LATERAL;  Surgeon: Jose De Jesus Medrano M.D.;  Location: [facility];  Service: Orthopedics    LUMBAR LAMINECTOMY DISKECTOMY Right 2017    Procedure: LUMBAR LAMINECTOMY DISKECTOMY - RE-DO OPEN L4-S1 LAMINOTOMIES;  Surgeon: Clint Garsia M.D.;  Location: [facility];  Service:     FORAMINOTOMY  2017    Procedure: FORAMINOTOMY;  Surgeon: Clint Garsia M.D.;  Location: [facility];  Service:     HARDWARE REMOVAL NEURO  2017    Procedure: HARDWARE REMOVAL NEURO ;  Surgeon: Clint Garsia M.D.;  Location: [facility];  Service:     LAPAROSCOPY ROBOTIC XI  10/26/2016    Procedure: LAPAROSCOPY FOR LIVER CYST MARSUPIALIZATION AND RESECTION LIVER WALL CYST;  Surgeon: Frank Corona M.D.;  Location: [facility];  Service:     FUSION, SPINE, LUMBAR, PLIF  9/10/2015    Procedure: LUMBAR FUSION POSTERIOR L5-S1 ;  Surgeon: Clint Garsia M.D.;  Location: [facility];  Service:     LUMBAR LAMINECTOMY DISKECTOMY  9/10/2015    Procedure: LUMBAR LAMINECTOMY ;  Surgeon: Clint Garsia M.D.;  Location: [facility];  Service:     LAMINOTOMY  9/10/2015    Procedure: LAMINOTOMY;  Surgeon: Clint Garsia M.D.;  Location: [facility];  Service:     OTHER      ANTERIOR NECK FUSION C5-7    OTHER      BLEEDING ULCERS    CATARACT EXTRACTION WITH IOL       Family History   Problem Relation Age of Onset    Stroke Mother     Heart Disease Mother     Stroke Father     Heart Disease Father      Social History     Tobacco Use   Smoking Status Former    Packs/day: 1.50    Years: 13.00    Pack years: 19.50    Types: Cigarettes    Quit date: 1978    Years since quittin.5   Smokeless Tobacco Never     No Known Allergies  Outpatient Encounter Medications as of 2023   Medication Sig  Dispense Refill    prednisoLONE acetate (PRED FORTE) 1 % Suspension 1 Drop.      atorvastatin (LIPITOR) 20 MG Tab Take 1 Tablet by mouth every evening. 100 Tablet 3    amLODIPine (NORVASC) 10 MG Tab Take 1 Tablet by mouth every evening. 100 Tablet 3    carvedilol (COREG) 25 MG Tab Take 1 Tablet by mouth 2 times a day with meals. 200 Tablet 3    losartan (COZAAR) 50 MG Tab Take 1 Tablet by mouth every day. 100 Tablet 3    sertraline (ZOLOFT) 50 MG Tab       aspirin EC (ECOTRIN) 81 MG Tablet Delayed Response Take 1 Tablet by mouth every day. 90 Tablet 3    traZODone (DESYREL) 50 MG Tab Take 50 mg by mouth every evening. Only taking 1/2 tab of 25mg      gabapentin (NEURONTIN) 800 MG tablet Take 800 mg by mouth 2 times a day.      Multiple Vitamins-Minerals (CENTRUM SILVER 50+MEN) Tab Take 1 Tab by mouth every morning.      LUMIGAN 0.01 % Solution Administer 1 Drop into the left eye at bedtime.  3    PROAIR  (90 Base) MCG/ACT Aero Soln inhalation aerosol Inhale 2 Puffs by mouth every four hours as needed for Shortness of Breath.      metFORMIN (GLUCOPHAGE) 500 MG Tab Take 500 mg by mouth 2 times a day.      [DISCONTINUED] amLODIPine (NORVASC) 10 MG Tab Take 1 Tablet by mouth every evening. 90 Tablet 0    [DISCONTINUED] losartan (COZAAR) 50 MG Tab Take 1 Tablet by mouth every day. 90 Tablet 0    [DISCONTINUED] atorvastatin (LIPITOR) 20 MG Tab Take 1 Tablet by mouth every evening. 90 Tablet 0    [DISCONTINUED] carvedilol (COREG) 25 MG Tab Take 1 Tablet by mouth 2 times a day with meals. 180 Tablet 1    [DISCONTINUED] sertraline (ZOLOFT) 100 MG Tab Take 100 mg by mouth every day.      [DISCONTINUED] BREO ELLIPTA 100-25 MCG/INH AEROSOL POWDER, BREATH ACTIVATED Inhale 1 Puff every day.       No facility-administered encounter medications on file as of 7/14/2023.     Review of Systems   Constitutional:  Negative for fever and malaise/fatigue.   Respiratory:  Negative for cough and shortness of breath.   "  Cardiovascular:  Negative for chest pain, palpitations, orthopnea, claudication, leg swelling and PND.   Gastrointestinal:  Negative for abdominal pain.   Musculoskeletal:  Negative for back pain, joint pain and myalgias.   Neurological:  Negative for dizziness and sensory change.   All other systems reviewed and are negative.       Objective:   /66 (BP Location: Left arm, Patient Position: Sitting, BP Cuff Size: Adult)   Pulse (!) 53   Resp 16   Ht 1.702 m (5' 7\")   Wt 68.5 kg (151 lb)   SpO2 97%   BMI 23.65 kg/m²     Physical Exam  Vitals and nursing note reviewed.   Constitutional:       General: He is not in acute distress.     Appearance: Normal appearance. He is well-developed and normal weight.   HENT:      Head: Normocephalic and atraumatic.   Eyes:      Pupils: Pupils are equal, round, and reactive to light.   Neck:      Vascular: No JVD.   Cardiovascular:      Rate and Rhythm: Normal rate and regular rhythm.      Pulses: Normal pulses.      Heart sounds: Normal heart sounds. No murmur heard.  Pulmonary:      Effort: Pulmonary effort is normal. No respiratory distress.      Breath sounds: Normal breath sounds. No wheezing or rales.   Abdominal:      General: Bowel sounds are normal.      Palpations: Abdomen is soft.   Musculoskeletal:         General: Normal range of motion.      Cervical back: Normal range of motion.   Skin:     General: Skin is warm and dry.      Capillary Refill: Capillary refill takes less than 2 seconds.   Neurological:      General: No focal deficit present.      Mental Status: He is alert and oriented to person, place, and time. Mental status is at baseline.   Psychiatric:         Mood and Affect: Mood normal.         Behavior: Behavior normal.         Thought Content: Thought content normal.         Judgment: Judgment normal.       Assessment:     1. Coronary artery disease due to lipid rich plaque        2. Essential hypertension  amLODIPine (NORVASC) 10 MG Tab    " carvedilol (COREG) 25 MG Tab    losartan (COZAAR) 50 MG Tab      3. Mixed hyperlipidemia  atorvastatin (LIPITOR) 20 MG Tab      4. Type 2 diabetes mellitus with diabetic polyneuropathy, without long-term current use of insulin (HCC)        5. Aortic valve insufficiency, etiology of cardiac valve disease unspecified  EC-ECHOCARDIOGRAM COMPLETE W/O CONT        Medical Decision Making:  Today's Assessment / Status / Plan:     1. HLD  -good LDL control <70 with CAD  -cont statin  -obtain annual labs    2. HTN  -great control at home on losartan, coreg, and amlodipine  -refilled all medications for 1 year   -he will follow BP at home    3. CAD with + CT scoring in '15  -stress imaging in '17 with no ischemia  -follow clinically, currently no angina or DAWSON  -cont ASA 81 mg and statin  -echo unremarkable except mod AI, check annually, ordered for next year  -consider re-check CT scoring in '25    4. DM with neuropathy  -managed well per patient    Patient is to follow up with Isabela MCFADDEN in 1 year with annual labs and echo.

## 2023-08-31 ENCOUNTER — PATIENT MESSAGE (OUTPATIENT)
Dept: CARDIOLOGY | Facility: MEDICAL CENTER | Age: 78
End: 2023-08-31
Payer: COMMERCIAL

## 2023-08-31 ENCOUNTER — TELEPHONE (OUTPATIENT)
Dept: CARDIOLOGY | Facility: MEDICAL CENTER | Age: 78
End: 2023-08-31
Payer: COMMERCIAL

## 2023-08-31 NOTE — TELEPHONE ENCOUNTER
SC    Caller: Ronny Gallegos    Topic/issue: Patient returning Sidra's call and states his preferred pharmacy for routine refills is:  [facility]:  Ph: [phone]  Please advise.    Callback Number: [phone] (home) [phone] (work)    Thank you,  Alyssa ADAMS

## 2023-08-31 NOTE — PATIENT COMMUNICATION
Called patient to confirm which pharmacy he would prefer to have his refills go to, no answer, left voicemail and [employer] message sent.

## 2024-03-11 NOTE — ED NOTES
----- Message from Dex Anna MD sent at 3/8/2024  4:24 PM CST -----  Your Pap smear was normal.  High risk HPV test was negative.  Repeat your Pap smear in 3 years.  I still recommend that you have an annual examination for both breast and pelvic exam.   Break RN  : blood sent

## 2024-06-08 DIAGNOSIS — E78.2 MIXED HYPERLIPIDEMIA: ICD-10-CM

## 2024-06-08 DIAGNOSIS — I10 ESSENTIAL HYPERTENSION: ICD-10-CM

## 2024-06-10 RX ORDER — AMLODIPINE BESYLATE 10 MG/1
10 TABLET ORAL EVERY EVENING
Qty: 90 TABLET | Refills: 0 | Status: SHIPPED | OUTPATIENT
Start: 2024-06-10

## 2024-06-10 RX ORDER — LOSARTAN POTASSIUM 50 MG/1
50 TABLET ORAL
Qty: 90 TABLET | Refills: 0 | Status: SHIPPED | OUTPATIENT
Start: 2024-06-10

## 2024-06-10 RX ORDER — ATORVASTATIN CALCIUM 20 MG/1
20 TABLET, FILM COATED ORAL EVERY EVENING
Qty: 90 TABLET | Refills: 0 | Status: SHIPPED | OUTPATIENT
Start: 2024-06-10

## 2024-06-10 NOTE — TELEPHONE ENCOUNTER
Is the patient due for a refill? Yes    Was the patient seen the past year? Yes    Date of last office visit: 7/14/23    Does the patient have an upcoming appointment?  Yes   If yes, When? 8/7/24    Provider to refill:SC    Does the patients insurance require a 100 day supply?  No

## 2024-07-30 ENCOUNTER — HOSPITAL ENCOUNTER (OUTPATIENT)
Dept: CARDIOLOGY | Facility: MEDICAL CENTER | Age: 79
End: 2024-07-30
Attending: NURSE PRACTITIONER
Payer: COMMERCIAL

## 2024-07-30 DIAGNOSIS — I35.1 AORTIC VALVE INSUFFICIENCY, ETIOLOGY OF CARDIAC VALVE DISEASE UNSPECIFIED: ICD-10-CM

## 2024-07-30 LAB
LV EJECT FRACT MOD 2C 99903: 57.54
LV EJECT FRACT MOD 4C 99902: 68.3
LV EJECT FRACT MOD BP 99901: 63.5

## 2024-07-30 PROCEDURE — 93306 TTE W/DOPPLER COMPLETE: CPT

## 2024-08-01 ENCOUNTER — TELEPHONE (OUTPATIENT)
Dept: CARDIOLOGY | Facility: MEDICAL CENTER | Age: 79
End: 2024-08-01
Payer: COMMERCIAL

## 2024-08-01 NOTE — TELEPHONE ENCOUNTER
Claire message sent to patient with Sc recommendations.     ----- Message from FLAVIA Kirk sent at 8/1/2024  7:20 AM PDT -----  Will discuss next week at upcoming apt, thank you. SC  ----- Message -----  From: Senait Torres R.N.  Sent: 7/31/2024   5:22 PM PDT  To: FLAVIA Kirk    To: SC    Please advise. Thank you!

## 2024-08-05 RX ORDER — SODIUM CHLORIDE 1 G/1
TABLET ORAL
COMMUNITY
Start: 2024-06-11

## 2024-08-07 ENCOUNTER — OFFICE VISIT (OUTPATIENT)
Dept: CARDIOLOGY | Facility: MEDICAL CENTER | Age: 79
End: 2024-08-07
Attending: NURSE PRACTITIONER
Payer: COMMERCIAL

## 2024-08-07 VITALS
SYSTOLIC BLOOD PRESSURE: 132 MMHG | HEIGHT: 67 IN | BODY MASS INDEX: 24.17 KG/M2 | HEART RATE: 59 BPM | WEIGHT: 154 LBS | DIASTOLIC BLOOD PRESSURE: 68 MMHG | OXYGEN SATURATION: 97 % | RESPIRATION RATE: 16 BRPM

## 2024-08-07 DIAGNOSIS — I25.10 CORONARY ARTERY DISEASE DUE TO LIPID RICH PLAQUE: ICD-10-CM

## 2024-08-07 DIAGNOSIS — E11.42 TYPE 2 DIABETES MELLITUS WITH DIABETIC POLYNEUROPATHY, WITHOUT LONG-TERM CURRENT USE OF INSULIN (HCC): ICD-10-CM

## 2024-08-07 DIAGNOSIS — I10 ESSENTIAL HYPERTENSION: ICD-10-CM

## 2024-08-07 DIAGNOSIS — I25.83 CORONARY ARTERY DISEASE DUE TO LIPID RICH PLAQUE: ICD-10-CM

## 2024-08-07 DIAGNOSIS — I35.1 AORTIC VALVE INSUFFICIENCY, ETIOLOGY OF CARDIAC VALVE DISEASE UNSPECIFIED: ICD-10-CM

## 2024-08-07 DIAGNOSIS — E78.2 MIXED HYPERLIPIDEMIA: ICD-10-CM

## 2024-08-07 PROCEDURE — 3075F SYST BP GE 130 - 139MM HG: CPT | Performed by: NURSE PRACTITIONER

## 2024-08-07 PROCEDURE — 3078F DIAST BP <80 MM HG: CPT | Performed by: NURSE PRACTITIONER

## 2024-08-07 PROCEDURE — 99213 OFFICE O/P EST LOW 20 MIN: CPT | Performed by: NURSE PRACTITIONER

## 2024-08-07 PROCEDURE — 99214 OFFICE O/P EST MOD 30 MIN: CPT | Performed by: NURSE PRACTITIONER

## 2024-08-07 RX ORDER — CARVEDILOL 25 MG/1
25 TABLET ORAL 2 TIMES DAILY WITH MEALS
Qty: 200 TABLET | Refills: 3 | Status: SHIPPED | OUTPATIENT
Start: 2024-08-07

## 2024-08-07 RX ORDER — AMLODIPINE BESYLATE 10 MG/1
10 TABLET ORAL EVERY EVENING
Qty: 100 TABLET | Refills: 3 | Status: SHIPPED | OUTPATIENT
Start: 2024-08-07

## 2024-08-07 RX ORDER — LOSARTAN POTASSIUM 50 MG/1
50 TABLET ORAL
Qty: 100 TABLET | Refills: 3 | Status: SHIPPED | OUTPATIENT
Start: 2024-08-07

## 2024-08-07 RX ORDER — ATORVASTATIN CALCIUM 20 MG/1
20 TABLET, FILM COATED ORAL EVERY EVENING
Qty: 100 TABLET | Refills: 3 | Status: SHIPPED | OUTPATIENT
Start: 2024-08-07

## 2024-08-07 ASSESSMENT — ENCOUNTER SYMPTOMS
DIZZINESS: 0
SENSORY CHANGE: 0
COUGH: 0
BACK PAIN: 0
CLAUDICATION: 0
ABDOMINAL PAIN: 0
FEVER: 0
MYALGIAS: 0
PND: 0
PALPITATIONS: 0
ORTHOPNEA: 0
SHORTNESS OF BREATH: 0

## 2024-08-07 NOTE — PROGRESS NOTES
"Subjective:   Ronny Gallegos is a 79 y.o. male who presents today for annual follow up for his cardiac conditions.    Hx of HTN, moderate AI, and HLD with CAD (+ CT scoring in '15).    He is overall doing well from a cardiac standpoint.    He has no exertional symptoms. He feels good.     He has no chest pain, shortness of breath, edema, dizziness/lightheadedness, or palpitations.    Past Medical History:   Diagnosis Date    Arthritis     right ankle/left knee    Asthma     inhaler PRN     CAD (coronary artery disease)     + CT scoring, negative PET cardiac in     Depression     Diabetes (HCC)     oral medication     Glaucoma     High cholesterol     Hypertension     Hypopotassemia     Hyposmolality and/or hyponatremia     Jaundice     \"from drinking after wife \"    Neuropathy     bilat legs/feet    Pain     right ankle     Personal history of peptic ulcer disease     Pneumonia     Unspecified cataract     removed bilat     Past Surgical History:   Procedure Laterality Date    PB REVISE KNEE JOINT REPLACE,ALL PARTS Left 3/7/2023    Procedure: LEFT TOTAL KNEE ARTHROPLASTY REVISION;  Surgeon: Parmjit Charles M.D.;  Location: [facility];  Service: Orthopedics    PB REVISE KNEE JOINT REPLACE,ALL PARTS Left 2019    Procedure: REVISION, TOTAL ARTHROPLASTY, KNEE, ALL COMPONENTS - POLY EXCHANGE ONLY;  Surgeon: Parmjit Charles M.D.;  Location: [facility];  Service: Orthopedics    IRRIGATION & DEBRIDEMENT ORTHO  2019    Procedure: IRRIGATION AND DEBRIDEMENT, WOUND - KNEE;  Surgeon: Parmjit Charles M.D.;  Location: [facility];  Service: Orthopedics    IRRIGATION & DEBRIDEMENT ORTHO Left 2019    Procedure: IRRIGATION AND DEBRIDEMENT, WOUND;  Surgeon: Jesús Wesley M.D.;  Location: [facility];  Service: Orthopedics    ANKLE TOTAL ARTHROPLASTY Right 2018    Procedure: ANKLE TOTAL ARTHROPLASTY;  Surgeon: Jose De Jesus Medrano, " M.D.;  Location: [facility];  Service: Orthopedics    LIGAMENT REPAIR Right 2018    Procedure: LIGAMENT REPAIR- LATERAL;  Surgeon: Jose De Jesus Medrano M.D.;  Location: [facility];  Service: Orthopedics    LUMBAR LAMINECTOMY DISKECTOMY Right 2017    Procedure: LUMBAR LAMINECTOMY DISKECTOMY - RE-DO OPEN L4-S1 LAMINOTOMIES;  Surgeon: Clint Garsia M.D.;  Location: [facility];  Service:     FORAMINOTOMY  2017    Procedure: FORAMINOTOMY;  Surgeon: Clint Garsia M.D.;  Location: [facility];  Service:     HARDWARE REMOVAL NEURO  2017    Procedure: HARDWARE REMOVAL NEURO ;  Surgeon: Clint Garsia M.D.;  Location: [facility];  Service:     LAPAROSCOPY ROBOTIC XI  10/26/2016    Procedure: LAPAROSCOPY FOR LIVER CYST MARSUPIALIZATION AND RESECTION LIVER WALL CYST;  Surgeon: Frank Corona M.D.;  Location: [facility];  Service:     FUSION, SPINE, LUMBAR, PLIF  9/10/2015    Procedure: LUMBAR FUSION POSTERIOR L5-S1 ;  Surgeon: Clint Garsia M.D.;  Location: [facility];  Service:     LUMBAR LAMINECTOMY DISKECTOMY  9/10/2015    Procedure: LUMBAR LAMINECTOMY ;  Surgeon: Clint Garsia M.D.;  Location: [facility];  Service:     LAMINOTOMY  9/10/2015    Procedure: LAMINOTOMY;  Surgeon: Clint Garsia M.D.;  Location: [facility];  Service:     OTHER      ANTERIOR NECK FUSION C5-7    OTHER      BLEEDING ULCERS    CATARACT EXTRACTION WITH IOL       Family History   Problem Relation Age of Onset    Stroke Mother     Heart Disease Mother     Stroke Father     Heart Disease Father      Social History     Tobacco Use   Smoking Status Former    Current packs/day: 0.00    Average packs/day: 1.5 packs/day for 13.0 years (19.5 ttl pk-yrs)    Types: Cigarettes    Start date: 1965    Quit date: 1978    Years since quittin.6   Smokeless Tobacco Never     No Known  "Allergies  Outpatient Encounter Medications as of 8/7/2024   Medication Sig Dispense Refill    sodium chloride (SALT) 1 GM Tab       atorvastatin (LIPITOR) 20 MG Tab TAKE 1 TABLET EVERY EVENING 90 Tablet 0    amLODIPine (NORVASC) 10 MG Tab TAKE 1 TABLET EVERY EVENING 90 Tablet 0    losartan (COZAAR) 50 MG Tab TAKE 1 TABLET EVERY DAY 90 Tablet 0    prednisoLONE acetate (PRED FORTE) 1 % Suspension 1 Drop.      carvedilol (COREG) 25 MG Tab Take 1 Tablet by mouth 2 times a day with meals. 200 Tablet 3    aspirin EC (ECOTRIN) 81 MG Tablet Delayed Response Take 1 Tablet by mouth every day. 90 Tablet 3    gabapentin (NEURONTIN) 800 MG tablet Take 800 mg by mouth 2 times a day.      Multiple Vitamins-Minerals (CENTRUM SILVER 50+MEN) Tab Take 1 Tab by mouth every morning.      LUMIGAN 0.01 % Solution Administer 1 Drop into the left eye at bedtime.  3    PROAIR  (90 Base) MCG/ACT Aero Soln inhalation aerosol Inhale 2 Puffs by mouth every four hours as needed for Shortness of Breath.      metFORMIN (GLUCOPHAGE) 500 MG Tab Take 500 mg by mouth 2 times a day.      sertraline (ZOLOFT) 50 MG Tab       traZODone (DESYREL) 50 MG Tab Take 50 mg by mouth every evening. Only taking 1/2 tab of 25mg       No facility-administered encounter medications on file as of 8/7/2024.     Review of Systems   Constitutional:  Negative for fever and malaise/fatigue.   Respiratory:  Negative for cough and shortness of breath.    Cardiovascular:  Negative for chest pain, palpitations, orthopnea, claudication, leg swelling and PND.   Gastrointestinal:  Negative for abdominal pain.   Musculoskeletal:  Negative for back pain, joint pain and myalgias.   Neurological:  Negative for dizziness and sensory change.   All other systems reviewed and are negative.       Objective:   /68 (BP Location: Left arm, Patient Position: Sitting, BP Cuff Size: Adult)   Pulse (!) 59   Resp 16   Ht 1.702 m (5' 7\")   Wt 69.9 kg (154 lb)   SpO2 97%   BMI " 24.12 kg/m²     Physical Exam  Vitals and nursing note reviewed.   Constitutional:       General: He is not in acute distress.     Appearance: Normal appearance. He is well-developed and normal weight.   HENT:      Head: Normocephalic and atraumatic.   Eyes:      Pupils: Pupils are equal, round, and reactive to light.   Neck:      Vascular: No JVD.   Cardiovascular:      Rate and Rhythm: Normal rate and regular rhythm.      Pulses: Normal pulses.      Heart sounds: Normal heart sounds. No murmur heard.  Pulmonary:      Effort: Pulmonary effort is normal. No respiratory distress.      Breath sounds: Normal breath sounds. No wheezing or rales.   Abdominal:      General: Bowel sounds are normal.      Palpations: Abdomen is soft.   Musculoskeletal:         General: Normal range of motion.      Cervical back: Normal range of motion.   Skin:     General: Skin is warm and dry.      Capillary Refill: Capillary refill takes less than 2 seconds.   Neurological:      General: No focal deficit present.      Mental Status: He is alert and oriented to person, place, and time. Mental status is at baseline.   Psychiatric:         Mood and Affect: Mood normal.         Behavior: Behavior normal.         Thought Content: Thought content normal.         Judgment: Judgment normal.       Assessment:     1. Aortic valve insufficiency, etiology of cardiac valve disease unspecified        2. Coronary artery disease due to lipid rich plaque        3. Essential hypertension        4. Mixed hyperlipidemia        5. Type 2 diabetes mellitus with diabetic polyneuropathy, without long-term current use of insulin ([facility])          Medical Decision Making:  Today's Assessment / Status / Plan:     1. HTN  -great control at home on losartan, coreg, and amlodipine  -refilled all medications for 1 year   -he will follow BP at home    3. HLD with CAD with + CT scoring in '15, aortic insufficiency moderate  -stress imaging in '17 with no ischemia  -follow  clinically, currently no angina or DAWSON  -cont ASA 81 mg and statin  -echo unremarkable except mod AI, check annually, ordered for next year, remains asymptomatic    4. DM type II with neuropathy  -managed well per patient    Patient is to follow up with Isabela MCFADDEN in 1 year with annual labs from PCP and echo next year.

## 2025-05-27 DIAGNOSIS — I10 ESSENTIAL HYPERTENSION: ICD-10-CM

## 2025-05-28 RX ORDER — CARVEDILOL 25 MG/1
25 TABLET ORAL 2 TIMES DAILY WITH MEALS
Qty: 180 TABLET | Refills: 0 | Status: SHIPPED | OUTPATIENT
Start: 2025-05-28

## 2025-05-28 NOTE — TELEPHONE ENCOUNTER
Is the patient due for a refill? Yes    Was the patient seen the last 15 months? Yes    Date of last office visit: 08/07/2024    Does the patient have an upcoming appointment?  Yes   If yes, When? 08/07/2025    Provider to refill:SC    Does the patient have assisted Plus and need 100-day supply? (This applies to ALL medications) Patient does not have SCP

## 2025-06-06 DIAGNOSIS — E78.2 MIXED HYPERLIPIDEMIA: ICD-10-CM

## 2025-06-06 DIAGNOSIS — I10 ESSENTIAL HYPERTENSION: ICD-10-CM

## 2025-06-09 DIAGNOSIS — E78.2 MIXED HYPERLIPIDEMIA: ICD-10-CM

## 2025-06-09 DIAGNOSIS — I10 ESSENTIAL HYPERTENSION: Primary | ICD-10-CM

## 2025-06-09 RX ORDER — LOSARTAN POTASSIUM 50 MG/1
50 TABLET ORAL
Qty: 90 TABLET | Refills: 0 | Status: SHIPPED | OUTPATIENT
Start: 2025-06-09

## 2025-06-09 RX ORDER — AMLODIPINE BESYLATE 10 MG/1
10 TABLET ORAL EVERY EVENING
Qty: 90 TABLET | Refills: 0 | Status: SHIPPED | OUTPATIENT
Start: 2025-06-09

## 2025-06-09 RX ORDER — ATORVASTATIN CALCIUM 20 MG/1
20 TABLET, FILM COATED ORAL EVERY EVENING
Qty: 90 TABLET | Refills: 0 | Status: SHIPPED | OUTPATIENT
Start: 2025-06-09

## 2025-06-09 NOTE — TELEPHONE ENCOUNTER
Is the patient due for a refill? Yes    Was the patient seen the last 15 months? Yes    Date of last office visit: 08/07/2024    Does the patient have an upcoming appointment?  Yes   If yes, When? 08/07/2025    Provider to refill:SC    Does the patient have [facility] and need 100-day supply? (This applies to ALL medications) Patient does not have SCP

## 2025-07-18 ENCOUNTER — HOSPITAL ENCOUNTER (OUTPATIENT)
Dept: LAB | Facility: MEDICAL CENTER | Age: 80
End: 2025-07-18
Attending: NURSE PRACTITIONER
Payer: COMMERCIAL

## 2025-07-18 DIAGNOSIS — I10 ESSENTIAL HYPERTENSION: ICD-10-CM

## 2025-07-18 DIAGNOSIS — E78.2 MIXED HYPERLIPIDEMIA: ICD-10-CM

## 2025-07-18 LAB
ANION GAP SERPL CALC-SCNC: 10 MMOL/L (ref 7–16)
BUN SERPL-MCNC: 11 MG/DL (ref 8–22)
CALCIUM SERPL-MCNC: 9.1 MG/DL (ref 8.5–10.5)
CHLORIDE SERPL-SCNC: 98 MMOL/L (ref 96–112)
CHOLEST SERPL-MCNC: 132 MG/DL (ref 100–199)
CO2 SERPL-SCNC: 25 MMOL/L (ref 20–33)
CREAT SERPL-MCNC: 0.75 MG/DL (ref 0.5–1.4)
FASTING STATUS PATIENT QL REPORTED: NORMAL
GFR SERPLBLD CREATININE-BSD FMLA CKD-EPI: 92 ML/MIN/1.73 M 2
GLUCOSE SERPL-MCNC: 105 MG/DL (ref 65–99)
HDLC SERPL-MCNC: 62 MG/DL
LDLC SERPL CALC-MCNC: 61 MG/DL
POTASSIUM SERPL-SCNC: 4.5 MMOL/L (ref 3.6–5.5)
SODIUM SERPL-SCNC: 133 MMOL/L (ref 135–145)
TRIGL SERPL-MCNC: 45 MG/DL (ref 0–149)

## 2025-07-18 PROCEDURE — 80048 BASIC METABOLIC PNL TOTAL CA: CPT

## 2025-07-18 PROCEDURE — 36415 COLL VENOUS BLD VENIPUNCTURE: CPT

## 2025-07-18 PROCEDURE — 80061 LIPID PANEL: CPT

## 2025-07-31 ENCOUNTER — TELEPHONE (OUTPATIENT)
Dept: CARDIOLOGY | Facility: MEDICAL CENTER | Age: 80
End: 2025-07-31
Payer: COMMERCIAL

## 2025-07-31 NOTE — TELEPHONE ENCOUNTER
Confirmed in the Appt Desk patient has a pending appointment for imaging on 8/5/25.    Patient has completed  labs prior to upcoming Cardiology appointment.

## 2025-08-05 ENCOUNTER — ANCILLARY PROCEDURE (OUTPATIENT)
Facility: MEDICAL CENTER | Age: 80
End: 2025-08-05
Attending: NURSE PRACTITIONER
Payer: COMMERCIAL

## 2025-08-05 DIAGNOSIS — I35.1 AORTIC VALVE INSUFFICIENCY, ETIOLOGY OF CARDIAC VALVE DISEASE UNSPECIFIED: ICD-10-CM

## 2025-08-05 PROCEDURE — 93306 TTE W/DOPPLER COMPLETE: CPT

## 2025-08-07 ENCOUNTER — OFFICE VISIT (OUTPATIENT)
Dept: CARDIOLOGY | Facility: MEDICAL CENTER | Age: 80
End: 2025-08-07
Attending: NURSE PRACTITIONER
Payer: COMMERCIAL

## 2025-08-07 VITALS
DIASTOLIC BLOOD PRESSURE: 62 MMHG | RESPIRATION RATE: 14 BRPM | HEIGHT: 67 IN | WEIGHT: 148 LBS | BODY MASS INDEX: 23.23 KG/M2 | SYSTOLIC BLOOD PRESSURE: 112 MMHG | OXYGEN SATURATION: 99 % | HEART RATE: 53 BPM

## 2025-08-07 DIAGNOSIS — E78.2 MIXED HYPERLIPIDEMIA: ICD-10-CM

## 2025-08-07 DIAGNOSIS — E11.42 TYPE 2 DIABETES MELLITUS WITH DIABETIC POLYNEUROPATHY, WITHOUT LONG-TERM CURRENT USE OF INSULIN (HCC): ICD-10-CM

## 2025-08-07 DIAGNOSIS — I10 ESSENTIAL HYPERTENSION: ICD-10-CM

## 2025-08-07 DIAGNOSIS — I35.1 AORTIC VALVE INSUFFICIENCY, ETIOLOGY OF CARDIAC VALVE DISEASE UNSPECIFIED: Primary | ICD-10-CM

## 2025-08-07 DIAGNOSIS — I25.83 CORONARY ARTERY DISEASE DUE TO LIPID RICH PLAQUE: ICD-10-CM

## 2025-08-07 DIAGNOSIS — I25.10 CORONARY ARTERY DISEASE DUE TO LIPID RICH PLAQUE: ICD-10-CM

## 2025-08-07 LAB
LV EJECT FRACT MOD 2C 99903: 56.28
LV EJECT FRACT MOD 4C 99902: 64.02
LV EJECT FRACT MOD BP 99901: 60.65

## 2025-08-07 PROCEDURE — 99214 OFFICE O/P EST MOD 30 MIN: CPT | Performed by: NURSE PRACTITIONER

## 2025-08-07 PROCEDURE — 3078F DIAST BP <80 MM HG: CPT | Performed by: NURSE PRACTITIONER

## 2025-08-07 PROCEDURE — 99212 OFFICE O/P EST SF 10 MIN: CPT | Performed by: NURSE PRACTITIONER

## 2025-08-07 PROCEDURE — 93306 TTE W/DOPPLER COMPLETE: CPT | Mod: 26 | Performed by: STUDENT IN AN ORGANIZED HEALTH CARE EDUCATION/TRAINING PROGRAM

## 2025-08-07 PROCEDURE — 3074F SYST BP LT 130 MM HG: CPT | Performed by: NURSE PRACTITIONER

## 2025-08-07 RX ORDER — ATORVASTATIN CALCIUM 20 MG/1
20 TABLET, FILM COATED ORAL EVERY EVENING
Qty: 90 TABLET | Refills: 3 | Status: SHIPPED | OUTPATIENT
Start: 2025-08-07

## 2025-08-07 RX ORDER — CARVEDILOL 25 MG/1
25 TABLET ORAL 2 TIMES DAILY WITH MEALS
Qty: 180 TABLET | Refills: 3 | Status: SHIPPED | OUTPATIENT
Start: 2025-08-07

## 2025-08-07 RX ORDER — LOSARTAN POTASSIUM 50 MG/1
50 TABLET ORAL
Qty: 90 TABLET | Refills: 3 | Status: SHIPPED | OUTPATIENT
Start: 2025-08-07

## 2025-08-07 RX ORDER — AMLODIPINE BESYLATE 10 MG/1
10 TABLET ORAL EVERY EVENING
Qty: 90 TABLET | Refills: 3 | Status: SHIPPED | OUTPATIENT
Start: 2025-08-07

## 2025-08-07 ASSESSMENT — ENCOUNTER SYMPTOMS
MYALGIAS: 0
PND: 0
DIZZINESS: 0
ORTHOPNEA: 0
FEVER: 0
ABDOMINAL PAIN: 0
BACK PAIN: 0
SENSORY CHANGE: 0
SHORTNESS OF BREATH: 0
CLAUDICATION: 0
PALPITATIONS: 0
COUGH: 0

## 2025-08-19 ENCOUNTER — OFFICE VISIT (OUTPATIENT)
Dept: URGENT CARE | Facility: PHYSICIAN GROUP | Age: 80
End: 2025-08-19
Payer: COMMERCIAL

## 2025-08-19 VITALS
SYSTOLIC BLOOD PRESSURE: 130 MMHG | DIASTOLIC BLOOD PRESSURE: 68 MMHG | HEART RATE: 58 BPM | WEIGHT: 152 LBS | HEIGHT: 67 IN | OXYGEN SATURATION: 96 % | TEMPERATURE: 97.3 F | RESPIRATION RATE: 14 BRPM | BODY MASS INDEX: 23.86 KG/M2

## 2025-08-19 DIAGNOSIS — H10.31 ACUTE CONJUNCTIVITIS OF RIGHT EYE, UNSPECIFIED ACUTE CONJUNCTIVITIS TYPE: Primary | ICD-10-CM

## 2025-08-19 PROCEDURE — 3075F SYST BP GE 130 - 139MM HG: CPT | Performed by: STUDENT IN AN ORGANIZED HEALTH CARE EDUCATION/TRAINING PROGRAM

## 2025-08-19 PROCEDURE — 99203 OFFICE O/P NEW LOW 30 MIN: CPT | Performed by: STUDENT IN AN ORGANIZED HEALTH CARE EDUCATION/TRAINING PROGRAM

## 2025-08-19 PROCEDURE — 3078F DIAST BP <80 MM HG: CPT | Performed by: STUDENT IN AN ORGANIZED HEALTH CARE EDUCATION/TRAINING PROGRAM

## (undated) DEVICE — HANDPIECE 10FT INTPLS SCT PLS IRRIGATION HAND CONTROL SET (6/PK)

## (undated) DEVICE — GLOVE BIOGEL INDICATOR SZ 7SURGICAL PF LTX - (50/BX 4BX/CA)

## (undated) DEVICE — SET LEADWIRE 5 LEAD BEDSIDE DISPOSABLE ECG (1SET OF 5/EA)

## (undated) DEVICE — ELECTRODE 850 FOAM ADHESIVE - HYDROGEL RADIOTRNSPRNT (50/PK)

## (undated) DEVICE — SUTURE 1 VICRYL PLUS CTX - 8 X 18 INCH (12/BX)

## (undated) DEVICE — SLEEVE, VASO, THIGH, MED

## (undated) DEVICE — HEAD HOLDER JUNIOR/ADULT

## (undated) DEVICE — KIT ANESTHESIA W/CIRCUIT & 3/LT BAG W/FILTER (20EA/CA)

## (undated) DEVICE — PACK JACKSON TABLE KIT W/OUT - HR (6EA/CA)

## (undated) DEVICE — GOWN WARMING STANDARD FLEX - (30/CA)

## (undated) DEVICE — KIT ROOM DECONTAMINATION

## (undated) DEVICE — DRAPE C ARMOR (12EA/CA)

## (undated) DEVICE — BOVIE BLADE COATED - (50/PK)

## (undated) DEVICE — SODIUM CHL IRRIGATION 0.9% 1000ML (12EA/CA)

## (undated) DEVICE — DRESSING 3X8 ADAPTIC GAUZE - NON-ADHERING (36/PK 6PK/BX)

## (undated) DEVICE — CANISTER SUCTION 3000ML MECHANICAL FILTER AUTO SHUTOFF MEDI-VAC NONSTERILE LF DISP  (40EA/CA)

## (undated) DEVICE — CONTAINER SPECIMEN BAG OR - STERILE 4 OZ W/LID (100EA/CA)

## (undated) DEVICE — SUCTION INSTRUMENT YANKAUER BULBOUS TIP W/O VENT (50EA/CA)

## (undated) DEVICE — PROTECTOR ULNA NERVE - (36PR/CA)

## (undated) DEVICE — BLADE SURGICAL CLIPPER - (50EA/CA)

## (undated) DEVICE — TUBING CLEARLINK DUO-VENT - C-FLO (48EA/CA)

## (undated) DEVICE — DRAPE STRLE REG TOWEL 18X24 - (10/BX 4BX/CA)"

## (undated) DEVICE — MIXER BONE CEMENT REVOLUTION - W/FEMORAL PRESSURIZER (6/CA)

## (undated) DEVICE — SPLINT PLASTER 5 IN X 30 IN - (50EA/BX 6BX/CA)

## (undated) DEVICE — DRAPE C-ARM LARGE 41IN X 74 IN - (10/BX 2BX/CA)

## (undated) DEVICE — NEPTUNE 4 PORT MANIFOLD - (20/PK)

## (undated) DEVICE — GLOVE BIOGEL SZ 6.5 SURGICAL PF LTX (50PR/BX 4BX/CA)

## (undated) DEVICE — GLOVE BIOGEL PI ORTHO SZ 7 PF LF (40PR/BX)

## (undated) DEVICE — TOURNIQUET CUFF 34 X 4 ONE PORT DISP - STERILE (10/BX)

## (undated) DEVICE — DETERGENT RENUZYME PLUS 10 OZ PACKET (50/BX)

## (undated) DEVICE — MASK, LARYNGEAL AIRWAY #4

## (undated) DEVICE — PACK LOWER EXTREMITY - (2/CA)

## (undated) DEVICE — SYRINGE ASEPTO - (50EA/CA

## (undated) DEVICE — NEEDLE W/FACET TIP DULL VERSION W/STIMULATION CABLE SONOPLEX 21G X 4 (10/EA)"

## (undated) DEVICE — TOURNIQUET CUFF 24 X 4 ONE PORT - STERILE (10/BX)

## (undated) DEVICE — BLADE SAW 90X25X1.37MM SAGITTAL DUAL CUT

## (undated) DEVICE — PADDING CAST 6 IN STERILE - 6 X 4 YDS (24/CA)

## (undated) DEVICE — SYRINGE SAFETY 10 ML 18 GA X 1 1/2 BLUNT LL (100/BX 4BX/CA)

## (undated) DEVICE — SUTURE GENERAL

## (undated) DEVICE — DRAPE LARGE 3 QUARTER - (20/CA)

## (undated) DEVICE — GLOVE BIOGEL INDICATOR SZ 8 SURGICAL PF LTX - (50/BX 4BX/CA)

## (undated) DEVICE — DRAPE IOBAN II INCISE 23X17 - (10EA/BX 4BX/CA)

## (undated) DEVICE — GLOVE BIOGEL PI INDICATOR SZ 8.0 SURGICAL PF LF -(50/BX 4BX/CA)

## (undated) DEVICE — WRAP CO-FLEX 4IN X 5YD STERIL - SELF-ADHERENT (18/CA)

## (undated) DEVICE — COTTON ROLL 1 POUND BIOSEAL - (5RL/CA)

## (undated) DEVICE — CHLORAPREP 26 ML APPLICATOR - ORANGE TINT(25/CA)

## (undated) DEVICE — HEADREST PRONEVIEW LARGE - (10/CA)

## (undated) DEVICE — SPONGE GAUZESTER 4 X 4 4PLY - (128PK/CA)

## (undated) DEVICE — SET EXTENSION WITH 2 PORTS (48EA/CA) ***PART #2C8610 IS A SUBSTITUTE*****

## (undated) DEVICE — CLOSURE SKIN STRIP 1/2 X 4 IN - (STERI STRIP) (50/BX 4BX/CA)

## (undated) DEVICE — SYRINGE SAFETY 3 ML 18 GA X 1 1/2 BLUNT LL (100/BX 8BX/CA)

## (undated) DEVICE — ELECTRODE DUAL RETURN W/ CORD - (50/PK)

## (undated) DEVICE — SUCTION INSTRUMENT YANKAUER OPEN TIP W/O VENT (50EA/CA)

## (undated) DEVICE — SUTURE 0 VICRYL PLUS CT-1 - 8 X 18 INCH (12/BX)

## (undated) DEVICE — DRAPE MICROSCOPE X-LONG (10EA/CA)

## (undated) DEVICE — STOCKINET BIAS 6 IN STERILE - (20/CA)

## (undated) DEVICE — GLOVE BIOGEL ECLIPSE PF LATEX SIZE 7.5

## (undated) DEVICE — SUTURE 3-0 MONOCRYL PLUS PS-1 - 27 INCH (36/BX)

## (undated) DEVICE — DRAPE LOWER EXTREMETY - (6/CA)

## (undated) DEVICE — NEEDLE NON-SAFETY HYPO 21 GA X 1 1/2 IN HYPO (100/BX)

## (undated) DEVICE — LACTATED RINGERS INJ 1000 ML - (14EA/CA 60CA/PF)

## (undated) DEVICE — PACK TOTAL KNEE  (1/CA)

## (undated) DEVICE — SUTURE 3-0 ETHILON PS-1 (36PK/BX)

## (undated) DEVICE — PACK NEURO - (2EA/CA)

## (undated) DEVICE — STERI STRIP COMPOUND BENZOIN - TINCTURE 0.6ML WITH APPLICATOR (40EA/BX)

## (undated) DEVICE — LACTATED RINGERS INJ. 500 ML - (24EA/CA)

## (undated) DEVICE — GLOVE BIOGEL PI INDICATOR SZ 6.5 SURGICAL PF LF - (50/BX 4BX/CA)

## (undated) DEVICE — GLOVE BIOGEL ECLIPSE  PF LATEX SIZE 6.5 (50PR/BX)

## (undated) DEVICE — SODIUM CHL. IRRIGATION 0.9% 3000ML (4EA/CA 65CA/PF)

## (undated) DEVICE — WATER IRRIG. STER. 1500 ML - (9/CA)

## (undated) DEVICE — BLADE SURGICAL #15 - (50/BX 3BX/CA)

## (undated) DEVICE — MASK ANESTHESIA ADULT  - (100/CA)

## (undated) DEVICE — FIBRILLAR SURGICEL 4X4 - 10/CA

## (undated) DEVICE — INSTRUMENT KIT

## (undated) DEVICE — SUTURE 1 VICRYL PLUS CT-1 - 18 INCH (12/BX)

## (undated) DEVICE — SUTURE 3-0 VICRYL PLUS SH - 8X 18 INCH (12/BX)

## (undated) DEVICE — STOCKINET TUBULAR 6IN STERILE - 6 X 48YDS (25/CA)

## (undated) DEVICE — GLOVE BIOGEL ECLIPSE PF LATEX SIZE 8.0  (50PR/BX)

## (undated) DEVICE — GOWN SURGEONS X-LARGE - DISP. (30/CA)

## (undated) DEVICE — GLOVE BIOGEL SZ 7 SURGICAL PF LTX - (50PR/BX 4BX/CA)

## (undated) DEVICE — DRAPE LAPAROTOMY T SHEET - (12EA/CA)

## (undated) DEVICE — SYRINGE 30 ML LL (56/BX)

## (undated) DEVICE — PAD LAP STERILE 18 X 18 - (5/PK 40PK/CA)

## (undated) DEVICE — LENS/HOOD FOR SPACESUIT - (32/PK) PEEL AWAY FACE

## (undated) DEVICE — SUTURE 0 VICRYL PLUS CT-2 - 8 X 18 INCH (12/BX)

## (undated) DEVICE — PAD PREP 24 X 48 CUFFED - (100/CA)

## (undated) DEVICE — GLOVE BIOGEL INDICATOR SZ 7.5 SURGICAL PF LTX - (50PR/BX 4BX/CA)

## (undated) DEVICE — ARMREST CRADLE FOAM - (2PR/PK 12PR/CA)

## (undated) DEVICE — KIT EVACUATER 3 SPRING PVC LF 1/8 DRAIN SIZE (10EA/CA)"

## (undated) DEVICE — TIP INTPLS HFLO ML ORFC BTRY - (12/CS)  FOR SURGILAV

## (undated) DEVICE — BLADE SAW RECIPROCATING 8.0 X 0.98 X 43MM

## (undated) DEVICE — LIGHT SOURCE MIS 12FT

## (undated) DEVICE — SENSOR SPO2 NEO LNCS ADHESIVE (20/BX) SEE USER NOTES

## (undated) DEVICE — BLOCK

## (undated) DEVICE — DRESSING ABDOMINAL PAD STERILE 8 X 10" (360EA/CA)"

## (undated) DEVICE — GLOVE BIOGEL SZ 8 SURGICAL PF LTX - (50PR/BX 4BX/CA)

## (undated) DEVICE — PENCIL ELECTSURG 10FT BTN SWH - (50/CA)

## (undated) DEVICE — BLADE SAGITTAL SAW 9.4MM X 25.5MM X .4MM FINE TOOTH (1/EA)

## (undated) DEVICE — BLADE RECIPROCATING 12.7 X 78.7 X 1.0MM (1/EA)

## (undated) DEVICE — SUTURE 2-0 MONOCRYL PLUS UNDYED CT-1 1 X 36 (36EA/BX)"

## (undated) DEVICE — BANDAGE ELASTIC 6 HONEYCOMB - 6X5YD LF (20/CA)"

## (undated) DEVICE — GLOVE BIOGEL PI INDICATOR SZ 7.0 SURGICAL PF LF - (50/BX 4BX/CA)

## (undated) DEVICE — GOWN SURGICAL XX-LARGE - (28EA/CA) SIRUS NON REINFORCED

## (undated) DEVICE — DRAPE U ORTHOPEDIC - (10/BX)

## (undated) DEVICE — SUTURE 2-0 VICRYL PLUS CT-1 - 8 X 18 INCH(12/BX)

## (undated) DEVICE — DISPOSABLE WOUND VAC PICO 10 X 30 CM - WOUND CARE (3/CA)

## (undated) DEVICE — TRAY E CATH W/INDWELLING CATHETER W/STIMULATION CABLE AND FIXOCATH 18G X 3.25 ---ORDER 10 EACHS----"

## (undated) DEVICE — KIT SURGIFLO W/OUT THROMBIN - (6EA/CA)

## (undated) DEVICE — TUBING C&T SET FLYING LEADS DRAIN TUBING (10EA/BX)

## (undated) DEVICE — BLADE SAGITTAL SAW DUAL CUT 25.0 X 90.0 X 1.27MM (1/EA)

## (undated) DEVICE — BLADE SAW OSCILLATING 8.0 X 1.27 X 70MM

## (undated) DEVICE — DERMABOND ADVANCED - (12EA/BX)

## (undated) DEVICE — CATHETER EPIDURAL PORTEX (10/BX) ***DISCONTINUED LOOKING INTO SUB***

## (undated) DEVICE — TUBE CONNECT SUCTION CLEAR 120 X 1/4" (50EA/CA)"

## (undated) DEVICE — GLOVE BIOGEL SZ 7.5 SURGICAL PF LTX - (50PR/BX 4BX/CA)

## (undated) DEVICE — DISPOSABLE WOUND VAC PICO 10 X 20 CM - WOUND CARE (3/CA)

## (undated) DEVICE — GLOVE BIOGEL INDICATOR SZ 6.5 SURGICAL PF LTX - (50PR/BX 4BX/CA)

## (undated) DEVICE — PEROXIDE HYDROGEN 3% 32 OZ. (12EA/BX)

## (undated) DEVICE — MIDAS LUBRICATOR DIFFUSER PACK (4EA/CA)

## (undated) DEVICE — LEAD SET 6 DISP. EKG NIHON KOHDEN (100EA/CA) [9859].

## (undated) DEVICE — GOWN SURGEONS LARGE - (32/CA)

## (undated) DEVICE — TRAY SKIN SCRUB PVP WET (20EA/CA) PART #DYND70356 DISCONTINUED

## (undated) DEVICE — TOOL DISSECT MATCH HEAD